# Patient Record
Sex: FEMALE | Race: WHITE | NOT HISPANIC OR LATINO | Employment: OTHER | URBAN - METROPOLITAN AREA
[De-identification: names, ages, dates, MRNs, and addresses within clinical notes are randomized per-mention and may not be internally consistent; named-entity substitution may affect disease eponyms.]

---

## 2017-01-17 ENCOUNTER — OFFICE VISIT (OUTPATIENT)
Dept: ORTHOPEDICS | Facility: CLINIC | Age: 77
End: 2017-01-17

## 2017-01-17 VITALS — BODY MASS INDEX: 23.49 KG/M2 | WEIGHT: 155 LBS | HEIGHT: 68 IN

## 2017-01-17 DIAGNOSIS — M19.041 OSTEOARTHRITIS OF FINGER, RIGHT: ICD-10-CM

## 2017-01-17 DIAGNOSIS — M25.641 JOINT STIFFNESS OF HAND, RIGHT: Primary | ICD-10-CM

## 2017-01-17 NOTE — NURSING NOTE
"Reason For Visit:   Chief Complaint   Patient presents with     Consult     Degenerative changes in Right hand. Referral Dr. Curran.        Primary MD: Jeramie Curran  Ref. MD: same    Age: 76 year old    ?  No      Ht 1.727 m (5' 8\")  Wt 70.308 kg (155 lb)  BMI 23.57 kg/m2      Pain Assessment  Patient Currently in Pain: Yes  0-10 Pain Scale: 2  Primary Pain Location: Hand  Pain Orientation: Right  Pain Descriptors: Aching, Discomfort  Alleviating Factors: Rest (avoiding what makes it worse)  Aggravating Factors: Movement    Hand Dominance Evaluation  Hand Dominance: Right      force  R hand pincher force: 3.629 kg (8 lb)  R hand  level 1 force: 20.412 kg (45 lb)  R hand  level 3 force: 24.948 kg (55 lb)  R hand  level 5 force: 18.144 kg (40 lb)  L hand pincher force: 3.629 kg (8 lb)  L hand   level 1 force: 15.876 kg (35 lb)  L hand  level 3 force: 20.412 kg (45 lb)  L hand  level 5 force: 18.144 kg (40 lb)    QuickDASH Assessment  QuickDASH Main 1/17/2017   1.Open a tight or new jar. Moderate difficulty   2. Do heavy household chores (e.g., wash walls, floors) Mild difficulty   3. Carry a shopping bag or briefcase. Mild difficulty   4. Wash your back. No difficulty   5. Use a knife to cut food. Mild difficulty   6. Recreational activities in which you take some force or impact through your arm, shoulder or hand (e.g., golf, hammering, tennis, etc.). Moderate difficulty   7. During the past week, to what extent has your arm, shoulder or hand problem interfered with your normal social activities with family, friends, neighbours or groups? Moderately   8. During the past week, were you limited in your work or other regular daily activities as a result of your arm, shoulder or hand problem? Slightly limited   9. Arm, shoulder or hand pain. Moderate   10.Tingling (pins and needles) in your arm,shoulder or hand. None   11. During the past week, how much difficulty have you " had sleeping because of the pain in your arm, shoulder or hand? ([place] number) Mild difficulty   Quickdash Ability Score 29.54          Current Outpatient Prescriptions   Medication Sig Dispense Refill     diltiazem (DILACOR XR) 240 MG 24 hr ER capsule Take 1 capsule (240 mg) by mouth daily 90 capsule 3     atorvastatin (LIPITOR) 10 MG tablet Take 1 tablet (10 mg) by mouth daily 90 tablet 3     omeprazole (PRILOSEC) 40 MG capsule Take 1 capsule (40 mg) by mouth daily as needed 90 capsule 3     diclofenac (VOLTAREN) 1 % GEL Apply 4 grams to knees or 2 grams to hands four times daily using enclosed dosing card. 100 g 1     clobetasol (TEMOVATE) 0.05 % ointment Apply sparingly to affected area 2 x daily for14 days. Then, apply small amount to affected area twice weekly for maintenance. 30 g 2     diphenhydrAMINE (BENADRYL) 25 MG tablet Take 1 tablet (25 mg) by mouth nightly as needed for itching or allergies 20 tablet 0     Probiotic Product (PROBIOTIC FORMULA PO) Take by mouth as needed       ascorbic acid (VITAMIN C) 500 MG tablet Take 500 mg by mouth as needed.       Cholecalciferol (VITAMIN D3) 1000 UNIT TABS Take 1 tablet by mouth daily.       ASPIRIN 81 MG OR TABS 1 tab po QD (Once per day) 100 3       Allergies   Allergen Reactions     Dilaudid [Hydromorphone]      dizziness     Senna Hives

## 2017-01-17 NOTE — Clinical Note
1/17/2017       RE: Shahana Donaldson  [address] [facility] [address]     Dear Colleague,    Thank you for referring your patient, Shahana Donaldson, to the [facility] at [facility]. Please see a copy of my visit note below.     Dictation on: 01/17/2017  2:20 PM by: ISHA AMAYA [[email or web address]]         CHIEF COMPLAINT:  Right hand changes by Dr. Curran.      HISTORY OF PRESENT ILLNESS:  Shahana Donaldson is a very pleasant 76-year-old female, right hand dominant who sustained a nonspecific injury to the right ring finger following a fall in mid-10/2016.  She was walking when she landed onto the back of her hand with a closed fist.  She had some swelling; this slowly worsened to the digits.  The swelling worsened ***she finally went to *** where they removed the *** and cut her ring off.  She then followed up with Dr. Curran PCP on 11/11/2016 where they obtained imaging.  Imaging revealed some very mild changes to the PIP joint of the ring finger and she was told to follow up with hand surgery.  Overall, she has been doing well.  Her only concern is she has some mild swelling and stiffness of the finger that is worse in the morning.  She denies any numbness or tingling.  She takes Ibuprofen and Tylenol occasionally  She has not tried any splinting and has done activity modifications as necessary to relieve her pain.       REVIEW OF SYSTEMS:  A 10-point review of systems otherwise negative unless as noted above in HPI.      QuickDASH assessment ore is 29.54.      PRIOR MEDICAL HISTORY:  Hypertension.  She does have diverticular disease ***  cholesterol.      PAST SURGICAL HISTORY:  Bilateral tubal ligation, ***excision in 1998, a Nissen procedure, hiatal hernia on 01/07/2013, hysterectomy *** 01/2014.      SOCIAL HISTORY:  The patient lives nearby.  She denies smoking and drinking.      ALLERGIES:  Dilaudid and Senna.       PHYSICAL  EXAMINATION:  The patient is alert and oriented x3, in no acute distress.  Breathing is regular and nonlabored.  Focused exam of the right hand reveals mild swelling with some rubor which is limited to the PIP joints.  At *** resting flexed position at rest by approximately 10-15 degrees at the PIP joints.  She has full range of motion available with extension at the DIP and MCP.  She is able to make a full composite fist and there are no signs of malrotation.  She has no signs of instability at full extension or 30 degrees of flexion of the PIP joints with both ulnar and radial deviation.  She has negative Claude's test, negative intrinsic and negative *** test.  She has no pain with palpation along the CMC over the volar or dorsal aspects.  She has negative grind test.      IMAGING:  Images obtained on 11/11/2016 revealed a very mild degenerative changes to the PIP joint of the ring finger.  There is minimal significant ***.  She does have moderate arthritis with osteophytes at the base of the CMC joints.      ASSESSMENT AND PLAN:  76-year-old right-hand dominant female with mild degenerative changes to the PIP joint of the ring finger with the joint stiffness.  She also has radiographic evidence of CMC arthritis on the right, but no evidence of clinical correlation.     We discussed at length with the patient regarding treatment recommendations.  As long as the patient continues to do well, we will plan to observe her or referral to occupational/hand therapy for *** program for right finger PIP joint stiffness.  We will plan on *** as the patient endorses *** .  Otherwise, we will have her follow up p.r.n. with Dr. Dotson.      The patient was seen and discussed with Dr. Dotson who agrees with the above assessment and plan.      Dictated by Charan Perdue MD, Resident   I have personally examined this patient and have reviewed the clinical presentation and progress note with the resident.  I agree with the  treatment plan as outlined.  The plan was formulated with the resident on the day of the resident's dictation.      Again, thank you for allowing me to participate in the care of your patient.      Sincerely,    Rosetta Dotson MD

## 2017-01-17 NOTE — PROGRESS NOTES
CHIEF COMPLAINT:  Right hand changes by Dr. Curran.      HISTORY OF PRESENT ILLNESS:  Shahana Donaldson is a very pleasant 76-year-old female, right hand dominant who sustained a nonspecific injury to the right ring finger following a fall in mid-10/2016.  She was walking when she landed onto the back of her hand with a closed fist.  She had some swelling; this slowly worsened to the digits.  The swelling worsened and she finally went to her PCP where they cut her ring off to allow for swelling.  She then followed up with Dr. Curran on 11/11/2016 where they obtained imaging.  Imaging revealed some very mild changes to the PIP joint of the ring finger and she was told to follow up with hand surgery.  Overall, she has been doing well.  Her only concern is she has some mild swelling and stiffness of the finger that is worse in the morning.  She denies any numbness or tingling.  She takes Ibuprofen and Tylenol occasionally  She has not tried any splinting and has done activity modifications as necessary to relieve her pain.       REVIEW OF SYSTEMS:  A 14-point review of systems was obtained per the digital intake process and is included at the completion of this note.     QuickDASH assessment ore is 29.54.      PAST MEDICAL HISTORY:  Hypertension.  She does have diverticular disease, high cholesterol.      PAST SURGICAL HISTORY:  Bilateral tubal ligation, a Nissen procedure, hiatal hernia on 01/07/2013, hysterectomy 01/2014.      SOCIAL HISTORY:  The patient lives nearby.  She denies smoking and drinking.      MEDICATIONS:   Current Outpatient Prescriptions   Medication     diltiazem (DILACOR XR) 240 MG 24 hr ER capsule     atorvastatin (LIPITOR) 10 MG tablet     omeprazole (PRILOSEC) 40 MG capsule     diclofenac (VOLTAREN) 1 % GEL     clobetasol (TEMOVATE) 0.05 % ointment     diphenhydrAMINE (BENADRYL) 25 MG tablet     Probiotic Product (PROBIOTIC FORMULA PO)     ascorbic acid (VITAMIN C) 500 MG tablet      Cholecalciferol (VITAMIN D3) 1000 UNIT TABS     ASPIRIN 81 MG OR TABS     No current facility-administered medications for this visit.     ALLERGIES:  Dilaudid and Senna.      FAMILY HISTORY: Reviewed and non-contributory.     PHYSICAL EXAMINATION:    BMI: 23.6.  Pain is 2 out of 10 on visual analog scale.  QUICKDASH: 29.54.   strengths: 45/55/40lbs right and 35/45/40lbs left.  Pinch strength is 8lbs bilaterally.  GENERAL: The patient is alert and oriented x3, in no acute distress.    HEENT: Head is normocepahlic and atraumatic.  Extraocular movements are intact.   NECK: Full range of motion without pain.  RESPIRATORY:Breathing is regular and nonlabored.    EXTREMITIES: Focused exam of the right hand reveals mild swelling with some rubor which is limited to the PIP joints.  A resting flexed position at approximately 10-15 degrees at the PIP joints.  She has full range of motion available with extension at the DIP and MCP.  She is able to make a full composite fist and there are no signs of malrotation.  She has no signs of instability at full extension or 30 degrees of flexion of the PIP joints with both ulnar and radial deviation.  She has negative Claude's test, negative intrinsic and negative Bunnel test.  She has no pain with palpation along the CMC over the volar or dorsal aspects.  She has negative grind test.      IMAGING:  Images obtained on 11/11/2016 revealed a very mild degenerative changes to the PIP joint of the ring finger.  She does have moderate arthritis with osteophytes at the base of the CMC joints.      ASSESSMENT AND PLAN:  76-year-old right-hand dominant female with mild degenerative changes to the PIP joint of the ring finger with the joint stiffness.  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     We discussed at length with the patient regarding treatment recommendations.  Intermittent anti-inflammatories are appropriate.  As long as  the patient continues to do well, we will plan to observe her or otherwise refer her to occupational/hand therapy for right finger PIP joint stiffness.  she will follow up p.r.n. with Dr. Dotson.      The patient was seen and discussed with Dr. Dotson who agrees with the above assessment and plan.      Dictated by Charan Perdue MD, Resident   I have personally examined this patient and have reviewed the clinical presentation and progress note with the resident.  I agree with the treatment plan as outlined.  The plan was formulated with the resident on the day of the resident's dictation.

## 2017-01-26 ENCOUNTER — MEDICAL CORRESPONDENCE (OUTPATIENT)
Dept: HEALTH INFORMATION MANAGEMENT | Facility: CLINIC | Age: 77
End: 2017-01-26

## 2017-01-26 ENCOUNTER — THERAPY VISIT (OUTPATIENT)
Dept: OCCUPATIONAL THERAPY | Facility: CLINIC | Age: 77
End: 2017-01-26
Payer: MEDICARE

## 2017-01-26 DIAGNOSIS — M25.641 STIFFNESS OF RIGHT HAND JOINT: Primary | ICD-10-CM

## 2017-01-26 PROCEDURE — 97165 OT EVAL LOW COMPLEX 30 MIN: CPT | Mod: GO | Performed by: OCCUPATIONAL THERAPIST

## 2017-01-26 PROCEDURE — G8985 CARRY GOAL STATUS: HCPCS | Mod: GO | Performed by: OCCUPATIONAL THERAPIST

## 2017-01-26 PROCEDURE — G8984 CARRY CURRENT STATUS: HCPCS | Mod: GO | Performed by: OCCUPATIONAL THERAPIST

## 2017-01-26 PROCEDURE — 97110 THERAPEUTIC EXERCISES: CPT | Mod: GO | Performed by: OCCUPATIONAL THERAPIST

## 2017-01-26 PROCEDURE — 29130 APPL FINGER SPLINT STATIC: CPT | Mod: GO | Performed by: OCCUPATIONAL THERAPIST

## 2017-01-26 NOTE — Clinical Note
"DEPARTMENT OF HEALTH AND HUMAN SERVICES  CENTERS FOR MEDICARE & MEDICAID SERVICES    PLAN/UPDATED PLAN OF PROGRESS FOR OUTPATIENT REHABILITATION    PATIENTS NAME:  Shahana Donaldson   : 1940  [facility]:  [id number]  [id number]   PROVIDER NAME: [facility]  MEDICAL RECORD NUMBER: [id number]   START OF CARE DATE:    SOC Date: 17   TYPE:  OT    PRIMARY/TREATMENT DIAGNOSIS: (Pertinent Medical Diagnosis)  Stiffness of right hand joint    VISITS FROM START OF CARE:  Rxs Used: 1     Hand Therapy Initial Evaluation  Current Date:  2017    Subjective:  Shahana (\"Pat\") EMMA Donaldson is a 76 year old right hand dominant female.  Diagnosis:   Right ring finger injury  DOI:  2016  Therapy referral:  17  Patient reports symptoms of pain, stiffness/loss of motion, weakness/loss of strength and edema of the right ring finger which occurred due to fall in 2016. Since onset symptoms are unchanged.  Special tests:  x-ray negative for fracture.  Previous treatment: None.  General health as reported by patient is good.  Pertinent medical history includes:osteoarthritis, high blood pressure, pain at rest/night, changes in skin color  Medical allergies:  See list in EMR.  Surgical history: other: vision, hysterectomy.  Medication history: high blood pressure, Lipitor.  Current occupation is Retired   Home Tasks: driving, lifting/carrying  Barriers include:none  Prior functional level:  no limitations  Additional Occupational Profile Information (patterns of daily living, interests, values and needs): None  Functional Outcome Measure:  Upper Extremity Functional Index  SCORE:   Column Totals: 60/80  (A lower score indicates greater disability.)    ROM:  Ring Finger 17   AROM(PROM) Right   MCP 0/90   PIP -30/110 (-25/)   DIP 0/35     Strength:   (Measured in pounds)    17   Trials Left Right   1  2  3 42  37  32 48+  44+  48+ "   Average: 37 47     Edema:  Circumference:  (Measured in cm)  Edema  1/26/17   Location: Left Right   P1 ring 6.1   PIP 5.5 6.3   P2 4.8 5.4   Sensation:  WNL throughout all nerve distributions; per patient report  Pain Report:  VAS(0-10) 1/26/17   Least: 2/10   Worst: 5/10   Location:  ring finger and PIP joint  Pain Quality:  Aching and Sharp  Frequency: intermittent sharp pain, constant ache  Pain is worst:  Daytime with use  Exacerbated by:  Gripping, lifting and overuse  Relieved by:  rest  Progression:  Unchanged  Assessment:  Patient presents with symptoms consistent with diagnosis of ring finger stiffness/PIP flexion contracture, with conservative intervention.   Patient's limitations or Problem List includes:  Pain, Decreased ROM/motion, Increased edema and Weakness of the right ring finger which interferes with the patient's ability to perform Self Care Tasks (dressing, eating), Sleep Patterns, Recreational Activities and Household Chores as compared to previous level of function.  Rehab Potential:  Excellent - Return to full activity, no limitations  Patient will benefit from skilled Occupational Therapy to increase ROM, flexibility and overall strength and decrease pain and edema to return to previous activity level and resume normal daily tasks and to reach their rehab potential.  Barriers to Learning:  No barrier  Communication Issues:  Patient appears to be able to clearly communicate and understand verbal and written communication and follow directions correctly.  Assessment of Occupational Performance:  1-3 Performance Deficits  Identified Performance Deficits: dressing, feeding, home establishment and management, meal preparation and cleanup, sleep and leisure activities    Clinical Decision Making (Complexity): Low complexity  Treatment Explanation:  The following has been discussed with the patient:  RX ordered/plan of care  Anticipated outcomes  Possible risks and side effects    PATIENTS NAME:  "Shahana Donaldson   : 1940  Plan:  Frequency:  1 X week, once daily  Duration:  for 2 months  Treatment Plan:    Modalities:  US and Paraffin  Therapeutic Exercise:  AROM, AAROM, PROM, Tendon Gliding, Blocking, Reverse Blocking, Extensor Tracking and Isotonics  Manual Techniques:  Joint mobilization  Orthotic Fabrication:  Static progressive and Dynamic Finger based orthoses  Discharge Plan:  Achieve all LTG.  Independent in home treatment program.  Reach maximal therapeutic benefit.    Home Exercise Program:  Warmth for stiffness  Ice as needed for pain after activity   AROM finger E/F  Tendon gliding with 3 fists  PIP and DIP blocking exercises  Reverse blocking for PIP extension  Tracking on table for PIP extension stretch  PROM composite finger flexion  Wear LMB orthosis 10-20 mins 3-4 x.day    Next Visit:  See in 1 week  Assess response to HEP and LMB  Consider US and cylinder cast for night       Caregiver Signature/Credentials ______________________________ Date ________      Treating Provider: Rosetta Henderson, CATHIER/L, CHT    I have reviewed and certified the need for these services and plan of treatment while under my care.        PHYSICIAN'S SIGNATURE:   _________________________________________  Date___________    Rosetta Dotson    Certification period: Beginning of Cert date period: 17 End of Cert period date: 17     Functional Level Progress Report: Please see attached \"Goal Flow sheet for Functional level.\"    ___X_____ Continue Services or       ________ DC Services                Service dates: SOC Date: 17  to present                                                                       "

## 2017-01-26 NOTE — Clinical Note
"DEPARTMENT OF HEALTH AND HUMAN SERVICES  CENTERS FOR MEDICARE & MEDICAID SERVICES    PLAN/UPDATED PLAN OF PROGRESS FOR OUTPATIENT REHABILITATION    PATIENTS NAME:  Shahana Donaldson   : 1940  [facility]:  [id number]  [id number]   PROVIDER NAME: [facility]  MEDICAL RECORD NUMBER: [id number]   START OF CARE DATE:    SOC Date: 17   TYPE:  OT  PRIMARY/TREATMENT DIAGNOSIS: (Pertinent Medical Diagnosis)  Stiffness of right hand joint  VISITS FROM START OF CARE:  Rxs Used: 1     Hand Therapy Initial Evaluation  Current Date:  2017    Subjective:  Shahana (\"Pat\") EMMA Donaldson is a 76 year old right hand dominant female.  Diagnosis:   Right ring finger injury  DOI:  2016  Therapy referral:  17  Patient reports symptoms of pain, stiffness/loss of motion, weakness/loss of strength and edema of the right ring finger which occurred due to fall in 2016. Since onset symptoms are unchanged.  Special tests:  x-ray negative for fracture.  Previous treatment: None.  General health as reported by patient is good.  Pertinent medical history includes:osteoarthritis, high blood pressure, pain at rest/night, changes in skin color  Medical allergies:  See list in EMR.  Surgical history: other: vision, hysterectomy.  Medication history: high blood pressure, Lipitor.  Current occupation is Retired   Home Tasks: driving, lifting/carrying  Barriers include:none  Prior functional level:  no limitations  Additional Occupational Profile Information (patterns of daily living, interests, values and needs): None  Functional Outcome Measure:  Upper Extremity Functional Index  SCORE:   Column Totals: 60/80  (A lower score indicates greater disability.)        ROM:  Ring Finger 17   AROM(PROM) Right   MCP 0/90   PIP -30/110 (-25/)   DIP 0/35     Strength:   (Measured in pounds)    17   Trials Left Right   1  2  3 42  37  32 48+  44+  48+ "   Average: 37 47     Edema:  Circumference:  (Measured in cm)  Edema  1/26/17   Location: Left Right   P1 ring 6.1   PIP 5.5 6.3   P2 4.8 5.4     Sensation:  WNL throughout all nerve distributions; per patient report  Pain Report:  VAS(0-10) 1/26/17   Least: 2/10   Worst: 5/10   Location:  ring finger and PIP joint  Pain Quality:  Aching and Sharp  Frequency: intermittent sharp pain, constant ache  Pain is worst:  Daytime with use  Exacerbated by:  Gripping, lifting and overuse  Relieved by:  rest  Progression:  Unchanged  Assessment:  Patient presents with symptoms consistent with diagnosis of ring finger stiffness/PIP flexion contracture, with conservative intervention.   Patient's limitations or Problem List includes:  Pain, Decreased ROM/motion, Increased edema and Weakness of the right ring finger which interferes with the patient's ability to perform Self Care Tasks (dressing, eating), Sleep Patterns, Recreational Activities and Household Chores as compared to previous level of function.  Rehab Potential:  Excellent - Return to full activity, no limitations  Patient will benefit from skilled Occupational Therapy to increase ROM, flexibility and overall strength and decrease pain and edema to return to previous activity level and resume normal daily tasks and to reach their rehab potential.  Barriers to Learning:  No barrier  Communication Issues:  Patient appears to be able to clearly communicate and understand verbal and written communication and follow directions correctly.  Assessment of Occupational Performance:  1-3 Performance Deficits  Identified Performance Deficits: dressing, feeding, home establishment and management, meal preparation and cleanup, sleep and leisure activities    Clinical Decision Making (Complexity): Low complexity  Treatment Explanation:  The following has been discussed with the patient:  RX ordered/plan of care  Anticipated outcomes  Possible risks and side effects  PATIENTS NAME:  "Shahana Donaldosn   : 1940  Plan:  Frequency:  1 X week, once daily  Duration:  for 2 months  Treatment Plan:    Modalities:  US and Paraffin  Therapeutic Exercise:  AROM, AAROM, PROM, Tendon Gliding, Blocking, Reverse Blocking, Extensor Tracking and Isotonics  Manual Techniques:  Joint mobilization  Orthotic Fabrication:  Static progressive and Dynamic Finger based orthoses  Discharge Plan:  Achieve all LTG.  Independent in home treatment program.  Reach maximal therapeutic benefit.    Home Exercise Program:  Warmth for stiffness  Ice as needed for pain after activity   AROM finger E/F  Tendon gliding with 3 fists  PIP and DIP blocking exercises  Reverse blocking for PIP extension  Tracking on table for PIP extension stretch  PROM composite finger flexion  Wear LMB orthosis 10-20 mins 3-4 x.day    Next Visit:  See in 1 week  Assess response to HEP and LMB  Consider US and cylinder cast for night         Caregiver Signature/Credentials ______________________________ Date ________      Treating Provider: Rosetta Henderson, CATHIER/L, CHT    I have reviewed and certified the need for these services and plan of treatment while under my care.        PHYSICIAN'S SIGNATURE:   _________________________________________  Date___________    Rosetta Dotson    Certification period: Beginning of Cert date period: 17 End of Cert period date: 17     Functional Level Progress Report: Please see attached \"Goal Flow sheet for Functional level.\"    ___X_____ Continue Services or       ________ DC Services                Service dates: SOC Date: 17  to present                                                                       "

## 2017-01-26 NOTE — PROGRESS NOTES
"Hand Therapy Initial Evaluation    Current Date:  1/26/2017    Subjective:  Shahana (\"Pat\") EMMA Donaldson is a 76 year old right hand dominant female.    Diagnosis:   Right ring finger injury  DOI:  October 2016  Therapy referral:  1/17/17    Patient reports symptoms of pain, stiffness/loss of motion, weakness/loss of strength and edema of the right ring finger which occurred due to fall in October 2016. Since onset symptoms are unchanged.  Special tests:  x-ray negative for fracture.  Previous treatment: None.  General health as reported by patient is good.  Pertinent medical history includes:osteoarthritis, high blood pressure, pain at rest/night, changes in skin color  Medical allergies:  See list in EMR.  Surgical history: other: vision, hysterectomy.  Medication history: high blood pressure, Lipitor.    Current occupation is Retired   Home Tasks: driving, lifting/carrying  Barriers include:none  Prior functional level:  no limitations    Additional Occupational Profile Information (patterns of daily living, interests, values and needs): None    Functional Outcome Measure:  Upper Extremity Functional Index  SCORE:   Column Totals: 60/80  (A lower score indicates greater disability.)    ROM:  Ring Finger 1/26/17   AROM(PROM) Right   MCP 0/90   PIP -30/110 (-25/)   DIP 0/35     Strength:   (Measured in pounds)    1/26/17   Trials Left Right   1  2  3 42  37  32 48+  44+  48+   Average: 37 47     Edema:  Circumference:  (Measured in cm)  Edema  1/26/17   Location: Left Right   P1 ring 6.1   PIP 5.5 6.3   P2 4.8 5.4     Sensation:  WNL throughout all nerve distributions; per patient report    Pain Report:  VAS(0-10) 1/26/17   Least: 2/10   Worst: 5/10   Location:  ring finger and PIP joint  Pain Quality:  Aching and Sharp  Frequency: intermittent sharp pain, constant ache  Pain is worst:  Daytime with use  Exacerbated by:  Gripping, lifting and overuse  Relieved by:  rest  Progression:  " Unchanged  Assessment:  Patient presents with symptoms consistent with diagnosis of ring finger stiffness/PIP flexion contracture, with conservative intervention.     Patient's limitations or Problem List includes:  Pain, Decreased ROM/motion, Increased edema and Weakness of the right ring finger which interferes with the patient's ability to perform Self Care Tasks (dressing, eating), Sleep Patterns, Recreational Activities and Household Chores as compared to previous level of function.    Rehab Potential:  Excellent - Return to full activity, no limitations    Patient will benefit from skilled Occupational Therapy to increase ROM, flexibility and overall strength and decrease pain and edema to return to previous activity level and resume normal daily tasks and to reach their rehab potential.    Barriers to Learning:  No barrier    Communication Issues:  Patient appears to be able to clearly communicate and understand verbal and written communication and follow directions correctly.    Assessment of Occupational Performance:  1-3 Performance Deficits  Identified Performance Deficits: dressing, feeding, home establishment and management, meal preparation and cleanup, sleep and leisure activities      Clinical Decision Making (Complexity): Low complexity    Treatment Explanation:  The following has been discussed with the patient:  RX ordered/plan of care  Anticipated outcomes  Possible risks and side effects    Plan:  Frequency:  1 X week, once daily  Duration:  for 2 months  Treatment Plan:    Modalities:  US and Paraffin  Therapeutic Exercise:  AROM, AAROM, PROM, Tendon Gliding, Blocking, Reverse Blocking, Extensor Tracking and Isotonics  Manual Techniques:  Joint mobilization  Orthotic Fabrication:  Static progressive and Dynamic Finger based orthoses    Discharge Plan:  Achieve all LTG.  Independent in home treatment program.  Reach maximal therapeutic benefit.    Home Exercise Program:  Warmth for  stiffness  Ice as needed for pain after activity   AROM finger E/F  Tendon gliding with 3 fists  PIP and DIP blocking exercises  Reverse blocking for PIP extension  Tracking on table for PIP extension stretch  PROM composite finger flexion  Wear LMB orthosis 10-20 mins 3-4 x.day    Next Visit:  See in 1 week  Assess response to HEP and LMB  Consider US and cylinder cast for night

## 2017-01-26 NOTE — Clinical Note
"DEPARTMENT OF HEALTH AND HUMAN SERVICES  CENTERS FOR MEDICARE & MEDICAID SERVICES    PLAN/UPDATED PLAN OF PROGRESS FOR OUTPATIENT REHABILITATION    PATIENTS NAME:  Shahana Donaldson   : 1940  PR[facility] NUMBER:  [id number]  [id number]   PROVIDER NAME: [facility]  MEDICAL RECORD NUMBER: [id number]   START OF CARE DATE:    SOC Date: 17   TYPE:  OT  PRIMARY/TREATMENT DIAGNOSIS: (Pertinent Medical Diagnosis)  Stiffness of right hand joint  VISITS FROM START OF CARE:  Rxs Used: 1     Hand Therapy Initial Evaluation  Current Date:  2017    Subjective:  Shahana (\"Pat\") EMMA Donaldson is a 76 year old right hand dominant female.  Diagnosis:   Right ring finger injury  DOI:  2016  Therapy referral:  17  Patient reports symptoms of pain, stiffness/loss of motion, weakness/loss of strength and edema of the right ring finger which occurred due to fall in 2016. Since onset symptoms are unchanged.  Special tests:  x-ray negative for fracture.  Previous treatment: None.  General health as reported by patient is good.  Pertinent medical history includes:osteoarthritis, high blood pressure, pain at rest/night, changes in skin color  Medical allergies:  See list in EMR.  Surgical history: other: vision, hysterectomy.  Medication history: high blood pressure, Lipitor.  Current occupation is Retired   Home Tasks: driving, lifting/carrying  Barriers include:none  Prior functional level:  no limitations  Additional Occupational Profile Information (patterns of daily living, interests, values and needs): None  Functional Outcome Measure:  Upper Extremity Functional Index  SCORE:   Column Totals: 60/80  (A lower score indicates greater disability.)    ROM:  Ring Finger 17   AROM(PROM) Right   MCP 0/90   PIP -30/110 (-25/)   DIP 0/35     Strength:   (Measured in pounds)    17   Trials Left Right   1  2  3 42  37  32 48+  44+  48+ "   Average: 37 47     Edema:  Circumference:  (Measured in cm)  Edema  1/26/17   Location: Left Right   P1 ring 6.1   PIP 5.5 6.3   P2 4.8 5.4     Sensation:  WNL throughout all nerve distributions; per patient report    Pain Report:  VAS(0-10) 1/26/17   Least: 2/10   Worst: 5/10   Location:  ring finger and PIP joint  Pain Quality:  Aching and Sharp  Frequency: intermittent sharp pain, constant ache  Pain is worst:  Daytime with use  Exacerbated by:  Gripping, lifting and overuse  Relieved by:  rest  Progression:  Unchanged  Assessment:  Patient presents with symptoms consistent with diagnosis of ring finger stiffness/PIP flexion contracture, with conservative intervention.   Patient's limitations or Problem List includes:  Pain, Decreased ROM/motion, Increased edema and Weakness of the right ring finger which interferes with the patient's ability to perform Self Care Tasks (dressing, eating), Sleep Patterns, Recreational Activities and Household Chores as compared to previous level of function.  Rehab Potential:  Excellent - Return to full activity, no limitations  Patient will benefit from skilled Occupational Therapy to increase ROM, flexibility and overall strength and decrease pain and edema to return to previous activity level and resume normal daily tasks and to reach their rehab potential.  Barriers to Learning:  No barrier  Communication Issues:  Patient appears to be able to clearly communicate and understand verbal and written communication and follow directions correctly.  Assessment of Occupational Performance:  1-3 Performance Deficits  Identified Performance Deficits: dressing, feeding, home establishment and management, meal preparation and cleanup, sleep and leisure activities    Clinical Decision Making (Complexity): Low complexity  Treatment Explanation:  The following has been discussed with the patient:  RX ordered/plan of care  Anticipated outcomes  Possible risks and side effects    PATIENTS  "NAME:  Shahana Donaldson   : 1940  Plan:  Frequency:  1 X week, once daily  Duration:  for 2 months  Treatment Plan:    Modalities:  US and Paraffin  Therapeutic Exercise:  AROM, AAROM, PROM, Tendon Gliding, Blocking, Reverse Blocking, Extensor Tracking and Isotonics  Manual Techniques:  Joint mobilization  Orthotic Fabrication:  Static progressive and Dynamic Finger based orthoses  Discharge Plan:  Achieve all LTG.  Independent in home treatment program.  Reach maximal therapeutic benefit.    Home Exercise Program:  Warmth for stiffness  Ice as needed for pain after activity   AROM finger E/F  Tendon gliding with 3 fists  PIP and DIP blocking exercises  Reverse blocking for PIP extension  Tracking on table for PIP extension stretch  PROM composite finger flexion  Wear LMB orthosis 10-20 mins 3-4 x.day    Next Visit:  See in 1 week  Assess response to HEP and LMB  Consider US and cylinder cast for night     Caregiver Signature/Credentials ______________________________ Date ________      Treating Provider: Rosetta Henderson, CATHIER/L, CHT    I have reviewed and certified the need for these services and plan of treatment while under my care.        PHYSICIAN'S SIGNATURE:   _________________________________________  Date___________    Rosetta Dotson    Certification period: Beginning of Cert date period: 17 End of Cert period date: 17     Functional Level Progress Report: Please see attached \"Goal Flow sheet for Functional level.\"    ___X_____ Continue Services or       ________ DC Services                Service dates: SOC Date: 17  to present                                                                       "

## 2017-02-02 ENCOUNTER — THERAPY VISIT (OUTPATIENT)
Dept: OCCUPATIONAL THERAPY | Facility: CLINIC | Age: 77
End: 2017-02-02
Payer: MEDICARE

## 2017-02-02 DIAGNOSIS — M25.641 STIFFNESS OF RIGHT HAND JOINT: Primary | ICD-10-CM

## 2017-02-02 PROCEDURE — 97110 THERAPEUTIC EXERCISES: CPT | Mod: GO | Performed by: OCCUPATIONAL THERAPIST

## 2017-02-02 PROCEDURE — 97140 MANUAL THERAPY 1/> REGIONS: CPT | Mod: GO | Performed by: OCCUPATIONAL THERAPIST

## 2017-02-02 NOTE — PROGRESS NOTES
SOAP note objective information for 2/2/2017:    ROM:  Ring Finger 1/26/17 2/2/17   AROM(PROM) Right Right   MCP 0/90 0/90   PIP  After Tx -30/110 (-25/) -20/110 (-15/)  -15 (-10/)   DIP 0/35 0/50     Home Exercise Program:  Warmth for stiffness  Ice as needed for pain after activity   AROM finger E/F  Tendon gliding with 3 fists  PIP and DIP blocking exercises  Reverse blocking for PIP extension  Tracking on table for PIP extension stretch  PROM composite finger flexion  Wear LMB orthosis 10-20 mins 3-4 x.day    Next Visit:  See in 1-2 weeks  Continue US, jt mob and ROM  Consider cylinder cast or ext gutter for night if extension does not continue to improve    Please refer to the daily flowsheet for treatment today, total treatment time and time spent performing 1:1 timed codes.

## 2017-02-15 ENCOUNTER — THERAPY VISIT (OUTPATIENT)
Dept: OCCUPATIONAL THERAPY | Facility: CLINIC | Age: 77
End: 2017-02-15
Payer: MEDICARE

## 2017-02-15 DIAGNOSIS — M25.641 STIFFNESS OF RIGHT HAND JOINT: ICD-10-CM

## 2017-02-15 PROCEDURE — 97140 MANUAL THERAPY 1/> REGIONS: CPT | Mod: GO | Performed by: OCCUPATIONAL THERAPIST

## 2017-02-15 PROCEDURE — 97110 THERAPEUTIC EXERCISES: CPT | Mod: GO | Performed by: OCCUPATIONAL THERAPIST

## 2017-02-15 PROCEDURE — 29086 APPLICATION CAST FINGER: CPT | Mod: GO | Performed by: OCCUPATIONAL THERAPIST

## 2017-02-15 NOTE — PROGRESS NOTES
SOAP note objective information for 2/15/2017:    ROM:  Ring Finger 1/26/17 2/2/17 2/15/17   AROM(PROM) Right Right Right   MCP 0/90 0/90 /90   PIP  After Tx -30/110 (-25/) -20/110 (-15/)  -15 (-10/) -20/110 (-15/)  -15/ (-10/)   DIP 0/35 0/50 0/60     Home Exercise Program:  Warmth for stiffness  Ice as needed for pain after activity   AROM finger E/F  Tendon gliding with 3 fists  PIP and DIP blocking exercises  Reverse blocking for PIP extension  Tracking on table for PIP extension stretch  PROM composite finger flexion  Wear LMB orthosis 10-20 mins 3-4 x/day  Wear cylinder cast sleeping    Next Visit:  See in 1-2 weeks  Continue US, jt mob and ROM  Assess response to cylinder cast for night, change cast as indciated    Please refer to the daily flowsheet for treatment today, total treatment time and time spent performing 1:1 timed codes.

## 2017-02-15 NOTE — MR AVS SNAPSHOT
After Visit Summary   2/15/2017    Shahana Donaldson    MRN: [id number]           Patient Information     Date Of Birth          1940        Visit Information        Provider Department      2/15/2017 11:00 AM Rosetta Hednerson [facility]        Today's Diagnoses     Stiffness of right hand joint           Follow-ups after your visit        Your next 10 appointments already scheduled     Mar 13, 2017  1:45 PM CDT   Return Visit with Sandra Gee MD   [facility] ([facility])    [facility] [address]  [address]  [facility] [address]   [phone]            Apr 11, 2017  8:00 AM CDT   RETURN GENERAL with Josue Trujillo MD   [facility] ([facility])    [address]  [address]  [facility] [address] [phone]              Who to contact     If you have questions or need follow up information about today's clinic visit or your schedule please contact [facility] directly at [phone].  Normal or non-critical lab and imaging results will be communicated to you by MyChart, letter or phone within 4 business days after the clinic has received the results. If you do not hear from us within 7 days, please contact the clinic through [employer] or phone. If you have a critical or abnormal lab result, we will notify you by phone as soon as possible.  Submit refill requests through [employer] [facility] or call your pharmacy and they will forward the refill request to us. Please allow 3 business days for your refill to be completed.          Additional Information About Your Visit        MyChart Information     [facility] gives you secure access to your electronic health record. If you see a primary care provider, you can also send messages to your care team and make appointments. If you have questions, please  call your primary care clinic.  If you do not have a primary care provider, please call [phone] and they will assist you.        Care EveryWhere ID     This is your Care EveryWhere ID. This could be used by other organizations to access your [place] medical records  [id number]         Blood Pressure from Last 3 Encounters:   11/11/16 (!) 163/93   06/27/16 166/87   06/06/16 (!) 179/91    Weight from Last 3 Encounters:   01/17/17 70.3 kg (155 lb)   11/11/16 69.9 kg (154 lb)   06/27/16 69.1 kg (152 lb 4.8 oz)              We Performed the Following     APPY FINGER CAST     MANUAL THER TECH,1+REGIONS,EA 15 MIN     THERAPEUTIC EXERCISES        Primary Care Provider Office Phone # Fax #    Jeramie Curran [phone]       [address] [address]        Thank you!     Thank you for choosing [facility] [place] [facility]  for your care. Our goal is always to provide you with excellent care. Hearing back from our patients is one way we can continue to improve our services. Please take a few minutes to complete the written survey that you may receive in the mail after your visit with us. Thank you!             Your Updated Medication List - Protect others around you: Learn how to safely use, store and throw away your medicines at www.disposemymeds.org.          This list is accurate as of: 2/15/17 11:30 AM.  Always use your most recent med list.                   Brand Name Dispense Instructions for use    ascorbic acid 500 MG tablet    VITAMIN C     Take 500 mg by mouth as needed.       aspirin 81 MG tablet     100    1 tab po QD (Once per day)       atorvastatin 10 MG tablet    LIPITOR    90 tablet    Take 1 tablet (10 mg) by mouth daily       cholecalciferol 1000 UNIT tablet    vitamin D     Take 1 tablet by mouth daily.       clobetasol 0.05 % ointment    TEMOVATE    30 g    Apply sparingly to affected area 2 x daily for14 days.  Then, apply small amount to affected area twice weekly for maintenance.       diclofenac 1 % Gel topical gel    VOLTAREN    100 g    Apply 4 grams to knees or 2 grams to hands four times daily using enclosed dosing card.       diltiazem 240 MG 24 hr capsule    DILACOR XR    90 capsule    Take 1 capsule (240 mg) by mouth daily       diphenhydrAMINE 25 MG tablet    BENADRYL    20 tablet    Take 1 tablet (25 mg) by mouth nightly as needed for itching or allergies       omeprazole 40 MG capsule    priLOSEC    90 capsule    Take 1 capsule (40 mg) by mouth daily as needed       PROBIOTIC FORMULA PO      Take by mouth as needed

## 2017-03-28 ENCOUNTER — OFFICE VISIT (OUTPATIENT)
Dept: INTERNAL MEDICINE | Facility: CLINIC | Age: 77
End: 2017-03-28

## 2017-03-28 ENCOUNTER — TELEPHONE (OUTPATIENT)
Dept: NURSING | Facility: CLINIC | Age: 77
End: 2017-03-28

## 2017-03-28 VITALS
WEIGHT: 154.8 LBS | RESPIRATION RATE: 16 BRPM | TEMPERATURE: 97.8 F | DIASTOLIC BLOOD PRESSURE: 82 MMHG | SYSTOLIC BLOOD PRESSURE: 134 MMHG | HEART RATE: 85 BPM | BODY MASS INDEX: 23.54 KG/M2

## 2017-03-28 DIAGNOSIS — R07.0 THROAT PAIN: Primary | ICD-10-CM

## 2017-03-28 DIAGNOSIS — R07.0 THROAT PAIN: ICD-10-CM

## 2017-03-28 LAB
DEPRECATED S PYO AG THROAT QL EIA: NORMAL
ERYTHROCYTE [DISTWIDTH] IN BLOOD BY AUTOMATED COUNT: 12.3 % (ref 10–15)
HCT VFR BLD AUTO: 43.1 % (ref 35–47)
HGB BLD-MCNC: 14.1 G/DL (ref 11.7–15.7)
MCH RBC QN AUTO: 31 PG (ref 26.5–33)
MCHC RBC AUTO-ENTMCNC: 32.7 G/DL (ref 31.5–36.5)
MCV RBC AUTO: 95 FL (ref 78–100)
MICRO REPORT STATUS: NORMAL
PLATELET # BLD AUTO: 205 10E9/L (ref 150–450)
RBC # BLD AUTO: 4.55 10E12/L (ref 3.8–5.2)
SPECIMEN SOURCE: NORMAL
WBC # BLD AUTO: 9 10E9/L (ref 4–11)

## 2017-03-28 ASSESSMENT — PAIN SCALES - GENERAL: PAINLEVEL: MILD PAIN (2)

## 2017-03-28 NOTE — MR AVS SNAPSHOT
After Visit Summary   3/28/2017    Shahana Donaldson    MRN: [id number]           Patient Information     Date Of Birth          1940        Visit Information        Provider Department      3/28/2017 9:45 AM Shama Concepcion, APRN CNP [facility]        Today's Diagnoses     Throat pain    -  1      Care Instructions    Primary Care Center Phone Number [phone]  Primary Care Center Medication Refill Request Information:  * Please contact your pharmacy regarding ANY request for medication refills.  ** [facility] Prescription Fax = [phone]  * Please allow 3 business days for routine medication refills.  * Please allow 5 business days for controlled substance medication refills.     Primary Care Center Test Result notification information:  *You will be notified with in 7-10 days of your appointment day regarding the results of your test.  If you are on MyChart you will be notified as soon as the provider has reviewed the results and signed off on them.          Self-Care for Sore Throats  Sore throats occur for many reasons, such as colds, allergies, and infections caused by viruses or bacteria. In any case, your throat becomes red and sore. Your goal for self-care is to reduce your discomfort while giving your throat a chance to heal.    Moisten and Soothe Your Throat    Try a sip of water first thing after waking up.    Keep your throat moist by drinking 6 or more glasses of clear liquids every day.    Run a cool-air humidifier in your room overnight.    Avoid cigarette smoke.     Suck on throat lozenges, cough drops, hard candy, ice chips, or frozen fruit-juice bars. Use the sugar-free versions if your diet or medical condition require them.  Gargle to Ease Irritation  Gargling every hour or 2 can ease irritation. Try gargling with 1 of these solutions:    1/4 teaspoon of salt in 1/2 cup of warm water    An over-the-counter anesthetic gargle  Use Medication for More  Relief  Over-the-counter medication can reduce sore throat symptoms. Ask your pharmacist if you have questions about which medication to use:    Ease pain with anesthetic sprays. Aspirin or an aspirin substitute also helps. Remember, never give aspirin to anyone 18 or younger, or if you are already taking blood thinners.     For sore throats caused by allergies, try antihistamines to block the allergic reaction.    Remember: unless a sore throat is caused by a bacterial infection, antibiotics won t help you.  Prevent Future Sore Throats    Stop smoking or reduce contact with secondhand smoke. Smoke irritates the tender throat lining.    Limit contact with pets and with allergy-causing substances, such as pollen and mold.    When you re around someone with a sore throat or cold, wash your hands frequently to keep viruses or bacteria from spreading.    Don t strain your vocal cords.  Call Your Health Care Provider  Contact your doctor if you have:    A temperature over 101 F (38.3 C)    White spots on the throat    Great difficulty swallowing    Trouble breathing    A skin rash    Recent exposure to someone else with strep bacteria    Severe hoarseness and swollen glands in the neck or jaw     0551-6007 [employer]. [address] [place] [address]. All rights reserved. This information is not intended as a substitute for professional medical care. Always follow your healthcare professional's instructions.              Follow-ups after your visit        Your next 10 appointments already scheduled     Apr 11, 2017  8:00 AM CDT   RETURN GENERAL with Josue Trujillo MD   [facility] Clinic ([facility])    [address]  [address] [facility]  9th [address] Clin [address]a  [facility] [address]   [phone]            Apr 17, 2017  3:45 PM CDT   Return Visit with Sandra Gee MD   [facility] ([facility])    [address] [facility] [address]  [address] [place] [id number] [phone]              Future tests that were ordered for you today     Open Future Orders        Priority Expected Expires Ordered    CBC with platelets Routine 3/28/2017 4/11/2017 3/28/2017            Who to contact     Please call your clinic at [phone] to:    Ask questions about your health    Make or cancel appointments    Discuss your medicines    Learn about your test results    Speak to your doctor   If you have compliments or concerns about an experience at your clinic, or if you wish to file a complaint, please contact [facility] Patient Relations at [phone] or email us at [email or web address]         Additional Information About Your Visit        [employer] Information     [employer] gives you secure access to your electronic health record. If you see a primary care provider, you can also send messages to your care team and make appointments. If you have questions, please call your primary care clinic.  If you do not have a primary care provider, please call [phone] and they will assist you.      [employer] is an electronic gateway that provides easy, online access to your medical records. With [employer], you can request a clinic appointment, read your test results, renew a prescription or communicate with your care team.     To access your existing account, please contact your [facility] or call [phone] for assistance.        Care EveryWhere ID     This is your Care EveryWhere ID. This could be used by other organizations to access your [facility] medical records  [id number]        Your Vitals Were     Pulse Temperature Respirations Breastfeeding? BMI (Body Mass Index)       85 97.8  F (36.6  C) (Oral) 16 No 23.54 kg/m2        Blood Pressure from Last 3 Encounters:   03/28/17 134/82   11/11/16 (!) 163/93   06/27/16 166/87    Weight from Last 3 Encounters:   03/28/17 70.2 kg  (154 lb 12.8 oz)   01/17/17 70.3 kg (155 lb)   11/11/16 69.9 kg (154 lb)              We Performed the Following     Rapid strep screen POCT     Rapid strep screen with reflex to culture        Primary Care Provider Office Phone # Fax #    Jeramie Curran [phone]       [facility] [address] [address]        Thank you!     Thank you for choosing [facility]  for your care. Our goal is always to provide you with excellent care. Hearing back from our patients is one way we can continue to improve our services. Please take a few minutes to complete the written survey that you may receive in the mail after your visit with us. Thank you!             Your Updated Medication List - Protect others around you: Learn how to safely use, store and throw away your medicines at www.disposemymeds.org.          This list is accurate as of: 3/28/17 10:50 AM.  Always use your most recent med list.                   Brand Name Dispense Instructions for use    ascorbic acid 500 MG tablet    VITAMIN C     Take 500 mg by mouth as needed.       aspirin 81 MG tablet     100    1 tab po QD (Once per day)       atorvastatin 10 MG tablet    LIPITOR    90 tablet    Take 1 tablet (10 mg) by mouth daily       cholecalciferol 1000 UNIT tablet    vitamin D     Take 1 tablet by mouth daily.       clobetasol 0.05 % ointment    TEMOVATE    30 g    Apply sparingly to affected area 2 x daily for14 days. Then, apply small amount to affected area twice weekly for maintenance.       diclofenac 1 % Gel topical gel    VOLTAREN    100 g    Apply 4 grams to knees or 2 grams to hands four times daily using enclosed dosing card.       diltiazem 240 MG 24 hr capsule    DILACOR XR    90 capsule    Take 1 capsule (240 mg) by mouth daily       diphenhydrAMINE 25 MG tablet    BENADRYL    20 tablet    Take 1 tablet (25 mg) by mouth nightly as needed for itching or allergies       omeprazole 40 MG  capsule    priLOSEC    90 capsule    Take 1 capsule (40 mg) by mouth daily as needed       PROBIOTIC FORMULA PO      Take by mouth as needed

## 2017-03-28 NOTE — PATIENT INSTRUCTIONS
Primary Care Center Phone Number [phone]  [facility] Medication Refill Request Information:  * Please contact your pharmacy regarding ANY request for medication refills.  ** [facility] Prescription Fax = [phone]  * Please allow 3 business days for routine medication refills.  * Please allow 5 business days for controlled substance medication refills.     [facility] Test Result notification information:  *You will be notified with in 7-10 days of your appointment day regarding the results of your test.  If you are on MyChart you will be notified as soon as the provider has reviewed the results and signed off on them.          Self-Care for Sore Throats  Sore throats occur for many reasons, such as colds, allergies, and infections caused by viruses or bacteria. In any case, your throat becomes red and sore. Your goal for self-care is to reduce your discomfort while giving your throat a chance to heal.    Moisten and Soothe Your Throat    Try a sip of water first thing after waking up.    Keep your throat moist by drinking 6 or more glasses of clear liquids every day.    Run a cool-air humidifier in your room overnight.    Avoid cigarette smoke.     Suck on throat lozenges, cough drops, hard candy, ice chips, or frozen fruit-juice bars. Use the sugar-free versions if your diet or medical condition require them.  Gargle to Ease Irritation  Gargling every hour or 2 can ease irritation. Try gargling with 1 of these solutions:    1/4 teaspoon of salt in 1/2 cup of warm water    An over-the-counter anesthetic gargle  Use Medication for More Relief  Over-the-counter medication can reduce sore throat symptoms. Ask your pharmacist if you have questions about which medication to use:    Ease pain with anesthetic sprays. Aspirin or an aspirin substitute also helps. Remember, never give aspirin to anyone 18 or younger, or if you are already taking blood thinners.     For sore throats caused by allergies, try  antihistamines to block the allergic reaction.    Remember: unless a sore throat is caused by a bacterial infection, antibiotics won t help you.  Prevent Future Sore Throats    Stop smoking or reduce contact with secondhand smoke. Smoke irritates the tender throat lining.    Limit contact with pets and with allergy-causing substances, such as pollen and mold.    When you re around someone with a sore throat or cold, wash your hands frequently to keep viruses or bacteria from spreading.    Don t strain your vocal cords.  Call Your Health Care Provider  Contact your doctor if you have:    A temperature over 101 F (38.3 C)    White spots on the throat    Great difficulty swallowing    Trouble breathing    A skin rash    Recent exposure to someone else with strep bacteria    Severe hoarseness and swollen glands in the neck or jaw     5736-6197 [employer]. [address] [place] [address], [place] [address]. All rights reserved. This information is not intended as a substitute for professional medical care. Always follow your healthcare professional's instructions.

## 2017-03-28 NOTE — NURSING NOTE
Chief Complaint   Patient presents with     Pharyngitis     pt is here to discuss sore throat, chills and dizziness x 1 week       Evelin Rodriguez CMA at 10:13 AM on 3/28/2017

## 2017-03-28 NOTE — TELEPHONE ENCOUNTER
"Call Type: Triage Call    Presenting Problem: Patient has a extreme \"sore throat\" and stuffy  head, has \"white spots\" on tonsils along with swelling. Triaged for  sore throat or hoarness/Disposition to see provider within 24 hours.  Triage Note:  Guideline Title: Sore Throat or Hoarseness  Recommended Disposition: See Provider within 24 hours  Original Inclination: Wanted to speak with a nurse  Override Disposition:  Intended Action: See Dr/Chintan Appt  Physician Contacted: No  Has enlarged tonsils covered by yellow-white patches ?  YES  Choking sensation, cannot swallow own saliva with associated drooling and soft  muffled voice ? NO  Exposure to irritants (smoke, fumes, gases, etc.) ? NO  Cannot open mouth fully due to severe pain ? NO  Marked difficulty swallowing due to sore throat unresponsive to 12 hours of home  care ? NO  Temperature of 101.5 F (38.6 C) or greater that has not responded to 24 hours of  home care measures ? NO  Severe breathing problems not related to nasal congestion ? NO  Coughed out foreign object after choking episode and NO further choking symptoms  (can now talk, no coughing, gasping, or wheezing) ? NO  Swallowing difficulty related to anything other than sore throat such as swallowed  toxic or caustic substance, mechanical abnormalities, or motility problems ? NO  New onset of stiff neck causing pain with forward head movement, severe  generalized headache, fever, or altered mental status ? NO  Non-itchy, red skin rash ? NO  Pregnant and has a temperature up to 100 F (37.7 C) that has not responded to 3  days of home care ? NO  Had negative rapid strep test but no throat culture; having continued symptoms of  sore throat, fever, swollen glands ? NO  Pregnant and has temperature 100F (37.7 C) or greater ? NO  Any temperature elevation in an immunocompromised individual OR frail elderly with  signs of dehydration ? NO  Physician Instructions:  Care Advice: Stay home until your temperature " returns to normal.  Antibiotics may be prescribed by a provider after confirmation of strep  throat or in a high risk situation, until a diagnosis is confirmed. Inform  provider of any previous reaction or sensitivity to penicillin.  Complete  entire course of antibiotics, even when feeling better, if ordered by your  provider.  To help prevent the spread of infection, do not share eating or drinking  utensils, personal care items like a toothbrush, or food.  Wash hands often  with soap and water or alcohol-based hand rub.  Avoid exposure to environmental irritants.  Do not smoke and avoid  second-hand smoke.  Avoid outside activities on high pollution days.  Instead of strong smelling commercial cleaning products, substitute vinegar  and lemon juice.  Drink more fluids -- water, low-sugar juices, tea and warm soup, especially  chicken broth, are options.  Avoid caffeinated or alcoholic beverages  because they can increase the chance of dehydration.  INFECTION CONTROL  CAUTIONS  HEALTH PROMOTION / MAINTENANCE  SYMPTOM / CONDITION MANAGEMENT  Call 911 if voice muffled, is unable to swallow own saliva and is drooling  or choking sensation.  Rest until symptoms improve.  If more than 20 weeks pregnant, lie on left  side when resting.  To help relieve nasal congestion, use nonprescription saline nasal spray  according to label instructions or as directed by a healthcare provider.  If pregnancy is known or suspected, get advice from provider before using  any nonprescription medication other than acetaminophen  Sore Throat Relief:   - Use salt water gargles (1/2 teaspoon salt in 8 oz.  [240mL] warm water) every one to two hours. - Use a vaporizer or cool mist  humidifier in the room when sleeping. - Suck on hard candy, nonprescription  or herbal throat lozenges (sugar-free if diabetic) - Eat soothing, soft  food/fluids (broths, soups, or honey and lemon juice in hot tea, Popsicles,  frozen yogurt or sherbet, scrambled  eggs, cooked cereals, Jell-O or  puddings) whichever is most comforting. - Avoid eating salty, spicy or  acidic foods.  Total water intake includes drinking water, water in beverages, and water  contained in food. Fluids make up about 80% of the body's total hydration  need. Individual fluid requirement to maintain hydration vary based on  physical activity, environmental factors and illness. Limit fluids that  contain sugar, caffeine, or alcohol. Urine will be very light yellow color  when you drink enough fluids.  Analgesic/Antipyretic Advice - NSAIDs: Consider aspirin, ibuprofen,  naproxen or ketoprofen for pain or fever as directed on label or by  pharmacist/provider. PRECAUTIONS: - You should not take this medicine for  more than 10 days unless recommended by your provider. EXCEPTIONS: - Should  not be used if taking blood thinners or have bleeding problems. - Do not  use if have history of sensitivity/allergy to any of these medications  or history of cardiovascular, ulcer, kidney, liver disease or diabetes  unless approved by provider. - Do not exceed recommended dose or frequency.  Analgesic/Antipyretic Advice - Acetaminophen: Consider acetaminophen as  directed on label or by pharmacist/provider for pain or fever. PRECAUTIONS:  - Use if there is no history of liver disease, alcoholism, or intake of  three or more alcohol drinks per day - Only if approved by provider during  pregnancy or when breastfeeding - Do not exceed recommended dose or  frequency. Do not take more than 3000 milligrams (mg) in 24 hours. Do not  take this medicine for more than 10 days unless recommended by your  provider. - During pregnancy, acetaminophen should not be taken more than 3  consecutive days without telling provider - To make sure you don't take too  much, check other medicines you take to see if they also contain  acetaminophen.

## 2017-03-28 NOTE — PROGRESS NOTES
HPI: Shahana Donaldson is a 76 year old female who comes in accompanied by her  for congestion and sore throat.  She noted one week ago onset of feeling fatigued, dizzy,head congestion, nasal congestion.  Now has throat pain which she rates as a 2-3/10 with increasing symptoms of throat scratchiness.  She asked her  to look in her throat and he said it was red with white spots.  Her concern is that she and her immune-compromised sister are suppose to attend a retreat together this weekend and she does not want to expose her sister to an illness.   She admits her appetite is fine.  She has a mild headache.She has been using zinc lozenges, throat lozenges, vitamin C, ginger candy.    Denies fever, ear pain.       Patient Active Problem List   Diagnosis     Essential hypertension, benign     Mixed hyperlipidemia     Symptomatic menopausal or female climacteric states     Diverticulosis of large intestine     Hyperlipidemia     Coronary atherosclerosis     Esophageal reflux     vulvar cyst     Hiatal hernia     S/P Nissen fundoplication (without gastrostomy tube) procedure     Advance care planning     History of tubal ligation     History of dilatation and curettage     History of hysterectomy     Lichen sclerosus     Right knee pain     Stiffness of right hand joint       She has a medical hx of  does not have any pertinent problems on file.    Current Outpatient Prescriptions   Medication Sig Dispense Refill     diltiazem (DILACOR XR) 240 MG 24 hr ER capsule Take 1 capsule (240 mg) by mouth daily 90 capsule 3     atorvastatin (LIPITOR) 10 MG tablet Take 1 tablet (10 mg) by mouth daily 90 tablet 3     omeprazole (PRILOSEC) 40 MG capsule Take 1 capsule (40 mg) by mouth daily as needed 90 capsule 3     diclofenac (VOLTAREN) 1 % GEL Apply 4 grams to knees or 2 grams to hands four times daily using enclosed dosing card. 100 g 1     clobetasol (TEMOVATE) 0.05 % ointment Apply sparingly to affected area 2  x daily for14 days. Then, apply small amount to affected area twice weekly for maintenance. 30 g 2     diphenhydrAMINE (BENADRYL) 25 MG tablet Take 1 tablet (25 mg) by mouth nightly as needed for itching or allergies 20 tablet 0     Probiotic Product (PROBIOTIC FORMULA PO) Take by mouth as needed       ascorbic acid (VITAMIN C) 500 MG tablet Take 500 mg by mouth as needed.       Cholecalciferol (VITAMIN D3) 1000 UNIT TABS Take 1 tablet by mouth daily.       ASPIRIN 81 MG OR TABS 1 tab po QD (Once per day) 100 3         ALLERGIES: Dilaudid [hydromorphone] and Senna    PAST MEDICAL HX:   Past Medical History:   Diagnosis Date     Diverticulosis of colon (without mention of hemorrhage)      Essential hypertension, benign      Gastro-oesophageal reflux disease     nissen     Hemorrhoid      Nonsenile cataract      Osteoporosis      Other and unspecified hyperlipidemia      Rectocele      Symptomatic menopausal or female climacteric states        PAST SURGICAL HX:   Past Surgical History:   Procedure Laterality Date     C NONSPECIFIC PROCEDURE      Bilateral Tubal Ligation     C NONSPECIFIC PROCEDURE      D&C secondary to menorrhagia     C NONSPECIFIC PROCEDURE      child birth x 2     COLONOSCOPY  5/24/2011    Procedure:COLONOSCOPY; Surgeon:HALINA SCOTT; Location:[facility]     COLONOSCOPY Left 11/10/2015    Procedure: COLONOSCOPY;  Surgeon: Raheem Colunga MD;  Location: [facility]     GYN SURGERY      hysterectomy/oopherectomy; done for prolapse     LAPAROSCOPIC NISSEN FUNDOPLICATION  1/7/2013    Procedure: LAPAROSCOPIC NISSEN FUNDOPLICATION;  Laparoscopic Repair of Hiatel Hernia, NISSEN, Esophagoscopy, Gastroscopy And Duodenoscopy ;  Surgeon: Leonidas Valle MD;  Location: [facility]       IMMUNIZATION HX:   Immunization History   Administered Date(s) Administered     Influenza (High Dose) 3 valent vaccine 10/07/2014, 11/11/2016     Influenza (IIV3) 11/16/1998, 12/15/2005, 11/07/2006, 10/14/2008, 10/20/2010,  10/25/2011, 09/19/2012, 10/22/2013, 10/15/2015     Pneumococcal (PCV 13) 05/18/2015     TD (ADULT, 7+) 03/13/1997, 10/10/2006     TDAP Vaccine (Boostrix) 11/11/2016     Zoster vaccine, live 10/20/2010       SOCIAL HX:   Social History     Social History Narrative    The patient has a 0 pk yr tobacco hx.  She has no active use.  Alcohol use is 0 alcoholic drinks per week.  She denies use of recreational drugs.          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         ROS: 8 system ROS reviewed w/o changes except for above      OBJECTIVE:  /82  Pulse 85  Temp 97.8  F (36.6  C) (Oral)  Resp 16  Wt 70.2 kg (154 lb 12.8 oz)  Breastfeeding? No  BMI 23.54 kg/m2   Wt Readings from Last 1 Encounters:   03/28/17 70.2 kg (154 lb 12.8 oz)     Constitutional: no distress, comfortable, pleasant   Eyes: anicteric  Ears, Nose and Throat: tympanic membranes clear, nose clear and free of lesions, pharynx red. I did not see any white spots.    Neck: supple with full range of motion, no lymphadenopathy.   Cardiovascular: regular rate and rhythm, normal S1 and S2, no murmurs, rubs or gallops, peripheral pulses full and symmetric   Respiratory: clear to auscultation, no wheezes or crackles, normal breath sounds   Skin: no concerning lesions, no jaundice, temp normal   Neurological:normal speech, no tremor   Psychological: appropriate mood   LYMPH: cervical,  supraclavicular, infraclavicular  nodes.    ASSESSMENT/PLAN:  Shahana was seen today for pharyngitis.    Diagnoses and all orders for this visit:    Throat pain    -     Rapid strep screen with reflex to culture  -     CBC with platelets; Future  -     Beta strep group A culture    She was phoned with normal CBC and rapid strep test result.   She will be notified of culture result when available.    Advised her to continue current comfort measures and take Benadryl at hs for post nasal drainage.     Total time spent 25 minutes.  More than 50%  of the time spent with Ms. Donaldson on counseling / coordinating her care    Shama TORRES CNP

## 2017-03-30 LAB
BACTERIA SPEC CULT: NORMAL
Lab: NORMAL
MICRO REPORT STATUS: NORMAL
SPECIMEN SOURCE: NORMAL

## 2017-04-11 ENCOUNTER — OFFICE VISIT (OUTPATIENT)
Dept: OPHTHALMOLOGY | Facility: CLINIC | Age: 77
End: 2017-04-11
Attending: OPHTHALMOLOGY
Payer: MEDICARE

## 2017-04-11 DIAGNOSIS — H53.001 AMBLYOPIA OF RIGHT EYE: Primary | ICD-10-CM

## 2017-04-11 DIAGNOSIS — H26.9 CATARACT: ICD-10-CM

## 2017-04-11 DIAGNOSIS — H54.40 BLIND RIGHT EYE: ICD-10-CM

## 2017-04-11 ASSESSMENT — REFRACTION_WEARINGRX
OD_SPHERE: -2.00
OS_AXIS: 158
OS_ADD: +2.75
OS_SPHERE: -1.00
OD_ADD: +2.75
OD_CYLINDER: +1.25
OS_CYLINDER: +1.50
OD_AXIS: 180

## 2017-04-11 ASSESSMENT — VISUAL ACUITY
CORRECTION_TYPE: GLASSES
METHOD: SNELLEN - LINEAR
OS_CC+: +1
OS_CC: J1+
OD_CC: 5'/200E
OS_PH_CC: 20/25-2
OS_CC: 20/30

## 2017-04-11 ASSESSMENT — REFRACTION_MANIFEST
OD_SPHERE: -2.00
OS_CYLINDER: +1.75
OD_CYLINDER: +1.25
OS_SPHERE: -1.75
OD_ADD: +3.00
OS_ADD: +3.00
OD_AXIS: 180
OS_AXIS: 158

## 2017-04-11 ASSESSMENT — CONF VISUAL FIELD
OD_NORMAL: 1
OS_NORMAL: 1

## 2017-04-11 ASSESSMENT — EXTERNAL EXAM - LEFT EYE: OS_EXAM: NORMAL

## 2017-04-11 ASSESSMENT — CUP TO DISC RATIO: OS_RATIO: 0.3

## 2017-04-11 ASSESSMENT — TONOMETRY
OS_IOP_MMHG: 13
IOP_METHOD: TONOPEN
OD_IOP_MMHG: 13

## 2017-04-11 ASSESSMENT — SLIT LAMP EXAM - LIDS
COMMENTS: NORMAL
COMMENTS: NORMAL

## 2017-04-11 ASSESSMENT — EXTERNAL EXAM - RIGHT EYE: OD_EXAM: NORMAL

## 2017-04-11 NOTE — MR AVS SNAPSHOT
After Visit Summary   4/11/2017    Shahana Donaldson    MRN: [id number]           Patient Information     Date Of Birth          1940        Visit Information        Provider Department      4/11/2017 8:00 AM Josue Trujillo [facility]        Today's Diagnoses     Amblyopia of right eye    -  1    Staphyloma of right eye        Blind right eye        Cataract - Both Eyes           Follow-ups after your visit        Follow-up notes from your care team     Return in about 1 year (around 4/11/2018) for Annual Visit.      Your next 10 appointments already scheduled     Apr 17, 2017  3:45 PM CDT   Return Visit with Sandra Gee MD   [facility] ([facility])    [facility]  [address]  [address]  [facility] [address] [phone]              Who to contact     Please call your clinic at [phone] to:    Ask questions about your health    Make or cancel appointments    Discuss your medicines    Learn about your test results    Speak to your doctor   If you have compliments or concerns about an experience at your clinic, or if you wish to file a complaint, please contact [facility] Patient Relations at [phone] or email us at [email or web address]         Additional Information About Your Visit        MyChart Information     [employer] gives you secure access to your electronic health record. If you see a primary care provider, you can also send messages to your care team and make appointments. If you have questions, please call your primary care clinic.  If you do not have a primary care provider, please call [phone] and they will assist you.      [employer] is an electronic gateway that provides easy, online access to your medical records. With [employer], you can request a clinic appointment, read your test results, renew a prescription or communicate with your care team.      To access your existing account, please contact your [facility] or call [phone] for assistance.        Care EveryWhere ID     This is your Care EveryWhere ID. This could be used by other organizations to access your [place] medical records  [id number]         Blood Pressure from Last 3 Encounters:   03/28/17 134/82   11/11/16 (!) 163/93   06/27/16 166/87    Weight from Last 3 Encounters:   03/28/17 70.2 kg (154 lb 12.8 oz)   01/17/17 70.3 kg (155 lb)   11/11/16 69.9 kg (154 lb)              Today, you had the following     No orders found for display       Primary Care Provider Office Phone # Fax #    Jeramie Curran [phone]       [address] [address]        Thank you!     Thank you for choosing [facility]  for your care. Our goal is always to provide you with excellent care. Hearing back from our patients is one way we can continue to improve our services. Please take a few minutes to complete the written survey that you may receive in the mail after your visit with us. Thank you!             Your Updated Medication List - Protect others around you: Learn how to safely use, store and throw away your medicines at www.disposemymeds.org.          This list is accurate as of: 4/11/17 10:25 AM.  Always use your most recent med list.                   Brand Name Dispense Instructions for use    ascorbic acid 500 MG tablet    VITAMIN C     Take 500 mg by mouth as needed.       aspirin 81 MG tablet     100    1 tab po QD (Once per day)       atorvastatin 10 MG tablet    LIPITOR    90 tablet    Take 1 tablet (10 mg) by mouth daily       cholecalciferol 1000 UNIT tablet    vitamin D     Take 1 tablet by mouth daily.       clobetasol 0.05 % ointment    TEMOVATE    30 g    Apply sparingly to affected area 2 x daily for14 days. Then, apply small amount to affected area twice weekly for maintenance.       diclofenac 1 % Gel  topical gel    VOLTAREN    100 g    Apply 4 grams to knees or 2 grams to hands four times daily using enclosed dosing card.       diltiazem 240 MG 24 hr capsule    DILACOR XR    90 capsule    Take 1 capsule (240 mg) by mouth daily       diphenhydrAMINE 25 MG tablet    BENADRYL    20 tablet    Take 1 tablet (25 mg) by mouth nightly as needed for itching or allergies       omeprazole 40 MG capsule    priLOSEC    90 capsule    Take 1 capsule (40 mg) by mouth daily as needed       PROBIOTIC FORMULA PO      Take by mouth as needed

## 2017-04-11 NOTE — PROGRESS NOTES
CC: Annual Exam    HPI:  Shahana Donaldson is a 76 year old female who presents for annual comprehensive exam. 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 She reports some blurriness at distance. Reading has been ok. She reports using tinted glasses at night which improves her glare symptoms at night, especially when raining.    Assessment & Plan      Shahana Donaldson is a 76 year old female with the following diagnoses:   1. Amblyopia of right eye    2. Staphyloma of right eye    3. Blind right eye    4. Cataract - Both Eyes       -Long standing amblyopia right eye.  No previous treatments.  Stable vision per patient  Recommend continued full-time protective eyeware    -Cataract, right eye>left  Early visual significance left eye   Likely no benefit from right eye cataract extraction.    Recommend monitoring left eye for now, recheck yearly.  Glasses prescription updated, monocular precautions reviewed      Patient disposition:   Return in about 1 year (around 4/11/2018) for Annual Visit.      Jose Lombardo MD  PGY2, Dept of Ophthalmology  Pager [phone]

## 2017-04-17 ENCOUNTER — OFFICE VISIT (OUTPATIENT)
Dept: OBGYN | Facility: CLINIC | Age: 77
End: 2017-04-17
Attending: OBSTETRICS & GYNECOLOGY
Payer: MEDICARE

## 2017-04-17 VITALS
DIASTOLIC BLOOD PRESSURE: 91 MMHG | SYSTOLIC BLOOD PRESSURE: 153 MMHG | HEART RATE: 67 BPM | BODY MASS INDEX: 23.7 KG/M2 | WEIGHT: 155.9 LBS

## 2017-04-17 DIAGNOSIS — L90.0 LICHEN SCLEROSUS: ICD-10-CM

## 2017-04-17 PROCEDURE — 99212 OFFICE O/P EST SF 10 MIN: CPT | Mod: ZF

## 2017-04-17 RX ORDER — CLOBETASOL PROPIONATE 0.5 MG/G
OINTMENT TOPICAL
Qty: 30 G | Refills: 2 | Status: SHIPPED | OUTPATIENT
Start: 2017-04-17

## 2017-04-17 NOTE — NURSING NOTE
Chief Complaint   Patient presents with     Follow Up For     Follow up LS and breast exam       See TATO Herndon 4/17/2017

## 2017-04-17 NOTE — PROGRESS NOTES
Shahana is a 76 year old female  postmenopausal female with PMH of BSO with total hysterectomy that presents today for f/u of her Vulvar Lichen Sclerosis.    HPI:  Patient has hx of Vulvar Lichen Sclerosis s/p 2 biopsy procedures last seen by OB-GYN 2015. She takes Clobetasol approximately 3x/week with normally good control. Over past 2 weeks patient has noted increased whiteness in vulvar region. She has been taking clobetasol 2x/day for 1.5 weeks and states this has helped her symptoms. She feels this flare is starting to resolve. Has concerns today for her risk of developing squamous cell carcinoma with lichen sclerosis and wonders what are worrisome signs she should be looking for.    Patient does follow with dermatology who monitors lichen sclerosis annually as well.    ROS:  Negative except as noted above in HPI    PROBLEM LIST:  Patient Active Problem List   Diagnosis     Essential hypertension, benign     Mixed hyperlipidemia     Symptomatic menopausal or female climacteric states     Diverticulosis of large intestine     Hyperlipidemia     Coronary atherosclerosis     Esophageal reflux     vulvar cyst     Hiatal hernia     S/P Nissen fundoplication (without gastrostomy tube) procedure     Advance care planning     History of tubal ligation     History of dilatation and curettage     History of hysterectomy     Lichen sclerosus     Right knee pain     Stiffness of right hand joint       OB/GYN HISTORY:   Obstetric History       T2      TAB0   SAB1   E0   M0   L2       # Outcome Date GA Lbr Erickson/2nd Weight Sex Delivery Anes PTL Lv   3 SAB            2 Term            1 Term                   PAST MEDICAL HISTORY:  Past Medical History:   Diagnosis Date     Diverticulosis of colon (without mention of hemorrhage)      Essential hypertension, benign      Gastro-oesophageal reflux disease     nissen     Hemorrhoid      Nonsenile cataract      Osteoporosis      Other and unspecified  hyperlipidemia      Rectocele      Symptomatic menopausal or female climacteric states        PAST SURGICAL HISTORY:  Past Surgical History:   Procedure Laterality Date     C NONSPECIFIC PROCEDURE      Bilateral Tubal Ligation     C NONSPECIFIC PROCEDURE      D&C secondary to menorrhagia     C NONSPECIFIC PROCEDURE      child birth x 2     COLONOSCOPY  5/24/2011    Procedure:COLONOSCOPY; Surgeon:HALINA SCOTT; Location: GI     COLONOSCOPY Left 11/10/2015    Procedure: COLONOSCOPY;  Surgeon: Raheem Colunga MD;  Location:  GI     GYN SURGERY      hysterectomy/oopherectomy; done for prolapse     LAPAROSCOPIC NISSEN FUNDOPLICATION  1/7/2013    Procedure: LAPAROSCOPIC NISSEN FUNDOPLICATION;  Laparoscopic Repair of Hiatel Hernia, NISSEN, Esophagoscopy, Gastroscopy And Duodenoscopy ;  Surgeon: Leonidas Valle MD;  Location:  OR       FAMILY HISTORY:  Family History   Problem Relation Age of Onset     Hypertension Father      C.A.D. Father      Hypertension Mother      Breast Cancer Sister      EYE* Brother      Keratoconus     Glaucoma No family hx of      Macular Degeneration No family hx of        SOCIAL HISTORY:  Social History     Social History     Marital status:      Spouse name: N/A     Number of children: N/A     Years of education: N/A     Occupational History     Not on file.     Social History Main Topics     Smoking status: Never Smoker     Smokeless tobacco: Never Used     Alcohol use No     Drug use: No     Sexual activity: Yes     Partners: Male     Birth control/ protection: Post-menopausal     Other Topics Concern     Not on file     Social History Narrative    The patient has a 0 pk yr tobacco hx.  She has no active use.  Alcohol use is 0 alcoholic drinks per week.  She denies use of recreational drugs.          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           MEDICATIONS:  Current Outpatient Prescriptions   Medication Sig Dispense Refill      clobetasol (TEMOVATE) 0.05 % ointment Apply sparingly to affected area 2 x daily for14 days. Then, apply small amount to affected area twice weekly for maintenance. 30 g 2     diltiazem (DILACOR XR) 240 MG 24 hr ER capsule Take 1 capsule (240 mg) by mouth daily 90 capsule 3     atorvastatin (LIPITOR) 10 MG tablet Take 1 tablet (10 mg) by mouth daily 90 tablet 3     omeprazole (PRILOSEC) 40 MG capsule Take 1 capsule (40 mg) by mouth daily as needed 90 capsule 3     diclofenac (VOLTAREN) 1 % GEL Apply 4 grams to knees or 2 grams to hands four times daily using enclosed dosing card. 100 g 1     diphenhydrAMINE (BENADRYL) 25 MG tablet Take 1 tablet (25 mg) by mouth nightly as needed for itching or allergies 20 tablet 0     Probiotic Product (PROBIOTIC FORMULA PO) Take by mouth as needed       ascorbic acid (VITAMIN C) 500 MG tablet Take 500 mg by mouth as needed.       Cholecalciferol (VITAMIN D3) 1000 UNIT TABS Take 1 tablet by mouth daily.       ASPIRIN 81 MG OR TABS 1 tab po QD (Once per day) 100 3       ALLERGIES:  Dilaudid [hydromorphone] and Senna    VITALS:  Blood pressure (!) 153/91, pulse 67, weight 70.7 kg (155 lb 14.4 oz), not currently breastfeeding.     Pelvic Exam:   External genitalia: Loss of architecture with fusion of labia minora and majora, R > L. Fusion of clitoral mishra. Biopsy scar present at 9 o'clock position. Unclear if 2nd biopsy scar at 4 o'clock position with some white color changes present.   Urethral meatus: normal   Urethra: no tenderness or scarring  Vagina: atrophic, thin, with physiologic/pink tinged discharge.   Uterus: surgically absent  Rectum: anus normal    A/P  Shahana is a 76 year old female  postmenopausal female with PMH of BSO with total hysterectomy with lichen sclerosis changes of vulvar region.    Lichen Sclerosis:   - Clobetasol ointment 2x daily for 14 days, then apply small amount to affected area twice weekly for maintentance  - F/u in 6 weeks, will consider bx  of lesion at 4 o'clock position if not improved    Patient verbalized understanding of assessment and plan    I, Félix William, MS3, scribed this note in the presence of Dr. Gee based on hx, physical exam, and discussion with the patient.    I agree with the PFSH and ROS as completed by the MS, except for changes made by me. The remainder of the encounter was performed by me and scribed by the MS. The scribed note accurately reflects my personal services and the decisions made by me.    Sandra Gee MD, FACOG  [facility]  OB/GYN    4/18/2017  10:28 AM            Consider bx 4 oclock if burst steroid is not improved.

## 2017-04-17 NOTE — LETTER
2017       RE: Shahana Donaldson  [address] [facility] [address]     Dear Colleague,    Thank you for referring your patient, Shahana Donaldson, to the [facility] at [facility]. Please see a copy of my visit note below.    Shahana is a 76 year old female  postmenopausal female with PMH of BSO with total hysterectomy that presents today for f/u of her Vulvar Lichen Sclerosis.    HPI:  Patient has hx of Vulvar Lichen Sclerosis s/p 2 biopsy procedures last seen by OB-GYN 2015. She takes Clobetasol approximately 3x/week with normally good control. Over past 2 weeks patient has noted increased whiteness in vulvar region. She has been taking clobetasol 2x/day for 1.5 weeks and states this has helped her symptoms. She feels this flare is starting to resolve. Has concerns today for her risk of developing squamous cell carcinoma with lichen sclerosis and wonders what are worrisome signs she should be looking for.    Patient does follow with dermatology who monitors lichen sclerosis annually as well.    ROS:  Negative except as noted above in HPI    PROBLEM LIST:  Patient Active Problem List   Diagnosis     Essential hypertension, benign     Mixed hyperlipidemia     Symptomatic menopausal or female climacteric states     Diverticulosis of large intestine     Hyperlipidemia     Coronary atherosclerosis     Esophageal reflux     vulvar cyst     Hiatal hernia     S/P Nissen fundoplication (without gastrostomy tube) procedure     Advance care planning     History of tubal ligation     History of dilatation and curettage     History of hysterectomy     Lichen sclerosus     Right knee pain     Stiffness of right hand joint       OB/GYN HISTORY:   Obstetric History       T2      TAB0   SAB1   E0   M0   L2       # Outcome Date GA Lbr Erickson/2nd Weight Sex Delivery Anes PTL Lv   3 SAB            2 Term            1 Term                    PAST MEDICAL HISTORY:  Past Medical History:   Diagnosis Date     Diverticulosis of colon (without mention of hemorrhage)      Essential hypertension, benign      Gastro-oesophageal reflux disease     nissen     Hemorrhoid      Nonsenile cataract      Osteoporosis      Other and unspecified hyperlipidemia      Rectocele      Symptomatic menopausal or female climacteric states        PAST SURGICAL HISTORY:  Past Surgical History:   Procedure Laterality Date     C NONSPECIFIC PROCEDURE      Bilateral Tubal Ligation     C NONSPECIFIC PROCEDURE      D&C secondary to menorrhagia     C NONSPECIFIC PROCEDURE      child birth x 2     COLONOSCOPY  5/24/2011    Procedure:COLONOSCOPY; Surgeon:HALINA SCOTT; Location: GI     COLONOSCOPY Left 11/10/2015    Procedure: COLONOSCOPY;  Surgeon: Raheem Colunga MD;  Location:  GI     GYN SURGERY      hysterectomy/oopherectomy; done for prolapse     LAPAROSCOPIC NISSEN FUNDOPLICATION  1/7/2013    Procedure: LAPAROSCOPIC NISSEN FUNDOPLICATION;  Laparoscopic Repair of Hiatel Hernia, NISSEN, Esophagoscopy, Gastroscopy And Duodenoscopy ;  Surgeon: Lenoidas Valle MD;  Location:  OR       FAMILY HISTORY:  Family History   Problem Relation Age of Onset     Hypertension Father      C.A.D. Father      Hypertension Mother      Breast Cancer Sister      EYE* Brother      Keratoconus     Glaucoma No family hx of      Macular Degeneration No family hx of        SOCIAL HISTORY:  Social History     Social History     Marital status:      Spouse name: N/A     Number of children: N/A     Years of education: N/A     Occupational History     Not on file.     Social History Main Topics     Smoking status: Never Smoker     Smokeless tobacco: Never Used     Alcohol use No     Drug use: No     Sexual activity: Yes     Partners: Male     Birth control/ protection: Post-menopausal     Other Topics Concern     Not on file     Social History Narrative    The patient has a 0  pk yr tobacco hx.  She has no active use.  Alcohol use is 0 alcoholic drinks per week.  She denies use of recreational drugs.          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           MEDICATIONS:  Current Outpatient Prescriptions   Medication Sig Dispense Refill     clobetasol (TEMOVATE) 0.05 % ointment Apply sparingly to affected area 2 x daily for14 days. Then, apply small amount to affected area twice weekly for maintenance. 30 g 2     diltiazem (DILACOR XR) 240 MG 24 hr ER capsule Take 1 capsule (240 mg) by mouth daily 90 capsule 3     atorvastatin (LIPITOR) 10 MG tablet Take 1 tablet (10 mg) by mouth daily 90 tablet 3     omeprazole (PRILOSEC) 40 MG capsule Take 1 capsule (40 mg) by mouth daily as needed 90 capsule 3     diclofenac (VOLTAREN) 1 % GEL Apply 4 grams to knees or 2 grams to hands four times daily using enclosed dosing card. 100 g 1     diphenhydrAMINE (BENADRYL) 25 MG tablet Take 1 tablet (25 mg) by mouth nightly as needed for itching or allergies 20 tablet 0     Probiotic Product (PROBIOTIC FORMULA PO) Take by mouth as needed       ascorbic acid (VITAMIN C) 500 MG tablet Take 500 mg by mouth as needed.       Cholecalciferol (VITAMIN D3) 1000 UNIT TABS Take 1 tablet by mouth daily.       ASPIRIN 81 MG OR TABS 1 tab po QD (Once per day) 100 3       ALLERGIES:  Dilaudid [hydromorphone] and Senna    VITALS:  Blood pressure (!) 153/91, pulse 67, weight 70.7 kg (155 lb 14.4 oz), not currently breastfeeding.     Pelvic Exam:   External genitalia: Loss of architecture with fusion of labia minora and majora, R > L. Fusion of clitoral mishra. Biopsy scar present at 9 o'clock position. Unclear if 2nd biopsy scar at 4 o'clock position with some white color changes present.   Urethral meatus: normal   Urethra: no tenderness or scarring  Vagina: atrophic, thin, with physiologic/pink tinged discharge.   Uterus: surgically absent  Rectum: anus normal    A/P  Shahana is a 76 year old  female  postmenopausal female with PMH of BSO with total hysterectomy with lichen sclerosis changes of vulvar region.    Lichen Sclerosis:   - Clobetasol ointment 2x daily for 14 days, then apply small amount to affected area twice weekly for maintentance  - F/u in 6 weeks, will consider bx of lesion at 4 o'clock position if not improved    Patient verbalized understanding of assessment and plan    I, Félix William, MS3, scribed this note in the presence of Dr. Gee based on hx, physical exam, and discussion with the patient.    I agree with the PFSH and ROS as completed by the MS, except for changes made by me. The remainder of the encounter was performed by me and scribed by the MS. The scribed note accurately reflects my personal services and the decisions made by me.    Sandra Gee MD, FACOG  [facility]  OB/GYN    2017  10:28 AM    Consider bx 4 oclock if burst steroid is not improved.

## 2017-04-17 NOTE — MR AVS SNAPSHOT
After Visit Summary   4/17/2017    Shahana Donaldson    MRN: [id number]           Patient Information     Date Of Birth          1940        Visit Information        Provider Department      4/17/2017 3:45 PM Sandra Gee MD [facility]        Today's Diagnoses     Lichen sclerosus           Follow-ups after your visit        Your next 10 appointments already scheduled     May 30, 2017 10:30 AM CDT   Return Visit with Sandra Gee MD   [facility] ([facility])    [address]  [address]  [facility] [id number] [phone]            Apr 12, 2018  8:00 AM CDT   RETURN GENERAL with Josue Trujillo MD   Eye Clinic ([facility])    [address]  [address]  9th [address] Clin [address]a  [facility] [id number] [phone]              Who to contact     Please call your clinic at [phone] to:    Ask questions about your health    Make or cancel appointments    Discuss your medicines    Learn about your test results    Speak to your doctor   If you have compliments or concerns about an experience at your clinic, or if you wish to file a complaint, please contact [facility] Patient Relations at [phone] or email us at [email or web address]         Additional Information About Your Visit        MyChart Information     medidametricst gives you secure access to your electronic health record. If you see a primary care provider, you can also send messages to your care team and make appointments. If you have questions, please call your primary care clinic.  If you do not have a primary care provider, please call [phone] and they will assist you.      Bee Shield is an electronic gateway that provides easy, online access to your medical records. With Bee Shield, you can request a clinic appointment, read your test results, renew a  prescription or communicate with your care team.     To access your existing account, please contact your [facility] or call [phone] for assistance.        Care EveryWhere ID     This is your Care EveryWhere ID. This could be used by other organizations to access your [place] medical records  [id number]        Your Vitals Were     Pulse Breastfeeding? BMI (Body Mass Index)             67 No 23.7 kg/m2          Blood Pressure from Last 3 Encounters:   04/17/17 (!) 153/91   03/28/17 134/82   11/11/16 (!) 163/93    Weight from Last 3 Encounters:   04/17/17 70.7 kg (155 lb 14.4 oz)   03/28/17 70.2 kg (154 lb 12.8 oz)   01/17/17 70.3 kg (155 lb)              Today, you had the following     No orders found for display         Where to get your medicines      These medications were sent to [id number] - [place], [place] - [address] AT [facility] [address]  [address], [place] [address]     Phone:  [phone]     clobetasol 0.05 % ointment          Primary Care Provider Office Phone # Fax #    Jeramie Curran [phone]       [address] [address]        Thank you!     Thank you for choosing [facility]  for your care. Our goal is always to provide you with excellent care. Hearing back from our patients is one way we can continue to improve our services. Please take a few minutes to complete the written survey that you may receive in the mail after your visit with us. Thank you!             Your Updated Medication List - Protect others around you: Learn how to safely use, store and throw away your medicines at www.disposemymeds.org.          This list is accurate as of: 4/17/17 11:59 PM.  Always use your most recent med list.                   Brand Name Dispense Instructions for use    ascorbic acid 500 MG tablet    VITAMIN C     Take 500 mg by  mouth as needed.       aspirin 81 MG tablet     100    1 tab po QD (Once per day)       atorvastatin 10 MG tablet    LIPITOR    90 tablet    Take 1 tablet (10 mg) by mouth daily       cholecalciferol 1000 UNIT tablet    vitamin D     Take 1 tablet by mouth daily.       clobetasol 0.05 % ointment    TEMOVATE    30 g    Apply sparingly to affected area 2 x daily for14 days. Then, apply small amount to affected area twice weekly for maintenance.       diclofenac 1 % Gel topical gel    VOLTAREN    100 g    Apply 4 grams to knees or 2 grams to hands four times daily using enclosed dosing card.       diltiazem 240 MG 24 hr capsule    DILACOR XR    90 capsule    Take 1 capsule (240 mg) by mouth daily       diphenhydrAMINE 25 MG tablet    BENADRYL    20 tablet    Take 1 tablet (25 mg) by mouth nightly as needed for itching or allergies       omeprazole 40 MG capsule    priLOSEC    90 capsule    Take 1 capsule (40 mg) by mouth daily as needed       PROBIOTIC FORMULA PO      Take by mouth as needed

## 2017-04-21 ENCOUNTER — TELEPHONE (OUTPATIENT)
Dept: OBGYN | Facility: CLINIC | Age: 77
End: 2017-04-21

## 2017-04-21 NOTE — TELEPHONE ENCOUNTER
Prior Authorization Retail Medication Request  Medication/Dose: clobetasol prop 0.05 % ointment  Apply sparingly to affected area twice daily for 14 days, then apply small amount to affected area twice weekly for maintenance  Diagnosis and ICD code: lichen sclerosus  L790.0  New/Renewal/Insurance Change PA: new   Previously Tried and Failed Therapies: vagifem 2013  Didn't relieve symtpoms,   Estradiol    Didn't help with skin lesion    Insurance ID (if provided): [facility]  Insurance Phone (if provided):     Any additional info from fax request:     If you received a fax notification from an outside Pharmacy:  Pharmacy Name:[facility]  Pharmacy #:[phone]  Pharmacy Fax:[phone]

## 2017-05-04 NOTE — TELEPHONE ENCOUNTER
[facility] Prior Authorization Team   Phone: [phone]  Fax: [phone]    PA Initiation    Medication: CLOBETASOL 0.05% OINTMENT  Insurance Company: Medicare Blue - Phone [phone] Fax [phone]  Pharmacy Filling the Rx: [id number] - [place], [place] - [address] AT [facility] [address] [facility]  Filling Pharmacy Phone: [phone]  Filling Pharmacy Fax:    Start Date: 5/4/2017    \

## 2017-05-05 NOTE — TELEPHONE ENCOUNTER
Prior Authorization Approval    Authorization Effective Date: 2/3/2017  Authorization Expiration Date: 5/4/2018  Medication: CLOBETASOL 0.05% OINTMENT - Approved  Approved Dose/Quantity:   Reference #:     Insurance Company: Medicare Blue - Phone [phone] Fax [phone]  Expected CoPay: $189.77 @ 30 day suppy (deductible not met)     CoPay Card Available:      Foundation Assistance Needed:    Which Pharmacy is filling the prescription (Not needed for infusion/clinic administered): [id number] - [place], [place] - [address] AT [facility] & [facility]  Pharmacy Notified: Yes  Patient Notified: Yes

## 2017-05-08 PROBLEM — M25.641 STIFFNESS OF RIGHT HAND JOINT: Status: RESOLVED | Noted: 2017-01-26 | Resolved: 2017-05-08

## 2017-05-08 NOTE — PROGRESS NOTES
Discharge Summary - Hand Therapy  Pt cancelled appointment 3/1/17 and has not returned for therapy.  Assume all goals met to pt satisfaction.  D/C Hand Therapy.

## 2017-05-30 ENCOUNTER — OFFICE VISIT (OUTPATIENT)
Dept: OBGYN | Facility: CLINIC | Age: 77
End: 2017-05-30
Attending: OBSTETRICS & GYNECOLOGY
Payer: MEDICARE

## 2017-05-30 VITALS
SYSTOLIC BLOOD PRESSURE: 154 MMHG | DIASTOLIC BLOOD PRESSURE: 90 MMHG | HEART RATE: 75 BPM | WEIGHT: 153 LBS | BODY MASS INDEX: 23.19 KG/M2 | HEIGHT: 68 IN

## 2017-05-30 DIAGNOSIS — L90.0 LICHEN SCLEROSUS: Primary | ICD-10-CM

## 2017-05-30 PROCEDURE — 99212 OFFICE O/P EST SF 10 MIN: CPT | Mod: ZF

## 2017-05-30 NOTE — NURSING NOTE
Chief Complaint   Patient presents with     Follow Up For     6 wks follow up LS. Some overall improvement.       See TATO Herndon 5/30/2017

## 2017-05-30 NOTE — PROGRESS NOTES
"LS f/u visit today. Has used clobetasol over past month.  Denies itching or pain.     Vitals:    05/30/17 1026   BP: 154/90   BP Location: Left arm   Patient Position: Chair   Pulse: 75   Weight: 69.4 kg (153 lb)   Height: 1.727 m (5' 8\")     Vulva inspected. Scar at 9 o clock remains.  Small area at 4 o clock appears c/w likely bx site.  Tissue healthy. No raised lesions.      A/p: routine surveillance for LS.   F/u 3 mo at this point and if neg, will plan q 6 mo f/u.    Sandra Gee MD, FACOG  [facility]  OB/GYN    5/30/2017  2:57 PM      "

## 2017-05-30 NOTE — MR AVS SNAPSHOT
After Visit Summary   5/30/2017    Shahana Donaldson    MRN: [id number]           Patient Information     Date Of Birth          1940        Visit Information        Provider Department      5/30/2017 10:30 AM Sandra Gee MD [facility]        Today's Diagnoses     Lichen sclerosus    -  1       Follow-ups after your visit        Follow-up notes from your care team     Return in about 3 months (around 8/30/2017) for Routine Visit.      Your next 10 appointments already scheduled     May 29, 2018  8:15 AM CDT   RETURN GENERAL with Josue Trujillo MD   Eye Clinic ([facility])    [address]  [address] [facility]  9th [address] Clin 9a  [facility] [address] [phone]              Who to contact     Please call your clinic at [phone] to:    Ask questions about your health    Make or cancel appointments    Discuss your medicines    Learn about your test results    Speak to your doctor   If you have compliments or concerns about an experience at your clinic, or if you wish to file a complaint, please contact [facility] Patient Relations at [phone] or email us at [email or web address]         Additional Information About Your Visit        MyChart Information     [employer] gives you secure access to your electronic health record. If you see a primary care provider, you can also send messages to your care team and make appointments. If you have questions, please call your primary care clinic.  If you do not have a primary care provider, please call [phone] and they will assist you.      E[employer] Assist is an electronic gateway that provides easy, online access to your medical records. With [employer], you can request a clinic appointment, read your test results, renew a prescription or communicate with your care team.     To access your existing account, please contact your [facility]  "Clinic or call [phone] for assistance.        Care EveryWhere ID     This is your Care EveryWhere ID. This could be used by other organizations to access your Telephone medical records  [id number]        Your Vitals Were     Pulse Height Breastfeeding? BMI (Body Mass Index)          75 1.727 m (5' 8\") No 23.26 kg/m2         Blood Pressure from Last 3 Encounters:   05/30/17 154/90   04/17/17 (!) 153/91   03/28/17 134/82    Weight from Last 3 Encounters:   05/30/17 69.4 kg (153 lb)   04/17/17 70.7 kg (155 lb 14.4 oz)   03/28/17 70.2 kg (154 lb 12.8 oz)              Today, you had the following     No orders found for display       Primary Care Provider Office Phone # Fax #    Jeramie Curran [phone]       [address] [address]        Thank you!     Thank you for choosing [facility]  for your care. Our goal is always to provide you with excellent care. Hearing back from our patients is one way we can continue to improve our services. Please take a few minutes to complete the written survey that you may receive in the mail after your visit with us. Thank you!             Your Updated Medication List - Protect others around you: Learn how to safely use, store and throw away your medicines at www.disposemymeds.org.          This list is accurate as of: 5/30/17  2:58 PM.  Always use your most recent med list.                   Brand Name Dispense Instructions for use    ascorbic acid 500 MG tablet    VITAMIN C     Take 500 mg by mouth as needed.       aspirin 81 MG tablet     100    1 tab po QD (Once per day)       atorvastatin 10 MG tablet    LIPITOR    90 tablet    Take 1 tablet (10 mg) by mouth daily       cholecalciferol 1000 UNIT tablet    vitamin D     Take 1 tablet by mouth daily.       clobetasol 0.05 % ointment    TEMOVATE    30 g    Apply sparingly to affected area 2 x daily for14 days. Then, apply small amount to affected " area twice weekly for maintenance.       diclofenac 1 % Gel topical gel    VOLTAREN    100 g    Apply 4 grams to knees or 2 grams to hands four times daily using enclosed dosing card.       diltiazem 240 MG 24 hr capsule    DILACOR XR    90 capsule    Take 1 capsule (240 mg) by mouth daily       diphenhydrAMINE 25 MG tablet    BENADRYL    20 tablet    Take 1 tablet (25 mg) by mouth nightly as needed for itching or allergies       omeprazole 40 MG capsule    priLOSEC    90 capsule    Take 1 capsule (40 mg) by mouth daily as needed       PROBIOTIC FORMULA PO      Take by mouth as needed

## 2017-05-30 NOTE — LETTER
"5/30/2017       RE: Shahana Donaldson  [address] [facility] [address]     Dear Colleague,    Thank you for referring your patient, Shahana Donaldson, to the [facility] at [facility]. Please see a copy of my visit note below.    LS f/u visit today. Has used clobetasol over past month.  Denies itching or pain.     Vitals:    05/30/17 1026   BP: 154/90   BP Location: Left arm   Patient Position: Chair   Pulse: 75   Weight: 69.4 kg (153 lb)   Height: 1.727 m (5' 8\")     Vulva inspected. Scar at 9 o clock remains.  Small area at 4 o clock appears c/w likely bx site.  Tissue healthy. No raised lesions.      A/p: routine surveillance for LS.   F/u 3 mo at this point and if neg, will plan q 6 mo f/u.    Sandra Gee MD, FACOG  [facility]  OB/GYN          "

## 2017-07-31 ENCOUNTER — OFFICE VISIT (OUTPATIENT)
Dept: FAMILY MEDICINE | Facility: CLINIC | Age: 77
End: 2017-07-31

## 2017-07-31 VITALS
RESPIRATION RATE: 16 BRPM | SYSTOLIC BLOOD PRESSURE: 153 MMHG | WEIGHT: 153.3 LBS | BODY MASS INDEX: 23.31 KG/M2 | DIASTOLIC BLOOD PRESSURE: 84 MMHG | HEART RATE: 76 BPM

## 2017-07-31 DIAGNOSIS — M53.3 SACROILIAC JOINT PAIN: Primary | ICD-10-CM

## 2017-07-31 DIAGNOSIS — I10 ESSENTIAL HYPERTENSION, BENIGN: ICD-10-CM

## 2017-07-31 ASSESSMENT — PAIN SCALES - GENERAL: PAINLEVEL: MILD PAIN (3)

## 2017-07-31 NOTE — NURSING NOTE
Post-it placed on computer screen to notify provider of elevated pressure reading. Evelin Rodriguez CMA at 7:56 AM on 7/31/2017

## 2017-07-31 NOTE — PATIENT INSTRUCTIONS
Primary Care Center Phone Number [phone]  Primary Care Center Medication Refill Request Information:  * Please contact your pharmacy regarding ANY request for medication refills.  ** [facility] Prescription Fax = [phone]  * Please allow 3 business days for routine medication refills.  * Please allow 5 business days for controlled substance medication refills.     Primary Care Center Test Result notification information:  *You will be notified with in 7-10 days of your appointment day regarding the results of your test.  If you are on MyChart you will be notified as soon as the provider has reviewed the results and signed off on them.

## 2017-07-31 NOTE — NURSING NOTE
Chief Complaint   Patient presents with     Leg Pain     pt is here to discuss right lower leg pain x 2 weeks       Evelin Rodriguez CMA at 7:53 AM on 7/31/2017

## 2017-07-31 NOTE — PROGRESS NOTES
HPI:  Shahana Donaldson is a 77 year old female who presents today with a c/o pain in the right leg after vacation to [facility]. She did a lot of swimming and walking. She did not feel the pain until after the vacation. She has not been taking pain medications.  Previous injury to right knee as she slammed her knee in the car door and right side of her back after a fall 1.5 years ago at [facility] with physical therapy and previous IT band injury.   This pain is different in the she has pain in the right side into the ankle on the right side. She has some chronic swelling in lower legs and has been using compression stockings. She indicates she was doing side strokeswimming and has pain in the right sacroiliac joint. She describes sharp pain worse with activity 2/10 today present for 2 weeks with gradual improvement in her symptoms.. She is very active. She did not do a regular aerobics class. she has been working with water aerobics which seems to have assisted her pain. She has travel plans once again and wanted to make sure there were no serious problems and that she could continue her physical activity.  Her blood pressure is well controlled at home tends to run a little high in the office.       Patient Active Problem List   Diagnosis     Essential hypertension, benign     Mixed hyperlipidemia     Symptomatic menopausal or female climacteric states     Diverticulosis of large intestine     Hyperlipidemia     Coronary atherosclerosis     Esophageal reflux     vulvar cyst     Hiatal hernia     S/P Nissen fundoplication (without gastrostomy tube) procedure     Advance care planning     History of tubal ligation     History of dilatation and curettage     History of hysterectomy     Lichen sclerosus     Right knee pain         ROS:  CONSTITUTIONAL: no fatigue, no unexpected change in weight, no fever  SKIN: no worrisome rashes  RESP: no significant cough, no shortness of breath  CV: no chest pain, no  palpitations, no new peripheral edema stable  MS: no claudication, no myalgias, no joint aches no joint swelling   NEURO: no weakness, no dizziness, no syncope, no headaches  PSYCHIATRIC: NEGATIVE for changes in mood or affect       Physical Exam:  Vitals: /84  Pulse 76  Resp 16  Wt 69.5 kg (153 lb 4.8 oz)  Breastfeeding? No  BMI 23.31 kg/m2  BMI= Body mass index is 23.31 kg/(m^2).    GENERAL APPEARANCE: healthy, alert and no distress  EYES: Eyes grossly normal to inspection, PERRL and conjunctivae and sclerae normal  RESP: lungs clear to auscultation - no rales, rhonchi or wheezes  CV: regular rates and rhythm, normal S1 S2, no S3 or S4, grade 1/6 murmur,  no peripheral edema and peripheral pulses strong  MS: no musculoskeletal defects are noted and gait is age appropriate without ataxia. She has mild pain with palpation to the SI joint on the right she has full range of motion of the right leg at the hip and knee ankle and foot. There is no swelling noted. Knee ligaments intact.   SKIN: no suspicious lesions or rashes  NEURO: Normal strength and tone,  mentation intact and speech normal  PSYCH: mentation appears normal and affect normal/bright    Shahana was seen today for leg pain. She has right SI joint inflammation related to side stroke swimming now improving. I encouraged water aerobics range of motion and she may use Voltaren gel topically up to 4 times per day to the right SI joint area. Reassurance provided    Diagnoses and all orders for this visit:    Sacroiliac joint pain    Essential hypertension, benign     Her blood pressure reading is slightly above goal of less than 150/90. She will monitor at home and follow-up with her primary if consistently elevated. It is under excellent control at home.   She will f/u in prn. Options for treatment and follow-up care were reviewed with the patient. Shahana Donaldson was engaged and actively involved in the decision making process and verbalized  understanding of the options discussed and was satisfied with the final plan.    Mendoza Dobbs MD

## 2017-07-31 NOTE — MR AVS SNAPSHOT
After Visit Summary   7/31/2017    Shahana Donaldson    MRN: [id number]           Patient Information     Date Of Birth          1940        Visit Information        Provider Department      7/31/2017 7:55 AM Mendoza Dobbs MD [facility]        Today's Diagnoses     Sacroiliac joint pain    -  1    Essential hypertension, benign          Care Instructions    Primary Care Center Phone Number [phone]  Primary Care Center Medication Refill Request Information:  * Please contact your pharmacy regarding ANY request for medication refills.  ** [facility] Prescription Fax = [phone]  * Please allow 3 business days for routine medication refills.  * Please allow 5 business days for controlled substance medication refills.     Primary Care Center Test Result notification information:  *You will be notified with in 7-10 days of your appointment day regarding the results of your test.  If you are on MyChart you will be notified as soon as the provider has reviewed the results and signed off on them.                  Follow-ups after your visit        Follow-up notes from your care team     Discussed this visit      Your next 10 appointments already scheduled     May 29, 2018  8:15 AM CDT   RETURN GENERAL with Josue Trujillo MD   Eye Clinic ([facility])    [facility]  [address]  [address]  [facility] [address] [phone]              Who to contact     Please call your clinic at [phone] to:    Ask questions about your health    Make or cancel appointments    Discuss your medicines    Learn about your test results    Speak to your doctor   If you have compliments or concerns about an experience at your clinic, or if you wish to file a complaint, please contact [facility] Patient Relations at [phone] or email us at [email or web address]         Additional Information About Your Visit         [employer] Information     Se[employer]less gives you secure access to your electronic health record. If you see a primary care provider, you can also send messages to your care team and make appointments. If you have questions, please call your primary care clinic.  If you do not have a primary care provider, please call [phone] and they will assist you.      Seamless is an electronic gateway that provides easy, online access to your medical records. With Se[employer], you can request a clinic appointment, read your test results, renew a prescription or communicate with your care team.     To access your existing account, please contact your [facility] or call [phone] for assistance.        Care EveryWhere ID     This is your Care EveryWhere ID. This could be used by other organizations to access your [place] medical records  [id number]        Your Vitals Were     Pulse Respirations Breastfeeding? BMI (Body Mass Index)          76 16 No 23.31 kg/m2         Blood Pressure from Last 3 Encounters:   07/31/17 153/84   05/30/17 154/90   04/17/17 (!) 153/91    Weight from Last 3 Encounters:   07/31/17 69.5 kg (153 lb 4.8 oz)   05/30/17 69.4 kg (153 lb)   04/17/17 70.7 kg (155 lb 14.4 oz)              Today, you had the following     No orders found for display       Primary Care Provider Office Phone # Fax #    Jeramie Curran [phone]       [address] [address]        Equal Access to Services     ERNESTO NEFF : Hadii aad ku hadasho Soomaali, waaxda luqadaha, qaybta kaalmada adeegyada, celso paulinoin haygerrin juan lemon . So [facility] [phone].    ATENCIÓN: Si habla español, tiene a quiroz disposición servicios gratuitos de asistencia lingüística. Llame al [phone].    We comply with applicable federal civil rights laws and [place] laws. We do not discriminate on the basis of race, color, national origin, age, disability  sex, sexual orientation or gender identity.            Thank you!     Thank you for choosing [facility]IMARY [facility]ARE CLINIC  for your care. Our goal is always to provide you with excellent care. Hearing back from our patients is one way we can continue to improve our services. Please take a few minutes to complete the written survey that you may receive in the mail after your visit with us. Thank you!             Your Updated Medication List - Protect others around you: Learn how to safely use, store and throw away your medicines at www.disposemymeds.org.          This list is accurate as of: 7/31/17  8:17 AM.  Always use your most recent med list.                   Brand Name Dispense Instructions for use Diagnosis    ascorbic acid 500 MG tablet    VITAMIN C     Take 500 mg by mouth as needed.        aspirin 81 MG tablet     100    1 tab po QD (Once per day)    Mixed hyperlipidemia       atorvastatin 10 MG tablet    LIPITOR    90 tablet    Take 1 tablet (10 mg) by mouth daily    Mixed hyperlipidemia       cholecalciferol 1000 UNIT tablet    vitamin D     Take 1 tablet by mouth daily.        clobetasol 0.05 % ointment    TEMOVATE    30 g    Apply sparingly to affected area 2 x daily for14 days. Then, apply small amount to affected area twice weekly for maintenance.    Lichen sclerosus       diclofenac 1 % Gel topical gel    VOLTAREN    100 g    Apply 4 grams to knees or 2 grams to hands four times daily using enclosed dosing card.    Right knee pain       diltiazem 240 MG 24 hr capsule    DILACOR XR    90 capsule    Take 1 capsule (240 mg) by mouth daily    Essential hypertension       diphenhydrAMINE 25 MG tablet    BENADRYL    20 tablet    Take 1 tablet (25 mg) by mouth nightly as needed for itching or allergies    Acute sinusitis       omeprazole 40 MG capsule    priLOSEC    90 capsule    Take 1 capsule (40 mg) by mouth daily as needed    S/P Nissen fundoplication (without gastrostomy tube) procedure        PROBIOTIC FORMULA PO      Take by mouth as needed    Routine gynecological examination, Atypical chest pain

## 2017-10-13 ENCOUNTER — OFFICE VISIT (OUTPATIENT)
Dept: INTERNAL MEDICINE | Facility: CLINIC | Age: 77
End: 2017-10-13

## 2017-10-13 VITALS
WEIGHT: 153.5 LBS | RESPIRATION RATE: 20 BRPM | HEART RATE: 75 BPM | SYSTOLIC BLOOD PRESSURE: 157 MMHG | DIASTOLIC BLOOD PRESSURE: 85 MMHG | BODY MASS INDEX: 23.34 KG/M2 | OXYGEN SATURATION: 96 %

## 2017-10-13 DIAGNOSIS — Z91.81 AT RISK FOR FALLING: ICD-10-CM

## 2017-10-13 DIAGNOSIS — Z12.31 ENCOUNTER FOR SCREENING MAMMOGRAM FOR BREAST CANCER: ICD-10-CM

## 2017-10-13 DIAGNOSIS — Z12.31 VISIT FOR SCREENING MAMMOGRAM: ICD-10-CM

## 2017-10-13 DIAGNOSIS — Z23 NEED FOR PROPHYLACTIC VACCINATION AND INOCULATION AGAINST INFLUENZA: ICD-10-CM

## 2017-10-13 DIAGNOSIS — M79.604 PAIN OF RIGHT LOWER EXTREMITY: ICD-10-CM

## 2017-10-13 DIAGNOSIS — G89.29 CHRONIC PAIN OF LEFT KNEE: ICD-10-CM

## 2017-10-13 DIAGNOSIS — M79.604 PAIN OF RIGHT LOWER EXTREMITY: Primary | ICD-10-CM

## 2017-10-13 DIAGNOSIS — M25.562 CHRONIC PAIN OF LEFT KNEE: ICD-10-CM

## 2017-10-13 DIAGNOSIS — Z23 NEED FOR PROPHYLACTIC VACCINATION AGAINST STREPTOCOCCUS PNEUMONIAE (PNEUMOCOCCUS): ICD-10-CM

## 2017-10-13 LAB
ALBUMIN SERPL-MCNC: 3.9 G/DL (ref 3.4–5)
ALP SERPL-CCNC: 134 U/L (ref 40–150)
ALT SERPL W P-5'-P-CCNC: 17 U/L (ref 0–50)
ANION GAP SERPL CALCULATED.3IONS-SCNC: 5 MMOL/L (ref 3–14)
AST SERPL W P-5'-P-CCNC: 19 U/L (ref 0–45)
BASOPHILS # BLD AUTO: 0 10E9/L (ref 0–0.2)
BASOPHILS NFR BLD AUTO: 0.4 %
BILIRUB SERPL-MCNC: 0.9 MG/DL (ref 0.2–1.3)
BUN SERPL-MCNC: 19 MG/DL (ref 7–30)
CALCIUM SERPL-MCNC: 8.8 MG/DL (ref 8.5–10.1)
CHLORIDE SERPL-SCNC: 109 MMOL/L (ref 94–109)
CO2 SERPL-SCNC: 27 MMOL/L (ref 20–32)
CREAT SERPL-MCNC: 0.68 MG/DL (ref 0.52–1.04)
DIFFERENTIAL METHOD BLD: NORMAL
EOSINOPHIL # BLD AUTO: 0 10E9/L (ref 0–0.7)
EOSINOPHIL NFR BLD AUTO: 0.7 %
ERYTHROCYTE [DISTWIDTH] IN BLOOD BY AUTOMATED COUNT: 12.4 % (ref 10–15)
GFR SERPL CREATININE-BSD FRML MDRD: 83 ML/MIN/1.7M2
GLUCOSE SERPL-MCNC: 101 MG/DL (ref 70–99)
HCT VFR BLD AUTO: 42.2 % (ref 35–47)
HGB BLD-MCNC: 13.7 G/DL (ref 11.7–15.7)
IMM GRANULOCYTES # BLD: 0 10E9/L (ref 0–0.4)
IMM GRANULOCYTES NFR BLD: 0.2 %
LYMPHOCYTES # BLD AUTO: 1.5 10E9/L (ref 0.8–5.3)
LYMPHOCYTES NFR BLD AUTO: 26.2 %
MCH RBC QN AUTO: 31.4 PG (ref 26.5–33)
MCHC RBC AUTO-ENTMCNC: 32.5 G/DL (ref 31.5–36.5)
MCV RBC AUTO: 97 FL (ref 78–100)
MONOCYTES # BLD AUTO: 0.5 10E9/L (ref 0–1.3)
MONOCYTES NFR BLD AUTO: 8.5 %
NEUTROPHILS # BLD AUTO: 3.6 10E9/L (ref 1.6–8.3)
NEUTROPHILS NFR BLD AUTO: 64 %
NRBC # BLD AUTO: 0 10*3/UL
NRBC BLD AUTO-RTO: 0 /100
PLATELET # BLD AUTO: 222 10E9/L (ref 150–450)
POTASSIUM SERPL-SCNC: 3.7 MMOL/L (ref 3.4–5.3)
PROT SERPL-MCNC: 7.7 G/DL (ref 6.8–8.8)
RBC # BLD AUTO: 4.36 10E12/L (ref 3.8–5.2)
SODIUM SERPL-SCNC: 141 MMOL/L (ref 133–144)
WBC # BLD AUTO: 5.6 10E9/L (ref 4–11)

## 2017-10-13 ASSESSMENT — PAIN SCALES - GENERAL: PAINLEVEL: NO PAIN (0)

## 2017-10-13 NOTE — PROGRESS NOTES
HPI   Pt. Comes in for  Follow up today .  She had normal exercise stress echo test 5/23/14.  She had unremarkable Holter Monitor in the past. She was seen by Dr. Jensen Cardiology on 6/25/14 as well as 8/20/14.  She stopped her Altace for possible side effects. She does fixate on increasing her Diltiazem for her sxs. Of microvascular angina.  She o/w denies additional HEENT, cardiopulmonary, abdominal, , GYN, neurological, systemic, psycahictric compliants. No lymphatic, endocrine complaints. She does not smoke nor abuse EtOH. We reviewed fam. Hx. No change. She denies EtOH abuse or smoking.     She denies any rest/exertional CP/SOB today. She states her BP is fairly well controlled at home on 240 mg of Diltiazem.       She was seen by Dr. Valle, CVTS 3/26/15 and again on 4/27/16 for her reflux sxs. And her h/o Yves and a follow up upper GI is planned she remains on her PPI. She was seen by Dr. Yanez, pulmonary 9/10/14 for review of CT scan, breathing issues and PFT's. She was seen by Dr. Gee, [facility] for annual exam 4/13/15. Mammogram 10/15.    She has been seeing Dr. Estelita dhillon 5/20/16 for lumbar radicular pain. She had MRI with some mild disease. Epidural steroid injection is being considered. She is getting PT and saw Dr. Estelita Dhillon 6/28/16.     B knee pain and minor B LE swelling last month or so.     PMH    1. HTN  2. C. Diff.  3. Diverticular disease  4. B tubal ligation  5. Normal exercise stress echo 9/20/12  6. Increased cholesterol  7. Osteoporosis  8. Neuroma excision 1998.  9. Vulvar cyst  10. Normal Coronary angiogram 2000; possible dx. 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   11. Normal Nuc. Med exercise stress test 12/2/13.  12. Laparoscopic Yves procedure for Hiatal Hernia 1/7/13.  13. Laparoscopic hysterectomy oophorectomy and rectocele surgery 1/14.      Physical Exam   Vitals noted gen nad cooperative alert, HEENT ears normal oroparhynx clear no exudate neck supple nl rom no  adenopathy, LCTA, B, normal resp. system exam, RRR, S1, S2, no MRG, abdomen, positive BS's, nt, nd no masses,  Normal neurological exam.  B knees with minimal swelling, no tenderness, no erythema.         A and P       1. Pt. With  5/20/14 cardiac evaluation and normal exercise stress echo 5/23/14.   She has followed up 8/20/14 cardiology with Dr. Jensen. She has no cardiovascular complaints today and is doing well.  2. Mammogram done  11/11/16. Ordered future mammogram today 10/13/17. She will get Tdap and influenza vaccination today 10/13/17  3. She was seen in  [facility] by Dr. Gee 5/2017  4. Colonoscopy done 11/15.  5. She has Followed up with Dr. Valle  with previous h/o Yves surgery.  She was seen 4/27/16.  6. HTN; she remains on Diltiazem and has stopped her Altace. She states side effects from B-blocker.  7. She has followed up in Pulmonary with Dr. Yanez for evaluation of her pulmonary sxs.   8. She was seen by jacquie Contreras for lumbar radicular sxs. And had MRI lumbar spine 5/17/16 showing mild disease. She is following up in  PT. She saw Dr. Capone 6/28/16.  She had epidural injection 6/6/16 that helped out.   9. B knee pain and swelling. B knee X-rays, B LE U/S and ortho referral    Total time spent 25 minutes.  More than 50% of the time spent with Ms. Donaldson on counseling / coordinating her care

## 2017-10-13 NOTE — MR AVS SNAPSHOT
After Visit Summary   10/13/2017    Shahana Donaldson    MRN: [id number]           Patient Information     Date Of Birth          1940        Visit Information        Provider Department      10/13/2017 7:35 AM Jeramie Curran MD [facility]        Today's Diagnoses     Pain of right lower extremity    -  1    Encounter for screening mammogram for breast cancer        Visit for screening mammogram        At risk for falling        Need for prophylactic vaccination and inoculation against influenza        Need for prophylactic vaccination against Streptococcus pneumoniae (pneumococcus)        Chronic pain of left knee          Care Instructions    Primary Care Center: [phone]     Primary Care Center Medication Refill Request Information:  * Please contact your pharmacy regarding ANY request for medication refills.  ** [facility] Prescription Fax = [phone]  * Please allow 3 business days for routine medication refills.  * Please allow 5 business days for controlled substance medication refills.     Primary Care Center Test Result notification information:  *You will be notified with in 7-10 days of your appointment day regarding the results of your test.  If you are on MyChart you will be notified as soon as the provider has reviewed the results and signed off on them.    Orthopaedics & Xray [phone]    Breast Center [phone]               Follow-ups after your visit        Additional Services     ORTHOPEDICS ADULT REFERRAL       Knee pain                  Your next 10 appointments already scheduled     Oct 13, 2017  8:15 AM CDT   LAB with  LAB    Health Lab ([facility])    [address] [place] [address] [phone]           Patient must bring picture ID. Patient should be prepared to give a urine specimen  Please do not eat 10-12 hours before your appointment if you are coming in fasting for labs on  lipids, cholesterol, or glucose (sugar). Pregnant women should follow their Care Team instructions. Water with medications is okay. Do not drink coffee or other fluids. If you have concerns about taking  your medications, please ask at office or if scheduling via [employer], send a message by clicking on Secure Messaging, Message Your Care Team.            Oct 13, 2017  8:35 AM CDT   XR KNEE BILATERAL 3 VIEWS with UCXR1   [facility] Xray ([facility])    [address] [id number] [phone]           Please bring a list of your current medicines to your exam. (Include vitamins, minerals and over-thecounter medicines.) Leave your valuables at home.  Tell your doctor if there is a chance you may be pregnant.  You do not need to do anything special for this exam.            Oct 13, 2017  9:00 AM CDT   US LOWER EXTREMITY VENOUS DUPLEX BILATERAL with UCUS3   [facility] US ([facility])    [address] [id number] [phone]           Please bring a list of your medicines (including vitamins, minerals and over-the-counter drugs). Also, tell your doctor about any allergies you may have. Wear comfortable clothes and leave your valuables at home.  You do not need to do anything special to prepare for your exam.  Please call the Imaging Department at your exam site with any questions.            May 29, 2018  8:15 AM CDT   RETURN GENERAL with Josue Trujillo MD   Eye Clinic ([facility])    [facility]  [address] [facility]  [address]  [facility] [address]   [phone]              Future tests that were ordered for you today     Open Future Orders        Priority Expected Expires Ordered    XR Knee Bilateral 3 Views Routine 10/13/2017 10/13/2018 10/13/2017    MA SCREENING DIGITAL BILAT - Future  (s+30) Routine  10/13/2018 10/13/2017    Mammogram,  routine screening Routine  10/13/2018 10/13/2017    CBC with platelets differential Routine 10/13/2017 10/27/2017 10/13/2017    Comprehensive metabolic panel Routine 10/13/2017 10/13/2018 10/13/2017    US Lower Extremity Venous Duplex Bilateral Routine  10/13/2018 10/13/2017            Who to contact     Please call your clinic at [phone] to:    Ask questions about your health    Make or cancel appointments    Discuss your medicines    Learn about your test results    Speak to your doctor   If you have compliments or concerns about an experience at your clinic, or if you wish to file a complaint, please contact [facility] Patient Relations at [phone] or email us at [email or web address]         Additional Information About Your Visit        [employer] Information     [employer] gives you secure access to your electronic health record. If you see a primary care provider, you can also send messages to your care team and make appointments. If you have questions, please call your primary care clinic.  If you do not have a primary care provider, please call [phone] and they will assist you.      [employer] is an electronic gateway that provides easy, online access to your medical records. With [employer], you can request a clinic appointment, read your test results, renew a prescription or communicate with your care team.     To access your existing account, please contact your [facility] or call [phone] for assistance.        Care EveryWhere ID     This is your Care EveryWhere ID. This could be used by other organizations to access your [place] medical records  [id number]        Your Vitals Were     Pulse Respirations Pulse Oximetry BMI (Body Mass Index)          75 20 96% 23.34 kg/m2         Blood Pressure from Last 3 Encounters:   10/13/17 157/85   07/31/17 153/84   05/30/17 154/90    Weight from Last 3 Encounters:   10/13/17 69.6 kg (153  lb 8 oz)   07/31/17 69.5 kg (153 lb 4.8 oz)   05/30/17 69.4 kg (153 lb)              We Performed the Following     FLU VACCINE, INCREASED ANTIGEN, PRESV FREE, AGE 65+ [92[address]]     ORTHOPEDICS ADULT REFERRAL        Primary Care Provider Office Phone # Fax #    Jeramie SMITH MD [facility] [phone] [address] [address]        Equal Access to Services     ERNESTO NEFF : Hadii aad ku hadasho Soomaali, waaxda luqadaha, qaybta kaalmada adeegyada, waxay idiin hayaan adeeg kharash la'gerrin . So [facility] [phone].    ATENCIÓN: Si habla español, tiene a quiroz disposición servicios gratuitos de asistencia lingüística. [facility] [phone].    We comply with applicable federal civil rights laws and [place] laws. We do not discriminate on the basis of race, color, national origin, age, disability, sex, sexual orientation, or gender identity.            Thank you!     Thank you for choosing [facility]IMARY CARE CLINIC  for your care. Our goal is always to provide you with excellent care. Hearing back from our patients is one way we can continue to improve our services. Please take a few minutes to complete the written survey that you may receive in the mail after your visit with us. Thank you!             Your Updated Medication List - Protect others around you: Learn how to safely use, store and throw away your medicines at www.disposemymeds.org.          This list is accurate as of: 10/13/17  8:02 AM.  Always use your most recent med list.                   Brand Name Dispense Instructions for use Diagnosis    ascorbic acid 500 MG tablet    VITAMIN C     Take 500 mg by mouth as needed.        aspirin 81 MG tablet     100    1 tab po QD (Once per day)    Mixed hyperlipidemia       atorvastatin 10 MG tablet    LIPITOR    90 tablet    Take 1 tablet (10 mg) by mouth daily    Mixed hyperlipidemia       cholecalciferol 1000 UNIT tablet    vitamin D     Take 1 tablet by mouth daily.         clobetasol 0.05 % ointment    TEMOVATE    30 g    Apply sparingly to affected area 2 x daily for14 days. Then, apply small amount to affected area twice weekly for maintenance.    Lichen sclerosus       diclofenac 1 % Gel topical gel    VOLTAREN    100 g    Apply 4 grams to knees or 2 grams to hands four times daily using enclosed dosing card.    Right knee pain       diltiazem 240 MG 24 hr capsule    DILACOR XR    90 capsule    Take 1 capsule (240 mg) by mouth daily    Essential hypertension       diphenhydrAMINE 25 MG tablet    BENADRYL    20 tablet    Take 1 tablet (25 mg) by mouth nightly as needed for itching or allergies    Acute sinusitis       omeprazole 40 MG capsule    priLOSEC    90 capsule    Take 1 capsule (40 mg) by mouth daily as needed    S/P Nissen fundoplication (without gastrostomy tube) procedure       PROBIOTIC FORMULA PO      Take by mouth as needed    Routine gynecological examination, Atypical chest pain

## 2017-10-13 NOTE — NURSING NOTE
Chief Complaint   Patient presents with     Pain     pain in both legs(since July,2017) more in right leg      Edema     swelling in lower legs   Belkis Luke LPN 7:38 AM on 10/13/2017

## 2017-10-13 NOTE — NURSING NOTE
"Injectable Influenza Immunization Documentation    1.  Has the patient received the information for the injectable influenza vaccine? YES     2. Is the patient 6 months of age or older? YES     3. Does the patient have any of the following contraindications?         Severe allergy to eggs? No     Severe allergic reaction to previous influenza vaccines? No   Severe allergy to latex? No       History of Guillain-[place] syndrome? No     Currently have a temperature greater than 100.4F? No        4.  Severely egg allergic patients should have flu vaccine eligibility assessed by an MD, RN, or pharmacist, and those who received flu vaccine should be observed for 15 min by an MD, RN, Pharmacist, Medical Technician, or member of clinic staff.\": YES    5. Latex-allergic patients should be given latex-free influenza vaccine No. Please reference the Vaccine latex table to determine if your clinic s product is latex-containing.       Vaccination given by MARY SZYMANSKI at 1:35 PM on 10/13/2017.          "

## 2017-10-13 NOTE — PATIENT INSTRUCTIONS
[facility]: [phone]     [facility] Medication Refill Request Information:  * Please contact your pharmacy regarding ANY request for medication refills.  ** [facility] Prescription Fax = [phone]  * Please allow 3 business days for routine medication refills.  * Please allow 5 business days for controlled substance medication refills.     [facility] Test Result notification information:  *You will be notified with in 7-10 days of your appointment day regarding the results of your test.  If you are on MyChart you will be notified as soon as the provider has reviewed the results and signed off on them.    Orthopaedics & Xray [phone]    Breast Center [phone]

## 2017-12-12 DIAGNOSIS — E78.2 MIXED HYPERLIPIDEMIA: ICD-10-CM

## 2017-12-12 DIAGNOSIS — I10 ESSENTIAL HYPERTENSION: ICD-10-CM

## 2017-12-14 RX ORDER — DILTIAZEM HYDROCHLORIDE 240 MG/1
CAPSULE, EXTENDED RELEASE ORAL
Qty: 90 CAPSULE | Refills: 3 | Status: SHIPPED | OUTPATIENT
Start: 2017-12-14 | End: 2018-10-22

## 2017-12-14 NOTE — TELEPHONE ENCOUNTER
dilacor  Last Written Prescription Date: 11/13/16  Last Fill Quantity: 90 # refills: 3     lipitor  Last Written Prescription Date: 11/12/16  Last Fill Quantity:90 # refills: 3  Last Office Visit with [facility] prescribing provider: 10/13/17  Future: 11/22/18     Lab Results   Component Value Date    CHOL 172 11/11/2016     Lab Results   Component Value Date     11/11/2016     Lab Results   Component Value Date    LDL 47 11/11/2016     Lab Results   Component Value Date    TRIG 126 11/11/2016     Lab Results   Component Value Date    CHOLHDLRATIO 1.6 10/15/2015       Potassium   Date Value Ref Range Status   10/13/2017 3.7 3.4 - 5.3 mmol/L Final     Creatinine   Date Value Ref Range Status   10/13/2017 0.68 0.52 - 1.04 mg/dL Final     BP Readings from Last 3 Encounters:   10/13/17 157/85   07/31/17 153/84   05/30/17 154/90   lipitor routed to clinic nurse labs overdue per protocol

## 2017-12-16 RX ORDER — ATORVASTATIN CALCIUM 10 MG/1
10 TABLET, FILM COATED ORAL DAILY
Qty: 90 TABLET | Refills: 3 | Status: ON HOLD | OUTPATIENT
Start: 2017-12-16 | End: 2018-10-25

## 2018-03-14 ENCOUNTER — TELEPHONE (OUTPATIENT)
Dept: INTERNAL MEDICINE | Facility: CLINIC | Age: 78
End: 2018-03-14

## 2018-03-14 NOTE — TELEPHONE ENCOUNTER
----- Message from Ana Cristina Michaels sent at 3/14/2018  9:47 AM CDT -----  Regarding: Dr. Curran, Swelling in the legs  Solo Parnell,     Patient would like call due to swelling in her legs. Please follow-up.     Appt on April 2. Pt states she doesn't need to talk to anyone.  Soheila Gomez RN 1:34 PM on 3/14/2018.

## 2018-04-03 ENCOUNTER — OFFICE VISIT (OUTPATIENT)
Dept: INTERNAL MEDICINE | Facility: CLINIC | Age: 78
End: 2018-04-03
Payer: COMMERCIAL

## 2018-04-03 VITALS
OXYGEN SATURATION: 96 % | SYSTOLIC BLOOD PRESSURE: 158 MMHG | WEIGHT: 153 LBS | DIASTOLIC BLOOD PRESSURE: 90 MMHG | HEART RATE: 73 BPM | RESPIRATION RATE: 20 BRPM | BODY MASS INDEX: 23.26 KG/M2
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
## 2018-04-03 DIAGNOSIS — Z23 NEED FOR VACCINATION: ICD-10-CM

## 2018-04-03 LAB — VIT B12 SERPL-MCNC: 244 PG/ML (ref 193–986)

## 2018-04-03 ASSESSMENT — PAIN SCALES - GENERAL: PAINLEVEL: MODERATE PAIN (4)

## 2018-04-03 NOTE — NURSING NOTE
Pneumovax-23 shot given without problems, patient tolerated procedure well.Belkis Luke LPN 3:52 PM on 4/3/2018

## 2018-04-03 NOTE — NURSING NOTE
"Chief Complaint   Patient presents with     Numbness     numbness /tingling in both feet and mostly in toes for about 2 weeks. \" I fell backwards in a gym class about 2-3 weeks ago.\"   Belkis Luke LPN 2:58 PM on 4/3/2018    Rooming Note  Health Maintenance   Health Maintenance Due   Topic Date Due     PNEUMOCOCCAL (2 of 2 - PPSV23) 05/18/2016     ADVANCE DIRECTIVE PLANNING Q5 YRS  02/06/2018    All health maintenance items discussed and pended.  Rooming Note  Blood Pressure   BP Readings from Last 1 Encounters:   04/03/18 158/90       Single BP recheck started, 3:03 PM (4 minutes)  Belkis Luke LPN 3:03 PM on 4/3/2018  "

## 2018-04-03 NOTE — PROGRESS NOTES
[facility]  Primary Care Center   Leslie Mandel MD  04/03/2018      Chief Complaint:   Numbness    History of Present Illness:   Shahana Donaldson is a 77 year old female with a history of hypertension, hyperlipidemia, GERD, and coronary arthrosclerosis who presents for evaluation of balance issues, leg swelling, and feet numbness. She is accompanied by her .  Shahana has established primary care with Dr. Curran, and was last seen by him on 10/13/17, at which time they discussed previous cardiology results and testing including normal stress echo and Holter Monitor.     Today, Shahana reports that about 3 weeks ago she was in her exercise class at the gym when she had a vague description of a mechanical fall to her right buttock. She was able to get up and perform the rest of her exercises following this. She had no pain or other symptoms following the fall.  She was not dizzy, pre-syncopal; she has not experienced increasing falls recently.  About one week ago she began to feel numbness on her toes and feet that she describes as pins and needles with slight weakness. She has had slight tenderness to the right hip/buttock area which has kept her form being able to sleep on that side, though she is already having a hard time sleeping as she is fixated on the numbness while in bed. She denies any numbness/tingling in her fingertips and has not had any balance issues, though  endorses she has been dizzy during their classes at times.     Health Maintenance:  Mammogram (females age 40 - 75): yearly or every 2 years? - Up to Date   Colon Cancer screening (age 50 - 75): yearly FIT or colonoscopy every 5 - 10 years - Up to Date   Dexa (females age ? 65, males age ? 70): every 2 years - Up to Date   Lipid panel: every 5 years, yearly if risk factors - Up to Date   Immunizations (Tetanus every 10 years, Influenza yearly, Pneumonia PPV-23 and PCV-13 age ? 65 or sooner if risk factors) - Recommended  the pneumococcal vaccine   Immunization History   Administered Date(s) Administered     Influenza (High Dose) 3 valent vaccine 10/07/2014, 11/11/2016, 10/13/2017     Influenza (IIV3) PF 11/16/1998, 12/15/2005, 11/07/2006, 10/14/2008, 10/20/2010, 10/25/2011, 09/19/2012, 10/22/2013, 10/15/2015     Pneumo Conj 13-V (2010&after) 05/18/2015     Pneumococcal 23 valent 04/03/2018     TD (ADULT, 7+) 03/13/1997, 10/10/2006     TDAP Vaccine (Boostrix) 11/11/2016     Zoster vaccine, live 10/20/2010        The following health maintenance issues were also reviewed and addressed as needed:  Lifestyle habits (including exercise, diet, weight)  Health risk behaviors (sexual history, alcohol, tobacco, drug use, partner violence)  Routine eye, dental, hearing, and skin exams  Advanced directives     Review of Systems:   Pertinent items are noted in HPI, remainder of complete ROS is negative.      Active Medications:     Current Outpatient Prescriptions:      atorvastatin (LIPITOR) 10 MG tablet, Take 1 tablet (10 mg) by mouth daily, Disp: 90 tablet, Rfl: 3     DILT- MG 24 hr capsule, TAKE 1 CAPSULE BY MOUTH ONCE DAILY, Disp: 90 capsule, Rfl: 3     clobetasol (TEMOVATE) 0.05 % ointment, Apply sparingly to affected area 2 x daily for14 days. Then, apply small amount to affected area twice weekly for maintenance., Disp: 30 g, Rfl: 2     omeprazole (PRILOSEC) 40 MG capsule, Take 1 capsule (40 mg) by mouth daily as needed, Disp: 90 capsule, Rfl: 3     Probiotic Product (PROBIOTIC FORMULA PO), Take by mouth as needed, Disp: , Rfl:      ascorbic acid (VITAMIN C) 500 MG tablet, Take 500 mg by mouth as needed., Disp: , Rfl:      Cholecalciferol (VITAMIN D3) 1000 UNIT TABS, Take 1 tablet by mouth daily., Disp: , Rfl:      ASPIRIN 81 MG OR TABS, 1 tab po QD (Once per day), Disp: 100, Rfl: 3     diclofenac (VOLTAREN) 1 % GEL, Apply 4 grams to knees or 2 grams to hands four times daily using enclosed dosing card. (Patient not taking:  Reported on 4/3/2018), Disp: 100 g, Rfl: 1     diphenhydrAMINE (BENADRYL) 25 MG tablet, Take 1 tablet (25 mg) by mouth nightly as needed for itching or allergies (Patient not taking: Reported on 4/3/2018), Disp: 20 tablet, Rfl: 0      Allergies:   Dilaudid [hydromorphone] and Senna      Past Medical History:  Gastro-oesophageal reflux disease   Hemorrhoid   Nonsenile cataract   Osteoporosis   Rectocele   Essential hypertension, benign  Mixed hyperlipidemia  Symptomatic menopausal or female climacteric states  Diverticulosis of large intestine  Coronary atherosclerosis  Esophageal reflux  vulvar cyst  Hiatal hernia  Advance care planning   Lichen sclerosus  Past Surgical History:  History of tubal ligation bilateral   History of dilatation and curettage  History of hysterectomy  2 child births   Colonoscopy  X 2   Hysterectomy/oophorectomy  Laparoscopic nissen fundoplication   Family History:   Father - HTN, CAD  Mother - HTN   Sister - breast cancer   Bother - keratoconus       Social History:   Presents with    Tobacco use: Never smoker   Alcohol consumption: No   Marital status:    Exercises three times a week      Physical Exam:   /90 (BP Location: Right arm, Patient Position: Sitting, Cuff Size: Adult Regular)  Pulse 73  Resp 20  Wt 69.4 kg (153 lb)  SpO2 96%  BMI 23.26 kg/m2     Constitutional: Healthy, alert, no distress and cooperative  Extremities/back: this tenderness over the sacrum and sacroiliac region on the right.  Skin: Ecchymosis on crease of buttock on right with slight hematoma.    Neurologic: Gait normal. Tandem gait is a little wobbly, and the Romberg test is slightly wobbly without falling.  Diminished sensation to monofilament testing and light touch on the right toes. Diminished sensation on the distal dorsum of the left foot and 205 toes on the dorsum.  Deep tendon reflexes in the knees and ankles are intact. Negative kyle test. Absent vibration sense on lower  extremities.     Assessment and Plan:  Fall, sequela  There's a small hematoma over the tailbone it is only slightly tender. The neurological exam demonstrates slight decreased sensation in the toes on the right and variably on the left toes. This is not consistent with a radiculopathy or spinal injury. The squeeze test for Hilton's neuroma was negative.  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  There's no motor neuropathy.    I was struck by the indeterminate Romberg test and absence of vibratory sense. This may well contributed to her fall, and possibly to the neuropathy. I discuss these results with her and her  and reassured her that there was no serious spine injury from the fall.    Both she and her  go to the gym three times a week and I asked them to continue that into let me know if she developed a tendency to fall. At that point we could refer her to the balance PT group.    Plan:- Vitamin B12;     Need for vaccination  - Pneumococcal vaccine 23 valent PPSV23  (Pneumovax) [46654]     Follow-up: Data Unavailable      Scribe Disclosure:   I, Ralph Herrera, am serving as a scribe to document services personally performed by Leslie Mandel MD at this visit, based upon the provider's statements to me. All documentation has been reviewed by the aforementioned provider prior to being entered into the official medical record.     Portions of this medical record were completed by a scribe. UPON MY REVIEW AND AUTHENTICATION BY ELECTRONIC SIGNATURE, this confirms (a) I performed the applicable clinical services, and (b) the record is accurate.      Leslie Mandel

## 2018-04-03 NOTE — MR AVS SNAPSHOT
After Visit Summary   4/3/2018    Shahana Donaldson    MRN: [id number]           Patient Information     Date Of Birth          1940        Visit Information        Provider Department      4/3/2018 2:30 PM Leslie Mandel MD [facility]        Today's Diagnoses     Fall, sequela    -  1    Need for vaccination          Care Instructions    [facility]: [phone]     Primary Care Center Medication Refill Request Information:  * Please contact your pharmacy regarding ANY request for medication refills.  ** [facility] Prescription Fax = [phone]  * Please allow 3 business days for routine medication refills.  * Please allow 5 business days for controlled substance medication refills.     Primary Care Center Test Result notification information:  *You will be notified with in 7-10 days of your appointment day regarding the results of your test.  If you are on MyChart you will be notified as soon as the provider has reviewed the results and signed off on them.          Follow-ups after your visit        Your next 10 appointments already scheduled     Apr 03, 2018  4:00 PM CDT   LAB with  LAB   [facility] ([facility])    [address] [place] [address] [phone]           Please do not eat 10-12 hours before your appointment if you are coming in fasting for labs on lipids, cholesterol, or glucose (sugar). This does not apply to pregnant women. Water, hot tea and black coffee (with nothing added) are okay. Do not drink other fluids, diet soda or chew gum.            Apr 17, 2018  8:35 AM CDT   (Arrive by 8:20 AM)   Return Visit with Jeramie Curran MD   [facility] ([facility])    [address] [place] [address] [phone]            May 29, 2018  8:15 AM CDT   RETURN GENERAL with Josue Trujillo MD   [facility] ([facility])     "[address]  [address]  [facility] [address]   [phone]            Nov 21, 2018  7:05 AM CST   (Arrive by 6:50 AM)   Return Visit with Jeramie Curran MD   [facility] ([facility])    [address]  4th Floor  [facility] [address] [phone]            Nov 21, 2018 10:00 AM CST   (Arrive by 9:45 AM)   MA SCREENING DIGITAL BILATERAL with [id number]   [facility] ([facility])    [address]  2nd Floor  [facility] [address] [phone]           Do not use any powder, lotion or deodorant under your arms or on your breast. If you do, we will ask you to remove it before your exam.  Wear comfortable, two-piece clothing.  If you have any allergies, tell your care team.  Bring any previous mammograms from other facilities or have them mailed to the breast center. Three-dimensional (3D) mammograms are available at [facility] locations in [facility], [facility], [place], [place], and [place]. [facility] locations include [place] and [facility] in [place]. Benefits of 3D mammograms include: - Improved rate of cancer detection - Decreases your chance of having to go back for more tests, which means fewer: - \"False-positive\" results (This means that there is an abnormal area but it isn't cancer.) - Invasive testing procedures, such as a biopsy or surgery - Can provide clearer images of the breast if you have dense breast tissue. 3D mammography is an optional exam that anyone can have with a 2D mammogram. It doesn't replace or take the place of a 2D mammogram. 2D mammograms remain an effective screening test for all women.  Not all insurance companies cover the cost of a 3D mammogram. Check with your insurance.              Future tests that were ordered for you today     Open Future Orders        Priority " Expected Expires Ordered    Vitamin B12 Routine 4/3/2018 4/3/2019 4/3/2018            Who to contact     Please call your clinic at [phone] to:    Ask questions about your health    Make or cancel appointments    Discuss your medicines    Learn about your test results    Speak to your doctor            Additional Information About Your Visit        MyChart Information     [employer] gives you secure access to your electronic health record. If you see a primary care provider, you can also send messages to your care team and make appointments. If you have questions, please call your primary care clinic.  If you do not have a primary care provider, please call [phone] and they will assist you.      [employer] is an electronic gateway that provides easy, online access to your medical records. With [employer], you can request a clinic appointment, read your test results, renew a prescription or communicate with your care team.     To access your existing account, please contact your [facility] or call [phone] for assistance.        Care EveryWhere ID     This is your Care EveryWhere ID. This could be used by other organizations to access your [facility] medical records  [id number]        Your Vitals Were     Pulse Respirations Pulse Oximetry BMI (Body Mass Index)          73 20 96% 23.26 kg/m2         Blood Pressure from Last 3 Encounters:   04/03/18 158/90   10/13/17 157/85   07/31/17 153/84    Weight from Last 3 Encounters:   04/03/18 69.4 kg (153 lb)   10/13/17 69.6 kg (153 lb 8 oz)   07/31/17 69.5 kg (153 lb 4.8 oz)              We Performed the Following     Pneumococcal vaccine 23 valent PPSV23  (Pneumovax) [65108]        Primary Care Provider Office Phone # Fax #    Jeramie Curran [phone] [address] [address]        Equal Access to Services     ERNESTO NEFF : Cristhian Valdovinos, pooja graf, marlon jacobo  celso kamarayareli frausto'aan ah. So [facility] [phone].    ATENCIÓN: Si stefano hobson, tiene a quiroz disposición servicios gratuitos de asistencia lingüística. Chris al [phone].    We comply with applicable federal civil rights laws and [place] laws. We do not discriminate on the basis of race, color, national origin, age, disability, sex, sexual orientation, or gender identity.            Thank you!     Thank you for choosing [facility]  for your care. Our goal is always to provide you with excellent care. Hearing back from our patients is one way we can continue to improve our services. Please take a few minutes to complete the written survey that you may receive in the mail after your visit with us. Thank you!             Your Updated Medication List - Protect others around you: Learn how to safely use, store and throw away your medicines at www.disposemymeds.org.          This list is accurate as of 4/3/18  3:49 PM.  Always use your most recent med list.                   Brand Name Dispense Instructions for use Diagnosis    ascorbic acid 500 MG tablet    VITAMIN C     Take 500 mg by mouth as needed.        aspirin 81 MG tablet     100    1 tab po QD (Once per day)    Mixed hyperlipidemia       atorvastatin 10 MG tablet    LIPITOR    90 tablet    Take 1 tablet (10 mg) by mouth daily    Mixed hyperlipidemia       cholecalciferol 1000 UNIT tablet    vitamin D3     Take 1 tablet by mouth daily.        clobetasol 0.05 % ointment    TEMOVATE    30 g    Apply sparingly to affected area 2 x daily for14 days. Then, apply small amount to affected area twice weekly for maintenance.    Lichen sclerosus       diclofenac 1 % Gel topical gel    VOLTAREN    100 g    Apply 4 grams to knees or 2 grams to hands four times daily using enclosed dosing card.    Right knee pain       DILT- MG 24 hr capsule   Generic drug:  diltiazem     90 capsule    TAKE 1 CAPSULE BY MOUTH ONCE DAILY     Essential hypertension       diphenhydrAMINE 25 MG tablet    BENADRYL    20 tablet    Take 1 tablet (25 mg) by mouth nightly as needed for itching or allergies    Acute sinusitis       omeprazole 40 MG capsule    priLOSEC    90 capsule    Take 1 capsule (40 mg) by mouth daily as needed    S/P Nissen fundoplication (without gastrostomy tube) procedure       PROBIOTIC FORMULA PO      Take by mouth as needed    Routine gynecological examination, Atypical chest pain

## 2018-04-17 ENCOUNTER — RADIANT APPOINTMENT (OUTPATIENT)
Dept: GENERAL RADIOLOGY | Facility: CLINIC | Age: 78
End: 2018-04-17
Attending: INTERNAL MEDICINE
Payer: COMMERCIAL

## 2018-04-17 ENCOUNTER — OFFICE VISIT (OUTPATIENT)
Dept: INTERNAL MEDICINE | Facility: CLINIC | Age: 78
End: 2018-04-17
Payer: COMMERCIAL

## 2018-04-17 VITALS
WEIGHT: 153.8 LBS | RESPIRATION RATE: 20 BRPM | DIASTOLIC BLOOD PRESSURE: 74 MMHG | BODY MASS INDEX: 23.39 KG/M2 | OXYGEN SATURATION: 97 % | HEART RATE: 63 BPM | SYSTOLIC BLOOD PRESSURE: 158 MMHG

## 2018-04-17 DIAGNOSIS — Z98.890 S/P NISSEN FUNDOPLICATION (WITHOUT GASTROSTOMY TUBE) PROCEDURE: Primary | ICD-10-CM

## 2018-04-17 DIAGNOSIS — K44.9 HIATAL HERNIA: ICD-10-CM

## 2018-04-17 DIAGNOSIS — R20.9 DISTURBANCE OF SKIN SENSATION: Primary | ICD-10-CM

## 2018-04-17 DIAGNOSIS — R20.9 DISTURBANCE OF SKIN SENSATION: ICD-10-CM

## 2018-04-17 LAB
ALBUMIN SERPL-MCNC: 4.2 G/DL (ref 3.4–5)
ALP SERPL-CCNC: 124 U/L (ref 40–150)
ALT SERPL W P-5'-P-CCNC: 18 U/L (ref 0–50)
ANION GAP SERPL CALCULATED.3IONS-SCNC: 6 MMOL/L (ref 3–14)
AST SERPL W P-5'-P-CCNC: 21 U/L (ref 0–45)
BASOPHILS # BLD AUTO: 0 10E9/L (ref 0–0.2)
BASOPHILS NFR BLD AUTO: 0.4 %
BILIRUB SERPL-MCNC: 0.8 MG/DL (ref 0.2–1.3)
BUN SERPL-MCNC: 19 MG/DL (ref 7–30)
CALCIUM SERPL-MCNC: 8.8 MG/DL (ref 8.5–10.1)
CHLORIDE SERPL-SCNC: 108 MMOL/L (ref 94–109)
CO2 SERPL-SCNC: 25 MMOL/L (ref 20–32)
CREAT SERPL-MCNC: 0.73 MG/DL (ref 0.52–1.04)
CRP SERPL-MCNC: <2.9 MG/L (ref 0–8)
DIFFERENTIAL METHOD BLD: NORMAL
EOSINOPHIL # BLD AUTO: 0 10E9/L (ref 0–0.7)
EOSINOPHIL NFR BLD AUTO: 0.6 %
ERYTHROCYTE [DISTWIDTH] IN BLOOD BY AUTOMATED COUNT: 12.5 % (ref 10–15)
ERYTHROCYTE [SEDIMENTATION RATE] IN BLOOD BY WESTERGREN METHOD: 14 MM/H (ref 0–30)
GFR SERPL CREATININE-BSD FRML MDRD: 77 ML/MIN/1.7M2
GLUCOSE SERPL-MCNC: 94 MG/DL (ref 70–99)
HCT VFR BLD AUTO: 42.7 % (ref 35–47)
HGB BLD-MCNC: 14.1 G/DL (ref 11.7–15.7)
IMM GRANULOCYTES # BLD: 0 10E9/L (ref 0–0.4)
IMM GRANULOCYTES NFR BLD: 0.3 %
LYMPHOCYTES # BLD AUTO: 1.5 10E9/L (ref 0.8–5.3)
LYMPHOCYTES NFR BLD AUTO: 21.2 %
MCH RBC QN AUTO: 31.5 PG (ref 26.5–33)
MCHC RBC AUTO-ENTMCNC: 33 G/DL (ref 31.5–36.5)
MCV RBC AUTO: 95 FL (ref 78–100)
MONOCYTES # BLD AUTO: 0.5 10E9/L (ref 0–1.3)
MONOCYTES NFR BLD AUTO: 6.9 %
NEUTROPHILS # BLD AUTO: 4.9 10E9/L (ref 1.6–8.3)
NEUTROPHILS NFR BLD AUTO: 70.6 %
NRBC # BLD AUTO: 0 10*3/UL
NRBC BLD AUTO-RTO: 0 /100
PLATELET # BLD AUTO: 218 10E9/L (ref 150–450)
POTASSIUM SERPL-SCNC: 3.8 MMOL/L (ref 3.4–5.3)
PROT SERPL-MCNC: 7.9 G/DL (ref 6.8–8.8)
RBC # BLD AUTO: 4.48 10E12/L (ref 3.8–5.2)
SODIUM SERPL-SCNC: 140 MMOL/L (ref 133–144)
TSH SERPL DL<=0.005 MIU/L-ACNC: 2.51 MU/L (ref 0.4–4)
WBC # BLD AUTO: 6.9 10E9/L (ref 4–11)

## 2018-04-17 ASSESSMENT — PAIN SCALES - GENERAL: PAINLEVEL: MILD PAIN (2)

## 2018-04-17 NOTE — NURSING NOTE
Chief Complaint   Patient presents with     Fall     follow up fall ( about 4 weeks ago) at the gym on right side, since then has developed numbness/tingling in front part of feet into toes.   Belkis Luke LPN 8:41 AM on 4/17/2018  Rooming Note  Blood Pressure   BP Readings from Last 1 Encounters:   04/17/18 168/89    Single BP recheck started, 8:42 AM (4 minutes)  Belkis Luke LPN 8:43 AM on 4/17/2018    .

## 2018-04-17 NOTE — PROGRESS NOTES
HPI   Pt. Comes in for  Follow up today. She was at an exercise class about 2 weeks ago and she was doing an exercise and fell to the R on her R hip. She has B toe numbness since then. She had normal exercise stress echo test 5/23/14.  She had unremarkable Holter Monitor in the past. She was seen by Dr. Jensen [facility] on 6/25/14 as well as 8/20/14.  She stopped her Altace for possible side effects. She does fixate on increasing her Diltiazem for her sxs. Of microvascular angina.  She o/w denies additional HEENT, cardiopulmonary, abdominal, , GYN, neurological, systemic, psycahictric compliants. No lymphatic, endocrine complaints. She does not smoke nor abuse EtOH. We reviewed fam. Hx. No change. She denies EtOH abuse or smoking.     She denies any rest/exertional CP/SOB today. She states her BP is fairly well controlled at home on 240 mg of Diltiazem.       She was seen by Dr. Valle, CVTS 3/26/15 and again on 4/27/16 for her reflux sxs. And her h/o Yves and a follow up upper GI is planned she remains on her PPI. She was seen by Dr. Yanez, pulmonary 9/10/14 for review of CT scan, breathing issues and PFT's. She was seen by Dr. Gee, [facility] for annual exam 4/13/15. Mammogram 10/15.    She has been seeing Dr. Estelita dhillon 5/20/16 for lumbar radicular pain. She had MRI with some mild disease. Epidural steroid injection is being considered. She is getting PT and saw Dr. Estelita Dhillon 6/28/16.         PMH    1. HTN  2. C. Diff.  3. Diverticular disease  4. B tubal ligation  5. Normal exercise stress echo 9/20/12  6. Increased cholesterol  7. Osteoporosis  8. Neuroma excision 1998.  9. Vulvar cyst  10. Normal Coronary angiogram 2000; possible dx. 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   11. Normal Nuc. Med exercise stress test 12/2/13.  12. Laparoscopic Yves procedure for Hiatal Hernia 1/7/13.  13. Laparoscopic hysterectomy oophorectomy and rectocele surgery 1/14.      Physical Exam   Vitals noted gen nad  cooperative alert, HEENT ears normal oroparhynx clear no exudate neck supple nl rom no adenopathy, LCTA, B, normal resp. system exam, RRR, S1, S2, no MRG, abdomen, positive BS's, nt, nd no masses,  She has sensation B LE' and toes. She has normal gait. B LE strength is normal and symmetric proximal/distal. She has normal and symmetric reflexes. No tenderness to lower lumbar spine or B hips.         A and P       1. Pt. With  5/20/14 cardiac evaluation and normal exercise stress echo 5/23/14.   She has followed up 8/20/14 cardiology with Dr. Jensen. She has no cardiovascular complaints today and is doing well.  2. Mammogram done  11/20/17. Tdap and influenza vaccination  10/13/17  3. She was seen in  [facility] by Dr. Gee 5/2017  4. Colonoscopy done 11/15.  5. She has Followed up with Dr. Valle  with previous h/o Yves surgery.  She was seen 4/27/16.  6. HTN; she remains on Diltiazem and has stopped her Altace. She states side effects from B-blocker.  7. She has followed up in Pulmonary with Dr. Yanez for evaluation of her pulmonary sxs.   8. She was seen by Dr. Capone, jacquie for lumbar radicular sxs. And had MRI lumbar spine 5/17/16 showing mild disease. She is following up in  PT. She saw Dr. Capone 6/28/16.  She had epidural injection 6/6/16 that helped out.   9. Seen 4/3/2018 by Dr. Mandel [facility] with B12 low normal 244 pg/mL B foot numbness will check additional labs, methylmalonic acid. Will get plain films lumbar spine and B hips. I will see her back in about 2 weeks. If worse/persistent: EMG B LE and Lumbar MRI    Total time spent 25 minutes.  More than 50% of the time spent with Ms. Donaldson on counseling / coordinating her care

## 2018-04-17 NOTE — MR AVS SNAPSHOT
After Visit Summary   4/17/2018    Shahana Donaldson    MRN: [id number]           Patient Information     Date Of Birth          1940        Visit Information        Provider Department      4/17/2018 8:35 AM Jeramie Curran MD [facility]        Today's Diagnoses     Disturbance of skin sensation    -  1      Care Instructions    .Primary Care Center: [phone]     Primary Care Center Medication Refill Request Information:  * Please contact your pharmacy regarding ANY request for medication refills.  ** [facility] Prescription Fax = [phone]  * Please allow 3 business days for routine medication refills.  * Please allow 5 business days for controlled substance medication refills.     Primary Care Center Test Result notification information:  *You will be notified with in 7-10 days of your appointment day regarding the results of your test.  If you are on MyChart you will be notified as soon as the provider has reviewed the results and signed off on them.          Follow-ups after your visit        Your next 10 appointments already scheduled     Apr 17, 2018  9:25 AM CDT   (Arrive by 9:10 AM)   XR LUMBAR SPINE 2/3 VIEWS with XR1   [facility] ([facility])    [address] [place] [address] [phone]           Please bring a list of your current medicines to your exam. (Include vitamins, minerals and over-thecounter medicines.) Leave your valuables at home.  Tell your doctor if there is a chance you may be pregnant.  You do not need to do anything special for this exam.            Apr 17, 2018  9:45 AM CDT   LAB with  LAB   [facility])    [address] [place] [address] [phone]           Please do not eat 10-12 hours before your appointment if you are coming in fasting for labs on lipids, cholesterol, or glucose (sugar). This  "does not apply to pregnant women. Water, hot tea and black coffee (with nothing added) are okay. Do not drink other fluids, diet soda or chew gum.            May 04, 2018  7:05 AM CDT   (Arrive by 6:50 AM)   Return Visit with Jeramie Curran MD   [facility] ([facility] [place])    [address] [facility]  [address] [facility] [address]   [phone]            May 29, 2018  8:15 AM CDT   RETURN GENERAL with Josue Trujillo MD   [facility] ([facility])    [address]  [address]  [facility] [address]   [phone]            Nov 21, 2018  7:05 AM CST   (Arrive by 6:50 AM)   Return Visit with Jeramie Curran MD   [facility] ([facility] [place])    [address] [place] [address]  [address] [facility] [address]   [phone]            Nov 21, 2018 10:00 AM CST   (Arrive by 9:45 AM)   MA SCREENING DIGITAL BILATERAL with [id number]   [facility] ([facility])    [address] [place] [address]  [address]nd [facility] [address]   [phone]           Do not use any powder, lotion or deodorant under your arms or on your breast. If you do, we will ask you to remove it before your exam.  Wear comfortable, two-piece clothing.  If you have any allergies, tell your care team.  Bring any previous mammograms from other facilities or have them mailed to the breast center. Three-dimensional (3D) mammograms are available at [place] locations in [facility], [facility], [facility], [place], [place], and [place]. [facility] locations include [place] [facility] in [place]. Benefits of 3D mammograms include: - Improved rate of cancer detection - Decreases your chance of having to go back for more tests, which means fewer: - \"False-positive\" results (This means that there is an abnormal area but it isn't cancer.) - Invasive " testing procedures, such as a biopsy or surgery - Can provide clearer images of the breast if you have dense breast tissue. 3D mammography is an optional exam that anyone can have with a 2D mammogram. It doesn't replace or take the place of a 2D mammogram. 2D mammograms remain an effective screening test for all women.  Not all insurance companies cover the cost of a 3D mammogram. Check with your insurance.              Future tests that were ordered for you today     Open Future Orders        Priority Expected Expires Ordered    TSH with free T4 reflex Routine 4/17/2018 4/17/2019 4/17/2018    Methylmalonic acid Routine 4/17/2018 4/17/2019 4/17/2018    Erythrocyte sedimentation rate Routine 4/17/2018 4/17/2019 4/17/2018    CRP inflammation Routine 4/17/2018 4/17/2019 4/17/2018    Comprehensive metabolic panel Routine 4/17/2018 5/1/2018 4/17/2018    CBC with platelets differential Routine 4/17/2018 5/1/2018 4/17/2018            Who to contact     Please call your clinic at [phone] to:    Ask questions about your health    Make or cancel appointments    Discuss your medicines    Learn about your test results    Speak to your doctor            Additional Information About Your Visit        [email or web address] Information     [email or web address] gives you secure access to your electronic health record. If you see a primary care provider, you can also send messages to your care team and make appointments. If you have questions, please call your primary care clinic.  If you do not have a primary care provider, please call [phone] and they will assist you.      [email or web address] is an electronic gateway that provides easy, online access to your medical records. With [email or web address], you can request a clinic appointment, read your test results, renew a prescription or communicate with your care team.     To access your existing account, please contact your [facility] or call [phone] for assistance.        Care  EveryWhere ID     This is your Care EveryWhere ID. This could be used by other organizations to access your [facility] medical records  [id number]        Your Vitals Were     Pulse Respirations Pulse Oximetry BMI (Body Mass Index)          63 20 97% 23.39 kg/m2         Blood Pressure from Last 3 Encounters:   04/17/18 158/74   04/03/18 158/90   10/13/17 157/85    Weight from Last 3 Encounters:   04/17/18 69.8 kg (153 lb 12.8 oz)   04/03/18 69.4 kg (153 lb)   10/13/17 69.6 kg (153 lb 8 oz)              We Performed the Following     XR Lumbar Spine 2-3 Views     XR Pelvis and Hip Bilateral 2 Views        Primary Care Provider Office Phone # Fax #    Jeramie Curran [phone] [address] [address]        Equal Access to Services     [facility]: Hadii chantelle ku hadasho Sodimple, waaxda luqadaha, qaybta kaalmada adeegyada, celso amador hayjenise lemon . So [facility] [phone].    ATENCIÓN: Si habla español, tiene a quiroz disposición servicios gratuitos de asistencia lingüística. Fredericrayshawn al [phone].    We comply with applicable federal civil rights laws and [place] laws. We do not discriminate on the basis of race, color, national origin, age, disability, sex, sexual orientation, or gender identity.            Thank you!     Thank you for choosing [facility]  for your care. Our goal is always to provide you with excellent care. Hearing back from our patients is one way we can continue to improve our services. Please take a few minutes to complete the written survey that you may receive in the mail after your visit with us. Thank you!             Your Updated Medication List - Protect others around you: Learn how to safely use, store and throw away your medicines at www.disposemymeds.org.          This list is accurate as of 4/17/18  9:05 AM.  Always use your most recent med list.                   Brand Name Dispense Instructions for use  Diagnosis    ascorbic acid 500 MG tablet    VITAMIN C     Take 500 mg by mouth as needed.        aspirin 81 MG tablet     100    1 tab po QD (Once per day)    Mixed hyperlipidemia       atorvastatin 10 MG tablet    LIPITOR    90 tablet    Take 1 tablet (10 mg) by mouth daily    Mixed hyperlipidemia       cholecalciferol 1000 UNIT tablet    vitamin D3     Take 1 tablet by mouth daily.        clobetasol 0.05 % ointment    TEMOVATE    30 g    Apply sparingly to affected area 2 x daily for14 days. Then, apply small amount to affected area twice weekly for maintenance.    Lichen sclerosus       diclofenac 1 % Gel topical gel    VOLTAREN    100 g    Apply 4 grams to knees or 2 grams to hands four times daily using enclosed dosing card.    Right knee pain       DILT- MG 24 hr capsule   Generic drug:  diltiazem     90 capsule    TAKE 1 CAPSULE BY MOUTH ONCE DAILY    Essential hypertension       diphenhydrAMINE 25 MG tablet    BENADRYL    20 tablet    Take 1 tablet (25 mg) by mouth nightly as needed for itching or allergies    Acute sinusitis       omeprazole 40 MG capsule    priLOSEC    90 capsule    Take 1 capsule (40 mg) by mouth daily as needed    S/P Nissen fundoplication (without gastrostomy tube) procedure       PROBIOTIC FORMULA PO      Take by mouth as needed    Routine gynecological examination, Atypical chest pain

## 2018-04-17 NOTE — PATIENT INSTRUCTIONS
.Primary Care Center: [phone]     [facility] Medication Refill Request Information:  * Please contact your pharmacy regarding ANY request for medication refills.  ** [facility] Prescription Fax = [phone]  * Please allow 3 business days for routine medication refills.  * Please allow 5 business days for controlled substance medication refills.     [facility] Test Result notification information:  *You will be notified with in 7-10 days of your appointment day regarding the results of your test.  If you are on MyChart you will be notified as soon as the provider has reviewed the results and signed off on them.

## 2018-04-19 LAB — METHYLMALONATE SERPL-SCNC: 0.15 UMOL/L (ref 0–0.4)

## 2018-05-04 ENCOUNTER — OFFICE VISIT (OUTPATIENT)
Dept: INTERNAL MEDICINE | Facility: CLINIC | Age: 78
End: 2018-05-04
Payer: COMMERCIAL

## 2018-05-04 VITALS
OXYGEN SATURATION: 96 % | RESPIRATION RATE: 20 BRPM | HEART RATE: 79 BPM | WEIGHT: 153.9 LBS | DIASTOLIC BLOOD PRESSURE: 80 MMHG | BODY MASS INDEX: 23.4 KG/M2 | SYSTOLIC BLOOD PRESSURE: 155 MMHG

## 2018-05-04 DIAGNOSIS — M25.561 CHRONIC PAIN OF RIGHT KNEE: Primary | ICD-10-CM

## 2018-05-04 DIAGNOSIS — G89.29 CHRONIC PAIN OF RIGHT KNEE: Primary | ICD-10-CM

## 2018-05-04 ASSESSMENT — PAIN SCALES - GENERAL: PAINLEVEL: NO PAIN (0)

## 2018-05-04 NOTE — PROGRESS NOTES
HPI   Pt. Comes in for  Follow up today. She was at an exercise class about 2 weeks ago and she was doing an exercise and fell to the R on her R hip. She has B toe numbness since then. She had normal exercise stress echo test 5/23/14.  She had unremarkable Holter Monitor in the past. She was seen by Dr. Jensen Cardiology on 6/25/14 as well as 8/20/14.  She stopped her Altace for possible side effects. She does fixate on increasing her Diltiazem for her sxs. Of microvascular angina.  She o/w denies additional HEENT, cardiopulmonary, abdominal, , GYN, neurological, systemic, psycahictric compliants. No lymphatic, endocrine complaints. She does not smoke nor abuse EtOH. We reviewed fam. Hx. No change. She denies EtOH abuse or smoking.     She denies any rest/exertional CP/SOB today. She states her BP is fairly well controlled at home on 240 mg of Diltiazem.       She was seen by Dr. Valle, CVTS 3/26/15 and again on 4/27/16 for her reflux sxs. And her h/o Yves and a follow up upper GI is planned she remains on her PPI. She was seen by Dr. Yanez, pulmonary 9/10/14 for review of CT scan, breathing issues and PFT's. She was seen by Dr. Gee, [facility] for annual exam 4/13/15. Mammogram 11/2017    She has been seeing Dr. Estelita dhillon 5/20/16 for lumbar radicular pain. She had MRI with some mild disease. Epidural steroid injection is being considered. She saw Dr. Estelita Dhillon 6/28/16.         PMH    1. HTN  2. C. Diff.  3. Diverticular disease  4. B tubal ligation  5. Normal exercise stress echo 9/20/12  6. Increased cholesterol  7. Osteoporosis  8. Neuroma excision 1998.  9. Vulvar cyst  10. Normal Coronary angiogram 2000; possible dx. 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   11. Normal Nuc. Med exercise stress test 12/2/13.  12. Laparoscopic Yves procedure for Hiatal Hernia 1/7/13.  13. Laparoscopic hysterectomy oophorectomy and rectocele surgery 1/14.      Physical Exam   Vitals noted gen nad cooperative  alert, HEENT ears normal oroparhynx clear no exudate neck supple nl rom no adenopathy, LCTA, B, normal resp. system exam, RRR, S1, S2, no MRG, abdomen, positive BS's, nt, nd no masses,  She has sensation B LE' and toes. She has normal gait. B LE strength is normal and symmetric proximal/distal. She has normal and symmetric reflexes. No tenderness to lower lumbar spine or B hips.  R knee w/o any tenderness, full ROM, no swelling, no erythema.         A and P       1. Pt. With  5/20/14 cardiac evaluation and normal exercise stress echo 5/23/14.   She has followed up 8/20/14 cardiology with Dr. Jensen. She has no cardiovascular complaints today and is doing well.  2. Mammogram done  11/20/17. Tdap and influenza vaccination  10/13/17  3. She was seen in  [facility] by Dr. Gee 5/2017  4. Colonoscopy done 11/15.  5. She has Followed up with Dr. Valle  with previous h/o Yves surgery.  She was seen 4/27/16 and has follow up 5/9/2018 with esophagram.   6. HTN; she remains on Diltiazem and has stopped her Altace. She states side effects from B-blocker. She states better at home. Does not want 24 hr BP monitor.   7. She has followed up in Pulmonary with Dr. Yanez for evaluation of her pulmonary sxs.   8. She was seen by Dr. Capone, ortho for lumbar radicular sxs. And had MRI lumbar spine 5/17/16 showing mild disease. She is following up in  PT. She saw Dr. Capone 6/28/16.  She had epidural injection 6/6/16 that helped out.   9. Seen 4/3/2018 by Dr. Mandel, [place] with B12 low normal 244 pg/mL B foot numbness will check additional labs, methylmalonic acid checked as normal.  Plain films lumbar spine and B hips w/o acute findings.  If worse/persistent: EMG B LE and Lumbar MRI. She states her sxs. Are less and declines any further evaluation  10. She had minor injury R knee and placed order for PT today 5/4/2018  11. Recommended she check with her insurance regarding new Shingles vaccine.     Total time spent 15 minutes.  More  than 50% of the time spent with Ms. Donaldson on counseling / coordinating her care

## 2018-05-04 NOTE — NURSING NOTE
Chief Complaint   Patient presents with     Fall     follow up and right hip and knee injury   Belkis Luke LPN 7:05 AM on 5/4/2018    Rooming Note  Blood Pressure   BP Readings from Last 1 Encounters:   05/04/18 162/84    Single BP recheck started, 7:06 AM (4 minutes)    Belkis Luke LPN 7:06 AM on 5/4/2018

## 2018-05-04 NOTE — PATIENT INSTRUCTIONS
[facility]: [phone]     [facility] Medication Refill Request Information:  * Please contact your pharmacy regarding ANY request for medication refills.  ** [facility] Prescription Fax = [phone]  * Please allow 3 business days for routine medication refills.  * Please allow 5 business days for controlled substance medication refills.     [facility] Test Result notification information:  *You will be notified with in 7-10 days of your appointment day regarding the results of your test.  If you are on MyChart you will be notified as soon as the provider has reviewed the results and signed off on them.      Schedule Physical Therapy

## 2018-05-08 NOTE — PROGRESS NOTES
THORACIC SURGERY FOLLOW UP VISIT    I saw Mrs. Shahana Donaldson in follow-up today. The clinical summary follows:  PREOP DIAGNOSIS   Giant hiatal hernia (>50%)    PROCEDURE   Laparoscopic reduction of hiatal hernia and Nissen (no Chaya, no PEG)  DATE OF PROCEDURE  1/7/2013     COMPLICATIONS  None     INTERVAL STUDIES  Esophagogram (2/2014): normal post Nissen appearance, mild distal esophageal dilation, no hernia.  CT PE protocol (9/2014): no PE, intact Nissen below diaphragm  Esophagogram (3/27/2015): intact Nissen, no recurrence of hernia, no reflux.  Esophagogram (4/27/2016): intact Nissen, no recurrence of hernia, mild reflux.      Esophagram 2018: Not formally read at time of note. No evidence of hiatal hernia reoccurrence.       ETOH: No  TOB: never smoker  BMI: Body mass index is 23.26 kg/(m^2).    SUBJECTIVE  She denies any dysphagia, regurgitation or heartburn--stating she has not had to use her PRN PPI at all in the recent past.  She is able to eat a wide variety of foods without difficulty.      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    IMPRESSION: (Z98.890) S/P Nissen fundoplication (without gastrostomy tube) procedure (K44.9) Hiatal hernia    75 year-old female 5 years S/P Nissen fundoplication for giant hiatal hernia.      PLAN  I spent a total of 15 minutes with Ms. Shahana Donaldson and her  Jake, more than 50% of which were spent in counseling, coordination of care, and face-to-face time. I reviewed the plan as follows:  1. Hiatal Hernia. S/P repair as above.  Her esophagram today was not formally read at my visit, however no evidence or re-herniation.  Will await final report and contact patient with any concerning findings.   Follow up with an esophagram in two years.   All questions were answered and the patient and present family were in agreement with the plan.  I appreciate the opportunity to participate in the care of  your patient and will keep you updated.  Sincerely,  BRIAN Yoder, CNP  Thoracic and Forgut Surgery  [facility]

## 2018-05-09 ENCOUNTER — RADIANT APPOINTMENT (OUTPATIENT)
Dept: GENERAL RADIOLOGY | Facility: CLINIC | Age: 78
End: 2018-05-09
Attending: NURSE PRACTITIONER
Payer: COMMERCIAL

## 2018-05-09 ENCOUNTER — OFFICE VISIT (OUTPATIENT)
Dept: SURGERY | Facility: CLINIC | Age: 78
End: 2018-05-09
Attending: THORACIC SURGERY (CARDIOTHORACIC VASCULAR SURGERY)
Payer: MEDICARE

## 2018-05-09 VITALS
OXYGEN SATURATION: 95 % | SYSTOLIC BLOOD PRESSURE: 142 MMHG | BODY MASS INDEX: 23.26 KG/M2 | DIASTOLIC BLOOD PRESSURE: 87 MMHG | RESPIRATION RATE: 16 BRPM | WEIGHT: 153 LBS | HEART RATE: 73 BPM

## 2018-05-09 DIAGNOSIS — K44.9 HIATAL HERNIA: ICD-10-CM

## 2018-05-09 DIAGNOSIS — Z98.890 S/P NISSEN FUNDOPLICATION (WITHOUT GASTROSTOMY TUBE) PROCEDURE: Primary | ICD-10-CM

## 2018-05-09 DIAGNOSIS — Z98.890 S/P NISSEN FUNDOPLICATION (WITHOUT GASTROSTOMY TUBE) PROCEDURE: ICD-10-CM

## 2018-05-09 PROCEDURE — G0463 HOSPITAL OUTPT CLINIC VISIT: HCPCS | Mod: ZF

## 2018-05-09 NOTE — LETTER
5/9/2018       RE: Shahana Donaldson  [address] [facility] [address]     Dear Colleague,    Thank you for referring your patient, Shahana Donaldson, to the [facility]. Please see a copy of my visit note below.    THORACIC SURGERY FOLLOW UP VISIT    I saw Mrs. Shahana Donaldson in follow-up today. The clinical summary follows:  PREOP DIAGNOSIS   Giant hiatal hernia (>50%)    PROCEDURE   Laparoscopic reduction of hiatal hernia and Nissen (no Chaya, no PEG)  DATE OF PROCEDURE  1/7/2013     COMPLICATIONS  None     INTERVAL STUDIES  Esophagogram (2/2014): normal post Nissen appearance, mild distal esophageal dilation, no hernia.  CT PE protocol (9/2014): no PE, intact Nissen below diaphragm  Esophagogram (3/27/2015): intact Nissen, no recurrence of hernia, no reflux.  Esophagogram (4/27/2016): intact Nissen, no recurrence of hernia, mild reflux.      Esophagram 2018: Not formally read at time of note. No evidence of hiatal hernia reoccurrence.       ETOH: No  TOB: never smoker  BMI: Body mass index is 23.26 kg/(m^2).    SUBJECTIVE  She denies any dysphagia, regurgitation or heartburn--stating she has not had to use her PRN PPI at all in the recent past.  She is able to eat a wide variety of foods without difficulty.      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    IMPRESSION: (Z98.890) S/P Nissen fundoplication (without gastrostomy tube) procedure (K44.9) Hiatal hernia    75 year-old female 5 years S/P Nissen fundoplication for giant hiatal hernia.      PLAN  I spent a total of 15 minutes with Ms. Shahana Donaldson and her  Jake, more than 50% of which were spent in counseling, coordination of care, and face-to-face time. I reviewed the plan as follows:  1. Hiatal Hernia. S/P repair as above.  Her esophagram today was not formally read at my visit, however no evidence or re-herniation.  Will await final report  and contact patient with any concerning findings.   Follow up with an esophagram in two years.   All questions were answered and the patient and present family were in agreement with the plan.  I appreciate the opportunity to participate in the care of your patient and will keep you updated.  Sincerely,  BRIAN Yoder, CNP  Thoracic and Forgut Surgery  [facility]

## 2018-05-09 NOTE — MR AVS SNAPSHOT
After Visit Summary   5/9/2018    Shahana Donaldson    MRN: [id number]           Patient Information     Date Of Birth          1940        Visit Information        Provider Department      5/9/2018 12:00 PM Marva Luna APRN CNP [facility]        Today's Diagnoses     S/P Nissen fundoplication (without gastrostomy tube) procedure    -  1    Hiatal hernia           Follow-ups after your visit        Follow-up notes from your care team     Return in about 2 years (around 5/9/2020).      Your next 10 appointments already scheduled     May 10, 2018 11:30 AM CDT   (Arrive by 11:15 AM)   TAMARA Extremity with Bonny Nolen PT   [facility] [place] [facility] ([facility] [place])    [address]  [place] [address]   [phone]            May 21, 2018  9:30 AM CDT   TAMARA Extremity with Letty Murphy PTA   [facility] [place] [facility] ([facility] [place])    [address]  [place] [address]   [phone]            May 29, 2018  8:15 AM CDT   RETURN GENERAL with Josue Trujillo MD   [facility] ([facility])    [address] [address] [phone]            Nov 21, 2018  7:05 AM CST   (Arrive by 6:50 AM)   Return Visit with Jeramie Curran MD   [facility] ([facility])    [address] [place] [address]  4th [facility] [id number] [phone]            Nov 21, 2018 10:00 AM CST   MA SCREENING DIGITAL BILATERAL with [id number]   [facility] ([facility])    [address] [place] [address]  2nd [facility] [id number] [phone]           Do not use any powder, lotion or deodorant under your arms or on your breast. If you do, we will ask you to remove it before your exam.  Wear comfortable, two-piece clothing.  If  "you have any allergies, tell your care team.  Bring any previous mammograms from other facilities or have them mailed to the breast center. Three-dimensional (3D) mammograms are available at [place] locations in [facility], [place], [place], [facility], [place], [place], and [place]. [facility] locations include [place] and [facility] in [place]. Benefits of 3D mammograms include: - Improved rate of cancer detection - Decreases your chance of having to go back for more tests, which means fewer: - \"False-positive\" results (This means that there is an abnormal area but it isn't cancer.) - Invasive testing procedures, such as a biopsy or surgery - Can provide clearer images of the breast if you have dense breast tissue. 3D mammography is an optional exam that anyone can have with a 2D mammogram. It doesn't replace or take the place of a 2D mammogram. 2D mammograms remain an effective screening test for all women.  Not all insurance companies cover the cost of a 3D mammogram. Check with your insurance.              Who to contact     If you have questions or need follow up information about today's clinic visit or your schedule please contact [facility] directly at [phone].  Normal or non-critical lab and imaging results will be communicated to you by ResoServhart, letter or phone within 4 business days after the clinic has received the results. If you do not hear from us within 7 days, please contact the clinic through [email or web address] or phone. If you have a critical or abnormal lab result, we will notify you by phone as soon as possible.  Submit refill requests through [employer] or call your pharmacy and they will forward the refill request to us. Please allow 3 business days for your refill to be completed.          Additional Information About Your Visit        Ze[employer]rix Information     Ze[employer] gives you secure access to your electronic health record. If you see a primary " care provider, you can also send messages to your care team and make appointments. If you have questions, please call your primary care clinic.  If you do not have a primary care provider, please call [phone] and they will assist you.        Care EveryWhere ID     This is your Care EveryWhere ID. This could be used by other organizations to access your [facility] medical records  [id number]        Your Vitals Were     Pulse Respirations Pulse Oximetry BMI (Body Mass Index)          73 16 95% 23.26 kg/m2         Blood Pressure from Last 3 Encounters:   05/09/18 142/87   05/04/18 155/80   04/17/18 158/74    Weight from Last 3 Encounters:   05/09/18 69.4 kg (153 lb)   05/04/18 69.8 kg (153 lb 14.4 oz)   04/17/18 69.8 kg (153 lb 12.8 oz)              Today, you had the following     No orders found for display       Primary Care Provider Office Phone # Fax #    Jeramie Curran [phone]       [address] [address]        Equal Access to Services     [facility] Hospital[facility] : Hadii chantelle ku hadasho Sodimple, waaxda luqadaha, qaybta kaalmakirby kamara, celso crane. So [facility] [phone].    ATENCIÓN: Si habla español, tiene a quiroz disposición servicios gratuitos de asistencia lingüística. [facility] [phone].    We comply with applicable federal civil rights laws and [place] laws. We do not discriminate on the basis of race, color, national origin, age, disability, sex, sexual orientation, or gender identity.            Thank you!     Thank you for choosing [facility]  for your care. Our goal is always to provide you with excellent care. Hearing back from our patients is one way we can continue to improve our services. Please take a few minutes to complete the written survey that you may receive in the mail after your visit with us. Thank you!             Your Updated Medication List - Protect others around you: Learn how to safely  use, store and throw away your medicines at www.disposemymeds.org.          This list is accurate as of 5/9/18 12:09 PM.  Always use your most recent med list.                   Brand Name Dispense Instructions for use Diagnosis    ascorbic acid 500 MG tablet    VITAMIN C     Take 500 mg by mouth as needed.        aspirin 81 MG tablet     100    1 tab po QD (Once per day)    Mixed hyperlipidemia       atorvastatin 10 MG tablet    LIPITOR    90 tablet    Take 1 tablet (10 mg) by mouth daily    Mixed hyperlipidemia       cholecalciferol 1000 UNIT tablet    vitamin D3     Take 1 tablet by mouth daily.        clobetasol 0.05 % ointment    TEMOVATE    30 g    Apply sparingly to affected area 2 x daily for14 days. Then, apply small amount to affected area twice weekly for maintenance.    Lichen sclerosus       diclofenac 1 % Gel topical gel    VOLTAREN    100 g    Apply 4 grams to knees or 2 grams to hands four times daily using enclosed dosing card.    Right knee pain       DILT- MG 24 hr capsule   Generic drug:  diltiazem     90 capsule    TAKE 1 CAPSULE BY MOUTH ONCE DAILY    Essential hypertension       diphenhydrAMINE 25 MG tablet    BENADRYL    20 tablet    Take 1 tablet (25 mg) by mouth nightly as needed for itching or allergies    Acute sinusitis       omeprazole 40 MG capsule    priLOSEC    90 capsule    Take 1 capsule (40 mg) by mouth daily as needed    S/P Nissen fundoplication (without gastrostomy tube) procedure       PROBIOTIC FORMULA PO      Take by mouth as needed    Routine gynecological examination, Atypical chest pain

## 2018-05-09 NOTE — NURSING NOTE
"Oncology Rooming Note    May 9, 2018 11:41 AM   Shahana Donaldson is a 77 year old female who presents for:    No chief complaint on file.    Initial Vitals: /87  Pulse 73  Resp 16  Wt 69.4 kg (153 lb)  SpO2 95%  BMI 23.26 kg/m2 Estimated body mass index is 23.26 kg/(m^2) as calculated from the following:    Height as of 5/30/17: 1.727 m (5' 8\").    Weight as of this encounter: 69.4 kg (153 lb). Body surface area is 1.82 meters squared.  Data Unavailable Comment: Data Unavailable   No LMP recorded. Patient is postmenopausal.  Allergies reviewed: Yes  Medications reviewed: Yes    Medications: Medication refills not needed today.  Pharmacy name entered into EPIC:    [facility] [address] [place] [address]    Clinical concerns: Patient states there are no new concerns to discuss with provider.  Mariana Luna was not notified.       8 minutes for nursing intake (face to face time)     Claire Marie CMA                "

## 2018-05-10 ENCOUNTER — THERAPY VISIT (OUTPATIENT)
Dept: PHYSICAL THERAPY | Facility: CLINIC | Age: 78
End: 2018-05-10
Payer: MEDICARE

## 2018-05-10 DIAGNOSIS — M25.561 CHRONIC PAIN OF RIGHT KNEE: Primary | ICD-10-CM

## 2018-05-10 DIAGNOSIS — G89.29 CHRONIC PAIN OF RIGHT KNEE: Primary | ICD-10-CM

## 2018-05-10 PROCEDURE — 97110 THERAPEUTIC EXERCISES: CPT | Mod: GP | Performed by: PHYSICAL THERAPIST

## 2018-05-10 PROCEDURE — 97161 PT EVAL LOW COMPLEX 20 MIN: CPT | Mod: GP | Performed by: PHYSICAL THERAPIST

## 2018-05-10 PROCEDURE — G8978 MOBILITY CURRENT STATUS: HCPCS | Mod: GP | Performed by: PHYSICAL THERAPIST

## 2018-05-10 PROCEDURE — G8979 MOBILITY GOAL STATUS: HCPCS | Mod: GP | Performed by: PHYSICAL THERAPIST

## 2018-05-10 PROCEDURE — 97112 NEUROMUSCULAR REEDUCATION: CPT | Mod: GP | Performed by: PHYSICAL THERAPIST

## 2018-05-10 ASSESSMENT — ACTIVITIES OF DAILY LIVING (ADL)
HOW_WOULD_YOU_RATE_THE_OVERALL_FUNCTION_OF_YOUR_KNEE_DURING_YOUR_USUAL_DAILY_ACTIVITIES?: ABNORMAL
STIFFNESS: THE SYMPTOM AFFECTS MY ACTIVITY MODERATELY
GO UP STAIRS: ACTIVITY IS FAIRLY DIFFICULT
KNEEL ON THE FRONT OF YOUR KNEE: ACTIVITY IS SOMEWHAT DIFFICULT
SWELLING: NOT ANSWERED
GIVING WAY, BUCKLING OR SHIFTING OF KNEE: THE SYMPTOM AFFECTS MY ACTIVITY MODERATELY
RISE FROM A CHAIR: ACTIVITY IS MINIMALLY DIFFICULT
STAND: ACTIVITY IS SOMEWHAT DIFFICULT
PAIN: THE SYMPTOM AFFECTS MY ACTIVITY MODERATELY
LIMPING: NOT ANSWERED
GO DOWN STAIRS: ACTIVITY IS FAIRLY DIFFICULT
WALK: ACTIVITY IS SOMEWHAT DIFFICULT
SIT WITH YOUR KNEE BENT: ACTIVITY IS MINIMALLY DIFFICULT
WEAKNESS: THE SYMPTOM AFFECTS MY ACTIVITY MODERATELY
KNEE_ACTIVITY_OF_DAILY_LIVING_SUM: 32
SQUAT: ACTIVITY IS SOMEWHAT DIFFICULT
HOW_WOULD_YOU_RATE_THE_CURRENT_FUNCTION_OF_YOUR_KNEE_DURING_YOUR_USUAL_DAILY_ACTIVITIES_ON_A_SCALE_FROM_0_TO_100_WITH_100_BEING_YOUR_LEVEL_OF_KNEE_FUNCTION_PRIOR_TO_YOUR_INJURY_AND_0_BEING_THE_INABILITY_TO_PERFORM_ANY_OF_YOUR_USUAL_DAILY_ACTIVITIES?: 85
AS_A_RESULT_OF_YOUR_KNEE_INJURY,_HOW_WOULD_YOU_RATE_YOUR_CURRENT_LEVEL_OF_DAILY_ACTIVITY?: ABNORMAL

## 2018-05-10 NOTE — PROGRESS NOTES
[facility] Initial Evaluation -- Lower Extremity    Evaluation Date: May 10, 2018  Shahana Donaldson is a 77 year old female with a Rt knee, tailbone condition.   Referral: GP  Work mechanical stresses: Retired   Employment status:   Leisure mechanical stresses: exercise class 1 x/wk, 2-3 x/week goes to [facility] - is doing more upper body exer because walking bothers  Functional disability score: See flowsheet  VAS score (0-10): 0/10 currently, ranges 0-8/10  Patient goals/expectations:  decr tailbone and knee pain to be able to walk and do stairs without pain    HISTORY:    Present symptoms: Lateral/ant Rt knee, Rt tailbone/buttock  Pain quality (sharp/shooting/stabbing/aching/burning/cramping):  Rt knee sharp, tailbone aching/sharp    Present since (onset date): 5 weeks ago.  MD referral 5-4-2018  Symptoms (improving/unchaning/worsening):  unchanging.      Symptoms commenced as a result of: Fell at [facility] landig on Rt side and tailbone   Condition occurred in the following environment: at gym     Symptoms at onset: Rt tailbone and knee  Paresthesia (yes/no):  Numbness/tingle bilat toes  Spinal history: yes - radiculopathy 2016 - injection w/ relief   Cough/Sneeze (pos/neg):  neg    Constant symptoms: N&T in feet  Intermittent symptoms: Rt knee and tailbone,     Symptoms are worse with the following: Sometimes Rising, Sometimes Walking, Always Stairs (ascending+descending), Always Sleeping (prone/sup/side R/L) - Rt side and Other - lying down - always   Symptoms are better with the following: none    Continued use makes the pain (better/worse/no effect): worse    Disturbed night (yes/no): difficulty getting to sleep, wakes 3-6 x/night      Pain at rest (yes/no):  yes  Site (back/hip/knee/ankle/foot):  Tailbone, knee and feet    Other questions (swelling/clicking/locking/giving way/falling):  Some giving way     Previous episodes: yes - 2016 see above  Previous treatments: PT    Specific  "Questions:  General health (excellent/good/fair/poor):  good  Pertinent medical history includes: Osteoarthritis, High blood pressure and Menopausal  Medications (nil/NSAIDS/analg/steroids/anticoag/other):  OTC analgesic and Other - High blood pressure  Medical allergies:  senacot  Imaging (none/Xray/MRI/other):  None recently  Recent or major surgery (yes/no):  Nisson surgery  Night pain (yes/no):  occas  Accidents (yes/no):  None other than at gym 5 weeks ago  Unexplained weight loss (yes/no):  no  Barriers at home: none  Other red flags: none    Sites for physical examination (back/hip/knee/ankle/foot/other): Rt tailbone and Knee    EXAMINATION    Posture:  Sitting (good/fair/poor): fair    Correction of Posture (better/worse/no effect/NA): NE  Standing (good/fair/poor): fair - mod incr thoracic kyphosis, Lt shld elevated, scoliosis type curve  Other observations:      Neurological: (NA/motor/sensory/reflexes/dural):    Myotomes:  Decr Toe Raise, single leg squat, and ant tib Rt   Dural:  Slump (+) Rt    Baselines (pain or functional activity): Decr strength and pain Rt knee with stairs/6\" step Up    Extremities (Hip / Knee / Ankle / Foot): Rt knee    Movement Loss Anshul Mod Min Nil Pain   Flexion    X    Extension    X      Passive Movement (+/- over pressure)/(PDM/ERP):     Rt knee symmetrical to Lt - no pain  Resisted Test Response (pain):    Rt HS 5/5 and Quad 4-/4  Other Tests:     Spine:  Movement loss:    Trunk Flexion to toes    Extension 25% loss - sharp pain Rt lateral ankle    SG Rt and Lt WNL  Effect of repeated movements: Slight Tailbone pain Rt   SPENSER (DKC) 2 x10 - NE, NE, NE   EIStdg (hips against table) - 2 x10 - NE, NE, Incr ROM, decr Rt knee pain w/ 6\" step up  Effect of static positioning:   Spine testing (not relevant/relevant/secondary problem): relevant    Baseline Symptoms: NA  Repeated Tests Symptom Response Mechanical Response   Active/Passive movement, resisted test, functional test During " -  Produce, Abolish, Increase, Decrease, NE After -  Better, Worse, NB, NW, NE Effect -   ? or ? ROM, strength or key functional test No   Effect          Effect of static positioning                Provisional Classification (Extremity/Spine):  Spine - Derangement - Asymmetrical, unilateral, symptoms below knee      Princicple of Management:   Education:  Posture - Neutral spine, use of L-roll, affect of posture on spine and pain producing structures, centralization vs peripheralization, avoiding couch and recliner.    Equipment provided:  none  Exercise and dosage:  EIStdg (hips against counter) 10x 6 x/day.    ASSESSMENT/PLAN:    Patient is a 77 year old female with lumbar and right side knee complaints.    Patient has the following significant findings with corresponding treatment plan.                Diagnosis 1:  Chronic Rt knee pain  Pain -  hot/cold therapy, manual therapy, education, directional preference exercise and home program  Decreased ROM/flexibility - manual therapy, therapeutic exercise and home program  Decreased strength - therapeutic exercise, therapeutic activities and home program  Decreased function - therapeutic activities and home program  Impaired posture - neuro re-education and home program    Therapy Evaluation Codes:   1) History comprised of:   Personal factors that impact the plan of care:      Age.    Comorbidity factors that impact the plan of care are:      High blood pressure, Menopausal and Osteoarthritis.     Medications impacting care: High blood pressure and Pain.  2) Examination of Body Systems comprised of:   Body structures and functions that impact the plan of care:      Knee and Lumbar spine.   Activity limitations that impact the plan of care are:      Sitting, Walking, Sleeping and Laying down.  3) Clinical presentation characteristics are:   Stable/Uncomplicated.  4) Decision-Making    Low complexity using standardized patient assessment instrument and/or measureable  assessment of functional outcome.  Cumulative Therapy Evaluation is: Low complexity.    Previous and current functional limitations:  (See Goal Flow Sheet for this information)    Short term and Long term goals: (See Goal Flow Sheet for this information)     Communication ability:  Patient appears to be able to clearly communicate and understand verbal and written communication and follow directions correctly.  Treatment Explanation - The following has been discussed with the patient:   RX ordered/plan of care  Anticipated outcomes  Possible risks and side effects  This patient would benefit from PT intervention to resume normal activities.   Rehab potential is good.    Frequency:  1 X week, once daily  Duration:  for 6 weeks  Discharge Plan:  Achieve all LTG.  Independent in home treatment program.  Reach maximal therapeutic benefit.    Please refer to the daily flowsheet for treatment today, total treatment time and time spent performing 1:1 timed codes.

## 2018-05-10 NOTE — LETTER
DEPARTMENT OF HEALTH AND HUMAN SERVICES  CENTERS FOR MEDICARE & MEDICAID SERVICES    PLAN/UPDATED PLAN OF PROGRESS FOR OUTPATIENT REHABILITATION    PATIENTS NAME:  Shahana Donaldson   : 1940    PROVIDER NUMBER:    [id number]  [id number]     PROVIDER NAME: [facility]  MEDICAL RECORD NUMBER: [id number]     START OF CARE DATE:  SOC Date: 05/10/18   TYPE:  PT    PRIMARY/TREATMENT DIAGNOSIS: (Pertinent Medical Diagnosis)  Chronic pain of right knee    VISITS FROM START OF CARE:    1     [facility] Initial Evaluation -- Lower Extremity  Evaluation Date: May 10, 2018  Shahana Donaldson is a 77 year old female with a Rt knee, tailbone condition.   Referral: GP  Work mechanical stresses: Retired   Employment status:   Leisure mechanical stresses: exercise class 1 x/wk, 2-3 x/week goes to [facility] - is doing more upper body exer because walking bothers  Functional disability score: See flowsheet  VAS score (0-10): 0/10 currently, ranges 0-8/10  Patient goals/expectations:  decr tailbone and knee pain to be able to walk and do stairs without pain    HISTORY:  Present symptoms: Lateral/ant Rt knee, Rt tailbone/buttock  Pain quality (sharp/shooting/stabbing/aching/burning/cramping):  Rt knee sharp, tailbone aching/sharp  Present since (onset date): 5 weeks ago.  MD referral 2018  Symptoms (improving/unchaning/worsening):  unchanging.    Symptoms commenced as a result of: Fell at [facility] landig on Rt side and tailbone   Condition occurred in the following environment: at gym   Symptoms at onset: Rt tailbone and knee    Paresthesia (yes/no):  Numbness/tingle bilat toes  Spinal history: yes - radiculopathy 2016 - injection w/ relief   Cough/Sneeze (pos/neg):  neg  Constant symptoms: N&T in feet  Intermittent symptoms: Rt knee and tailbone,   Symptoms are worse with the following: Sometimes Rising, Sometimes Walking, Always Stairs  "(ascending+descending), Always Sleeping (prone/sup/side R/L) - Rt side and Other - lying down - always   Symptoms are better with the following: none  Continued use makes the pain (better/worse/no effect): worse    PATIENTS NAME:  Shahana Donaldson   : 1940  PRIMARY/TREATMENT DIAGNOSIS: (Pertinent Medical Diagnosis)  Chronic pain of right knee    HISTORY (continued)  Disturbed night (yes/no): difficulty getting to sleep, wakes 3-6 x/night    Pain at rest (yes/no):  yes    Site (back/hip/knee/ankle/foot):  Tailbone, knee and feet  Other questions (swelling/clicking/locking/giving way/falling):  Some giving way  Previous episodes: yes - 2016 see above  Previous treatments: PT    Specific Questions:  General health (excellent/good/fair/poor):  good  Pertinent medical history includes: Osteoarthritis, High blood pressure and Menopausal  Medications (nil/NSAIDS/analg/steroids/anticoag/other):  OTC analgesic and Other - High blood pressure  Medical allergies:  senacot  Imaging (none/Xray/MRI/other):  None recently  Recent or major surgery (yes/no):  Nisson surgery  Night pain (yes/no):  occas  Accidents (yes/no):  None other than at gym 5 weeks ago  Unexplained weight loss (yes/no):  no  Barriers at home: none  Other red flags: none    Sites for physical examination (back/hip/knee/ankle/foot/other): Rt tailbone and Knee    EXAMINATION    Posture:  Sitting (good/fair/poor): fair    Correction of Posture (better/worse/no effect/NA): NE  Standing (good/fair/poor): fair - mod incr thoracic kyphosis, Lt shld elevated, scoliosis type curve  Other observations:      Neurological: (NA/motor/sensory/reflexes/dural):    Myotomes:  Decr Toe Raise, single leg squat, and ant tib Rt   Dural:  Slump (+) Rt    Baselines (pain or functional activity): Decr strength and pain Rt knee with stairs/6\" step Up    Extremities (Hip / Knee / Ankle / Foot): Rt knee    Movement Loss Anshul Mod Min Nil Pain   Flexion    X    Extension    X  " "    PATIENTS NAME:  Shahana Donaldson   : 1940  PRIMARY/TREATMENT DIAGNOSIS: (Pertinent Medical Diagnosis)  Chronic pain of right knee    Passive Movement (+/- over pressure)/(PDM/ERP):     Rt knee symmetrical to Lt - no pain  Resisted Test Response (pain):    Rt HS 5/5 and Quad 4-/4  Other Tests:     Spine:  Movement loss:    Trunk Flexion to toes    Extension 25% loss - sharp pain Rt lateral ankle    SG Rt and Lt WNL  Effect of repeated movements: Slight Tailbone pain Rt   SPENSER (DKC) 2 x10 - NE, NE, NE   EIStdg (hips against table) - 2 x10 - NE, NE, Incr ROM, decr Rt knee pain w/ 6\"  step up  Effect of static positioning:   Spine testing (not relevant/relevant/secondary problem): relevant    Baseline Symptoms: NA  Repeated Tests Symptom Response Mechanical Response   Active/Passive movement, resisted test, functional test During -  Produce, Abolish, Increase, Decrease, NE After -  Better, Worse, NB, NW, NE Effect -   ? or ? ROM, strength or key functional test No   Effect   Effect of static positioning       Provisional Classification (Extremity/Spine):  Spine - Derangement - Asymmetrical, unilateral, symptoms below knee      Princicple of Management:   Education:  Posture - Neutral spine, use of L-roll, affect of posture on spine and pain producing structures, centralization vs peripheralization, avoiding couch and recliner   Equipment provided:  none  Exercise and dosage:  EIStdg (hips against counter) 10x 6 x/day.    ASSESSMENT/PLAN:  Patient is a 77 year old female with lumbar and right side knee complaints.    Patient has the following significant findings with corresponding treatment plan.                Diagnosis 1:  Chronic Rt knee pain  Pain -  hot/cold therapy, manual therapy, education, directional preference exercise and home program  Decreased ROM/flexibility - manual therapy, therapeutic exercise and home program  Decreased strength - therapeutic exercise, therapeutic activities and home " program  Decreased function - therapeutic activities and home program  Impaired posture - neuro re-education and home program  PATIENTS NAME:  Shahana Donaldson   : 1940  PRIMARY/TREATMENT DIAGNOSIS: (Pertinent Medical Diagnosis)  Chronic pain of right knee    Therapy Evaluation Codes:   1) History comprised of:   Personal factors that impact the plan of care:      Age.    Comorbidity factors that impact the plan of care are:      High blood pressure, Menopausal and Osteoarthritis.     Medications impacting care: High blood pressure and Pain.  2) Examination of Body Systems comprised of:   Body structures and functions that impact the plan of care:      Knee and Lumbar spine.   Activity limitations that impact the plan of care are:      Sitting, Walking, Sleeping and Laying down.  3) Clinical presentation characteristics are:   Stable/Uncomplicated.  4) Decision-Making    Low complexity using standardized patient assessment instrument and/or   measureable assessment of functional outcome.  Cumulative Therapy Evaluation is: Low complexity.    Previous and current functional limitations:  (See Goal Flow Sheet for this information)    Short term and Long term goals: (See Goal Flow Sheet for this information)   Communication ability:  Patient appears to be able to clearly communicate and understand verbal and written communication and follow directions correctly.  Treatment Explanation - The following has been discussed with the patient:   RX ordered/plan of care, Anticipated outcomes, Possible risks and side effects                                                PATIENTS NAME:  Shahana Donaldson   : 1940  PRIMARY/TREATMENT DIAGNOSIS: (Pertinent Medical Diagnosis)  Chronic pain of right knee    This patient would benefit from PT intervention to resume normal activities.   Rehab potential is good.  Frequency:  1 X week, once daily  Duration:  for 6 weeks  Discharge Plan:  Achieve all  "LTG.  Independent in home treatment program.  Reach maximal therapeutic benefit.          Caregiver Signature/Credentials _____________________________ Date ________          Bonny Nolen  PT,  Cert MDT     I have reviewed and certified the need for these services and plan of treatment while under my care.        PHYSICIAN'S SIGNATURE:   _________________________________________    Date___________   Jeramie Curran MD    Certification period:  Beginning of Cert date period: 05/10/18 to 08/07/18     Functional Level Progress Report: Please see attached \"Goal Flow sheet for Functional level.\"    ____X____ Continue Services or       ________ DC Services                Service dates: From  SOC Date: 05/10/18  to present                         "

## 2018-05-11 PROBLEM — M25.561 CHRONIC PAIN OF RIGHT KNEE: Status: ACTIVE | Noted: 2018-05-11

## 2018-05-11 PROBLEM — G89.29 CHRONIC PAIN OF RIGHT KNEE: Status: ACTIVE | Noted: 2018-05-11

## 2018-05-21 ENCOUNTER — THERAPY VISIT (OUTPATIENT)
Dept: PHYSICAL THERAPY | Facility: CLINIC | Age: 78
End: 2018-05-21
Payer: MEDICARE

## 2018-05-21 DIAGNOSIS — G89.29 CHRONIC PAIN OF RIGHT KNEE: ICD-10-CM

## 2018-05-21 DIAGNOSIS — M25.561 CHRONIC PAIN OF RIGHT KNEE: ICD-10-CM

## 2018-05-21 PROCEDURE — 97112 NEUROMUSCULAR REEDUCATION: CPT | Mod: GP | Performed by: PHYSICAL THERAPY ASSISTANT

## 2018-05-21 PROCEDURE — 97110 THERAPEUTIC EXERCISES: CPT | Mod: GP | Performed by: PHYSICAL THERAPY ASSISTANT

## 2018-05-21 ASSESSMENT — ACTIVITIES OF DAILY LIVING (ADL)
KNEE_ACTIVITY_OF_DAILY_LIVING_SUM: 20
HOW_WOULD_YOU_RATE_THE_OVERALL_FUNCTION_OF_YOUR_KNEE_DURING_YOUR_USUAL_DAILY_ACTIVITIES?: ABNORMAL
STAND: ACTIVITY IS SOMEWHAT DIFFICULT
PAIN: THE SYMPTOM AFFECTS MY ACTIVITY SEVERELY
WEAKNESS: NOT ANSWERED
SIT WITH YOUR KNEE BENT: ACTIVITY IS MINIMALLY DIFFICULT
RISE FROM A CHAIR: ACTIVITY IS FAIRLY DIFFICULT
STIFFNESS: NOT ANSWERED
KNEEL ON THE FRONT OF YOUR KNEE: ACTIVITY IS FAIRLY DIFFICULT
WALK: ACTIVITY IS FAIRLY DIFFICULT
AS_A_RESULT_OF_YOUR_KNEE_INJURY,_HOW_WOULD_YOU_RATE_YOUR_CURRENT_LEVEL_OF_DAILY_ACTIVITY?: ABNORMAL
LIMPING: NOT ANSWERED
GO UP STAIRS: ACTIVITY IS FAIRLY DIFFICULT
SWELLING: NOT ANSWERED
GIVING WAY, BUCKLING OR SHIFTING OF KNEE: NOT ANSWERED
GO DOWN STAIRS: ACTIVITY IS FAIRLY DIFFICULT
SQUAT: ACTIVITY IS FAIRLY DIFFICULT

## 2018-05-29 ENCOUNTER — OFFICE VISIT (OUTPATIENT)
Dept: OPHTHALMOLOGY | Facility: CLINIC | Age: 78
End: 2018-05-29
Attending: OPHTHALMOLOGY
Payer: MEDICARE

## 2018-05-29 DIAGNOSIS — H54.40 BLIND RIGHT EYE: ICD-10-CM

## 2018-05-29 DIAGNOSIS — H25.13 AGE-RELATED NUCLEAR CATARACT OF BOTH EYES: Primary | ICD-10-CM

## 2018-05-29 PROCEDURE — G0463 HOSPITAL OUTPT CLINIC VISIT: HCPCS | Mod: ZF

## 2018-05-29 PROCEDURE — 92015 DETERMINE REFRACTIVE STATE: CPT | Mod: GY,ZF

## 2018-05-29 ASSESSMENT — VISUAL ACUITY
OS_CC: 20/30
CORRECTION_TYPE: GLASSES
METHOD: SNELLEN - LINEAR
OS_PH_CC: 20/30+2
OS_CC+: -2
OD_CC: 3/200 E

## 2018-05-29 ASSESSMENT — REFRACTION_MANIFEST
OS_AXIS: 160
OS_ADD: +2.75
OD_ADD: +2.75
OS_SPHERE: -2.00
OS_CYLINDER: +1.75
OD_SPHERE: BALANCE

## 2018-05-29 ASSESSMENT — REFRACTION_WEARINGRX
OS_CYLINDER: +1.50
OD_CYLINDER: +1.25
OD_ADD: +2.75
OS_ADD: +2.75
OS_SPHERE: -0.75
OD_SPHERE: -1.75
OS_AXIS: 164
OD_AXIS: 173
SPECS_TYPE: PAL

## 2018-05-29 ASSESSMENT — CONF VISUAL FIELD
OD_NORMAL: 1
METHOD: COUNTING FINGERS
OS_NORMAL: 1

## 2018-05-29 ASSESSMENT — TONOMETRY
OS_IOP_MMHG: 12
IOP_METHOD: TONOPEN
OD_IOP_MMHG: 10

## 2018-05-29 ASSESSMENT — EXTERNAL EXAM - RIGHT EYE: OD_EXAM: NORMAL

## 2018-05-29 ASSESSMENT — SLIT LAMP EXAM - LIDS
COMMENTS: NORMAL
COMMENTS: NORMAL

## 2018-05-29 ASSESSMENT — CUP TO DISC RATIO: OS_RATIO: 0.3

## 2018-05-29 ASSESSMENT — EXTERNAL EXAM - LEFT EYE: OS_EXAM: NORMAL

## 2018-05-29 NOTE — PATIENT INSTRUCTIONS
Future Appointments  Date Time Provider Department Center   5/31/2018 9:30 AM Letty Murphy PTA [facility]   11/21/2018 7:05 AM Jeramie Curran MD [facility]   11/21/2018 10:00 AM [address]   5/30/2019 8:00 AM Josue Trujillo MD [facility] CLIN

## 2018-05-29 NOTE — MR AVS SNAPSHOT
After Visit Summary   5/29/2018    Shahana Donaldson    MRN: [id number]           Patient Information     Date Of Birth          1940        Visit Information        Provider Department      5/29/2018 8:15 AM [facility]ue [facility]        Today's Diagnoses     Age-related nuclear cataract of both eyes    -  1    Blind right eye        Staphyloma of right eye          Care Instructions    Future Appointments  Date Time Provider Department Center   5/31/2018 9:30 AM Letty Murphy PTA [facility]   11/21/2018 7:05 AM Jeramie Curran MD [facility]   11/21/2018 10:00 AM [id number]   5/30/2019 8:00 AM Josue Trujillo MD [facility] CLIN               Follow-ups after your visit        Follow-up notes from your care team     Return in about 1 year (around 5/29/2019) for DFE.      Your next 10 appointments already scheduled     May 31, 2018  9:30 AM CDT   TAMARA Extremity with Letty Murphy PTA   [facility] ([facility])    [address]  [facility] [address]   [phone]            Nov 21, 2018  7:05 AM CST   (Arrive by 6:50 AM)   Return Visit with Jeramie Curran MD   [facility] ([facility])    [address] [place] [address]  4th [facility] [id number] [phone]            Nov 21, 2018 10:00 AM CST   MA SCREENING DIGITAL BILATERAL with [facility] [facility] ([facility])    [address] [place] [id number] [phone]           Do not use any powder, lotion or deodorant under your arms or on your breast. If you do, we will ask you to remove it before your exam.  Wear comfortable, two-piece clothing.  If you have any allergies, tell your care team.  Bring any previous mammograms from other facilities or have them mailed to the breast center. Three-dimensional (3D)  "mammograms are available at [facility] locations in [place], [place], [place], [place], [facility], [place], [place], and [place]. -Health locations include [place] and [facility] in [place]. Benefits of 3D mammograms include: - Improved rate of cancer detection - Decreases your chance of having to go back for more tests, which means fewer: - \"False-positive\" results (This means that there is an abnormal area but it isn't cancer.) - Invasive testing procedures, such as a biopsy or surgery - Can provide clearer images of the breast if you have dense breast tissue. 3D mammography is an optional exam that anyone can have with a 2D mammogram. It doesn't replace or take the place of a 2D mammogram. 2D mammograms remain an effective screening test for all women.  Not all insurance companies cover the cost of a 3D mammogram. Check with your insurance.              Who to contact     Please call your clinic at [phone] to:    Ask questions about your health    Make or cancel appointments    Discuss your medicines    Learn about your test results    Speak to your doctor            Additional Information About Your Visit        [email or web address] Information     [email or web address] gives you secure access to your electronic health record. If you see a primary care provider, you can also send messages to your care team and make appointments. If you have questions, please call your primary care clinic.  If you do not have a primary care provider, please call [phone] and they will assist you.      [email or web address] is an electronic gateway that provides easy, online access to your medical records. With [email or web address], you can request a clinic appointment, read your test results, renew a prescription or communicate with your care team.     To access your existing account, please contact your [facility] or call [phone] for assistance.        Care EveryWhere ID     This is your Care EveryWhere ID. " This could be used by other organizations to access your [facility] medical records  [id number]         Blood Pressure from Last 3 Encounters:   05/09/18 142/87   05/04/18 155/80   04/17/18 158/74    Weight from Last 3 Encounters:   05/09/18 69.4 kg (153 lb)   05/04/18 69.8 kg (153 lb 14.4 oz)   04/17/18 69.8 kg (153 lb 12.8 oz)              Today, you had the following     No orders found for display       Primary Care Provider Office Phone # Fax #    Jeramie Curran [phone] [address] [address]        Equal Access to Services     ERNESTO NEFF : Cristhian Valdovinos, wavanda graf, qaybta kaalmada adewilbur, celso crane. So [facility] [phone].    ATENCIÓN: Si habla español, tiene a quiroz disposición servicios gratuitos de asistencia lingüística. Llame al [phone].    We comply with applicable federal civil rights laws and [place] laws. We do not discriminate on the basis of race, color, national origin, age, disability, sex, sexual orientation, or gender identity.            Thank you!     Thank you for choosing [facility]  for your care. Our goal is always to provide you with excellent care. Hearing back from our patients is one way we can continue to improve our services. Please take a few minutes to complete the written survey that you may receive in the mail after your visit with us. Thank you!             Your Updated Medication List - Protect others around you: Learn how to safely use, store and throw away your medicines at www.disposemymeds.org.          This list is accurate as of 5/29/18  8:50 AM.  Always use your most recent med list.                   Brand Name Dispense Instructions for use Diagnosis    ascorbic acid 500 MG tablet    VITAMIN C     Take 500 mg by mouth as needed.        aspirin 81 MG tablet     100    1 tab po QD (Once per day)    Mixed hyperlipidemia       atorvastatin 10 MG tablet     LIPITOR    90 tablet    Take 1 tablet (10 mg) by mouth daily    Mixed hyperlipidemia       cholecalciferol 1000 UNIT tablet    vitamin D3     Take 1 tablet by mouth daily.        clobetasol 0.05 % ointment    TEMOVATE    30 g    Apply sparingly to affected area 2 x daily for14 days. Then, apply small amount to affected area twice weekly for maintenance.    Lichen sclerosus       diclofenac 1 % Gel topical gel    VOLTAREN    100 g    Apply 4 grams to knees or 2 grams to hands four times daily using enclosed dosing card.    Right knee pain       DILT- MG 24 hr capsule   Generic drug:  diltiazem     90 capsule    TAKE 1 CAPSULE BY MOUTH ONCE DAILY    Essential hypertension       diphenhydrAMINE 25 MG tablet    BENADRYL    20 tablet    Take 1 tablet (25 mg) by mouth nightly as needed for itching or allergies    Acute sinusitis       omeprazole 40 MG capsule    priLOSEC    90 capsule    Take 1 capsule (40 mg) by mouth daily as needed    S/P Nissen fundoplication (without gastrostomy tube) procedure       PROBIOTIC FORMULA PO      Take by mouth as needed    Routine gynecological examination, Atypical chest pain

## 2018-05-29 NOTE — NURSING NOTE
Chief Complaints and History of Present Illnesses   Patient presents with     Follow Up For     Yearly follow up Cataract - Both Eyes      HPI    Affected eye(s):  Both   Symptoms:     No floaters   No flashes   No redness   No tearing   No Dryness   No itching         Do you have eye pain now?:  No      Comments:  Pt states vision appearing more blurry in both eyes both distance and near.    Beata MOORE May 29, 2018 7:57 AM

## 2018-05-29 NOTE — PROGRESS NOTES
CC: Annual Exam    HPI:  Shahana Donaldson is a 77 year old female who presents for annual comprehensive exam. 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 She reports worsening blurriness at distance. Reading has been ok.     Assessment & Plan      Shahana Donaldson is a 77 year old female with the following diagnoses:     1. Age-related nuclear cataract of both eyes    2. Blind right eye    3. Staphyloma of right eye       -Long standing amblyopia right eye.  No previous treatments.  Stable vision per patient  -Cataracts both eyes   Visual significant both eyes.  Functioning well, would like to defer surgery   Recommend monitoring left eye for now, recheck yearly.    Glasses prescription updated, monocular precautions reviewed      Patient disposition:   Return in about 1 year (around 5/29/2019) for DFE.       Attending Physician Attestation:  Complete documentation of historical and exam elements from today's encounter can be found in the full encounter summary report (not reduplicated in this progress note).  I personally obtained the chief complaint(s) and history of present illness.  I confirmed and edited as necessary the review of systems, past medical/surgical history, family history, social history, and examination findings as documented by others; and I examined the patient myself.  I personally reviewed the relevant tests, images, and reports as documented above.  I formulated and edited as necessary the assessment and plan and discussed the findings and management plan with the patient and family. . - Josue Trujillo MD

## 2018-05-31 ENCOUNTER — THERAPY VISIT (OUTPATIENT)
Dept: PHYSICAL THERAPY | Facility: CLINIC | Age: 78
End: 2018-05-31
Payer: MEDICARE

## 2018-05-31 DIAGNOSIS — G89.29 CHRONIC PAIN OF RIGHT KNEE: ICD-10-CM

## 2018-05-31 DIAGNOSIS — M25.561 CHRONIC PAIN OF RIGHT KNEE: ICD-10-CM

## 2018-05-31 PROCEDURE — 97530 THERAPEUTIC ACTIVITIES: CPT | Mod: GP | Performed by: PHYSICAL THERAPY ASSISTANT

## 2018-05-31 PROCEDURE — 97110 THERAPEUTIC EXERCISES: CPT | Mod: GP | Performed by: PHYSICAL THERAPY ASSISTANT

## 2018-06-11 ENCOUNTER — THERAPY VISIT (OUTPATIENT)
Dept: PHYSICAL THERAPY | Facility: CLINIC | Age: 78
End: 2018-06-11
Payer: MEDICARE

## 2018-06-11 DIAGNOSIS — G89.29 CHRONIC PAIN OF RIGHT KNEE: ICD-10-CM

## 2018-06-11 DIAGNOSIS — M25.561 CHRONIC PAIN OF RIGHT KNEE: ICD-10-CM

## 2018-06-11 PROCEDURE — 97530 THERAPEUTIC ACTIVITIES: CPT | Mod: GP | Performed by: PHYSICAL THERAPIST

## 2018-06-11 PROCEDURE — 97110 THERAPEUTIC EXERCISES: CPT | Mod: GP | Performed by: PHYSICAL THERAPIST

## 2018-06-12 DIAGNOSIS — Z98.890 S/P NISSEN FUNDOPLICATION (WITHOUT GASTROSTOMY TUBE) PROCEDURE: ICD-10-CM

## 2018-06-12 RX ORDER — OMEPRAZOLE 40 MG/1
40 CAPSULE, DELAYED RELEASE ORAL DAILY PRN
Qty: 90 CAPSULE | Refills: 3 | Status: SHIPPED | OUTPATIENT
Start: 2018-06-12 | End: 2018-10-22

## 2018-07-20 ENCOUNTER — MEDICAL CORRESPONDENCE (OUTPATIENT)
Dept: HEALTH INFORMATION MANAGEMENT | Facility: CLINIC | Age: 78
End: 2018-07-20

## 2018-07-20 ENCOUNTER — THERAPY VISIT (OUTPATIENT)
Dept: PHYSICAL THERAPY | Facility: CLINIC | Age: 78
End: 2018-07-20
Payer: MEDICARE

## 2018-07-20 DIAGNOSIS — G89.29 CHRONIC PAIN OF RIGHT KNEE: ICD-10-CM

## 2018-07-20 DIAGNOSIS — M25.561 RIGHT KNEE PAIN, UNSPECIFIED CHRONICITY: ICD-10-CM

## 2018-07-20 DIAGNOSIS — M25.561 CHRONIC PAIN OF RIGHT KNEE: ICD-10-CM

## 2018-07-20 PROCEDURE — 97530 THERAPEUTIC ACTIVITIES: CPT | Mod: GP | Performed by: PHYSICAL THERAPIST

## 2018-07-20 PROCEDURE — G8978 MOBILITY CURRENT STATUS: HCPCS | Mod: GP | Performed by: PHYSICAL THERAPIST

## 2018-07-20 PROCEDURE — G8979 MOBILITY GOAL STATUS: HCPCS | Mod: GP | Performed by: PHYSICAL THERAPIST

## 2018-07-20 PROCEDURE — 97110 THERAPEUTIC EXERCISES: CPT | Mod: GP | Performed by: PHYSICAL THERAPIST

## 2018-07-20 NOTE — LETTER
DEPARTMENT OF HEALTH AND HUMAN SERVICES  CENTERS FOR MEDICARE & MEDICAID SERVICES    UPDATED PLAN OF PROGRESS FOR OUTPATIENT REHABILITATION    PATIENTS NAME:  Shahana Donaldson   : 1940    PROVIDER NUMBER:    [id number]  HICN:       [facility]  MEDICAL RECORD NUMBER: [id number]     START OF CARE DATE:  SOC Date: 05/10/18   TYPE:  PT    PRIMARY/TREATMENT DIAGNOSIS: (Pertinent Medical Diagnosis)  Chronic pain of right knee    VISITS FROM START OF CARE:    5     PROGRESS  REPORT: Progress reporting period is from 2018 to 2018. Interruption in attendance due to [employer] Vacation.      SUBJECTIVE  Subjective changes noted by patient: Pt. Notes that her knee is really stiff in am. But then better with movement/ exercise.. both knees hurt R>L. RIGHT is her dominant leg.. she is asking for strengthening ex.  Improved overall. She was hiking in [place] without trouble last week.   Current Pain level: 3/10 (stairs pre Rx).   Previous pain level was 5/10.  Initial Pain level: 5/10 (stairs ).   Changes in function:  Yes (See Goal flowsheet attached for changes in current functional level). Adverse reaction to treatment or activity: None    OBJECTIVE  Changes noted in objective findings:  Yes,   Objective: MMT: GM 5 HAB 4+ g. max 4 pre Rx: step down pain free 3 flight of stairs + both ascend and descending R knee  squatting + for both R>L  ROM: extension 2 fingers tight to 154 +extension with OP tight 1 finger to table  L is same as R. no edema.      ASSESSMENT/PLAN  Updated problem list and treatment plan: Diagnosis 1:  Chronic knee pain  Pain -  self management, education, directional preference exercise and home program  Decreased strength - therapeutic exercise, therapeutic activities and home program  Decreased function - therapeutic activities and home program  STG/LTGs have been met or progress has been made towards goals:  Yes  "(See Goal flow sheet completed today.)  Assessment of Progress: The patient's condition is improving.  Self Management Plans:  Patient is independent in a home treatment program.  Patient is independent in self management of symptoms.  Shahana continues to require the following intervention to meet STG and LTG's:  PT        PATIENTS NAME:  Shahana Donaldson   : 1940  PRIMARY/TREATMENT DIAGNOSIS: (Pertinent Medical Diagnosis)  Chronic pain of right knee    Recommendations:  This patient would benefit from continued therapy.     Frequency:  1 X week, once daily  Duration:  for 2 weeks.              Caregiver Signature/Credentials _____________________________ Date ________         Brenda Sharif, PT, Cert MDT     I have reviewed and certified the need for these services and plan of treatment while under my care.        PHYSICIAN'S SIGNATURE:   _________________________________________      Date___________   Jeramie Curran MD    Certification period:  Beginning of Cert date period: 18 to 10/18/2018    Functional Level Progress Report: Please see attached \"Goal Flow sheet for Functional level.\"    ____X____ Continue Services or       ________ DC Services                Service dates: From  SOC Date: 05/10/18 to present                         "

## 2018-07-22 NOTE — PROGRESS NOTES
Subjective:  HPI                    Objective:  System    Physical Exam    General     ROS    Assessment/Plan:    PROGRESS  REPORT    Progress reporting period is from 6.11.2018 to 7.20.2018. Interruption in attendance due to [employer]ts[employer] Vacation.      SUBJECTIVE  Subjective changes noted by patient: Pt. Notes that her knee is really stiff in am. But then better with movement/ exercise.. both knees hurt R>L. RIGHT is her dominant leg.. she is asking for strengthening ex.  Improved overall. She was hiking in [place] without trouble last week.   Current pain level is 3/10 Current Pain level: 3/10 (stairs pre Rx).     Previous pain level was  5/10 Initial Pain level: 5/10 (stairs ).   Changes in function:  Yes (See Goal flowsheet attached for changes in current functional level)  Adverse reaction to treatment or activity: None    OBJECTIVE  Changes noted in objective findings:  Yes,   Objective: MMT: GM 5 HAB 4+ g. max 4 pre Rx: step down pain free 3 flight of stairs + both ascend and descending R knee  squatting + for both R>L  ROM: extension 2 fingers tight to 154 +extension with OP tight 1 finger to table  L is same as R. no edema.      ASSESSMENT/PLAN  Updated problem list and treatment plan: Diagnosis 1:  Chronic knee pain  Pain -  self management, education, directional preference exercise and home program  Decreased strength - therapeutic exercise, therapeutic activities and home program  Decreased function - therapeutic activities and home program  STG/LTGs have been met or progress has been made towards goals:  Yes (See Goal flow sheet completed today.)  Assessment of Progress: The patient's condition is improving.  Self Management Plans:  Patient is independent in a home treatment program.  Patient is independent in self management of symptoms.    Shahana continues to require the following intervention to meet STG and LTG's:  PT    Recommendations:  This patient would benefit from continued therapy.      Frequency:  1 X week, once daily  Duration:  for 2 weeks.        Please refer to the daily flowsheet for treatment today, total treatment time and time spent performing 1:1 timed codes.

## 2018-07-23 ENCOUNTER — TELEPHONE (OUTPATIENT)
Dept: INTERNAL MEDICINE | Facility: CLINIC | Age: 78
End: 2018-07-23

## 2018-07-23 NOTE — TELEPHONE ENCOUNTER
[facility] Call [facility]    Phone Message    May a detailed message be left on voicemail: yes    Reason for Call: Other: Pt is requesting appt for her blood pressure with Bina before 8/13 as she is leaving for trip. His soonest right now that we can schedule is 8/14. Please call pt back at home phone.      Action Taken: Message routed to:  [facility] ([facility]): RENEE

## 2018-07-23 NOTE — TELEPHONE ENCOUNTER
Patient called clinic complaining of ' chest tightness' re-occurring and wanting to discuss her blood pressure medication.  Advised patient to go to the ED and she declined this.  She said she would like to discuss her diltiazem with Dr. Curran.  She is going out of town on 8/13/18 and would  like appointment sooner to see Dr. Curran to discuss her blood pressure.  She states she has seen him for years and he has altered her medication. The patient would like to go on a previous dosage she was on so that she may 'feel better'.   Advised patient that Dr. Gonzales nurse may authorize a sooner appointment after discussing with provider. Ladarius Betancur LPN at 2:17 PM on 7/23/2018          Pt called and has questions on her blood pressure medications. Pt states her blood pressure is 146/91 on 07/24,  140/86 HR 60 on 07/24/2018. Pt states she has chest discomfort and SOB at times. Going OOT 08/13/2018.  Appt 12:05pm with DR Curran.  Soheila Gomez RN 10:58 AM on 7/24/2018.

## 2018-07-30 ENCOUNTER — THERAPY VISIT (OUTPATIENT)
Dept: PHYSICAL THERAPY | Facility: CLINIC | Age: 78
End: 2018-07-30
Payer: MEDICARE

## 2018-07-30 DIAGNOSIS — M25.561 CHRONIC PAIN OF RIGHT KNEE: ICD-10-CM

## 2018-07-30 DIAGNOSIS — M25.561 RIGHT KNEE PAIN, UNSPECIFIED CHRONICITY: ICD-10-CM

## 2018-07-30 DIAGNOSIS — G89.29 CHRONIC PAIN OF RIGHT KNEE: ICD-10-CM

## 2018-07-30 PROCEDURE — 97530 THERAPEUTIC ACTIVITIES: CPT | Mod: GP | Performed by: PHYSICAL THERAPIST

## 2018-07-30 PROCEDURE — 97110 THERAPEUTIC EXERCISES: CPT | Mod: GP | Performed by: PHYSICAL THERAPIST

## 2018-07-31 ENCOUNTER — OFFICE VISIT (OUTPATIENT)
Dept: INTERNAL MEDICINE | Facility: CLINIC | Age: 78
End: 2018-07-31
Payer: COMMERCIAL

## 2018-07-31 ENCOUNTER — RADIANT APPOINTMENT (OUTPATIENT)
Dept: CARDIOLOGY | Facility: CLINIC | Age: 78
End: 2018-07-31
Attending: INTERNAL MEDICINE
Payer: COMMERCIAL

## 2018-07-31 VITALS
SYSTOLIC BLOOD PRESSURE: 153 MMHG | DIASTOLIC BLOOD PRESSURE: 86 MMHG | WEIGHT: 153.1 LBS | HEART RATE: 76 BPM | BODY MASS INDEX: 23.28 KG/M2

## 2018-07-31 DIAGNOSIS — R01.1 HEART MURMUR: Primary | ICD-10-CM

## 2018-07-31 DIAGNOSIS — I10 BENIGN ESSENTIAL HYPERTENSION: ICD-10-CM

## 2018-07-31 DIAGNOSIS — R01.1 HEART MURMUR: ICD-10-CM

## 2018-07-31 RX ORDER — DILTIAZEM HYDROCHLORIDE 300 MG/1
300 CAPSULE, COATED, EXTENDED RELEASE ORAL DAILY
Qty: 30 CAPSULE | Refills: 1 | Status: SHIPPED | OUTPATIENT
Start: 2018-07-31 | End: 2018-09-21

## 2018-07-31 ASSESSMENT — PAIN SCALES - GENERAL: PAINLEVEL: NO PAIN (0)

## 2018-07-31 NOTE — NURSING NOTE
Chief Complaint   Patient presents with     Hypertension     pt here to discuss high blood pressure     Recheck Medication     pt would like to discuss medications       Cherelle Cheng CMA at 11:57 AM on 7/31/2018.

## 2018-07-31 NOTE — MR AVS SNAPSHOT
After Visit Summary   7/31/2018    Shahana Donaldson    MRN: [id number]           Patient Information     Date Of Birth          1940        Visit Information        Provider Department      7/31/2018 12:05 PM Jeramie Curran MD [facility]        Today's Diagnoses     Heart murmur    -  1    Benign essential hypertension          Care Instructions    Primary Care Center Medication Refill Request Information:  * Please contact your pharmacy regarding ANY request for medication refills.  ** [facility] Prescription Fax = [phone]  * Please allow 3 business days for routine medication refills.  * Please allow 5 business days for controlled substance medication refills.     Primary Care Center Test Result notification information:  *You will be notified with in 7-10 days of your appointment day regarding the results of your test.  If you are on MyChart you will be notified as soon as the provider has reviewed the results and signed off on them.    [facility]: [phone]             Follow-ups after your visit        Your next 10 appointments already scheduled     Jul 31, 2018  1:00 PM CDT   Ech Complete with [id number]   [facility] ([facility])    [address] [place] [id number] [phone]           1.  Please bring or wear a comfortable two-piece outfit. 2.  You may eat, drink and take your normal medicines. 3.  For any questions that cannot be answered, please contact the ordering physician 4.  Please do not wear perfumes or scented lotions on the day of your exam.            Aug 07, 2018 12:30 PM CDT   (Arrive by 12:15 PM)   Return Visit with Jeramie Curran MD   [facility] ([facility])    [address] [place] [address] [id number] [phone]            Nov 21, 2018  7:05 AM CST   (Arrive by 6:50 AM)   Return Visit with Jeramie Curran MD     [facility] ([facility] [place])    [address]  4th Floor  [facility] [address] [phone]            Nov 21, 2018 10:00 AM CST   MA SCREENING DIGITAL BILATERAL with [id number]   [facility] ([facility])    [address], 2nd Floor  [facility] [id number] [phone]           Do not use any powder, lotion or deodorant under your arms or on your breast. If you do, we will ask you to remove it before your exam.  Wear comfortable, two-piece clothing.  If you have any allergies, tell your care team.  Bring any previous mammograms from other facilities or have them mailed to the breast center. Three-dimensional (3D) mammograms are available at [place] locations in [facility], [facility], [facility], and [place]. [facility] locations include [place] [facility] [place] in [place]. Benefits of 3D mammograms include: - Improved rate of cancer detection - Decreases your chance of having to go back for more tests, which means fewer: - \"False-positive\" results (This means that there is an abnormal area but it isn't cancer.) - Invasive testing procedures, such as a biopsy or surgery - Can provide clearer images of the breast if you have dense breast tissue. 3D mammography is an optional exam that anyone can have with a 2D mammogram. It doesn't replace or take the place of a 2D mammogram. 2D mammograms remain an effective screening test for all women.  Not all insurance companies cover the cost of a 3D mammogram. Check with your insurance.            May 30, 2019  8:00 AM CDT   RETURN GENERAL with Josue Trujillo MD   [facility] Clinic ([facility])    [address]  [address]  [facility] [address]   [phone]              Future tests that were ordered for you today     Open Future Orders        Priority Expected Expires " Ordered    Echocardiogram Routine  7/31/2019 7/31/2018            Who to contact     Please call your clinic at [phone] to:    Ask questions about your health    Make or cancel appointments    Discuss your medicines    Learn about your test results    Speak to your doctor            Additional Information About Your Visit        [employer] Information     "[employer] gives you secure access to your electronic health record. If you see a primary care provider, you can also send messages to your care team and make appointments. If you have questions, please call your primary care clinic.  If you do not have a primary care provider, please call [phone] and they will assist you.      "[employer] is an electronic gateway that provides easy, online access to your medical records. With "[employer] you can request a clinic appointment, read your test results, renew a prescription or communicate with your care team.     To access your existing account, please contact your [facility] or call [phone] for assistance.        Care EveryWhere ID     This is your Care EveryWhere ID. This could be used by other organizations to access your [place] medical records  [id number]        Your Vitals Were     Pulse BMI (Body Mass Index)                76 23.28 kg/m2           Blood Pressure from Last 3 Encounters:   07/31/18 153/86   05/09/18 142/87   05/04/18 155/80    Weight from Last 3 Encounters:   07/31/18 69.4 kg (153 lb 1.6 oz)   05/09/18 69.4 kg (153 lb)   05/04/18 69.8 kg (153 lb 14.4 oz)              We Performed the Following     EKG Performed in Clinic w/ Provider Reading Fee          Today's Medication Changes          These changes are accurate as of 7/31/18 12:51 PM.  If you have any questions, ask your nurse or doctor.               Start taking these medicines.        Dose/Directions    diltiazem 300 MG 24 hr capsule   Commonly known as:  CARDIZEM CD   Used for:  Benign essential  hypertension   Started by:  Jeramie Curran MD        Dose:  300 mg   Take 1 capsule (300 mg) by mouth daily   Quantity:  30 capsule   Refills:  1            Where to get your medicines      These medications were sent to [facility] [address]  [address] [facility] [address]     Phone:  [phone]     diltiazem 300 MG 24 hr capsule                Primary Care Provider Office Phone # Fax #    Jeramie Curran [phone]       [address] [address]        Equal Access to Services     [facility]: Hadii aad ku hadasho Soomaali, waaxda luqadaha, qaybta kaalmada adeegyada, waxselene paulinoin hayaan adeyareli lemon . So [facility] [phone].    ATENCIÓN: Si habla español, tiene a quiroz disposición servicios gratuitos de asistencia lingüística. Llame al [phone].    We comply with applicable federal civil rights laws and [place] laws. We do not discriminate on the basis of race, color, national origin, age, disability, sex, sexual orientation, or gender identity.            Thank you!     Thank you for choosing [facility]  for your care. Our goal is always to provide you with excellent care. Hearing back from our patients is one way we can continue to improve our services. Please take a few minutes to complete the written survey that you may receive in the mail after your visit with us. Thank you!             Your Updated Medication List - Protect others around you: Learn how to safely use, store and throw away your medicines at www.disposemymeds.org.          This list is accurate as of 7/31/18 12:51 PM.  Always use your most recent med list.                   Brand Name Dispense Instructions for use Diagnosis    ascorbic acid 500 MG tablet    VITAMIN C     Take 500 mg by mouth as needed.        aspirin 81 MG tablet     100    1 tab po QD (Once per day)    Mixed hyperlipidemia       atorvastatin 10 MG  tablet    LIPITOR    90 tablet    Take 1 tablet (10 mg) by mouth daily    Mixed hyperlipidemia       cholecalciferol 1000 UNIT tablet    vitamin D3     Take 1 tablet by mouth daily.        clobetasol 0.05 % ointment    TEMOVATE    30 g    Apply sparingly to affected area 2 x daily for14 days. Then, apply small amount to affected area twice weekly for maintenance.    Lichen sclerosus       diclofenac 1 % Gel topical gel    VOLTAREN    100 g    Apply 4 grams to knees or 2 grams to hands four times daily using enclosed dosing card.    Right knee pain       DILT- MG 24 hr capsule   Generic drug:  diltiazem     90 capsule    TAKE 1 CAPSULE BY MOUTH ONCE DAILY    Essential hypertension       diltiazem 300 MG 24 hr capsule    CARDIZEM CD    30 capsule    Take 1 capsule (300 mg) by mouth daily    Benign essential hypertension       diphenhydrAMINE 25 MG tablet    BENADRYL    20 tablet    Take 1 tablet (25 mg) by mouth nightly as needed for itching or allergies    Acute sinusitis       omeprazole 40 MG capsule    priLOSEC    90 capsule    Take 1 capsule (40 mg) by mouth daily as needed    S/P Nissen fundoplication (without gastrostomy tube) procedure       PROBIOTIC FORMULA PO      Take by mouth as needed    Routine gynecological examination, Atypical chest pain

## 2018-07-31 NOTE — PROGRESS NOTES
HPI:    Pat comes in specifically to discuss her BP. She has been taking readings at home. She is only on Diltiazem  mg. She denies any new HEENT, cardiopulmonary, abdominal, , GYN, neurological complaints    PE:    Vitals noted gen nad cooperative alert, LCTA, B good air movement, RRR, S1, S2, murmur present, grossly normal neurological exam    A/P:    She wants to increase her Diltiazem to 300 mg; HR today EKG 72. Resting echo for dorothy    I will see her back in about one week to follow up on echo and BP    Total time spent 15 minutes.  More than 50% of the time spent with Ms. Donaldson on counseling / coordinating her care

## 2018-07-31 NOTE — PATIENT INSTRUCTIONS
[facility] Medication Refill Request Information:  * Please contact your pharmacy regarding ANY request for medication refills.  ** [facility] Prescription Fax = [phone]  * Please allow 3 business days for routine medication refills.  * Please allow 5 business days for controlled substance medication refills.     [facility] Test Result notification information:  *You will be notified with in 7-10 days of your appointment day regarding the results of your test.  If you are on MyChart you will be notified as soon as the provider has reviewed the results and signed off on them.    [facility]: [phone]

## 2018-08-07 ENCOUNTER — OFFICE VISIT (OUTPATIENT)
Dept: INTERNAL MEDICINE | Facility: CLINIC | Age: 78
End: 2018-08-07
Payer: COMMERCIAL

## 2018-08-07 VITALS
BODY MASS INDEX: 23.26 KG/M2 | RESPIRATION RATE: 18 BRPM | SYSTOLIC BLOOD PRESSURE: 165 MMHG | HEART RATE: 73 BPM | WEIGHT: 153 LBS | DIASTOLIC BLOOD PRESSURE: 91 MMHG

## 2018-08-07 DIAGNOSIS — I10 BENIGN ESSENTIAL HYPERTENSION: Primary | ICD-10-CM

## 2018-08-07 ASSESSMENT — PAIN SCALES - GENERAL: PAINLEVEL: NO PAIN (0)

## 2018-08-07 NOTE — PROGRESS NOTES
HPI:    Kamini comes in specifically to discuss her BP. She has been taking readings at home. She increased her Diltiazem  mg to 300 mg at our last visit 7/31/2018. Overall she feels better. She states her BP is in the  range. She denies any new HEENT, cardiopulmonary, abdominal, , GYN, neurological complaints    PE:    Vitals noted. My check large cuff R arm 125/80,  gen nad cooperative alert, LCTA, B good air movement, RRR, S1, S2, murmur present, grossly normal neurological exam    A/P:    She  increaseed her Diltiazem to 300 mg PO every day and her BP is better    Resting echo done on 7/31/2018; nl LVF EF 60-65%. Trace to mild mitral insufficiency.    Total time spent 15 minutes.  More than 50% of the time spent with Ms. Donaldson on counseling / coordinating her care

## 2018-08-07 NOTE — NURSING NOTE
Chief Complaint   Patient presents with     Hypertension     Patient is here to follow up on blood pressure     Results     Also here to follow up on test results       Ritesh Prieto CMA (AAMA) at 12:07 PM on 8/7/2018

## 2018-08-07 NOTE — MR AVS SNAPSHOT
"              After Visit Summary   8/7/2018    Shahana Donaldson    MRN: [id number]           Patient Information     Date Of Birth          1940        Visit Information        Provider Department      8/7/2018 12:30 PM Jeramie Curran MD [facility]        Today's Diagnoses     Benign essential hypertension    -  1       Follow-ups after your visit        Your next 10 appointments already scheduled     Aug 29, 2018  9:30 AM CDT   TAMARA Extremity with Brenda Sharif, PT   [facility] [place] [facility] ([facility] [place])    [address]  [place] [address]   [phone]            Nov 21, 2018  7:05 AM CST   (Arrive by 6:50 AM)   Return Visit with Jeramie Curran MD   [facility] ([facility] [place])    [address] [place] [address]  4th Floor  [facility] [id number] [phone]            Nov 21, 2018 10:00 AM CST   MA SCREENING DIGITAL BILATERAL with [email or web address]   [facility] ([facility] [facility])    [address] [place] [address], 2nd Floor  [facility] [id number] [phone]           Do not use any powder, lotion or deodorant under your arms or on your breast. If you do, we will ask you to remove it before your exam.  Wear comfortable, two-piece clothing.  If you have any allergies, tell your care team.  Bring any previous mammograms from other facilities or have them mailed to the breast center. Three-dimensional (3D) mammograms are available at [place] locations in [facility], [facility], [place], [place], and [place]. [facility] locations include [place] and [facility] in [place]. Benefits of 3D mammograms include: - Improved rate of cancer detection - Decreases your chance of having to go back for more tests, which means fewer: - \"False-positive\" results (This means that there is an abnormal area but it isn't cancer.) - " Invasive testing procedures, such as a biopsy or surgery - Can provide clearer images of the breast if you have dense breast tissue. 3D mammography is an optional exam that anyone can have with a 2D mammogram. It doesn't replace or take the place of a 2D mammogram. 2D mammograms remain an effective screening test for all women.  Not all insurance companies cover the cost of a 3D mammogram. Check with your insurance.            May 30, 2019  8:00 AM CDT   RETURN GENERAL with Josue Trujillo MD   Eye Clinic ([facility])    [place] [address]  [facility] [id number] [phone]              Who to contact     Please call your clinic at [phone] to:    Ask questions about your health    Make or cancel appointments    Discuss your medicines    Learn about your test results    Speak to your doctor            Additional Information About Your Visit        [employer] Information     [employer] gives you secure access to your electronic health record. If you see a primary care provider, you can also send messages to your care team and make appointments. If you have questions, please call your primary care clinic.  If you do not have a primary care provider, please call [phone] and they will assist you.      [employer] is an electronic gateway that provides easy, online access to your medical records. With [employer], you can request a clinic appointment, read your test results, renew a prescription or communicate with your care team.     To access your existing account, please contact your [facility] or call [phone] for assistance.        Care EveryWhere ID     This is your Care EveryWhere ID. This could be used by other organizations to access your [place] medical records  [id number]        Your Vitals Were     Pulse Respirations Breastfeeding? BMI (Body Mass Index)          73 18 No 23.26 kg/m2         Blood Pressure from  Last 3 Encounters:   08/07/18 (!) 165/91   07/31/18 153/86   05/09/18 142/87    Weight from Last 3 Encounters:   08/07/18 69.4 kg (153 lb)   07/31/18 69.4 kg (153 lb 1.6 oz)   05/09/18 69.4 kg (153 lb)              Today, you had the following     No orders found for display       Primary Care Provider Office Phone # Fax #    Jeramie Curran [phone] [address] [address]        Equal Access to Services     [facility]: Hadii chantelle jo hadjulio Sodimple, waaxda inesadaha, qaybta kaalmada asad, celso lemon . So [facility] [phone].    ATENCIÓN: Si habla español, tiene a quiroz disposición servicios gratuitos de asistencia lingüística. [facility] [phone].    We comply with applicable federal civil rights laws and [place] laws. We do not discriminate on the basis of race, color, national origin, age, disability, sex, sexual orientation, or gender identity.            Thank you!     Thank you for choosing [facility]  for your care. Our goal is always to provide you with excellent care. Hearing back from our patients is one way we can continue to improve our services. Please take a few minutes to complete the written survey that you may receive in the mail after your visit with us. Thank you!             Your Updated Medication List - Protect others around you: Learn how to safely use, store and throw away your medicines at www.disposemymeds.org.          This list is accurate as of 8/7/18  1:03 PM.  Always use your most recent med list.                   Brand Name Dispense Instructions for use Diagnosis    ascorbic acid 500 MG tablet    VITAMIN C     Take 500 mg by mouth as needed.        aspirin 81 MG tablet     100    1 tab po QD (Once per day)    Mixed hyperlipidemia       atorvastatin 10 MG tablet    LIPITOR    90 tablet    Take 1 tablet (10 mg) by mouth daily    Mixed hyperlipidemia       cholecalciferol 1000 UNIT  tablet    vitamin D3     Take 1 tablet by mouth daily.        clobetasol 0.05 % ointment    TEMOVATE    30 g    Apply sparingly to affected area 2 x daily for14 days. Then, apply small amount to affected area twice weekly for maintenance.    Lichen sclerosus       diclofenac 1 % Gel topical gel    VOLTAREN    100 g    Apply 4 grams to knees or 2 grams to hands four times daily using enclosed dosing card.    Right knee pain       DILT- MG 24 hr capsule   Generic drug:  diltiazem     90 capsule    TAKE 1 CAPSULE BY MOUTH ONCE DAILY    Essential hypertension       diltiazem 300 MG 24 hr capsule    CARDIZEM CD    30 capsule    Take 1 capsule (300 mg) by mouth daily    Benign essential hypertension       diphenhydrAMINE 25 MG tablet    BENADRYL    20 tablet    Take 1 tablet (25 mg) by mouth nightly as needed for itching or allergies    Acute sinusitis       omeprazole 40 MG capsule    priLOSEC    90 capsule    Take 1 capsule (40 mg) by mouth daily as needed    S/P Nissen fundoplication (without gastrostomy tube) procedure       PROBIOTIC FORMULA PO      Take by mouth as needed    Routine gynecological examination, Atypical chest pain

## 2018-08-29 ENCOUNTER — THERAPY VISIT (OUTPATIENT)
Dept: PHYSICAL THERAPY | Facility: CLINIC | Age: 78
End: 2018-08-29
Payer: MEDICARE

## 2018-08-29 DIAGNOSIS — G89.29 CHRONIC PAIN OF RIGHT KNEE: ICD-10-CM

## 2018-08-29 DIAGNOSIS — M25.561 RIGHT KNEE PAIN, UNSPECIFIED CHRONICITY: ICD-10-CM

## 2018-08-29 DIAGNOSIS — M25.561 CHRONIC PAIN OF RIGHT KNEE: ICD-10-CM

## 2018-08-29 PROCEDURE — G8980 MOBILITY D/C STATUS: HCPCS | Mod: GP | Performed by: PHYSICAL THERAPIST

## 2018-08-29 PROCEDURE — 97110 THERAPEUTIC EXERCISES: CPT | Mod: GP | Performed by: PHYSICAL THERAPIST

## 2018-08-29 PROCEDURE — G8979 MOBILITY GOAL STATUS: HCPCS | Mod: GP | Performed by: PHYSICAL THERAPIST

## 2018-08-29 PROCEDURE — 97530 THERAPEUTIC ACTIVITIES: CPT | Mod: GP | Performed by: PHYSICAL THERAPIST

## 2018-08-29 ASSESSMENT — ACTIVITIES OF DAILY LIVING (ADL)
RISE FROM A CHAIR: NOT ANSWERED
STAND: NOT ANSWERED
LIMPING: I DO NOT HAVE THE SYMPTOM
KNEEL ON THE FRONT OF YOUR KNEE: NOT ANSWERED
KNEE_ACTIVITY_OF_DAILY_LIVING_SUM: 28
SIT WITH YOUR KNEE BENT: NOT ANSWERED
SQUAT: NOT ANSWERED
SWELLING: I DO NOT HAVE THE SYMPTOM
GIVING WAY, BUCKLING OR SHIFTING OF KNEE: I DO NOT HAVE THE SYMPTOM
STIFFNESS: I HAVE THE SYMPTOM BUT IT DOES NOT AFFECT MY ACTIVITY
PAIN: I DO NOT HAVE THE SYMPTOM
WALK: NOT ANSWERED
GO UP STAIRS: NOT ANSWERED
GO DOWN STAIRS: NOT ANSWERED
WEAKNESS: I DO NOT HAVE THE SYMPTOM

## 2018-08-29 NOTE — PROGRESS NOTES
Subjective:  HPI                    Objective:  System    Physical Exam    General     ROS    Assessment/Plan:    DISCHARGE REPORT    Progress reporting period is from 5.21.2018 to 8.29.2018.       SUBJECTIVE  Subjective changes noted by patient:   Pt states her knee was pain free while on vacation and since returning.  she has done a few sessions of the exercises since she's been home.     Current pain level is 0/10  .     Previous pain level was  4/10  .   Changes in function:  Yes (See Goal flowsheet attached for changes in current functional level)  Adverse reaction to treatment or activity: None    OBJECTIVE  Changes noted in objective findings:  Yes,   Objective: PROM: wnl pain free MMT: hip/ knee wnl except glut max and GM 4/5  she is independent with the home exercise program.     ASSESSMENT/PLAN  Updated problem list and treatment plan: Diagnosis 1:  Knee pain  Decreased strength - home program  STG/LTGs have been met or progress has been made towards goals:  Yes (See Goal flow sheet completed today.)  Assessment of Progress: The patient has met all of their long term goals.  Self Management Plans:  Patient is independent in a home treatment program.  Patient is independent in self management of symptoms.    Shahana continues to require the following intervention to meet STG and LTG's:  PT intervention is no longer required to meet STG/LTG.    Recommendations:  This patient is ready to be discharged from therapy and continue their home treatment program.    Please refer to the daily flowsheet for treatment today, total treatment time and time spent performing 1:1 timed codes.

## 2018-09-21 DIAGNOSIS — I10 BENIGN ESSENTIAL HYPERTENSION: ICD-10-CM

## 2018-09-24 RX ORDER — DILTIAZEM HYDROCHLORIDE 300 MG/1
CAPSULE, EXTENDED RELEASE ORAL
Qty: 90 CAPSULE | Refills: 3 | Status: ON HOLD | OUTPATIENT
Start: 2018-09-24 | End: 2018-10-25

## 2018-09-24 NOTE — TELEPHONE ENCOUNTER
CARTIA  MG 24 hr capsule  TAKE ONE CAPSULE BY MOUTH ONE TIME DAILY         Last Written Prescription Date:  7-31-18  Last Fill Quantity: 30,   # refills: 1  Last Office Visit : 8-7-18  Future Office visit:  11-21-18    Routing refill request to RN for review/approval because:  Drug failed the [facility], [facility] or [facility] refill protocol.  Recent dosage change- BP still elevated/lab not rechecked.

## 2018-10-22 ENCOUNTER — APPOINTMENT (OUTPATIENT)
Dept: GENERAL RADIOLOGY | Facility: CLINIC | Age: 78
DRG: 469 | End: 2018-10-22
Payer: MEDICARE

## 2018-10-22 ENCOUNTER — HOSPITAL ENCOUNTER (INPATIENT)
Facility: CLINIC | Age: 78
LOS: 4 days | Discharge: NURSING, REHAB OR LONG-TERM CARE | DRG: 469 | End: 2018-10-26
Attending: EMERGENCY MEDICINE | Admitting: SURGERY
Payer: MEDICARE

## 2018-10-22 ENCOUNTER — APPOINTMENT (OUTPATIENT)
Dept: GENERAL RADIOLOGY | Facility: CLINIC | Age: 78
DRG: 469 | End: 2018-10-22
Attending: EMERGENCY MEDICINE
Payer: MEDICARE

## 2018-10-22 ENCOUNTER — APPOINTMENT (OUTPATIENT)
Dept: GENERAL RADIOLOGY | Facility: CLINIC | Age: 78
DRG: 469 | End: 2018-10-22
Attending: NURSE PRACTITIONER
Payer: MEDICARE

## 2018-10-22 DIAGNOSIS — S72.002A CLOSED FRACTURE OF LEFT HIP, INITIAL ENCOUNTER (H): ICD-10-CM

## 2018-10-22 DIAGNOSIS — I10 BENIGN ESSENTIAL HYPERTENSION: ICD-10-CM

## 2018-10-22 DIAGNOSIS — K59.03 DRUG-INDUCED CONSTIPATION: Primary | ICD-10-CM

## 2018-10-22 DIAGNOSIS — S72.032A: ICD-10-CM
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
## 2018-10-22 DIAGNOSIS — E78.2 MIXED HYPERLIPIDEMIA: ICD-10-CM

## 2018-10-22 LAB
ABO + RH BLD: NORMAL
ABO + RH BLD: NORMAL
ALBUMIN UR-MCNC: NEGATIVE MG/DL
ALBUMIN UR-MCNC: NEGATIVE MG/DL
ANION GAP SERPL CALCULATED.3IONS-SCNC: 9 MMOL/L (ref 3–14)
APPEARANCE UR: CLEAR
APPEARANCE UR: CLEAR
BILIRUB UR QL STRIP: NEGATIVE
BILIRUB UR QL STRIP: NEGATIVE
BLD GP AB SCN SERPL QL: NORMAL
BLOOD BANK CMNT PATIENT-IMP: NORMAL
BUN SERPL-MCNC: 19 MG/DL (ref 7–30)
CALCIUM SERPL-MCNC: 8.5 MG/DL (ref 8.5–10.1)
CHLORIDE SERPL-SCNC: 109 MMOL/L (ref 94–109)
CO2 SERPL-SCNC: 25 MMOL/L (ref 20–32)
COLOR UR AUTO: ABNORMAL
COLOR UR AUTO: YELLOW
CREAT SERPL-MCNC: 0.71 MG/DL (ref 0.52–1.04)
ERYTHROCYTE [DISTWIDTH] IN BLOOD BY AUTOMATED COUNT: 12.9 % (ref 10–15)
GFR SERPL CREATININE-BSD FRML MDRD: 79 ML/MIN/1.7M2
GLUCOSE SERPL-MCNC: 95 MG/DL (ref 70–99)
GLUCOSE UR STRIP-MCNC: 30 MG/DL
GLUCOSE UR STRIP-MCNC: NEGATIVE MG/DL
HCT VFR BLD AUTO: 41.9 % (ref 35–47)
HGB BLD-MCNC: 13.3 G/DL (ref 11.7–15.7)
HGB UR QL STRIP: NEGATIVE
HGB UR QL STRIP: NEGATIVE
INR PPP: 0.98 (ref 0.86–1.14)
INTERPRETATION ECG - MUSE: NORMAL
KETONES UR STRIP-MCNC: 10 MG/DL
KETONES UR STRIP-MCNC: NEGATIVE MG/DL
LEUKOCYTE ESTERASE UR QL STRIP: ABNORMAL
LEUKOCYTE ESTERASE UR QL STRIP: NEGATIVE
MCH RBC QN AUTO: 31.1 PG (ref 26.5–33)
MCHC RBC AUTO-ENTMCNC: 31.7 G/DL (ref 31.5–36.5)
MCV RBC AUTO: 98 FL (ref 78–100)
MUCOUS THREADS #/AREA URNS LPF: PRESENT /LPF
MUCOUS THREADS #/AREA URNS LPF: PRESENT /LPF
NITRATE UR QL: NEGATIVE
NITRATE UR QL: NEGATIVE
PH UR STRIP: 7 PH (ref 5–7)
PH UR STRIP: 7 PH (ref 5–7)
PLATELET # BLD AUTO: 211 10E9/L (ref 150–450)
POTASSIUM SERPL-SCNC: 3.8 MMOL/L (ref 3.4–5.3)
RADIOLOGIST FLAGS: ABNORMAL
RBC # BLD AUTO: 4.27 10E12/L (ref 3.8–5.2)
RBC #/AREA URNS AUTO: 1 /HPF (ref 0–2)
RBC #/AREA URNS AUTO: 2 /HPF (ref 0–2)
SODIUM SERPL-SCNC: 143 MMOL/L (ref 133–144)
SOURCE: ABNORMAL
SOURCE: ABNORMAL
SP GR UR STRIP: 1.01 (ref 1–1.03)
SP GR UR STRIP: 1.02 (ref 1–1.03)
SPECIMEN EXP DATE BLD: NORMAL
SQUAMOUS #/AREA URNS AUTO: 3 /HPF (ref 0–1)
TRANS CELLS #/AREA URNS HPF: <1 /HPF (ref 0–1)
TRANS CELLS #/AREA URNS HPF: <1 /HPF (ref 0–1)
UROBILINOGEN UR STRIP-MCNC: NORMAL MG/DL (ref 0–2)
UROBILINOGEN UR STRIP-MCNC: NORMAL MG/DL (ref 0–2)
WBC # BLD AUTO: 11.3 10E9/L (ref 4–11)
WBC #/AREA URNS AUTO: 1 /HPF (ref 0–5)
WBC #/AREA URNS AUTO: 3 /HPF (ref 0–5)

## 2018-10-22 PROCEDURE — 12000008 ZZH R&B INTERMEDIATE UMMC

## 2018-10-22 PROCEDURE — 86901 BLOOD TYPING SEROLOGIC RH(D): CPT | Performed by: NURSE PRACTITIONER

## 2018-10-22 PROCEDURE — 73502 X-RAY EXAM HIP UNI 2-3 VIEWS: CPT

## 2018-10-22 PROCEDURE — 99285 EMERGENCY DEPT VISIT HI MDM: CPT | Mod: 25 | Performed by: EMERGENCY MEDICINE

## 2018-10-22 PROCEDURE — 73552 X-RAY EXAM OF FEMUR 2/>: CPT | Mod: LT

## 2018-10-22 PROCEDURE — 96374 THER/PROPH/DIAG INJ IV PUSH: CPT | Performed by: EMERGENCY MEDICINE

## 2018-10-22 PROCEDURE — 25000128 H RX IP 250 OP 636: Performed by: NURSE PRACTITIONER

## 2018-10-22 PROCEDURE — 99284 EMERGENCY DEPT VISIT MOD MDM: CPT | Mod: Z6 | Performed by: EMERGENCY MEDICINE

## 2018-10-22 PROCEDURE — A9270 NON-COVERED ITEM OR SERVICE: HCPCS | Mod: GY | Performed by: NURSE PRACTITIONER

## 2018-10-22 PROCEDURE — 80048 BASIC METABOLIC PNL TOTAL CA: CPT | Performed by: NURSE PRACTITIONER

## 2018-10-22 PROCEDURE — 25000132 ZZH RX MED GY IP 250 OP 250 PS 637: Mod: GY | Performed by: NURSE PRACTITIONER

## 2018-10-22 PROCEDURE — 72100 X-RAY EXAM L-S SPINE 2/3 VWS: CPT

## 2018-10-22 PROCEDURE — 85027 COMPLETE CBC AUTOMATED: CPT | Performed by: NURSE PRACTITIONER

## 2018-10-22 PROCEDURE — 99222 1ST HOSP IP/OBS MODERATE 55: CPT | Performed by: NURSE PRACTITIONER

## 2018-10-22 PROCEDURE — 71045 X-RAY EXAM CHEST 1 VIEW: CPT

## 2018-10-22 PROCEDURE — 93005 ELECTROCARDIOGRAM TRACING: CPT | Performed by: EMERGENCY MEDICINE

## 2018-10-22 PROCEDURE — 68200002 ZZH TRAUMA EVALUATION W/O CC LEVEL II: Performed by: EMERGENCY MEDICINE

## 2018-10-22 PROCEDURE — 96375 TX/PRO/DX INJ NEW DRUG ADDON: CPT | Performed by: EMERGENCY MEDICINE

## 2018-10-22 PROCEDURE — 86850 RBC ANTIBODY SCREEN: CPT | Performed by: NURSE PRACTITIONER

## 2018-10-22 PROCEDURE — 25000128 H RX IP 250 OP 636: Performed by: EMERGENCY MEDICINE

## 2018-10-22 PROCEDURE — 25000128 H RX IP 250 OP 636: Performed by: SURGERY

## 2018-10-22 PROCEDURE — 81001 URINALYSIS AUTO W/SCOPE: CPT | Performed by: NURSE PRACTITIONER

## 2018-10-22 PROCEDURE — 96361 HYDRATE IV INFUSION ADD-ON: CPT | Performed by: EMERGENCY MEDICINE

## 2018-10-22 PROCEDURE — 85610 PROTHROMBIN TIME: CPT | Performed by: NURSE PRACTITIONER

## 2018-10-22 PROCEDURE — 86900 BLOOD TYPING SEROLOGIC ABO: CPT | Performed by: NURSE PRACTITIONER

## 2018-10-22 RX ORDER — POTASSIUM CHLORIDE 1.5 G/1.58G
20-40 POWDER, FOR SOLUTION ORAL
Status: DISCONTINUED | OUTPATIENT
Start: 2018-10-22 | End: 2018-10-26 | Stop reason: HOSPADM

## 2018-10-22 RX ORDER — POTASSIUM CHLORIDE 29.8 MG/ML
20 INJECTION INTRAVENOUS
Status: DISCONTINUED | OUTPATIENT
Start: 2018-10-22 | End: 2018-10-26 | Stop reason: HOSPADM

## 2018-10-22 RX ORDER — ONDANSETRON 2 MG/ML
4 INJECTION INTRAMUSCULAR; INTRAVENOUS ONCE
Status: COMPLETED | OUTPATIENT
Start: 2018-10-22 | End: 2018-10-22

## 2018-10-22 RX ORDER — FENTANYL CITRATE 50 UG/ML
25-50 INJECTION, SOLUTION INTRAMUSCULAR; INTRAVENOUS
Status: DISCONTINUED | OUTPATIENT
Start: 2018-10-22 | End: 2018-10-24

## 2018-10-22 RX ORDER — MORPHINE SULFATE 4 MG/ML
4 INJECTION, SOLUTION INTRAMUSCULAR; INTRAVENOUS
Status: DISCONTINUED | OUTPATIENT
Start: 2018-10-22 | End: 2018-10-22

## 2018-10-22 RX ORDER — METHOCARBAMOL 750 MG/1
750 TABLET, FILM COATED ORAL EVERY 6 HOURS PRN
Status: DISCONTINUED | OUTPATIENT
Start: 2018-10-22 | End: 2018-10-26 | Stop reason: HOSPADM

## 2018-10-22 RX ORDER — POTASSIUM CL/LIDO/0.9 % NACL 10MEQ/0.1L
10 INTRAVENOUS SOLUTION, PIGGYBACK (ML) INTRAVENOUS
Status: DISCONTINUED | OUTPATIENT
Start: 2018-10-22 | End: 2018-10-26 | Stop reason: HOSPADM

## 2018-10-22 RX ORDER — ATORVASTATIN CALCIUM 10 MG/1
10 TABLET, FILM COATED ORAL EVERY EVENING
Status: DISCONTINUED | OUTPATIENT
Start: 2018-10-22 | End: 2018-10-26 | Stop reason: HOSPADM

## 2018-10-22 RX ORDER — ONDANSETRON 2 MG/ML
4 INJECTION INTRAMUSCULAR; INTRAVENOUS EVERY 6 HOURS PRN
Status: DISCONTINUED | OUTPATIENT
Start: 2018-10-22 | End: 2018-10-23

## 2018-10-22 RX ORDER — MORPHINE SULFATE 2 MG/ML
2 INJECTION, SOLUTION INTRAMUSCULAR; INTRAVENOUS
Status: DISCONTINUED | OUTPATIENT
Start: 2018-10-22 | End: 2018-10-25

## 2018-10-22 RX ORDER — CALCIUM CARBONATE 500(1250)
1500 TABLET ORAL 2 TIMES DAILY WITH MEALS
Status: DISCONTINUED | OUTPATIENT
Start: 2018-10-22 | End: 2018-10-26 | Stop reason: HOSPADM

## 2018-10-22 RX ORDER — POTASSIUM CHLORIDE 7.45 MG/ML
10 INJECTION INTRAVENOUS
Status: DISCONTINUED | OUTPATIENT
Start: 2018-10-22 | End: 2018-10-26 | Stop reason: HOSPADM

## 2018-10-22 RX ORDER — HEPARIN SODIUM 5000 [USP'U]/.5ML
5000 INJECTION, SOLUTION INTRAVENOUS; SUBCUTANEOUS ONCE
Status: COMPLETED | OUTPATIENT
Start: 2018-10-22 | End: 2018-10-22

## 2018-10-22 RX ORDER — ONDANSETRON 4 MG/1
4 TABLET, ORALLY DISINTEGRATING ORAL EVERY 6 HOURS PRN
Status: DISCONTINUED | OUTPATIENT
Start: 2018-10-22 | End: 2018-10-23

## 2018-10-22 RX ORDER — OXYCODONE HYDROCHLORIDE 5 MG/1
5 TABLET ORAL
Status: DISCONTINUED | OUTPATIENT
Start: 2018-10-22 | End: 2018-10-25

## 2018-10-22 RX ORDER — DOCUSATE SODIUM 100 MG/1
100 CAPSULE, LIQUID FILLED ORAL 2 TIMES DAILY
Status: DISCONTINUED | OUTPATIENT
Start: 2018-10-22 | End: 2018-10-26 | Stop reason: HOSPADM

## 2018-10-22 RX ORDER — ACETAMINOPHEN 325 MG/1
975 TABLET ORAL EVERY 6 HOURS
Status: DISCONTINUED | OUTPATIENT
Start: 2018-10-22 | End: 2018-10-26 | Stop reason: HOSPADM

## 2018-10-22 RX ORDER — AMOXICILLIN 250 MG
1-2 CAPSULE ORAL 2 TIMES DAILY
Status: DISCONTINUED | OUTPATIENT
Start: 2018-10-22 | End: 2018-10-22

## 2018-10-22 RX ORDER — NALOXONE HYDROCHLORIDE 0.4 MG/ML
.1-.4 INJECTION, SOLUTION INTRAMUSCULAR; INTRAVENOUS; SUBCUTANEOUS
Status: DISCONTINUED | OUTPATIENT
Start: 2018-10-22 | End: 2018-10-26 | Stop reason: HOSPADM

## 2018-10-22 RX ORDER — POTASSIUM CHLORIDE 750 MG/1
20-40 TABLET, EXTENDED RELEASE ORAL
Status: DISCONTINUED | OUTPATIENT
Start: 2018-10-22 | End: 2018-10-26 | Stop reason: HOSPADM

## 2018-10-22 RX ORDER — ACETAMINOPHEN 650 MG/1
650 SUPPOSITORY RECTAL EVERY 4 HOURS PRN
Status: DISCONTINUED | OUTPATIENT
Start: 2018-10-22 | End: 2018-10-26 | Stop reason: HOSPADM

## 2018-10-22 RX ORDER — SODIUM CHLORIDE 9 MG/ML
1000 INJECTION, SOLUTION INTRAVENOUS CONTINUOUS
Status: DISCONTINUED | OUTPATIENT
Start: 2018-10-22 | End: 2018-10-24

## 2018-10-22 RX ORDER — ONDANSETRON 2 MG/ML
4 INJECTION INTRAMUSCULAR; INTRAVENOUS EVERY 30 MIN PRN
Status: DISCONTINUED | OUTPATIENT
Start: 2018-10-22 | End: 2018-10-22

## 2018-10-22 RX ORDER — LIDOCAINE 4 G/G
2 PATCH TOPICAL
Status: DISCONTINUED | OUTPATIENT
Start: 2018-10-22 | End: 2018-10-23 | Stop reason: ALTCHOICE

## 2018-10-22 RX ORDER — POLYETHYLENE GLYCOL 3350 17 G/17G
17 POWDER, FOR SOLUTION ORAL DAILY
Status: DISCONTINUED | OUTPATIENT
Start: 2018-10-22 | End: 2018-10-24

## 2018-10-22 RX ORDER — SODIUM CHLORIDE 9 MG/ML
INJECTION, SOLUTION INTRAVENOUS CONTINUOUS
Status: DISCONTINUED | OUTPATIENT
Start: 2018-10-22 | End: 2018-10-22

## 2018-10-22 RX ORDER — MAGNESIUM SULFATE HEPTAHYDRATE 40 MG/ML
4 INJECTION, SOLUTION INTRAVENOUS EVERY 4 HOURS PRN
Status: DISCONTINUED | OUTPATIENT
Start: 2018-10-22 | End: 2018-10-26 | Stop reason: HOSPADM

## 2018-10-22 RX ADMIN — ONDANSETRON 4 MG: 2 INJECTION INTRAMUSCULAR; INTRAVENOUS at 22:11

## 2018-10-22 RX ADMIN — SODIUM CHLORIDE 1000 ML: 9 INJECTION, SOLUTION INTRAVENOUS at 14:08

## 2018-10-22 RX ADMIN — OXYCODONE HYDROCHLORIDE 5 MG: 5 TABLET ORAL at 18:10

## 2018-10-22 RX ADMIN — ONDANSETRON 4 MG: 2 INJECTION INTRAMUSCULAR; INTRAVENOUS at 18:58

## 2018-10-22 RX ADMIN — OXYCODONE HYDROCHLORIDE 5 MG: 5 TABLET ORAL at 14:25

## 2018-10-22 RX ADMIN — SODIUM CHLORIDE: 9 INJECTION, SOLUTION INTRAVENOUS at 09:18

## 2018-10-22 RX ADMIN — LIDOCAINE 2 PATCH: 560 PATCH PERCUTANEOUS; TOPICAL; TRANSDERMAL at 16:55

## 2018-10-22 RX ADMIN — ONDANSETRON 4 MG: 2 INJECTION INTRAMUSCULAR; INTRAVENOUS at 09:18

## 2018-10-22 RX ADMIN — METHOCARBAMOL 750 MG: 750 TABLET, FILM COATED ORAL at 14:24

## 2018-10-22 RX ADMIN — ACETAMINOPHEN 975 MG: 325 TABLET, FILM COATED ORAL at 21:03

## 2018-10-22 RX ADMIN — SODIUM CHLORIDE 1000 ML: 9 INJECTION, SOLUTION INTRAVENOUS at 20:12

## 2018-10-22 RX ADMIN — ATORVASTATIN CALCIUM 10 MG: 10 TABLET, FILM COATED ORAL at 21:03

## 2018-10-22 RX ADMIN — CALCIUM 1500 MG: 500 TABLET ORAL at 18:10

## 2018-10-22 RX ADMIN — Medication 5000 UNITS: at 21:02

## 2018-10-22 RX ADMIN — MORPHINE SULFATE 4 MG: 4 INJECTION INTRAVENOUS at 09:20

## 2018-10-22 ASSESSMENT — ENCOUNTER SYMPTOMS
ARTHRALGIAS: 1
GASTROINTESTINAL NEGATIVE: 1
MYALGIAS: 1
NECK PAIN: 0
CARDIOVASCULAR NEGATIVE: 1
CONSTITUTIONAL NEGATIVE: 1
RESPIRATORY NEGATIVE: 1

## 2018-10-22 ASSESSMENT — ACTIVITIES OF DAILY LIVING (ADL)
ADLS_ACUITY_SCORE: 14
ADLS_ACUITY_SCORE: 14

## 2018-10-22 NOTE — H&P
[facility], [place]    History and Physical / Consult note: Trauma Service    Date of Admission:  10/22/2018    Time of Admission/Consult Request (page/call): 10/22/2018 @ 1030am    Time of my evaluation: 10/22/2018 @ 11am  Consulting services:  Orthopedics - Non-emergent consult: Time called: 1130   Regional Anesthesia Pain - Time called: 1150     Assessment & Plan   Trauma mechanism: Fall down 2-3 stairs  Time/date of injury:10/22/2018 am  Known Injuries:  1. Left displaced femoral neck fracture  Other diagnoses:   1. Acute pain  2. Hypertension  3. Hyperlipidemia  4. Osteoporosis  5. GERD  6. Hiatal hernia S/P lap nissen 2013  7. leukocytosis      Procedure: Pending per Orthopedic surgery  Plan:  1. Admit to trauma, Dr. Viramontes. Tertiary exam in am. Isolated left femur fracture. Denies other pain or discomfort. Reports no LOC or head strike with fall. CXR, EKG obtained for pre- op evaluation  2. Left displaced femur fracture: Orthopedic Surgery consulted. NPO for possible surgery. Keep on bedrest with NWB to LLE. Per-op labs obtained, EKG, CXR without trauma. Benefits of surgical repair of her femur fracture outweighs the risk of  pain, immobility, resp complications, VTE with  non surgical repair at this time, OK to proceed from trauma perspective.  3. Acute pain: Regional Anesthesia consulted for peripheral block. PRN Fentanyl for severe pain, intolerance to IV Dilaudid (severe nausea, vomiting), scheduled Tylenol, Oxycodone. Ice to left hip.  4. Home medication: Took PO Diltiazem earlier today. Holding PTA Aspirin in anticipation of surgery. Other meds resumed  5. Leukocytosis: Probably trauma related. Will check a UA per protocol. Afebrile, no recent URI's, chills or malaise  6. Labs pending  7. PT/OT eval   8. Social work discharge planning    Code status: Full confirmed with patient.     General Cares:  GI Prophylaxis: Resuming PTA PPI  DVT Prophylaxis: Mechanical for now/  chemical prophylaxis post op  Date of last stool/Bowel Regimen:PTA/ ordered  Pulmonary toilet:Incentive spirometer, cough and deep breathe    ETOH: This patient was asked if in the last 3-6 months there has been a time when she had 4 or more drinks in a single day/outing.. Patient answer to the screening question was in the negative. No intervention needed.  Primary Care Physician   Jeramie Curran    Chief Complaint   Left hip pain    History is obtained from the patient, electronic health record and emergency department physician    History of Present Illness   Shahana Donaldson is a 78 year old female who presents with evaluation following a fall. She states that she was in the process of moving things to her cabin and she fell down 2-3 carpeted stairs, landing on her left hip and now complains of left hip and groin pain. She reports worse left hip and groin pain with cough or movement and tolerable at rest. She denies any LOC, head strike, lightheadedness or dizziness. She states she has been in her usual states of health, no cough, cold or flu symptoms, no diarrhea or recent changes in her medications. She took her home Diltiazem earlier today and her other medications last evening. She is not anticoagulated. Her only pain is in her left hip, no chest pain or shortness of breath.     Past Medical History    I have reviewed this patient's medical history and updated it with pertinent information if needed.   Past Medical History:   Diagnosis Date     Diverticulosis of colon (without mention of hemorrhage)      Essential hypertension, benign      Gastro-oesophageal reflux disease     nissen     Hemorrhoid      Nonsenile cataract      Osteoporosis      Other and unspecified hyperlipidemia      Rectocele      Symptomatic menopausal or female climacteric states        Past Surgical History   I have reviewed this patient's surgical history and updated it with pertinent information if needed.  Past Surgical  History:   Procedure Laterality Date     C NONSPECIFIC PROCEDURE      Bilateral Tubal Ligation     C NONSPECIFIC PROCEDURE      D&C secondary to menorrhagia     C NONSPECIFIC PROCEDURE      child birth x 2     COLONOSCOPY  2011    Procedure:COLONOSCOPY; Surgeon:HALINA SCOTT; Location:[facility]     COLONOSCOPY Left 11/10/2015    Procedure: COLONOSCOPY;  Surgeon: Raheem Colunga MD;  Location: [facility]     GYN SURGERY      hysterectomy/oopherectomy; done for prolapse     LAPAROSCOPIC NISSEN FUNDOPLICATION  2013    Procedure: LAPAROSCOPIC NISSEN FUNDOPLICATION;  Laparoscopic Repair of Hiatel Hernia, NISSEN, Esophagoscopy, Gastroscopy And Duodenoscopy ;  Surgeon: Leonidas Valle MD;  Location: [facility]     Prior to Admission Medications   Prior to Admission Medications   Prescriptions Last Dose Informant Patient Reported? Taking?   ASPIRIN 81 MG OR TABS 10/21/2018 at Unknown time  No Yes   Si tab po QD (Once per day)   CARTIA  MG 24 hr capsule 10/22/2018 at Unknown time  No Yes   Sig: TAKE ONE CAPSULE BY MOUTH ONE TIME DAILY    Cholecalciferol (VITAMIN D3) 1000 UNIT TABS   Yes No   Sig: Take 1 tablet by mouth daily.   DILT- MG 24 hr capsule   No No   Sig: TAKE 1 CAPSULE BY MOUTH ONCE DAILY   Probiotic Product (PROBIOTIC FORMULA PO)   Yes No   Sig: Take by mouth as needed   ascorbic acid (VITAMIN C) 500 MG tablet   Yes No   Sig: Take 500 mg by mouth as needed.   atorvastatin (LIPITOR) 10 MG tablet 10/21/2018 at Unknown time  No Yes   Sig: Take 1 tablet (10 mg) by mouth daily   clobetasol (TEMOVATE) 0.05 % ointment   No No   Sig: Apply sparingly to affected area 2 x daily for14 days. Then, apply small amount to affected area twice weekly for maintenance.   diclofenac (VOLTAREN) 1 % GEL   No No   Sig: Apply 4 grams to knees or 2 grams to hands four times daily using enclosed dosing card.   diphenhydrAMINE (BENADRYL) 25 MG tablet   No No   Sig: Take 1 tablet (25 mg) by mouth nightly as needed  for itching or allergies   omeprazole (PRILOSEC) 40 MG capsule   No No   Sig: Take 1 capsule (40 mg) by mouth daily as needed      Facility-Administered Medications: None     Allergies   Allergies   Allergen Reactions     Dilaudid [Hydromorphone]      dizziness     Senna Hives       Social History   Social History     Social History     Marital status:      Spouse name: N/A     Number of children: N/A     Years of education: N/A     Occupational History     Not on file.     Social History Main Topics     Smoking status: Never Smoker     Smokeless tobacco: Never Used     Alcohol use No     Drug use: No     Sexual activity: Yes     Partners: Male     Birth control/ protection: Post-menopausal     Other Topics Concern     Not on file     Social History Narrative    The patient has a 0 pk yr tobacco hx.  She has no active use.  Alcohol use is 0 alcoholic drinks per week.  She denies use of recreational drugs.          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           Family History   Family history reviewed with patient and is noncontributory.    Review of Systems   CONSTITUTIONAL: No fever, chills, sweats, fatigue   EYES: no visual blurring, no double vision or visual loss  ENT: no decrease in hearing, no tinnitus, no vertigo, no hoarseness  RESPIRATORY: no shortness of breath, no cough, no sputum   CARDIOVASCULAR: no palpitations, no chest  pain, no exertional chest pain or pressure  GASTROINTESTINAL: no nausea or vomiting, or abd pain  GENITOURINARY: no dysuria, no frequency or hesitancy, no hematuria  MUSCULOSKELETAL: left hip and groin pain.  SKIN: no rashes, ecchymoses, abrasions or lacerations  NEUROLOGIC: no numbness or tingling of hands, no numbness or tingling  of feet, no syncope, no tremors or weakness  PSYCHIATRIC: no sleep disturbances, no anxiety or depression    Physical Exam   Temp: 97.2  F (36.2  C) Temp src: Oral BP: 103/61   Heart Rate: 77 Resp: 16 SpO2: 94 % O2  Device: Nasal cannula Oxygen Delivery: 2 LPM  Vital Signs with Ranges  Temp:  [97.2  F (36.2  C)] 97.2  F (36.2  C)  Heart Rate:  [68-87] 77  Resp:  [16] 16  BP: (103-172)/() 103/61  SpO2:  [88 %-98 %] 94 % 150 lbs 0 oz    Primary Survey:  Airway: patient talking  Breathing: symmetric respiratory effort bilaterally  Circulation: central pulses present and peripheral pulses present  Disability: Pupils - left 4 mm and brisk, right 4 mm and brisk     Sharmaine Coma Scale - Total 15/15  Eye Response (E): 4  4= spontaneous,  3= to verbal/voice, 2=  to pain, 1= No response   Verbal Response (V): 5   5= Orientated, converses,  4= Confused, converses, 3= Inappropriate words,  2= Incomprehensible sounds,  1=No response   Motor Response (M): 6   6= Obeys commands, 5= Localizes to pain, 4= Withdrawal to pain, 3=Fexion to pain, 2= Extension to pain, 1= No response    Secondary Survey:  General: alert, oriented to person, place, time  Head: atraumatic, normocephalic, trachea midline  Eyes: PERRLA, pupils 3mm, EOMI, corneas and conjunctivae clear  Ears: pearly grey bilateral TMs and non-inflamed external ear canals  Nose: nares patent, no drainage, nasal septum non-tender  Mouth/Throat: no exudates or erythema,  no dental tenderness or malocclusions, no tongue lacerations  Neck:  no cervical collar present. No midline posterior tenderness, full AROM without pain or tenderness   Chest/Pulmonary: normal respiratory rate and rhythm,  bilateral clear breath sounds, no wheezes, rales or rhonchi, no chest wall tenderness or deformities,   Cardiovascular: S1, S2,  normal and regular rate and rhythm, no murmurs  Abdomen: soft, non-tender, no guarding, no rebound tenderness and no tenderness to palpation  : normal external genitalia, pelvis stable to lateral compression, no urine to assess  Back/Spine: no deformity, no midline tenderness, no sacral tenderness,  no step-offs and no abrasions or contusions  Musculoskel/Extremities:  normal extremities, full AROM of major joints without tenderness, edema, erythema, ecchymosis, or abrasions. +2 PP. no edema, no numbness or tingling  Hand: no gross deformities of hands or fingers. Full AROM of hand and fingers in flexion and extension.  strength equal and symmetric.   Skin: no rashes, laceration, ecchymosis, skin warm and dry.   Neuro: PERRLA, alert, oriented x 3. CN II-XII grossly intact. No focal deficits. Strength 5/5 x 4 extremities.  Sensation intact.  Psychiatric: affect/mood normal, cooperative, normal judgement/insight and memory intact  # Pain Assessment:  Current Pain Score 1/17/2017   Pain score (0-10) 2   Pain location -   Pain descriptors Aching;Discomfort   - Shahana is experiencing pain due to left femur fracture. Pain management was discussed and the plan was created in a collaborative fashion.  Shahana's response to the current recommendations: engaged  - Please see the plan for pain management as documented above  Data     Results for orders placed or performed during the hospital encounter of 10/22/18 (from the past 24 hour(s))   XR Pelvis and Hip Left 2 Views   Result Value Ref Range    Radiologist flags (Urgent)      Displaced transcervical fracture of the left femoral    Narrative    Exam: XR PELVIS AND HIP LEFT 2 VIEWS, 10/22/2018 10:11 AM    Indication: pain after fall;     Comparison: X-ray 4/17/2018    Findings:   1 view pelvis and 2 lateral views left hip.    Displaced transcervical fracture of the left femoral neck. Distal  aspect is slightly superiorly and laterally displaced.    Minimal degenerative changes. Partially visualized degenerative  changes of lumbar spine.    Osteopenic appearance of bone.      Impression    Impression:   Displaced transcervical fracture of the left femoral neck.       [Access Center: Displaced transcervical fracture of the left femoral  neck. ]    This report will be copied to the [facility] to ensure a  provider  acknowledges the finding. Access Center is available Monday  through Friday 8am-3:30 pm.     I have personally reviewed the examination and initial interpretation  and I agree with the findings.    JERRELL RODRIGUEZ MD   XR Lumbar Spine 2/3 Views    Narrative    2 views lumbar spine radiographs 10/22/2018 10:19 AM    History: pain after fall;     Comparison: X-ray 4/17/2018    Findings:   AP and lateral  views of the lumbar spine were obtained.    5  lumbar type vertebral bodies are assumed for the purpose of this  dictation.    There is no acute osseous abnormality.  Osteopenic appearance of bone.    There is multilevel degenerative changes of the lumbar spine and also  lower lumbar predominant facet arthropathy . Disc spaces relatively  preserved. Unchanged trace anterolisthesis of L4 over L5.    The visualized bowel gas pattern is non-obstructive.        Impression    Impression:  1.  No acute osseous abnormality.  2.  Multilevel degenerative changes.   3. Bones are osteopenic appearing.    I have personally reviewed the examination and initial interpretation  and I agree with the findings.    JERRELL RODRIGUEZ MD   ABO/Rh type and screen   Result Value Ref Range    ABO O     RH(D) Pos     Antibody Screen Neg     Test Valid Only At          [facility]    Specimen Expires 10/25/2018    INR   Result Value Ref Range    INR 0.98 0.86 - 1.14   Basic metabolic panel   Result Value Ref Range    Sodium 143 133 - 144 mmol/L    Potassium 3.8 3.4 - 5.3 mmol/L    Chloride 109 94 - 109 mmol/L    Carbon Dioxide 25 20 - 32 mmol/L    Anion Gap 9 3 - 14 mmol/L    Glucose 95 70 - 99 mg/dL    Urea Nitrogen 19 7 - 30 mg/dL    Creatinine 0.71 0.52 - 1.04 mg/dL    GFR Estimate 79 >60 mL/min/1.7m2    GFR Estimate If Black >90 >60 mL/min/1.7m2    Calcium 8.5 8.5 - 10.1 mg/dL   CBC with platelets   Result Value Ref Range    WBC 11.3 (H) 4.0 - 11.0 10e9/L    RBC Count 4.27 3.8 - 5.2 10e12/L     Hemoglobin 13.3 11.7 - 15.7 g/dL    Hematocrit 41.9 35.0 - 47.0 %    MCV 98 78 - 100 fl    MCH 31.1 26.5 - 33.0 pg    MCHC 31.7 31.5 - 36.5 g/dL    RDW 12.9 10.0 - 15.0 %    Platelet Count 211 150 - 450 10e9/L   XR Chest Port 1 View    Narrative    XR CHEST PORT 1 VW 10/22/2018 12:31 PM    Comparison: Esophagram 5/9/2018, CT chest 9/2/2014, chest radiograph  6/24/2014    History: traumatic fall;     Findings: Single AP view of the chest. K silhouette is within normal  limits. The pulmonary vasculature is distinct. Blunting of the right  costophrenic angle. No pneumothorax. No left pleural effusion. No  acute airspace disease. Biapical scarring. The upper abdomen is  unremarkable.      Impression    Impression: No pneumothorax. No displaced rib fracture. Small right  pleural effusion.       Studies:  XR Pelvis and Hip Left 2 Views   Final Result   Abnormal   Impression:    Displaced transcervical fracture of the left femoral neck.          [Access Center: Displaced transcervical fracture of the left femoral   neck. ]      This report will be copied to the [facility] to ensure a   provider acknowledges the finding. Access Center is available Monday   through Friday 8am-3:30 pm.       I have personally reviewed the examination and initial interpretation   and I agree with the findings.      JERRELL RODRIGUEZ MD      XR Lumbar Spine 2/3 Views   Final Result   Impression:   1.  No acute osseous abnormality.   2.  Multilevel degenerative changes.    3. Bones are osteopenic appearing.      I have personally reviewed the examination and initial interpretation   and I agree with the findings.      JERRELL RODRIGUEZ MD      XR Chest Port 1 View    (Results Pending)       Nae Felipe CNP

## 2018-10-22 NOTE — IP AVS SNAPSHOT
MRN:[id number]                      After Visit Summary   10/22/2018    Shahana Donaldson    MRN: [id number]           Thank you!     Thank you for choosing [facility] for your care. Our goal is always to provide you with excellent care. Hearing back from our patients is one way we can continue to improve our services. Please take a few minutes to complete the written survey that you may receive in the mail after you visit with us. Thank you!        Patient Information     Date Of Birth          1940        Designated Caregiver       Most Recent Value    Caregiver    Will someone help with your care after discharge? yes    Name of designated caregiver Jake    Phone number of caregiver [phone]    Caregiver address [address] [place] [address]      About your hospital stay     You were admitted on:  October 22, 2018 You last received care in the:  [address] [facility]    You were discharged on:  October 26, 2018        Reason for your hospital stay       You were hospitalized and treated for the following conditions:   Fall with left hip fracture s/p repair on 10/23/18    You were seen by the following services:  Trauma Service  Palliative Care service for advance care planning   Orthopedics  Physical and Occupational therapies                  Who to Call     For medical emergencies, please call 911.  For non-urgent questions about your medical care, please call your primary care provider or clinic, [phone]  For questions related to your surgery, please call your surgery clinic        Attending Provider     Provider Ritesh Noble MD Emergency Medicine    Sera Viramontes MD General Surgery       Primary Care Provider Office Phone # Fax #    Jeramie Curran [phone]      After Care Instructions     Activity - Up with nursing assistance       Up with nursing and assistive devices            Additional Discharge  Instructions       Lovenox for 2 weeks and then full strength Aspirin (325mg) for 6 weeks per orthopedics for DVT prophylaxis            Advance Diet as Tolerated       Follow this diet upon discharge: Regular diet.            Encourage PO fluids           Fall precautions           General info for SNF       Length of Stay Estimate: Short Term Care: Estimated # of Days <30  Condition at Discharge: Stable  Level of care:skilled   Rehabilitation Potential: Good  Admission H&P remains valid and up-to-date: Yes  Recent Chemotherapy: N/A  Use Nursing Home Standing Orders: Yes            Hip precautions       Avoid flexion greater than 90 degrees (avoid bending past 90 degrees)   Avoid internal rotation (twisting your legs in or out)  Avoid adduction past midline (avoid crossing your legs)  Avoid pivoting.  Keep a pillow (or abductor pillow--pink foam) between your thighs to keep your knees from touching.            Mantoux instructions       Give two-step Mantoux (PPD) Per Facility Policy Yes            Weight bearing status       Weight bearing as tolerated            Wound care (specify)       Site:   Left thigh Aquacell dressing   Instructions:   Okay to remove dressing on POD #7  (October 30)                  Follow-up Appointments     Follow Up and recommended labs and tests       Follow up with your primary care provider for continued medical care and hospital follow up in 5-10 days.     Medication Therapy Management Services  If you have any questions regarding your medications after discharge, this service is available to you.  Please call:  [phone] or [phone] (toll-free)  [facility]  [address] [place] [id number]  [email or web address]    Trauma Clinic- as needed.   [facility]  Floor 4  [address] [place] [address]   Appointments: [phone]    Orthopaedic Clinic-- Follow up with Dr Gutierrez in 2 weeks   [facility]  [facility]  Floor 4   [address] [place] [id number]   Appointments: [phone]                  Your next 10 appointments already scheduled     Nov 07, 2018 12:45 PM CST   (Arrive by 12:30 PM)   Return Visit with Araceli Gutierrez MD   [facility] ([facility])    [address] [place] [address]  4th [facility] [address]   [phone]            Nov 21, 2018  7:05 AM CST   (Arrive by 6:50 AM)   Return Visit with Jeramie Curran MD   [facility] ([facility])    [address] [place] [address]  4th [facility] [address]   [phone]            Nov 21, 2018 10:00 AM CST   MA SCREENING DIGITAL BILATERAL with [email or web address]   [facility] ([facility])    [address] [place] [address], 2nd Floor  [facility] [address]   [phone]           How do I prepare for my exam? (Food and drink instructions) No Food and Drink Restrictions.  How do I prepare for my exam? (Other instructions) Do not use any powder, lotion or deodorant under your arms or on your breast. If you do, we will ask you to remove it before your exam.  What should I wear: Wear comfortable, two-piece clothing.  How long does the exam take: Most scans will take 15 minutes.  What should I bring: Bring any previous mammograms from other facilities or have them mailed to the breast center.  Do I need a :  No  is needed.  What do I need to tell my doctor: If you have any allergies, tell your care team.  What should I do after the exam: No restrictions, You may resume normal activities.  What is this test: This test is an x-ray of the breast to look for breast disease. The breast is pressed between two plates to flatten and spread the tissue. An X-ray is taken of the breast from different angles.  Who should I call with questions: If you have any questions, please call the Imaging Department where you  "will have your exam. Directions, parking instructions, and other information is available on our website, [email or web address].  Other information about my exam Three-dimensional (3D) mammograms are available at [facility] locations in [facility], [facility], [place], [facility] and [place].  Health locations include [facility] and [facility] in [place].  Benefits of 3D mammograms include * Improved rate of cancer detection * Decreases your chance of having to go back for more tests, which means fewer: * \"False-positive\" results (This means that there is an abnormal area but it isn't cancer.) * Invasive testing procedures, such as a biopsy or surgery * Can provide clearer images of the breast if you have dense breast tissue.  *3D mammography is an optional exam that anyone can have with a 2D mammogram. It doesn't replace or take the place of a 2D mammogram. 2D mammograms remain an effective screening test for all women.  Not all insurance companies cover the cost of a 3D mammogram. Check with your insurance. Three-dimensional (3D) mammograms are available at [facility] locations in [facility], [facility], [facility], and [place]. Health locations include [facility] and [facility] in [place]. Benefits of 3D mammograms include: - Improved rate of cancer detection - Decreases your chance of having to go back for more tests, which means fewer: - \"False-positive\" results (This means that there is an abnormal area but it isn't cancer.) - Invasive testing procedures, such as a biopsy or surgery - Can provide clearer images of the breast if you have dense breast tissue. 3D mammography is an optional exam that anyone can have with a 2D mammogram. It doesn't replace or take the place of a 2D mammogram. 2D mammograms remain an effective screening test for all women.  Not all insurance companies cover the cost of " "a 3D mammogram. Check with your insurance.            May 30, 2019  8:00 AM CDT   RETURN GENERAL with Josue Trujillo MD   Eye Clinic ([facility])    [facility] [address] Clin [address]  [facility] [address]   [phone]              Additional Services     HONORING CHOICES REFERRAL       Your provider has referred you to Outpatient Honoring Choices Advance Care Planning Facilitator or Serious illness clinic support staff. The facilitator or support staff will contact you to schedule the appointment or for the follow up call    Reason for Referral: Basic Advance Care Planning - 1:1 need            Occupational Therapy Adult Consult       Evaluate and treat as clinically indicated.    Reason:  Left femoral neck fracture s/p Hemiarthroplasty            Physical Therapy Adult Consult       Evaluate and treat as clinically indicated.    Reason:  Left femoral neck fracture s/p Hemiarthroplasty                  Further instructions from your care team       To complete a Health Care Directive after your discharge         Pending Results     No orders found from 10/20/2018 to 10/23/2018.            Statement of Approval     Ordered          10/26/18 1157  I have reviewed and agree with all the recommendations and orders detailed in this document.  EFFECTIVE NOW     Approved and electronically signed by:  Monroe Dodd PA-C             Admission Information     Date & Time Provider Department Dept. Phone    10/22/2018 Sera Viramontes MD [address] [facility] [phone]      Your Vitals Were     Blood Pressure Pulse Temperature Respirations Height Weight    132/77 (BP Location: Right arm) 80 98.3  F (36.8  C) (Oral) 18 1.753 m (5' 9\") 69.1 kg (152 lb 6.4 oz)    Pulse Oximetry BMI (Body Mass Index)                94% 22.51 kg/m2          MyChart Information     [facility] gives you secure access to your electronic health record. If you see a primary care provider, " you can also send messages to your care team and make appointments. If you have questions, please call your primary care clinic.  If you do not have a primary care provider, please call [phone] and they will assist you.        Care EveryWhere ID     This is your Care EveryWhere ID. This could be used by other organizations to access your [facility] medical records  [id number]        Equal Access to Services     ERNESTO NEFF AH: Hadii chantelle jo hadvanessao Soarnavali, waaxda luqadaha, qaybta kaalmada daynada, celso orellanapricilaantonio crane. So [facility] [phone].    ATENCIÓN: Si habla espjayashree, tiene a quiroz disposición servicios gratuitos de asistencia lingüística. Chris al [phone].    We comply with applicable federal civil rights laws and [place] laws. We do not discriminate on the basis of race, color, national origin, age, disability, sex, sexual orientation, or gender identity.               Review of your medicines      START taking        Dose / Directions    acetaminophen 325 MG tablet   Commonly known as:  TYLENOL   Used for:  Closed fracture of left hip, initial encounter (H)        Dose:  975 mg   Take 3 tablets (975 mg) by mouth every 6 hours for 7 days   Quantity:  100 tablet   Refills:  0       aspirin 325 MG EC tablet   Used for:  Closed fracture of left hip, initial encounter (H)   Replaces:  aspirin 81 MG tablet        Dose:  325 mg   Start taking on:  11/8/2018   Take 1 tablet (325 mg) by mouth daily   Quantity:  42 tablet   Refills:  0       calcium carbonate 500 mg (elemental) 500 MG tablet   Commonly known as:  OSCAL;OYSTER SHELL CALCIUM   Used for:  Closed fracture of left hip, initial encounter (H)        Dose:  1500 mg   Take 3 tablets (1,500 mg) by mouth 2 times daily (with meals)   Quantity:  60 tablet   Refills:  0       docusate sodium 100 MG capsule   Commonly known as:  COLACE   Used for:  Drug-induced constipation        Dose:  100 mg   Take 1 capsule (100 mg) by mouth 2 times  daily   Quantity:  60 capsule   Refills:  0       enoxaparin 40 MG/0.4ML injection   Commonly known as:  LOVENOX   Used for:  Closed fracture of left hip, initial encounter (H)        Dose:  40 mg   Inject 0.4 mLs (40 mg) Subcutaneous every 24 hours   Quantity:  5.6 mL   Refills:  0       methocarbamol 750 MG tablet   Commonly known as:  ROBAXIN   Used for:  Closed fracture of left hip, initial encounter (H), Closed displaced midcervical fracture of left femur, initial encounter (H)        Dose:  750 mg   Take 1 tablet (750 mg) by mouth every 6 hours as needed for muscle spasms   Quantity:  45 tablet   Refills:  0         CONTINUE these medicines which may have CHANGED, or have new prescriptions. If we are uncertain of the size of tablets/capsules you have at home, strength may be listed as something that might have changed.        Dose / Directions    cholecalciferol 1000 UNIT tablet   Commonly known as:  vitamin D3   This may have changed:  how much to take   Used for:  Closed fracture of left hip, initial encounter (H)        Dose:  2000 Units   Take 2 tablets (2,000 Units) by mouth daily   Quantity:  30 tablet   Refills:  0       clobetasol 0.05 % ointment   Commonly known as:  TEMOVATE   This may have changed:  additional instructions   Used for:  Lichen sclerosus        Apply sparingly to affected area 2 x daily for14 days. Then, apply small amount to affected area twice weekly for maintenance.   Quantity:  30 g   Refills:  2       diltiazem 300 MG 24 hr capsule   Commonly known as:  CARTIA XT   This may have changed:  See the new instructions.   Used for:  Benign essential hypertension        Dose:  300 mg   Take 1 capsule (300 mg) by mouth daily   Quantity:  90 capsule   Refills:  0         CONTINUE these medicines which have NOT CHANGED        Dose / Directions    ascorbic acid 500 MG tablet   Commonly known as:  VITAMIN C        Dose:  500 mg   Take 500 mg by mouth as needed (when thinking of it)  "  Refills:  0       atorvastatin 10 MG tablet   Commonly known as:  LIPITOR   Used for:  Mixed hyperlipidemia        Dose:  10 mg   Take 1 tablet (10 mg) by mouth daily   Quantity:  90 tablet   Refills:  3         STOP taking     aspirin 81 MG tablet   Replaced by:  aspirin 325 MG EC tablet                Where to get your medicines      Some of these will need a paper prescription and others can be bought over the counter. Ask your nurse if you have questions.     You don't need a prescription for these medications     acetaminophen 325 MG tablet    aspirin 325 MG EC tablet    atorvastatin 10 MG tablet    calcium carbonate 500 mg (elemental) 500 MG tablet    cholecalciferol 1000 UNIT tablet    diltiazem 300 MG 24 hr capsule    docusate sodium 100 MG capsule    enoxaparin 40 MG/0.4ML injection    methocarbamol 750 MG tablet                Protect others around you: Learn how to safely use, store and throw away your medicines at www.disposemymeds.org.        Information about your nerve block     Today you received a block to numb the nerves near your surgery site.    This is a block using local anesthetic or \"numbing\" medication injected around the nerves to anesthetize or \"numb\" the area supplied by those nerves. This block is injected into the muscle layer near your surgical site. The type of anesthesia (Exparel) your anesthesia team used to numb your abdomen may give you relief for up to 72 hours.     Diet: There are no diet restrictions, but you should drink plenty of fluids, unless you are on a fluid-restricted diet.     Activity: If your surgical site is an arm or leg you should be careful with your affected limb, since it is possible to injure your limb without being aware of it due to the numbing. Until full feeling returns, you should guard against bumping or hitting your limb, and avoid extreme hot or cold temperatures on the skin.    Pain Medication: As the block wears off, the feeling will return as a " tingling or prickly sensation near your surgical site. You will experience more discomfort from your incisions as the feeling returns. You may want to take a pain pill (a narcotic or Tylenol if this was prescribed by your surgeon) when you start to experience mild pain, before the pain becomes more severe. If your pain medications do not control your pain, you should notify your surgeon. If you are taking narcotics for pain management, do not drink alcohol, drive a car, or perform hazardous activities.  If you have questions or concerns you may call your surgeon at the number provided with your discharge instructions.     Call your surgeon if you experience blurry vision, ringing in the ears or metallic taste in your mouth.              Medication List: This is a list of all your medications and when to take them. Check marks below indicate your daily home schedule. Keep this list as a reference.      Medications           Morning Afternoon Evening Bedtime As Needed    acetaminophen 325 MG tablet   Commonly known as:  TYLENOL   Take 3 tablets (975 mg) by mouth every 6 hours for 7 days   Last time this was given:  975 mg on 10/26/2018  1:37 PM                                ascorbic acid 500 MG tablet   Commonly known as:  VITAMIN C   Take 500 mg by mouth as needed (when thinking of it)                                aspirin 325 MG EC tablet   Take 1 tablet (325 mg) by mouth daily   Start taking on:  11/8/2018                                atorvastatin 10 MG tablet   Commonly known as:  LIPITOR   Take 1 tablet (10 mg) by mouth daily   Last time this was given:  10 mg on 10/25/2018  8:27 PM                                calcium carbonate 500 mg (elemental) 500 MG tablet   Commonly known as:  OSCAL;OYSTER SHELL CALCIUM   Take 3 tablets (1,500 mg) by mouth 2 times daily (with meals)   Last time this was given:  1,500 mg on 10/26/2018  8:46 AM                                cholecalciferol 1000 UNIT tablet   Commonly  known as:  vitamin D3   Take 2 tablets (2,000 Units) by mouth daily   Last time this was given:  2,000 Units on 10/26/2018  8:46 AM                                clobetasol 0.05 % ointment   Commonly known as:  TEMOVATE   Apply sparingly to affected area 2 x daily for14 days. Then, apply small amount to affected area twice weekly for maintenance.                                diltiazem 300 MG 24 hr capsule   Commonly known as:  CARTIA XT   Take 1 capsule (300 mg) by mouth daily   Last time this was given:  300 mg on 10/26/2018  8:46 AM                                docusate sodium 100 MG capsule   Commonly known as:  COLACE   Take 1 capsule (100 mg) by mouth 2 times daily   Last time this was given:  100 mg on 10/26/2018  8:47 AM                                enoxaparin 40 MG/0.4ML injection   Commonly known as:  LOVENOX   Inject 0.4 mLs (40 mg) Subcutaneous every 24 hours   Last time this was given:  40 mg on 10/26/2018  1:37 PM                                methocarbamol 750 MG tablet   Commonly known as:  ROBAXIN   Take 1 tablet (750 mg) by mouth every 6 hours as needed for muscle spasms   Last time this was given:  750 mg on 10/22/2018  2:24 PM

## 2018-10-22 NOTE — ED NOTES
General acute hospital   ED Nurse to Floor Handoff     Shahana Donaldson is a 78 year old female who speaks English and lives with a spouse,  in a home  They arrived in the ED by ambulance from home    ED Chief Complaint: Fall and Hip Pain (left hip)    ED Dx;   Final diagnoses:   Closed fracture of left hip, initial encounter (H)         Needed?: No    Allergies:   Allergies   Allergen Reactions     Dilaudid [Hydromorphone]      dizziness     Senna Hives   .  Past Medical Hx:   Past Medical History:   Diagnosis Date     Diverticulosis of colon (without mention of hemorrhage)      Essential hypertension, benign      Gastro-oesophageal reflux disease     nissen     Hemorrhoid      Nonsenile cataract      Osteoporosis      Other and unspecified hyperlipidemia      Rectocele      Symptomatic menopausal or female climacteric states       Baseline Mental status: WDL  Current Mental Status changes: at basesline    Infection present or suspected this encounter: no  Sepsis suspected: No  Isolation type: No active isolations     Activity level - Baseline/Home:  Independent  Activity Level - Current:   Bedrest d/t fractured femur    Bariatric equipment needed?: No    In the ED these meds were given:   Medications   sodium chloride 0.9% infusion ( Intravenous New Bag 10/22/18 0918)   morphine (PF) injection 4 mg (4 mg Intravenous Given 10/22/18 0920)   ondansetron (ZOFRAN) injection 4 mg (4 mg Intravenous Given 10/22/18 0918)       Drips running?  Yes NS @ 75 mL/hr    Home pump  No    Current LDAs  Peripheral IV 10/22/18 Right Upper forearm (Active)   Site Assessment WDL 10/22/2018  9:13 AM   Line Status Saline locked 10/22/2018  9:13 AM   Phlebitis Scale 0-->no symptoms 10/22/2018  9:13 AM   Number of days:0       Incision/Surgical Site 01/07/13 Anterior;Bilateral Abdomen (Active)   Number of days:2114       Labs results: Labs Ordered and Resulted from Time of ED Arrival Up to the Time  "of Departure from the ED - No data to display    Imaging Studies:   Recent Results (from the past 24 hour(s))   XR Pelvis and Hip Left 2 Views   Result Value    Radiologist flags (Urgent)     Displaced transcervical fracture of the left femoral    Narrative    Exam: XR PELVIS AND HIP LEFT 2 VIEWS, 10/22/2018 10:11 AM    Indication: pain after fall;     Comparison: X-ray 4/17/2018    Findings:   1 view pelvis and 2 lateral views left hip.    Displaced transcervical fracture of the left femoral neck. Distal  aspect is slightly superiorly and laterally displaced.    Minimal degenerative changes. Partially visualized degenerative  changes of lumbar spine.    Osteopenic appearance of bone.      Impression    Impression:   Displaced transcervical fracture of the left femoral neck.       [Access Center: Displaced transcervical fracture of the left femoral  neck. ]    This report will be copied to the [facility] to ensure a  provider acknowledges the finding. Access Center is available Monday  through Friday 8am-3:30 pm.    XR Lumbar Spine 2/3 Views    Narrative    2 views lumbar spine radiographs 10/22/2018 10:19 AM    History: pain after fall;     Comparison: X-ray 4/17/2018    Findings:   AP and lateral  views of the lumbar spine were obtained.    5  lumbar type vertebral bodies are assumed for the purpose of this  dictation.    There is no acute osseous abnormality.  Osteopenic appearance of bone.    There is multilevel degenerative changes of the lumbar spine and also  lower lumbar predominant facet arthropathy . Disc spaces relatively  preserved. Unchanged trace anterolisthesis of L4 over L5.    The visualized bowel gas pattern is non-obstructive.        Impression    Impression:  1.  No acute osseous abnormality.  2.  Multilevel degenerative changes.       Recent vital signs:   /68  Temp 97.2  F (36.2  C) (Oral)  Resp 16  Ht 1.753 m (5' 9\")  Wt 68 kg (150 lb)  SpO2 96%  BMI 22.15 " kg/m2    Cardiac Rhythm: Normal Sinus  Pt needs tele? No  Skin/wound Issues: None    Code Status: Full Code    Pain control: fair    Nausea control: pt had none    Abnormal labs/tests/findings requiring intervention: Displaced transcervical fracture of the left femoral    Family present during ED course? Yes   Family Comments/Social Situation comments:  @ bedside    Tasks needing completion: None    Johana Oneal RN  [facility]-- [phone] [facility]  [phone] [facility]

## 2018-10-22 NOTE — IP AVS SNAPSHOT
"    [address] [facility]: [phone]                                              INTERAGENCY TRANSFER FORM - PHYSICIAN ORDERS   10/22/2018                    Hospital Admission Date: 10/22/2018  IWONA PHIPPS   : 1940  Sex: Female        Attending Provider: Sera Viramontes MD     Allergies:  Dilaudid [Hydromorphone], Senna    Infection:  None   Service:  TRAUMA    Ht:  1.753 m (5' 9\")   Wt:  69.1 kg (152 lb 6.4 oz)   Admission Wt:  68 kg (150 lb)    BMI:  22.51 kg/m 2   BSA:  1.83 m 2            Patient PCP Information     Provider PCP Type    Jeramie Curran MD General      ED Clinical Impression     Diagnosis Description Comment Added By Time Added    Closed fracture of left hip, initial encounter (H) [S72.002A] Closed fracture of left hip, initial encounter (H) [S72.002A]  Ritesh Fontaine MD 10/22/2018 10:17 AM      Hospital Problems as of 10/26/2018              Priority Class Noted POA    Left displaced femoral neck fracture (H) Medium  10/22/2018 Yes      Non-Hospital Problems as of 10/26/2018              Priority Class Noted    Essential hypertension, benign Medium  2004    Mixed hyperlipidemia Medium  2004    Symptomatic menopausal or female climacteric states Medium  2004    Diverticulosis of large intestine Medium  2004    Hyperlipidemia Medium  2004    Coronary atherosclerosis Medium  10/25/2011    Esophageal reflux Medium  10/25/2011    vulvar cyst Medium  10/25/2011    Hiatal hernia Medium  2012    S/P Nissen fundoplication (without gastrostomy tube) procedure Medium  2013    History of tubal ligation Medium  9/10/2014    History of dilatation and curettage Medium  9/10/2014    History of hysterectomy Medium  9/10/2014    Lichen sclerosus Medium  10/7/2014    Right knee pain Medium  2015      Code Status History     Date Active Date Inactive Code Status Order ID Comments User Context    10/25/2018  5:13 PM  Full Code " [id number]  Monroe Dodd PA-C Outpatient    10/22/2018  2:06 PM 10/25/2018  5:13 PM Full Code [id number]  Nae Felipe Lauraselena, APRN CNP Inpatient    1/7/2013  3:03 PM 1/10/2013  8:25 PM Full Code [id number]  Leslie Cotton, RN Inpatient    5/25/2011  1:17 PM 1/7/2013  3:03 PM Full Code [id number]  Mera Abbott Outpatient    5/23/2011  1:20 PM 5/25/2011  1:17 PM Full Code [id number]  Elicia Avila Inpatient    4/12/2004 10:30 AM 4/12/2004 10:30 AM  None  Mary Gil Demographics         Medication Review      START taking        Dose / Directions Comments    acetaminophen 325 MG tablet   Commonly known as:  TYLENOL   Used for:  Closed fracture of left hip, initial encounter (H)        Dose:  975 mg   Take 3 tablets (975 mg) by mouth every 6 hours for 7 days   Quantity:  100 tablet   Refills:  0        aspirin 325 MG EC tablet   Used for:  Closed fracture of left hip, initial encounter (H)   Replaces:  aspirin 81 MG tablet        Dose:  325 mg   Start taking on:  11/8/2018   Take 1 tablet (325 mg) by mouth daily   Quantity:  42 tablet   Refills:  0    For 6 weeks ( to start After Lovenox course)       calcium carbonate 500 mg (elemental) 500 MG tablet   Commonly known as:  OSCAL;OYSTER SHELL CALCIUM   Used for:  Closed fracture of left hip, initial encounter (H)        Dose:  1500 mg   Take 3 tablets (1,500 mg) by mouth 2 times daily (with meals)   Quantity:  60 tablet   Refills:  0        docusate sodium 100 MG capsule   Commonly known as:  COLACE   Used for:  Drug-induced constipation        Dose:  100 mg   Take 1 capsule (100 mg) by mouth 2 times daily   Quantity:  60 capsule   Refills:  0        enoxaparin 40 MG/0.4ML injection   Commonly known as:  LOVENOX   Used for:  Closed fracture of left hip, initial encounter (H)        Dose:  40 mg   Inject 0.4 mLs (40 mg) Subcutaneous every 24 hours   Quantity:  5.6 mL   Refills:  0        methocarbamol 750 MG tablet   Commonly known as:  ROBAXIN   Used  for:  Closed fracture of left hip, initial encounter (H), Closed displaced midcervical fracture of left femur, initial encounter (H)        Dose:  750 mg   Take 1 tablet (750 mg) by mouth every 6 hours as needed for muscle spasms   Quantity:  45 tablet   Refills:  0          CONTINUE these medications which may have CHANGED, or have new prescriptions. If we are uncertain of the size of tablets/capsules you have at home, strength may be listed as something that might have changed.        Dose / Directions Comments    cholecalciferol 1000 UNIT tablet   Commonly known as:  vitamin D3   This may have changed:  how much to take   Used for:  Closed fracture of left hip, initial encounter (H)        Dose:  2000 Units   Take 2 tablets (2,000 Units) by mouth daily   Quantity:  30 tablet   Refills:  0        clobetasol 0.05 % ointment   Commonly known as:  TEMOVATE   This may have changed:  additional instructions   Used for:  Lichen sclerosus        Apply sparingly to affected area 2 x daily for14 days. Then, apply small amount to affected area twice weekly for maintenance.   Quantity:  30 g   Refills:  2        diltiazem 300 MG 24 hr capsule   Commonly known as:  CARTIA XT   This may have changed:  See the new instructions.   Used for:  Benign essential hypertension        Dose:  300 mg   Take 1 capsule (300 mg) by mouth daily   Quantity:  90 capsule   Refills:  0          CONTINUE these medications which have NOT CHANGED        Dose / Directions Comments    ascorbic acid 500 MG tablet   Commonly known as:  VITAMIN C        Dose:  500 mg   Take 500 mg by mouth as needed (when thinking of it)   Refills:  0        atorvastatin 10 MG tablet   Commonly known as:  LIPITOR   Used for:  Mixed hyperlipidemia        Dose:  10 mg   Take 1 tablet (10 mg) by mouth daily   Quantity:  90 tablet   Refills:  3          STOP taking     aspirin 81 MG tablet   Replaced by:  aspirin 325 MG EC tablet                     Further instructions  from your care team       To complete a Health Care Directive after your discharge         Summary of Visit     Reason for your hospital stay       You were hospitalized and treated for the following conditions:   Fall with left hip fracture s/p repair on 10/23/18    You were seen by the following services:  Trauma Service  Palliative Care service for advance care planning   Orthopedics  Physical and Occupational therapies             After Care     Activity - Up with nursing assistance       Up with nursing and assistive devices       Additional Discharge Instructions       Lovenox for 2 weeks and then full strength Aspirin (325mg) for 6 weeks per orthopedics for DVT prophylaxis       Advance Diet as Tolerated       Follow this diet upon discharge: Regular diet.       Encourage PO fluids           Fall precautions           General info for SNF       Length of Stay Estimate: Short Term Care: Estimated # of Days <30  Condition at Discharge: Stable  Level of care:skilled   Rehabilitation Potential: Good  Admission H&P remains valid and up-to-date: Yes  Recent Chemotherapy: N/A  Use Nursing Home Standing Orders: Yes       Hip precautions       Avoid flexion greater than 90 degrees (avoid bending past 90 degrees)   Avoid internal rotation (twisting your legs in or out)  Avoid adduction past midline (avoid crossing your legs)  Avoid pivoting.  Keep a pillow (or abductor pillow--pink foam) between your thighs to keep your knees from touching.       Mantoux instructions       Give two-step Mantoux (PPD) Per Facility Policy Yes       Weight bearing status       Weight bearing as tolerated       Wound care (specify)       Site:   Left thigh Aquacell dressing   Instructions:   Okay to remove dressing on POD #7  (October 30)             Referrals     [facility] REFERRAL       Your provider has referred you to Outpatient [facility] Advance Care Planning Facilitator or Serious illness clinic support staff. The  facilitator or support staff will contact you to schedule the appointment or for the follow up call    Reason for Referral: Basic Advance Care Planning - 1:1 need       Occupational Therapy Adult Consult       Evaluate and treat as clinically indicated.    Reason:  Left femoral neck fracture s/p Hemiarthroplasty       Physical Therapy Adult Consult       Evaluate and treat as clinically indicated.    Reason:  Left femoral neck fracture s/p Hemiarthroplasty             Your next 10 appointments already scheduled     Nov 07, 2018 12:45 PM CST   (Arrive by 12:30 PM)   Return Visit with Araceli Gutierrez MD   [facility] ([facility])    [address] [place] [address]  [address] [facility] [address]   [phone]            Nov 21, 2018  7:05 AM CST   (Arrive by 6:50 AM)   Return Visit with Jeramie Curran MD   [facility] ([facility])    [address] [place] [address]  [address] [facility] [id number]   [phone]            Nov 21, 2018 10:00 AM CST   MA SCREENING DIGITAL BILATERAL with [email or web address]   [facility] ([facility])    [address] [place] [address], [address] [place] [id number]   [phone]           How do I prepare for my exam? (Food and drink instructions) No Food and Drink Restrictions.  How do I prepare for my exam? (Other instructions) Do not use any powder, lotion or deodorant under your arms or on your breast. If you do, we will ask you to remove it before your exam.  What should I wear: Wear comfortable, two-piece clothing.  How long does the exam take: Most scans will take 15 minutes.  What should I bring: Bring any previous mammograms from other facilities or have them mailed to the breast center.  Do I need a :  No  is needed.  What do I need to tell my doctor: If you have any allergies, tell your care team.  What should I do after the exam: No restrictions, You may  "resume normal activities.  What is this test: This test is an x-ray of the breast to look for breast disease. The breast is pressed between two plates to flatten and spread the tissue. An X-ray is taken of the breast from different angles.  Who should I call with questions: If you have any questions, please call the Imaging Department where you will have your exam. Directions, parking instructions, and other information is available on our website, [email or web address].  Other information about my exam Three-dimensional (3D) mammograms are available at [facility] locations in [facility], [facility], [place], [facility] and [place]. [facility] locations include [place] and the [facility] in [place].  Benefits of 3D mammograms include * Improved rate of cancer detection * Decreases your chance of having to go back for more tests, which means fewer: * \"False-positive\" results (This means that there is an abnormal area but it isn't cancer.) * Invasive testing procedures, such as a biopsy or surgery * Can provide clearer images of the breast if you have dense breast tissue.  *3D mammography is an optional exam that anyone can have with a 2D mammogram. It doesn't replace or take the place of a 2D mammogram. 2D mammograms remain an effective screening test for all women.  Not all insurance companies cover the cost of a 3D mammogram. Check with your insurance. Three-dimensional (3D) mammograms are available at [facility] locations in [facility], [facility], [facility], and [place]. Health locations include [place] and [facility] in [place]. Benefits of 3D mammograms include: - Improved rate of cancer detection - Decreases your chance of having to go back for more tests, which means fewer: - \"False-positive\" results (This means that there is an abnormal area but it isn't cancer.) - Invasive testing procedures, " such as a biopsy or surgery - Can provide clearer images of the breast if you have dense breast tissue. 3D mammography is an optional exam that anyone can have with a 2D mammogram. It doesn't replace or take the place of a 2D mammogram. 2D mammograms remain an effective screening test for all women.  Not all insurance companies cover the cost of a 3D mammogram. Check with your insurance.            May 30, 2019  8:00 AM CDT   RETURN GENERAL with Josue Trujillo MD   [facility] ([facility])    [address] Clin [address]  [facility] [address] [phone]              Follow-Up Appointment Instructions     Future Labs/Procedures    Follow Up and recommended labs and tests     Comments:    Follow up with your primary care provider for continued medical care and hospital follow up in 5-10 days.     Medication Therapy Management Services  If you have any questions regarding your medications after discharge, this service is available to you.  Please call:  [phone] or [phone] (toll-free)  [facility]  [address] [place] [address]  [email or web address]    Trauma Clinic- as needed.   [facility]  Floor 4  [address] [place] [address]   Appointments: [phone]    Orthopaedic Clinic-- Follow up with Dr Gutierrez in 2 weeks   [facility]  Floor 4   [address] [place] [address]   Appointments: [phone]      Follow-Up Appointment Instructions     Follow Up and recommended labs and tests       Follow up with your primary care provider for continued medical care and hospital follow up in 5-10 days.     Medication Therapy Management Services  If you have any questions regarding your medications after discharge, this service is available to you.  Please call:  [phone] or [phone] (toll-free)  [facility]  [place] [address]  [email or web address]    Trauma Clinic- as needed.   [facility]  Floor 4  [address] [place] [address]   Appointments: [phone]    Orthopaedic Clinic-- Follow up with Dr Gutierrez in 2 weeks   [facility]  Floor 4   [address] [place] [address]   Appointments: [phone]             Statement of Approval     Ordered          10/26/18 1157  I have reviewed and agree with all the recommendations and orders detailed in this document.  EFFECTIVE NOW     Approved and electronically signed by:  Monroe Dodd PA-C

## 2018-10-22 NOTE — IP AVS SNAPSHOT
[address] [address]    Phone:  [phone]                                       After Visit Summary   10/22/2018    Shahana Donaldson    MRN: [id number]           After Visit Summary Signature Page     I have received my discharge instructions, and my questions have been answered. I have discussed any challenges I see with this plan with the nurse or doctor.    ..........................................................................................................................................  Patient/Patient Representative Signature      ..........................................................................................................................................  Patient Representative Print Name and Relationship to Patient    ..................................................               ................................................  Date                                   Time    ..........................................................................................................................................  Reviewed by Signature/Title    ...................................................              ..............................................  Date                                               Time          22EPIC Rev 08/18

## 2018-10-22 NOTE — LETTER
Transition Communication Hand-off for Care Transitions to Next Level of Care Provider    Name: Shahana Donaldson  : 1940  MRN #: [id number]  Primary Care Provider: Jeramie Curran     Primary Clinic: [address] [place] [address]     Reason for Hospitalization:  Closed fracture of left hip, initial encounter (H) [S72.002A]  Admit Date/Time: 10/22/2018  8:41 AM  Discharge Date: 10/26/2018  Payor Source: Payor: MEDICARE / Plan: MEDICARE / Product Type: Medicare /     Readmission Assessment Measure (JUSTIN) Risk Score/category: Average     Plan of Care Goals/Milestone Events:  Pt is s/p left hemiarthroplasty on 10/23/18, after pt sustained fall.  Pt is medically ready for discharge on POD # 3. Pt evaluated by therapies whom recommended TCU placement, to which pt was agreeable to. Pt will be going to [facility] TCU.         Reason for Communication Hand-off Referral: Other Pt will be going to a TCU.    Discharge Plan:       Concern for non-adherence with plan of care: No   Discharge Needs Assessment:  Needs       Most Recent Value    Transportation Available car, family or friend will provide          Follow-up specialty is recommended: Yes    Follow-up plan:  Future Appointments  Date Time Provider Department Center   2018 12:45 PM Araceli Gutierrez MD [facility]   2018 7:05 AM Jeramie Curran MD [facility]   2018 10:00 AM [email or web address]   2019 8:00 AM Josue Trujillo MD [facility]       Any outstanding tests or procedures:        Referrals     Future Labs/Procedures    [facility] REFERRAL     Comments:    Your provider has referred you to Outpatient [facility] Advance Care Planning Facilitator or Serious illness clinic support staff. The facilitator or support staff will contact you to schedule the appointment or for the follow up call    Reason for Referral: Basic Advance Care Planning - 1:1 need    Occupational Therapy  Adult Consult     Comments:    Evaluate and treat as clinically indicated.    Reason:  Left femoral neck fracture s/p Hemiarthroplasty    Physical Therapy Adult Consult     Comments:    Evaluate and treat as clinically indicated.    Reason:  Left femoral neck fracture s/p Hemiarthroplasty            Key Recommendations:    Anticipate pt will be able to return home after TCU stay.     Lissy Abbott    AVS/Discharge Summary is the source of truth; this is a helpful guide for improved communication of patient story

## 2018-10-22 NOTE — ED TRIAGE NOTES
BIBA with c/o left hip pain after falling. Patient slipped on tile floor, denies head injury or LOC. Not on blood thinners.

## 2018-10-22 NOTE — IP AVS SNAPSHOT
"    [address] [facility]: [phone]                                              INTERAGENCY TRANSFER FORM - LAB / IMAGING / EKG / EMG RESULTS   10/22/2018                    Hospital Admission Date: 10/22/2018  IWONA PHIPPS   : 1940  Sex: Female        Attending Provider: Sera Viramontes MD     Allergies:  Dilaudid [Hydromorphone], Senna    Infection:  None   Service:  TRAUMA    Ht:  1.753 m (5' 9\")   Wt:  69.1 kg (152 lb 6.4 oz)   Admission Wt:  68 kg (150 lb)    BMI:  22.51 kg/m 2   BSA:  1.83 m 2            Patient PCP Information     Provider PCP Type    Jeramie Curran MD General         Lab Results - 3 Days      CBC with platelets [[id number]] (Abnormal)  Resulted: 10/25/18 07, Result status: Final result    Ordering provider: Demi Abebe APRN CNP  10/25/18 0103 Resulting lab: [facility]    Specimen Information    Type Source Collected On   Blood  10/25/18 0711          Components       Value Reference Range Flag Lab   WBC 10.1 4.0 - 11.0 10e9/L  51   RBC Count 3.41 3.8 - 5.2 10e12/L L 51   Hemoglobin 10.4 11.7 - 15.7 g/dL L 51   Hematocrit 32.6 35.0 - 47.0 % L 51   MCV 96 78 - 100 fl  51   MCH 30.5 26.5 - 33.0 pg  51   MCHC 31.9 31.5 - 36.5 g/dL  51   RDW 12.9 10.0 - 15.0 %  51   Platelet Count 162 150 - 450 10e9/L  51            Basic metabolic panel [[id number]] (Abnormal)  Resulted: 10/24/18 0722, Result status: Final result    Ordering provider: Nae Felipe APRN CNP  10/24/18 0001 Resulting lab: [facility]    Specimen Information    Type Source Collected On   Blood  10/24/18 0645          Components       Value Reference Range Flag Lab   Sodium 141 133 - 144 mmol/L  51   Potassium 4.1 3.4 - 5.3 mmol/L  51   Chloride 109 94 - 109 mmol/L  51   Carbon Dioxide 21 20 - 32 mmol/L  51   Anion Gap 10 3 - 14 mmol/L  51   Glucose 129 70 - 99 mg/dL H 51   Urea Nitrogen 14 7 - 30 mg/dL  51 "   Creatinine 0.62 0.52 - 1.04 mg/dL  51   GFR Estimate >90 >60 mL/min/1.7m2  51   Comment:  Non  GFR Calc   GFR Estimate If Black >90 >60 mL/min/1.7m2  51   Comment:  African American GFR Calc   Calcium 8.3 8.5 - 10.1 mg/dL L 51            CBC with platelets [[id number]] (Abnormal)  Resulted: 10/24/18 0700, Result status: Final result    Ordering provider: Nae Felipe APRN CNP  10/24/18 0001 Resulting lab: [facility]    Specimen Information    Type Source Collected On   Blood  10/24/18 0645          Components       Value Reference Range Flag Lab   WBC 8.0 4.0 - 11.0 10e9/L  51   RBC Count 3.41 3.8 - 5.2 10e12/L L 51   Hemoglobin 10.7 11.7 - 15.7 g/dL L 51   Hematocrit 33.3 35.0 - 47.0 % L 51   MCV 98 78 - 100 fl  51   MCH 31.4 26.5 - 33.0 pg  51   MCHC 32.1 31.5 - 36.5 g/dL  51   RDW 12.7 10.0 - 15.0 %  51   Platelet Count 160 150 - 450 10e9/L  51            Vitamin D [[id number]]  Resulted: 10/23/18 1459, Result status: Final result    Ordering provider: Nafisa Gaines MD  10/23/18 0100 Resulting lab: [facility]    Specimen Information    Type Source Collected On   Blood  10/23/18 0658          Components       Value Reference Range Flag Lab   Vitamin D Deficiency screening 25 20 - 75 ug/L  51   Comment:         Season, race, dietary intake, and treatment affect the concentration of   25-hydroxy-Vitamin D. Values may decrease during winter months and increase   during summer months. Values 20-29 ug/L may indicate Vitamin D insufficiency   and values <20 ug/L may indicate Vitamin D deficiency.  Vitamin D determination is routinely performed by an immunoassay specific for   25 hydroxyvitamin D3.  If an individual is on vitamin D2 (ergocalciferol)   supplementation, please specify 25 OH vitamin D2 and D3 level determination by   LCMSMS test VITD23.              Glucose by meter [[id number]]  Resulted: 10/23/18 1429, Result  status: Final result    Ordering provider: Sera Viramontes MD  10/23/18 1413 Resulting lab: POINT OF CARE TEST, GLUCOSE    Specimen Information    Type Source Collected On     10/23/18 1413          Components       Value Reference Range Flag Lab   Glucose 95 70 - 99 mg/dL  170            Parathyroid Hormone Intact [[id number]]  Resulted: 10/23/18 0753, Result status: Final result    Ordering provider: Nae Felipe APRN CNP  10/23/18 0100 Resulting lab: [facility]    Specimen Information    Type Source Collected On   Blood  10/23/18 0658          Components       Value Reference Range Flag Lab   Parathyroid Hormone Intact 34 18 - 80 pg/mL  51            Magnesium [[id number]]  Resulted: 10/23/18 0738, Result status: Final result    Ordering provider: Nae Felipe APRN CNP  10/23/18 0100 Resulting lab: [facility]    Specimen Information    Type Source Collected On   Blood  10/23/18 0658          Components       Value Reference Range Flag Lab   Magnesium 2.0 1.6 - 2.3 mg/dL  51            Phosphorus [[id number]]  Resulted: 10/23/18 0738, Result status: Final result    Ordering provider: Nae Felipe APRN CNP  10/23/18 0100 Resulting lab: [facility]    Specimen Information    Type Source Collected On   Blood  10/23/18 0658          Components       Value Reference Range Flag Lab   Phosphorus 3.1 2.5 - 4.5 mg/dL  51            CBC with platelets [[id number]]  Resulted: 10/23/18 0723, Result status: Final result    Ordering provider: Nae Felipe APRN CNP  10/23/18 0100 Resulting lab: [facility]    Specimen Information    Type Source Collected On   Blood  10/23/18 0658          Components       Value Reference Range Flag Lab   WBC 6.4 4.0 - 11.0 10e9/L  51   RBC Count 3.86 3.8 - 5.2 10e12/L  51   Hemoglobin 12.0 11.7 - 15.7 g/dL  51   Hematocrit  "37.6 35.0 - 47.0 %  51   MCV 97 78 - 100 fl  51   MCH 31.1 26.5 - 33.0 pg  51   MCHC 31.9 31.5 - 36.5 g/dL  51   RDW 13.0 10.0 - 15.0 %  51   Platelet Count 163 150 - 450 10e9/L  51            Testing Performed By     Lab - Abbreviation Name Director Address Valid Date Range    51 - Unknown [facility] Unknown [address] [facility] [address] 12/31/14 1010 - Present    170 - Unknown POINT OF CARE TEST, GLUCOSE Unknown Unknown 10/31/11 1114 - Present            Unresulted Labs (24h ago through future)    Start       Ordered    Unscheduled  Potassium  (Potassium Replacement - \"Standard\" - For K levels less than 3.4 mmol/L - UU,UR,UA,RH,SH,PH,WY )  CONDITIONAL (SPECIFY),   Routine     Comments:  Obtain Potassium Level for these conditions:  *IF no potassium result within 24 hours before initiation of order set, draw potassium level with next lab collect.    *2 HOURS AFTER last IV potassium replacement dose and 4 hours after an oral replacement dose.  *Next morning after potassium dose.     Repeat Potassium Replacement if necessary.    10/22/18 1406    Unscheduled  Magnesium  (Magnesium Replacement -  Adult - \"Standard\" - Replacement for all levels less than 1.6 mg/dL )  CONDITIONAL (SPECIFY),   Routine     Comments:  Obtain Magnesium Level for these conditions:  *IF no magnesium result within 24 hrs before initiation of order set, draw magnesium level with next lab collect.    *2 HOURS AFTER last magnesium replacement dose when magnesium replacement given for level less than 1.6   *Next morning after magnesium dose.     Repeat Magnesium Replacement if necessary.    10/22/18 1406         Imaging Results - 3 Days      XR Pelvis Port 1/2 Views [[id number]]  Resulted: 10/23/18 2052, Result status: Final result    Ordering provider: Jakob Anthony MD  10/23/18 1943 Resulted by: Yael Jung MD    Performed: 10/23/18 1944 - 10/23/18 2012 Resulting lab: RADIOLOGY RESULTS "    Narrative:       Exam: 2 views of the left hip dated 10/23/2018.    COMPARISON: Same day.    CLINICAL HISTORY: Postop hemiarthroplasty.    FINDINGS: 2 views of the pelvis were obtained. Postsurgical changes of  a left hip hemiarthroplasty. The hardware appears intact.  Postoperative air is noted.      Impression:       IMPRESSION: Postsurgical changes of a left hip hemiarthroplasty,  without complication.    JERRELL JUNG MD      XR Pelvis Port 1/2 Views [[id number]]  Resulted: 10/23/18 2000, Result status: Final result    Ordering provider: Araceli Gutierrez MD  10/23/18 1734 Resulted by: Jerrell Jung MD    Performed: 10/23/18 1734 - 10/23/18 1809 Resulting lab: RADIOLOGY RESULTS    Narrative:       Exam: Intraoperative views of the left hip dated 10/23/2018.    COMPARISON: 10/22/2018.    CLINICAL HISTORY: Intra-Op evaluation.    FINDINGS: Intraoperative views of the left hip were obtained. Surgical  air is noted. New post surgical changes of a left hip  hemiarthroplasty. The hardware appears intact. Postoperative air is  noted.      Impression:       IMPRESSION: Intraoperative film of a left hip hemiarthroplasty.    JERRELL JUNG MD      POC US Guidance Needle Placement [[id number]]  Resulted: 10/23/18 1425, Result status: Final result    Ordering provider: Félix Drake DO  10/23/18 1414 Performed: 10/23/18 1414 - 10/23/18 1414    Resulting lab: [facility]      Impression:       Left fascia iliaca       Testing Performed By     Lab - Abbreviation Name Director Address Valid Date Range    104 - Rad Rslts RADIOLOGY RESULTS Unknown Unknown 02/16/05 1553 - Present    1735 - [facility] Unknown Unknown 05/12/15 0832 - Present            Encounter-Level Documents:     There are no encounter-level documents.      Order-Level Documents:     There are no order-level documents.

## 2018-10-22 NOTE — CONSULTS
[facility] Orthopaedic Surgery Consultation    Shahana Donaldson MRN# [id number]   Age: 78 year old YOB: 1940   Date of Admission: 10/22/2018    Reason for consult: Left hip pain   Requesting physician: Ritesh Fontaine*   Level of consult: Consult, follow and place orders            Impression and Recommendation (Resident / Clinician):   Impression:  Shahana Donaldson is a 78 year old female with a PMHx significant for HTN, HLD, CAD, hiatal hernia s/p Nissen fundoplication who presents following a mechanical fall with a left femoral neck fracture.     Operative Plan: Plan for left hip hemiarthroplasty with Dr. Bowen 10/22.   - NPO    - Labs: CBC, BMP, INR/PTT, Type and Cross   - Hold anticoagulation   - Consent: In chart   - Case request placed   - Medically cleared by trauma    Activity: NWB LLE.  Wound Cares/Dressing/Splint: None currently.  DVT PPx: Hold for OR  Antibiotic: Lilli-op  Labs:   CBC, BMP, INR/PTT, Type and Cross. Recommend osteoporosis labs to be done during this hospitalization including Ca, Phos, PTH, Vitamin D.  Imaging: Left femur XRs  PT/OT: Work on ROM, strengthening, gait training, mobility, and ADLs  Dispo: Per Primary team      Thank you for allowing me to participate in this patient's care. Please do not hesitate to contact me with any questions or concerns.    Apurva Dennis MD   10/22/2018 12:55 PM  Orthopaedic Surgery Resident, PGY-1    Please page me at [phone] with any questions/concerns. If there is no response, if it is a weekend, or if it is during evening hours, please page the orthopaedic surgery resident on call.    The aforementioned patient was discussed with Dr. Dennis. I performed an independent history and physical exam, in addition to reviewing any relevant imaging. I agree with the above note with edits made where appropriate.     Nafisa Gaines MD  Orthopaedic Surgery Resident, PGY-4  Pager: [phone]         Chief Complaint:   Left hip pain           History of Present Illness (Resident / Clinician):   Shahana Donaldson is a 78 year old RHD female with PMHx significant for HTN, HLD, CAD, hiatal hernia s/p Nissen who presents following a mechanical fall with left hip pain. The patient reports that [place] she slipped on tiled floor in socks and fell on her left hip. She had immediate pain to the left hip and was unable to ambulate. No LOC, no head trauma. Denies pain elsewhere. No numbness or tingling to her lower extremities. She has not injured her left hip in the past. Of note, she did not have left hip pain prior to this event. She does have bilateral knee pain. She ambulates without assistive devices at baseline and is fairly active, working out at the [facility] 3x/week with her . Not on anticoagulants. Non-smoker and denies issues with bleeding, clotting, anesthesia. Last PO intake was dinner yesterday evening.     History obtained from patient interview and chart review. All pertinent ROS information is included above.           Review of Systems (not addressed in HPI):   10 point ROS negative unless otherwise noted above.         Past Medical History:     Past Medical History:   Diagnosis Date     Diverticulosis of colon (without mention of hemorrhage)      Essential hypertension, benign      Gastro-oesophageal reflux disease     nissen     Hemorrhoid      Nonsenile cataract      Osteoporosis      Other and unspecified hyperlipidemia      Rectocele      Symptomatic menopausal or female climacteric states      Patient denies any personal history of bleeding disorders, clotting disorders, or adverse reactions to anesthesia.         Past Surgical History:     Past Surgical History:   Procedure Laterality Date     C NONSPECIFIC PROCEDURE      Bilateral Tubal Ligation     C NONSPECIFIC PROCEDURE      D&C secondary to menorrhagia     C NONSPECIFIC PROCEDURE      child birth x 2     COLONOSCOPY  5/24/2011     Procedure:COLONOSCOPY; Surgeon:HALINA SCOTT; Location: GI     COLONOSCOPY Left 11/10/2015    Procedure: COLONOSCOPY;  Surgeon: Raheem Colunga MD;  Location:  GI     GYN SURGERY      hysterectomy/oopherectomy; done for prolapse     LAPAROSCOPIC NISSEN FUNDOPLICATION  1/7/2013    Procedure: LAPAROSCOPIC NISSEN FUNDOPLICATION;  Laparoscopic Repair of Hiatel Hernia, NISSEN, Esophagoscopy, Gastroscopy And Duodenoscopy ;  Surgeon: Leonidas Valle MD;  Location:  OR            Social History:     Social History     Social History     Marital status:      Spouse name: N/A     Number of children: N/A     Years of education: N/A     Occupational History     Not on file.     Social History Main Topics     Smoking status: Never Smoker     Smokeless tobacco: Never Used     Alcohol use No     Drug use: No     Sexual activity: Yes     Partners: Male     Birth control/ protection: Post-menopausal     Other Topics Concern     Not on file     Social History Narrative    The patient has a 0 pk yr tobacco hx.  She has no active use.  Alcohol use is 0 alcoholic drinks per week.  She denies use of recreational drugs.          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         Living Situation: lives in [place] with  in house, exercises 3x/week at [employer]  Tobacco: None  Alcohol: None  Illicit Drugs: None          Family History:     Family History   Problem Relation Age of Onset     Hypertension Father      C.A.D. Father      Hypertension Mother      Breast Cancer Sister      EYE* Brother      Keratoconus     Glaucoma No family hx of      Macular Degeneration No family hx of        Patient denies known family history of bleeding, clotting, or anesthesia related complications.            Allergies:     Allergies   Allergen Reactions     Dilaudid [Hydromorphone]      dizziness     Senna Hives             Medications:     Prior to Admission medications    Medication Sig Last  Dose Taking? Auth Provider   ASPIRIN 81 MG OR TABS 1 tab po QD (Once per day) 10/21/2018 at Unknown time Yes Kelton To MD   atorvastatin (LIPITOR) 10 MG tablet Take 1 tablet (10 mg) by mouth daily 10/21/2018 at Unknown time Yes Jeramie Curran MD   CARTIA  MG 24 hr capsule TAKE ONE CAPSULE BY MOUTH ONE TIME DAILY  10/22/2018 at Unknown time Yes Jeramie Curran MD   ascorbic acid (VITAMIN C) 500 MG tablet Take 500 mg by mouth as needed.   Reported, Patient   Cholecalciferol (VITAMIN D3) 1000 UNIT TABS Take 1 tablet by mouth daily.   Reported, Patient   clobetasol (TEMOVATE) 0.05 % ointment Apply sparingly to affected area 2 x daily for14 days. Then, apply small amount to affected area twice weekly for maintenance.   Sandra Gee MD   diclofenac (VOLTAREN) 1 % GEL Apply 4 grams to knees or 2 grams to hands four times daily using enclosed dosing card.   Nat Cui MD   DILT- MG 24 hr capsule TAKE 1 CAPSULE BY MOUTH ONCE DAILY   Jeramie Curran MD   diphenhydrAMINE (BENADRYL) 25 MG tablet Take 1 tablet (25 mg) by mouth nightly as needed for itching or allergies   Stefani Stevenson MD   omeprazole (PRILOSEC) 40 MG capsule Take 1 capsule (40 mg) by mouth daily as needed   Jeramie Curran MD   Probiotic Product (PROBIOTIC FORMULA PO) Take by mouth as needed   Reported, Patient     Medication reviewed with patient and in chart.           Physical Exam:     Vitals:    10/22/18 1245 10/22/18 1300 10/22/18 1408 10/22/18 1516   BP: (!) 118/96 101/84 141/71 127/73   BP Location:    Left arm   Resp:   16 18   Temp:   97.8  F (36.6  C) 97.1  F (36.2  C)   TempSrc:   Oral Oral   SpO2:   95% 97%   Weight:       Height:         General: Patient is awake, alert, and appropriate; following commands and is in NAD  Neuro: CN II-XII grossly intact  Skin: No rashes, lumps, or bumps; skin color normal  HEENT: Normocephalic, atraumatic; EOMs grossly intact; external ear without  erythema or edema bilaterally  Lungs: Breaths nonlabored, without wheezes or stridor  Heart/Cardiovascular: Regular pulse, no peripheral cyanosis  Abdomen: Soft, non-tender, non-distended   Back: Nontender to palpation throughout, no step-offs or deformities appreciated. Bilateral SI joints non-tender.  Pelvis: Stable to AP and lateral compression, non-tender  Upper Extremity: No deformity, skin intact. No significant tenderness to palpation over clavicle, AC joint, shoulder, arm, elbow, forearm, wrist. Normal ROM shoulder, elbow, wrist without pain. Motor intact distally with finger flexion/extension/intrinsics/EPL, OK sign 5/5 strength. SILT ax/m/r/u nerve distributions. Radial pulse palpable, 2+  Right lower Extremity: No deformity, skin intact. No significant tenderness to palpation over thigh, knee, leg, ankle/foot. No pain with ROM hip/knee/ankle. Motor intact distally TA/GSC/EHL/FHL with 5/5 strength. SILT sp/dp/tibial/saph/sural nerves. DP/PT pulses palpable, 2+, toes warm and well perfused   Left lower Extremity: RLE shortened. Skin intact. TTP over GT. Pain with log roll. No significant tenderness to palpation over distal thigh, knee, leg, ankle/foot. No pain with ROM ankle. Motor intact distally TA/GSC/EHL/FHL with 5/5 strength. SILT sp/dp/tibial/saph/sural nerves. DP/PT pulses palpable, 2+, toes warm and well perfused           Imaging:   Review of imaging below demonstrates:    XR AP pelvis and left hip 10/22: displaced femoral neck fracture, shaft displaced superiorly and laterally into varus. No suspicious lucencies. No soft tissue abnormalities.           Labs:   CBC:  Lab Results   Component Value Date    WBC 11.3 (H) 10/22/2018    HGB 13.3 10/22/2018     10/22/2018       BMP:  Lab Results   Component Value Date     10/22/2018    POTASSIUM 3.8 10/22/2018    CHLORIDE 109 10/22/2018    CO2 25 10/22/2018    BUN 19 10/22/2018    CR 0.71 10/22/2018    ANIONGAP 9 10/22/2018    MARCK 8.5  10/22/2018    GLC 95 10/22/2018       Inflammatory Markers:  Lab Results   Component Value Date    WBC 11.3 (H) 10/22/2018    CRP <2.9 04/17/2018    SED 14 04/17/2018

## 2018-10-22 NOTE — IP AVS SNAPSHOT
` `     [address] [facility] BANK: [phone]            Medication Administration Report for Shahana Donaldson as of 10/26/18 1507   Legend:    Given Hold Not Given Due Canceled Entry Other Actions    Time Time (Time) Time  Time-Action       Inactive    Active    Linked        Medications 10/20/18 10/21/18 10/22/18 10/23/18 10/24/18 10/25/18 10/26/18    acetaminophen (TYLENOL) Suppository 650 mg  Dose: 650 mg  Freq: EVERY 4 HOURS PRN Route: RE  PRN Reason: mild pain  Start: 10/22/18 1406   Admin Instructions: Alternate ibuprofen (if ordered) with acetaminophen.  Maximum acetaminophen dose from all sources = 75 mg/kg/day not to exceed 4 grams/day.    Admin. Amount: 1 suppository (1 × 650 mg suppository)  Dispense Loc: [facility] ADS 7B        1403-Auto Hold       2034-Unhold              acetaminophen (TYLENOL) tablet 975 mg  Dose: 975 mg  Freq: EVERY 6 HOURS Route: PO  Start: 10/22/18 1415   Admin Instructions: Alternate ibuprofen (if ordered) with acetaminophen.  Maximum acetaminophen dose from all sources = 75 mg/kg/day not to exceed 4 grams/day.    Admin. Amount: 3 tablet (3 × 325 mg tablet)  Last Admin: 10/26/18 1337  Dispense Loc: [facility] ADS 7B       (1654)-Not Given [C]       2103 (975 mg)-Given        0154 (975 mg)-Given       (0848)-Not Given       1403-Auto Hold       1415 Auto Hold-Automatically Held       2015 Auto Hold-Automatically Held       2034-Unhold        0114 (975 mg)-Given              0923 (975 mg)-Given       1509 (975 mg)-Given       2110 (975 mg)-Given        0148 (975 mg)-Given       0933 (975 mg)-Given       1410 (975 mg)-Given [C]       2112 (975 mg)-Given        0336 (975 mg)-Given       1337 (975 mg)-Given       [ ] 1730       [ ] 2330           atorvastatin (LIPITOR) tablet 10 mg  Dose: 10 mg  Freq: EVERY EVENING Route: PO  Start: 10/22/18 2000   Admin. Amount: 1 tablet (1 × 10 mg tablet)  Last Admin: 10/25/18 2027  Dispense Loc: [facility] ADS 7B       2103 (10 mg)-Given         "1403-Auto Hold       2000 Auto Hold-Automatically Held       2034-Unhold        2111 (10 mg)-Given        2027 (10 mg)-Given        [ ] 2000           bupivacaine liposome (EXPAREL) LONG ACTING injection was administered into the infiltration site to produce postsurgical analgesia. Duration of action is up to 72 hours, and other \"samuel\" medications should not be given for 96 hours with the exception of the lidocaine 5% patch (LIDODERM) and the lidocaine 10mg in potassium infusions. This entry is for INFORMATION ONLY.  Freq: CONTINUOUS PRN Route: XX  Start: 10/23/18 2040   End: 10/27/18 2039   Admin Instructions: NURSE to notify physician if patient has ringing in the ears, metallic taste in the mouth, or circumoral numbness    Dispense Loc: [facility]  POC: Post-procedure               calcium carbonate 500 mg (elemental) (OSCAL;OYSTER SHELL CALCIUM) tablet 1,500 mg  Dose: 1,500 mg  Freq: 2 TIMES DAILY WITH MEALS Route: PO  Start: 10/22/18 1800   Admin Instructions: Each tablet = 500 mg elemental calcium = 1250 mg calcium carbonate.  Each tablet = 500 mg elemental calcium = 1250 mg calcium carbonate.    Admin. Amount: 3 tablet (3 × 500 mg tablet)  Last Admin: 10/26/18 0846  Dispense Loc: [facility] ADS 7B       1810 (1,500 mg)-Given        (0848)-Not Given       1403-Auto Hold       1800 Auto Hold-Automatically Held       2034-Unhold        0921 (1,500 mg)-Given       (2110)-Not Given        0933 (1,500 mg)-Given       1751 (1,500 mg)-Given        0846 (1,500 mg)-Given       [ ] 1800           cholecalciferol (vitamin D3) tablet 2,000 Units  Dose: 2,000 Units  Freq: DAILY Route: PO  Start: 10/22/18 1415   Admin. Amount: 2 tablet (2 × 1,000 Units tablet)  Last Admin: 10/26/18 0846  Dispense Loc: [facility] ADS 7B       (1654)-Not Given [C]        (0848)-Not Given       1403-Auto Hold       2034-Unhold        0920 (2,000 Units)-Given        0932 (2,000 Units)-Given        0846 (2,000 Units)-Given           " diltiazem (CARDIZEM CD/CARTIA XT) 24 hr capsule 300 mg  Dose: 300 mg  Freq: DAILY Route: PO  Start: 10/23/18 0800   Admin Instructions: DO NOT CRUSH.    Admin. Amount: 1 capsule (1 × 120 mg capsule) + 1 capsule (1 × 180 mg capsule)  Last Admin: 10/26/18 0846  Dispense Loc: [facility] ADS 7B   Mixture Administration Information:   Medication Type Amount   diltiazem 120 MG CP24 Medications 120 mg   diltiazem 180 MG CP24 Medications 180 mg                0749 (300 mg)-Given       1403-Auto Hold       2034-Unhold        0921 (300 mg)-Given        0932 (300 mg)-Given        0846 (300 mg)-Given           docusate sodium (COLACE) capsule 100 mg  Dose: 100 mg  Freq: 2 TIMES DAILY Route: PO  Start: 10/22/18 2000   Admin. Amount: 1 capsule (1 × 100 mg capsule)  Last Admin: 10/26/18 0847  Dispense Loc: [facility] ADS 7B       (2111)-Not Given        (0848)-Not Given       1403-Auto Hold       2000 Auto Hold-Automatically Held       2034-Unhold        0922 (100 mg)-Given       2111 (100 mg)-Given        0933 (100 mg)-Given       (2052)-Not Given        0847 (100 mg)-Given       [ ] 2000           enoxaparin (LOVENOX) injection 40 mg  Dose: 40 mg  Freq: EVERY 24 HOURS Route: SC  Start: 10/24/18 1200   Admin. Amount: 40 mg = 0.4 mL Conc: 40 mg/0.4 mL  Last Admin: 10/26/18 1337  Dispense Loc: [facility] ADS 7B  Volume: 0.4 mL  POC: Post-procedure         1244 (40 mg)-Given        1230 (40 mg)-Given        1337 (40 mg)-Given           magnesium sulfate 4 g in 100 mL sterile water (premade)  Dose: 4 g  Freq: EVERY 4 HOURS PRN Route: IV  PRN Reason: magnesium supplementation  Start: 10/22/18 1406   Admin Instructions: For serum Mg++ less than 1.6 mg/dL  Give 4 g and recheck magnesium level 2 hours after dose, and next AM.    Admin. Amount: 4 g = 100 mL Conc: 4 g/100 mL  Dispense Loc: [facility] ADS 7B  Infused Over: 120 Minutes  Volume: 100 mL        1403-Auto Hold       2034-Unhold              methocarbamol (ROBAXIN) tablet 750 mg  Dose: 750  mg  Freq: EVERY 6 HOURS PRN Route: PO  PRN Reason: muscle spasms  Start: 10/22/18 1406   Admin. Amount: 1 tablet (1 × 750 mg tablet)  Last Admin: 10/22/18 1424  Dispense Loc: [facility] ADS 7B       1424 (750 mg)-Given        1403-Auto Hold       2034-Unhold              naloxone (NARCAN) injection 0.1-0.4 mg  Dose: 0.1-0.4 mg  Freq: EVERY 2 MIN PRN Route: IV  PRN Reason: opioid reversal  Start: 10/22/18 1406   Admin Instructions: For respiratory rate LESS than or EQUAL to 8.  Partial reversal dose:  0.1 mg titrated q 2 minutes for Analgesia Side Effects Monitoring Sedation Level of 3 (frequently drowsy, arousable, drifts to sleep during conversation).Full reversal dose:  0.4 mg bolus for Analgesia Side Effects Monitoring Sedation Level of 4 (somnolent, minimal or no response to stimulation).  For ordered IV doses 0.1-2mg give IVP. Give each 0.4mg over 15 seconds in emergency situations. For non-emergent situations further dilute in 9mL of NS to facilitate titration of response.    Admin. Amount: 0.1-0.4 mg = 0.25-1 mL Conc: 0.4 mg/mL  Dispense Loc: [facility] ADS 7B  Volume: 1 mL        1403-Auto Hold       2034-Unhold              ondansetron (ZOFRAN-ODT) ODT tab 4 mg  Dose: 4 mg  Freq: EVERY 6 HOURS PRN Route: PO  PRN Reasons: nausea,vomiting  Start: 10/23/18 1023   Admin Instructions: This is Step 1 of nausea and vomiting management.  If nausea not resolved in 15 minutes, go to Step 2 prochlorperazine (COMPAZINE). Do not push through foil backing. Peel back foil and gently remove. Place on tongue immediately. Administration with liquid unnecessary  With dry hands, peel back foil backing and gently remove tablet; do not push oral disintegrating tablet through foil backing; administer immediately on tongue and oral disintegrating tablet dissolves in seconds; then swallow with saliva; liquid not required.    Admin. Amount: 1 tablet (1 × 4 mg tablet)  Dispense Loc: [facility] ADS 7B               1403-Auto Hold        2034-Unhold             Or  ondansetron (ZOFRAN) injection 4-8 mg  Dose: 4-8 mg  Freq: EVERY 6 HOURS PRN Route: IV  PRN Reasons: nausea,vomiting  Start: 10/23/18 1023   Admin Instructions: This is Step 1 of nausea and vomiting management.  If nausea not resolved in 15 minutes, go to Step 2 prochlorperazine (COMPAZINE).  Irritant. For ordered IV doses 0.1-4 mg, give IV Push undiluted over 2-5 minutes.    Admin. Amount: 4-8 mg = 2-4 mL Conc: 4 mg/2 mL  Last Admin: 10/23/18 1126  Dispense Loc: [facility] ADS 7B  Infused Over: 2-5 Minutes  Volume: 4 mL        1126 (4 mg)-Given       1403-Auto Hold       2034-Unhold              polyethylene glycol (MIRALAX/GLYCOLAX) Packet 17 g  Dose: 17 g  Freq: 2 TIMES DAILY Route: PO  Indications of Use: CONSTIPATION  Start: 10/24/18 2000   Admin Instructions: Give in 8 ounces of water, juice, or soda.  Hold for loose stools.  1 Packet = 17 grams. Mixed prescribed dose in 8 ounces of water. Follow with 8 oz. of water.    Admin. Amount: 17 g  Last Admin: 10/25/18 0932  Dispense Loc: [facility] ADS 7B         2111 (17 g)-Given        0932 (17 g)-Given       (2052)-Not Given        (0846)-Not Given       [ ] 2000           potassium chloride (KLOR-CON) Packet 20-40 mEq  Dose: 20-40 mEq  Freq: EVERY 2 HOURS PRN Route: ORAL OR FEED  PRN Reason: potassium supplementation  Start: 10/22/18 1406   Admin Instructions: Use if unable to tolerate tablets.  If Serum K+ 3.0-3.3, dose = 60 mEq po total dose (40 mEq x1 followed in 2 hours by 20 mEq x1). Recheck K+ level 4 hours after dose and the next AM.  If Serum K+ 2.5-2.9, dose = 80 mEq po total dose (40 mEq Q2H x2). Recheck K+ level 4 hours after dose and the next AM.  If Serum K+ less than 2.5, See IV order.  Dissolve packet contents in 4-8 ounces of cold water or juice.    Admin. Amount: 20-40 mEq  Dispense Loc: [facility] ADS 7B        1403-Auto Hold       2034-Unhold              potassium chloride 10 mEq in 100 mL intermittent infusion with 10 mg  lidocaine  Dose: 10 mEq  Freq: EVERY 1 HOUR PRN Route: IV  PRN Reason: potassium supplementation  Start: 10/22/18 1406   Admin Instructions: Infuse via PERIPHERAL LINE. Use potassium with lidocaine for pain with peripheral administration.  If Serum K+ 3.0-3.3, dose = 10 mEq/hr x4 doses (40 mEq IV total dose). Recheck K+ level 2 hours after dose and the next AM.  If Serum K+ less than 3.0, dose = 10 mEq/hr x6 doses (60 mEq IV total dose). Recheck K+ level 2 hours after dose and the next AM.    Admin. Amount: 10 mEq = 100 mL Conc: 10 mEq/100 mL  Dispense Loc: [facility]  Infused Over: 1 Hours  Volume: 100 mL        1403-Auto Hold       2034-Unhold              potassium chloride 10 mEq in 100 mL sterile water intermittent infusion (premix)  Dose: 10 mEq  Freq: EVERY 1 HOUR PRN Route: IV  PRN Reason: potassium supplementation  Start: 10/22/18 1406   Admin Instructions: Infuse via PERIPHERAL LINE or CENTRAL LINE. Use for central line replacement if patient weight less than 65 kg, if patient is on TPN with high potassium content or if unit does not stock 20 mEq bags.   If Serum K+ 3.0-3.3, dose = 10 mEq/hr x4 doses (40 mEq IV total dose). Recheck K+ level 2 hours after dose and the next AM.   If Serum K+ less than 3.0, dose = 10 mEq/hr x6 doses (60 mEq IV total dose). Recheck K+ level 2 hours after dose and the next AM.    Admin. Amount: 10 mEq = 100 mL Conc: 10 mEq/100 mL  Dispense Loc: [facility]  Infused Over: 60 Minutes  Volume: 100 mL        1403-Auto Hold       2034-Unhold              potassium chloride 20 mEq in 50 mL intermittent infusion  Dose: 20 mEq  Freq: EVERY 1 HOUR PRN Route: IV  PRN Reason: potassium supplementation  Start: 10/22/18 1406   Admin Instructions: Infuse via CENTRAL LINE Only. May need EKG if less than 65 kg or on TPN - Max rate is 0.3 mEq/kg/hr for patients not on EKG monitoring.   If Serum K+ 3.0-3.3, dose = 20 mEq/hr x2 doses (40 mEq IV total dose). Recheck K+ level 2  hours after dose and the next AM.  If Serum K+ less than 3.0, dose = 20 mEq/hr x3 doses (60 mEq IV total dose). Recheck K+ level 2 hours after dose and the next AM.    Admin. Amount: 20 mEq = 50 mL Conc: 20 mEq/50 mL  Dispense Loc: [facility]  Volume: 50 mL        1403-Auto Hold       -Un              potassium chloride SA (K-DUR/KLOR-CON M) CR tablet 20-40 mEq  Dose: 20-40 mEq  Freq: EVERY 2 HOURS PRN Route: PO  PRN Reason: potassium supplementation  Start: 10/22/18 1406   Admin Instructions: Use if able to take PO.   If Serum K+ 3.0-3.3, dose = 60 mEq po total dose (40 mEq x1 followed in 2 hours by 20 mEq x1). Recheck K+ level 4 hours after dose and the next AM.  If Serum K+ 2.5-2.9, dose = 80 mEq po total dose (40 mEq Q2H x2). Recheck K+ level 4 hours after dose and the next AM.  If Serum K+ less than 2.5, See IV order.  DO NOT CRUSH.    Admin. Amount: 2-4 tablet (2-4 × 10 mEq tablet)  Dispense Loc: [facility] ADS 7B        1403-Auto Hold       -Un              prochlorperazine (COMPAZINE) tablet 5 mg  Dose: 5 mg  Freq: EVERY 6 HOURS PRN Route: PO  PRN Reasons: nausea,vomiting  Start: 10/23/18 1024   Admin. Amount: 1 tablet (1 × 5 mg tablet)  Dispense Loc: [facility] ADS 7B        1403-Auto Hold       -Un             Completed Medications  Medications 10/20/18 10/21/18 10/22/18 10/23/18 10/24/18 10/25/18 10/26/18         Dose: 2 g  Freq: EVERY 8 HOURS Route: IV  Indications of Use: PERIOPERATIVE PHARMACOPROPHYLAXIS  Start: 10/24/18 0100   End: 10/24/18 0953   Admin. Amount: 2 g = 100 mL Conc: 2 g/100 mL  Last Admin: 10/24/18 0923  Dispense Loc: [facility]  Infused Over: 30 Minutes  Administrations Remainin  Volume: 100 mL  POC: Post-procedure   Current Line: Peripheral IV 10/23/18 Left Lower forearm        0106 (2 g)-Given       0923 (2 g)-Given               Dose: 2 g  Freq: PRE-OP/PRE-PROCEDURE Route: IV  Indications of Use: PERIOPERATIVE PHARMACOPROPHYLAXIS  Start:  10/23/18 0154   End: 10/23/18 1741   Admin Instructions: Give first dose within 1 hour PRIOR to incision. If patient weight is greater than or equal to 120 kg increase dose to 3 g.    Admin. Amount: 2 g = 100 mL Conc: 2 g/100 mL  Last Admin: 10/23/18 1741  Dispense Loc: [facility]  Infused Over: 30 Minutes  Administrations Remainin  Volume: 100 mL  POC: Pre-procedure        1545 (2 g)-Given       1741 (1 g)-Given                Freq: ONCE Route: IR  Start: 10/23/18 1515   End: 10/23/18 1613   Admin Instructions: In bag x1    Last Admin: 10/23/18 1613  Dispense Loc: [facility]  Administrations Remainin  POC: Intra-procedure   Mixture Administration Information:   Medication Type Amount   gentamicin 40 MG/ML SOLN Medications 120 mg   polymyxin B 239929 units SOLR Medications 500,000 Units   bacitracin 02870 units SOLR Medications 50,000 Units   sodium chloride 0.9 % SOLN Medications 1,000 mL                1613 (1,000 mL)-Given                    Discontinued Medications  Medications 10/20/18 10/21/18 10/22/18 10/23/18 10/24/18 10/25/18 10/26/18         Dose: 975 mg  Freq: ONCE Route: PO  Start: 10/23/18 1515   End: 10/23/18 1853   Admin Instructions: Maximum acetaminophen dose from all sources = 75 mg/kg/day not to exceed 4 grams/day.    Admin. Amount: 3 tablet (3 × 325 mg tablet)  Dispense Loc: [facility] ADS 3C  Administrations Remainin  POC: Pre-procedure               1853-Med Discontinued            Dose: 1 g  Freq: SEE ADMIN INSTRUCTIONS Route: IV  Indications of Use: PERIOPERATIVE PHARMACOPROPHYLAXIS  Start: 10/23/18 0154   End: 10/23/18 1853   Admin Instructions: Intra-Op Dose.  Give every 2 hours while patient in surgery, starting 2 hours after pre-op dose.  DO NOT GIVE intra-op dose if CrCl less than 10 mL/min (on dialysis).  If CrCL less than 50 mL/min, double the time interval between doses.    Admin. Amount: 1 g  Dispense Loc: [facility] ADS 7B  Infused Over: 30 Minutes  POC:  Pre-procedure        1853-Med Discontinued            Dose: 40 mg  Freq: EVERY 24 HOURS Route: SC  Start: 10/23/18 2045   End: 10/23/18 2046   Admin. Amount: 40 mg = 0.4 mL Conc: 40 mg/0.4 mL  Dispense Loc: [facility] 7B  Volume: 0.4 mL  POC: Post-procedure        2046-Med Discontinued                  Dose: 25-50 mcg  Freq: EVERY 2 MIN PRN Route: IV  PRN Reason: other  PRN Comment: acute pain  Start: 10/23/18 1913   End: 10/23/18 2034   Admin Instructions: MAX cumulative dose = 250 mcg.   Use fentaNYL (SUBLIMAZE) initially, as a short acting agent for acute pain control. If insufficient, or a longer acting agent is needed, begin morphine or HYDROmorphone (DILAUDID) if ordered.  For ordered IV doses 1-100 mcg give IV Push undiluted over a minimum of 3-5 minutes.    Admin. Amount: 25-50 mcg = 0.5-1 mL Conc: 50 mcg/mL  Dispense Loc: [facility] PACU  Volume: 2 mL  POC: PACU        2034-Med Discontinued            Dose: 25-50 mcg  Freq: EVERY 2 MIN PRN Route: IV  PRN Reason: other  PRN Comment: acute pain. Max cumulative dose 250 mcg.   Start: 10/23/18 1426   End: 10/23/18 1853   Admin Instructions: Caution: may have synergistic effect when used with midazolam (VERSED). If inadequate response may repeat 25-50 mcg IV slowly Q 5 minutes PRN pain (Maximum of 200 mcg total dose in 60 minutes.) Doses can be exceeded under direct oversight of patient by provider.Only given for procedural sedation while provider present. Nurse to discontinue this medication when procedure complete.  For ordered IV doses 1-100 mcg give IV Push undiluted over a minimum of 3-5 minutes.    Admin. Amount: 25-50 mcg = 0.5-1 mL Conc: 50 mcg/mL  Last Admin: 10/23/18 1446  Dispense Loc: [facility] 3C  Volume: 2 mL  POC: Pre-procedure        1446 (50 mcg)-Given       1853-Med Discontinued            Dose: 25-50 mcg  Freq: EVERY 2 MIN PRN Route: IV  PRN Reason: other  PRN Comment: acute pain. Max cumulative dose 250 mcg.   Start: 10/23/18 1414   End:  10/23/18 1853   Admin Instructions: Caution: may have synergistic effect when used with midazolam (VERSED). If inadequate response may repeat 25-50 mcg IV slowly Q 5 minutes PRN pain (Maximum of 200 mcg total dose in 60 minutes.) Doses can be exceeded under direct oversight of patient by provider.Only given for procedural sedation while provider present. Nurse to discontinue this medication when procedure complete.  For ordered IV doses 1-100 mcg give IV Push undiluted over a minimum of 3-5 minutes.    Admin. Amount: 25-50 mcg = 0.5-1 mL Conc: 50 mcg/mL  Dispense Loc: [facility] ADS 3C  Volume: 2 mL  POC: Pre-procedure        1853-Med Discontinued            Dose: 25-50 mcg  Freq: EVERY 1 HOUR PRN Route: IV  PRN Reason: moderate to severe pain  Start: 10/22/18 1155   End: 10/24/18 1331   Admin Instructions: For ordered IV doses 1-100 mcg give IV Push undiluted over a minimum of 3-5 minutes.    Admin. Amount: 25-50 mcg = 0.5-1 mL Conc: 50 mcg/mL  Dispense Loc: [facility] ADS 7B  Volume: 2 mL        1403-Auto Hold       2034-Unhold        1331-Med Discontinued           Dose: 0.2 mg  Freq: EVERY 1 MIN PRN Route: IV  PRN Reason: benzodiazepine reversal  Start: 10/23/18 1426   End: 10/23/18 1853   Admin Instructions: Give over 15 seconds. If inadequate response after 45 seconds, may repeat up to a MAX total dose of 1 mg. Continue monitoring until discharge criteria met of minimum of 2 hours.  Irritant. For ordered IV doses 0.1-1 mg, give IV Push undiluted. Administer each 0.2mg over 15 seconds.    Admin. Amount: 0.2 mg = 2 mL Conc: 0.1 mg/mL  Dispense Loc: [facility] ADS 3C  Volume: 5 mL  POC: Pre-procedure        1853-Med Discontinued            Dose: 0.2 mg  Freq: EVERY 1 MIN PRN Route: IV  PRN Reason: benzodiazepine reversal  Start: 10/23/18 1414   End: 10/23/18 1853   Admin Instructions: Give over 15 seconds. If inadequate response after 45 seconds, may repeat up to a MAX total dose of 1 mg. Continue monitoring until  discharge criteria met of minimum of 2 hours.  Irritant. For ordered IV doses 0.1-1 mg, give IV Push undiluted. Administer each 0.2mg over 15 seconds.    Admin. Amount: 0.2 mg = 2 mL Conc: 0.1 mg/mL  Dispense Loc: [facility] ADS 3C  Infused Over: 1 Minutes  Volume: 5 mL  POC: Pre-procedure        -Med Discontinued            Dose: 300 mg  Freq: ONCE Route: PO  Start: 10/23/18 1515   End: 10/23/18 1853   Admin Instructions: Give ONLY ONCE in pre-op.  Confirm that patient has not already received a PRE OP dose of gabapentin (NEURONTIN).  This dose is in addition to patient's home medication dose, if any.    Admin. Amount: 1 capsule (1 × 300 mg capsule)  Dispense Loc: [facility] ADS 3C  Administrations Remainin  POC: Pre-procedure               -Med Discontinued            Rate: 100 mL/hr   Freq: CONTINUOUS Route: IV  Start: 10/23/18 1915   End: 10/23/18 2034   Admin Instructions: Continue until IV catheter is weaned    POC: PACU               Med Discontinued            Rate: 25 mL/hr   Freq: CONTINUOUS Route: IV  Start: 10/23/18 1430   End: 10/23/18 1853   Admin Instructions: IF patient NOT on dialysis.    Dispense Loc: [facility] Floor Stock  Volume: 1,000 mL  POC: Pre-procedure               -Med Discontinued            Freq: EVERY 24 HOURS  Route: TD  Start: 10/22/18 2000   End: 10/24/18 1023   Admin Instructions: Patient should have a 12 hour patch free interval    Dispense Loc: [facility]               1403-Auto Hold        Auto Hold-Automatically Held       -Unhold        1023-Med Discontinued           Dose: 1-2 mg  Freq: EVERY 4 MIN PRN Route: IV  PRN Reason: sedation  Start: 10/23/18 1426   End: 10/23/18 1853   Admin Instructions: Caution: when used with opioids, may need lower doses. If inadequate response may repeat 1 mg IV slowly Q 4 minutes PRN sedation until desired response (Maximum of 5 mg total dose.) Doses can be exceeded under direct oversight of patient by  provider. Nurse to discontinue this medication when procedure complete.  For ordered IV doses 0.1-2.5 mg give IV Push slowly titrated over a minimum of 2 minutes. Dilute each 1mg in 4mL of NS.    Admin. Amount: 1-2 mg = 1-2 mL Conc: 1 mg/mL  Dispense Loc: [facility] ADS 3C  Volume: 2 mL  POC: Pre-procedure        1853-Med Discontinued            Dose: 1-2 mg  Freq: EVERY 4 MIN PRN Route: IV  PRN Reason: sedation  Start: 10/23/18 1414   End: 10/23/18 1853   Admin Instructions: Caution: when used with opioids, may need lower doses. If inadequate response may repeat 1 mg IV slowly Q 4 minutes PRN sedation until desired response (Maximum of 5 mg total dose.) Doses can be exceeded under direct oversight of patient by provider. Nurse to discontinue this medication when procedure complete.  For ordered IV doses 0.1-2.5 mg give IV Push slowly titrated over a minimum of 2 minutes. Dilute each 1mg in 4mL of NS.    Admin. Amount: 1-2 mg = 1-2 mL Conc: 1 mg/mL  Dispense Loc: [facility] ADS 3C  Volume: 2 mL  POC: Pre-procedure        1853-Med Discontinued            Dose: 2 mg  Freq: EVERY 2 HOURS PRN Route: IV  PRN Reason: moderate to severe pain  PRN Comment: pain control or improvement in physical function. Hold dose for analgesic side effects.  Start: 10/22/18 1406   End: 10/25/18 1218   Admin Instructions: Start at the lowest dose. May adjust dose by 1 mg every 2 hours as needed. Notify provider to assess for uncontrolled pain or analgesic side effects. Hold while on PCA or with regular IV opioid dosing.  For ordered IV doses 0.1-15 mg give IV Push undiluted over 4-5 minutes.    Admin. Amount: 2 mg  Dispense Loc: [facility] ADS 7B        1403-Auto Hold       2034-Unhold         1218-Med Discontinued          Dose: 2-4 mg  Freq: EVERY 5 MIN PRN Route: IV  PRN Reason: other  PRN Comment: pain control or improvement in physical function.  Start: 10/23/18 1913   End: 10/23/18 2034   Admin Instructions: Max cumulative dose = 10 mg  If  fentaNYL (SUBLIMAZE) is also ordered, use morphine if pain control insufficient with fentaNYL (SUBLIMAZE) or a longer acting agent is needed.  For ordered IV doses 0.1-15 mg give IV Push undiluted over 4-5 minutes.    Admin. Amount: 2-4 mg  Dispense Loc: [facility] ADS PACU  POC: PACU        2034-Med Discontinued            Dose: 0.1-0.4 mg  Freq: EVERY 2 MIN PRN Route: IV  PRN Reason: opioid reversal  Start: 10/23/18 1913   End: 10/24/18 1912   Admin Instructions: For apnea or imminent respiratory arrest: give 0.4 mg IV undiluted Q 2 minutes PRN until desired degree of reversal is obtained, stop opioid and notify provider. Continue monitoring until discharge criteria are met for a minimum of 2 hours.  For severe sedation, decrease in respiratory depth, quality or respiratory rate less than 8: give 0.1 mg IV Q 2 minutes x 3 doses, stop opioid and notify provider.  Try to minimize reversal of analgesia especially in end-of-life patients  For ordered IV doses 0.1-2mg give IVP. Give each 0.4mg over 15 seconds in emergency situations. For non-emergent situations further dilute in 9mL of NS to facilitate titration of response.    Admin. Amount: 0.1-0.4 mg = 0.25-1 mL Conc: 0.4 mg/mL  Dispense Loc: [facility] ADS 7B  Volume: 1 mL  POC: Post-procedure         1912-Med Discontinued           Dose: 0.1-0.4 mg  Freq: EVERY 2 MIN PRN Route: IV  PRN Reason: opioid reversal  Start: 10/23/18 1426   End: 10/23/18 1853   Admin Instructions: For apnea or imminent respiratory arrest: give 0.4 mg IV undiluted Q 2 minutes PRN until desired degree of reversal is obtained, stop opioid and notify provider. Continue monitoring until discharge are criteria met for a minimum of 2 hours. For severe sedation, decrease in respiratory depth, quality or Respiratory Rate less than 8: give 0.1 mg IV Q 2 minutes x 3 doses, stop opioid and notify provider.  Try to minimize reversal of analgesia especially in end-of-life patients.  Continue monitoring until  discharge criteria are met for a minimum of 2 hours.  For ordered IV doses 0.1-2mg give IVP. Give each 0.4mg over 15 seconds in emergency situations. For non-emergent situations further dilute in 9mL of NS to facilitate titration of response.    Admin. Amount: 0.1-0.4 mg = 0.25-1 mL Conc: 0.4 mg/mL  Dispense Loc: [facility] ADS 3C  Volume: 1 mL  POC: Pre-procedure        1853-Med Discontinued            Dose: 0.1-0.4 mg  Freq: EVERY 2 MIN PRN Route: IV  PRN Reason: opioid reversal  Start: 10/23/18 1414   End: 10/23/18 1853   Admin Instructions: For apnea or imminent respiratory arrest: give 0.4 mg IV undiluted Q 2 minutes PRN until desired degree of reversal is obtained, stop opioid and notify provider. Continue monitoring until discharge are criteria met for a minimum of 2 hours. For severe sedation, decrease in respiratory depth, quality or Respiratory Rate less than 8: give 0.1 mg IV Q 2 minutes x 3 doses, stop opioid and notify provider.  Try to minimize reversal of analgesia especially in end-of-life patients.  Continue monitoring until discharge criteria are met for a minimum of 2 hours.  For ordered IV doses 0.1-2mg give IVP. Give each 0.4mg over 15 seconds in emergency situations. For non-emergent situations further dilute in 9mL of NS to facilitate titration of response.    Admin. Amount: 0.1-0.4 mg = 0.25-1 mL Conc: 0.4 mg/mL  Dispense Loc: [facility] ADS 3C  Volume: 1 mL  POC: Pre-procedure        1853-Med Discontinued            Dose: 4 mg  Freq: EVERY 30 MIN PRN Route: PO  PRN Reason: nausea  Start: 10/23/18 1913   End: 10/23/18 2034   Admin Instructions: MAX total dose = 8 mg, including OR dosing. If not resolved in 15 minutes, then go to step 2 [prochlorperazine (COMPAZINE), if ordered].  With dry hands, peel back foil backing and gently remove tablet; do not push oral disintegrating tablet through foil backing; administer immediately on tongue and oral disintegrating tablet dissolves in seconds; then  swallow with saliva; liquid not required.    Admin. Amount: 1 tablet (1 × 4 mg tablet)  Dispense Loc: [facility] ADS 3C  Administrations Remainin  POC: PACU               -Med Discontinued         Or    Dose: 4 mg  Freq: EVERY 30 MIN PRN Route: IV  PRN Reason: nausea  Start: 10/23/18 1913   End: 10/23/18 2034   Admin Instructions: MAX total dose = 8 mg, including OR dosing. If not resolved in 15 minutes, then go to step 2 [prochlorperazine (COMPAZINE), if ordered].  Irritant. For ordered IV doses 0.1-4 mg, give IV Push undiluted over 2-5 minutes.    Admin. Amount: 4 mg = 2 mL Conc: 4 mg/2 mL  Last Admin: 10/23/18 1927  Dispense Loc: [facility] PACU  Infused Over: 2-5 Minutes  Administrations Remainin  Volume: 2 mL  POC: PACU         (4 mg)-Given       -Med Discontinued            Dose: 5 mg  Freq: EVERY 3 HOURS PRN Route: PO  PRN Reason: moderate to severe pain  Start: 10/22/18 1406   End: 10/25/18 1218   Admin Instructions: Hold while on PCA or with regular IV opioid dosing.    Admin. Amount: 1 tablet (1 × 5 mg tablet)  Last Admin: 10/23/18 075  Dispense Loc: [facility] ADS 7B       1425 (5 mg)-Given       1810 (5 mg)-Given        0408 (5 mg)-Given       0750 (5 mg)-Given       1403-Auto Hold       -Unhold         1218-Med Discontinued          Dose: 17 g  Freq: DAILY Route: PO  Indications of Use: CONSTIPATION  Start: 10/22/18 141   End: 10/24/18 1335   Admin Instructions: Give in 8 ounces of water, juice, or soda.  Hold for loose stools.  1 Packet = 17 grams. Mixed prescribed dose in 8 ounces of water. Follow with 8 oz. of water.    Admin. Amount: 17 g  Last Admin: 10/24/18 0923  Dispense Loc: [facility] ADS 7B       (0003)-Not Given [C]        (0849)-Not Given       1403-Auto Hold       -Unhold        0923 (17 g)-Given       1335-Med Discontinued           Dose: 5 mg  Freq: EVERY 6 HOURS PRN Route: IV  PRN Reasons: nausea,vomiting  Start: 10/23/18 1913   End: 10/23/18 2034   Admin  Instructions: This is Step 2 of the nausea and vomiting protocol.   If nausea not resolved in 15 minutes, give metoclopramide (REGLAN) if ordered (step 3 of nausea and vomiting protocol)  For ordered IV doses 0.1-10 mg, give IV Push undiluted. Each 5mg over 1 minute.    Admin. Amount: 5 mg = 1 mL Conc: 5 mg/mL  Dispense Loc: [facility] ADS PACU  Infused Over: 1-2 Minutes  Volume: 1 mL  POC: PACU        4-Med Discontinued            Rate: 75 mL/hr Dose: 1000 mL  Freq: CONTINUOUS Route: IV  Last Dose: 1,000 mL (10/23/18 2129)  Start: 10/22/18 1415   End: 10/24/18 1023   Admin. Amount: 1,000 mL  Last Admin: 10/23/18 2129  Dispense Loc: [facility] Floor Stock  Volume: 1,000 mL       1408 (1,000 mL)-New Bag       2012 (1,000 mL)-New Bag        0750 (1,000 mL)-New Bag       1400 (1,000 mL)-Rate/Dose Verify       1711-Stopped        (1,000 mL)-New Bag        1023-Med Discontinued           Dose: 1 g  Freq: ONCE Route: IV  Start: 10/23/18 1500   End: 10/23/18 1853   Admin Instructions: Each 1 gram to be infused over 10 minutes.    Admin. Amount: 1 g  Dispense Loc: [facility] Satellite OR  Administrations Remainin  Volume: 50 mL  POC: Pre-procedure   Mixture Administration Information:   Medication Type Amount   tranexamic acid 1000 MG/10ML SOLN Medications 1 g   sodium chloride 0.9 % SOLN Base 50 mL                       1853-Med Discontinued       Medications 10/20/18 10/21/18 10/22/18 10/23/18 10/24/18 10/25/18 10/26/18

## 2018-10-22 NOTE — PLAN OF CARE
Problem: Patient Care Overview  Goal: Plan of Care/Patient Progress Review  Outcome: No Change  VSS.   GI/: Pt voided in bedpan  Diet: NPO for OR  Incisions/Drains: NOne, pt has bruising on L hip. Pulses +2; no numbness or tingling  IV: R PIV infusing NS at 75  Activity: rolled with A2  PRN meds: Robaxin and oxy given

## 2018-10-22 NOTE — ED PROVIDER NOTES
History     Chief Complaint   Patient presents with     Fall     Hip Pain     left hip     HPI  Shahana Donaldson is a 78 year old female with a history of HTN, osteoporosis who presents to the Emergency Department for the evaluation of left hip pain after a fall. Patient states that prior to arrival she fell down 2-3 stairs, landing on her left hip and now complains of left hip and general left side pain. Patient reports pain is tolerable if she is still and at rest, however, when she moves pain worsens significantly. She states she was able to get up two stairs after the fall but otherwise has not been able to walk since. Arrived by ambulance. Patient is allergic to Dilaudid.    I have reviewed the Medications, Allergies, Past Medical and Surgical History, and Social History in the [employer] system.  Past Medical History:   Diagnosis Date     Diverticulosis of colon (without mention of hemorrhage)      Essential hypertension, benign      Gastro-oesophageal reflux disease     nissen     Hemorrhoid      Nonsenile cataract      Osteoporosis      Other and unspecified hyperlipidemia      Rectocele      Symptomatic menopausal or female climacteric states        Past Surgical History:   Procedure Laterality Date     C NONSPECIFIC PROCEDURE      Bilateral Tubal Ligation     C NONSPECIFIC PROCEDURE      D&C secondary to menorrhagia     C NONSPECIFIC PROCEDURE      child birth x 2     COLONOSCOPY  5/24/2011    Procedure:COLONOSCOPY; Surgeon:HALINA SCOTT; Location: GI     COLONOSCOPY Left 11/10/2015    Procedure: COLONOSCOPY;  Surgeon: Raheem Colunga MD;  Location:  GI     GYN SURGERY      hysterectomy/oopherectomy; done for prolapse     LAPAROSCOPIC NISSEN FUNDOPLICATION  1/7/2013    Procedure: LAPAROSCOPIC NISSEN FUNDOPLICATION;  Laparoscopic Repair of Hiatel Hernia, NISSEN, Esophagoscopy, Gastroscopy And Duodenoscopy ;  Surgeon: Leonidas Valle MD;  Location:  OR       Family History   Problem  "Relation Age of Onset     Hypertension Father      C.A.D. Father      Hypertension Mother      Breast Cancer Sister      EYE* Brother      Keratoconus     Glaucoma No family hx of      Macular Degeneration No family hx of        Social History   Substance Use Topics     Smoking status: Never Smoker     Smokeless tobacco: Never Used     Alcohol use No       Current Facility-Administered Medications   Medication     fentaNYL (PF) (SUBLIMAZE) injection 25-50 mcg     ondansetron (ZOFRAN) injection 4 mg     sodium chloride 0.9% infusion     Current Outpatient Prescriptions   Medication     ASPIRIN 81 MG OR TABS     atorvastatin (LIPITOR) 10 MG tablet     CARTIA  MG 24 hr capsule     ascorbic acid (VITAMIN C) 500 MG tablet     Cholecalciferol (VITAMIN D3) 1000 UNIT TABS     clobetasol (TEMOVATE) 0.05 % ointment     diclofenac (VOLTAREN) 1 % GEL     DILT- MG 24 hr capsule     diphenhydrAMINE (BENADRYL) 25 MG tablet     omeprazole (PRILOSEC) 40 MG capsule     Probiotic Product (PROBIOTIC FORMULA PO)        Allergies   Allergen Reactions     Dilaudid [Hydromorphone]      dizziness     Senna Hives       Review of Systems   Constitutional: Negative.    Respiratory: Negative.    Cardiovascular: Negative.    Gastrointestinal: Negative.    Genitourinary: Negative.    Musculoskeletal: Positive for arthralgias, gait problem and myalgias. Negative for neck pain.        Positive - left hip pain   Skin: Negative.    All other systems reviewed and are negative.      Physical Exam   BP: (!) 167/107  Heart Rate: 84  Temp: 97.2  F (36.2  C)  Resp: 16  Height: 175.3 cm (5' 9\")  Weight: 68 kg (150 lb)  SpO2: 94 %      Physical Exam   Constitutional: She appears well-developed and well-nourished. No distress.   HENT:   Head: Atraumatic.   Neck: Neck supple.   Cardiovascular: Normal rate, regular rhythm and normal heart sounds.    Pulmonary/Chest: Breath sounds normal.   Abdominal: Soft.   Musculoskeletal:        Left hip: She " exhibits decreased range of motion, tenderness and bony tenderness. She exhibits no deformity.        Legs:  Skin: She is not diaphoretic.   Nursing note and vitals reviewed.      ED Course   8:58 AM  The patient was seen and examined by Blair Fontaine MD in Room 12.     ED Course     Procedures        Results for orders placed or performed during the hospital encounter of 10/22/18   XR Pelvis and Hip Left 2 Views     Value    Radiologist flags (Urgent)     Displaced transcervical fracture of the left femoral    Narrative    Exam: XR PELVIS AND HIP LEFT 2 VIEWS, 10/22/2018 10:11 AM    Indication: pain after fall;     Comparison: X-ray 4/17/2018    Findings:   1 view pelvis and 2 lateral views left hip.    Displaced transcervical fracture of the left femoral neck. Distal  aspect is slightly superiorly and laterally displaced.    Minimal degenerative changes. Partially visualized degenerative  changes of lumbar spine.    Osteopenic appearance of bone.      Impression    Impression:   Displaced transcervical fracture of the left femoral neck.       [Access Center: Displaced transcervical fracture of the left femoral  neck. ]    This report will be copied to the [facility] to ensure a  provider acknowledges the finding. Access Center is available Monday  through Friday 8am-3:30 pm.    XR Lumbar Spine 2/3 Views    Narrative    2 views lumbar spine radiographs 10/22/2018 10:19 AM    History: pain after fall;     Comparison: X-ray 4/17/2018    Findings:   AP and lateral  views of the lumbar spine were obtained.    5  lumbar type vertebral bodies are assumed for the purpose of this  dictation.    There is no acute osseous abnormality.  Osteopenic appearance of bone.    There is multilevel degenerative changes of the lumbar spine and also  lower lumbar predominant facet arthropathy . Disc spaces relatively  preserved. Unchanged trace anterolisthesis of L4 over L5.    The visualized bowel gas pattern is  non-obstructive.        Impression    Impression:  1.  No acute osseous abnormality.  2.  Multilevel degenerative changes.       Medications   sodium chloride 0.9% infusion ( Intravenous New Bag 10/22/18 0918)   ondansetron (ZOFRAN) injection 4 mg (4 mg Intravenous Given 10/22/18 0918)   fentaNYL (PF) (SUBLIMAZE) injection 25-50 mcg (not administered)       Discussed with Trauma       Labs Ordered and Resulted from Time of ED Arrival Up to the Time of Departure from the ED   CBC WITH PLATELETS - Abnormal; Notable for the following:        Result Value    WBC 11.3 (*)     All other components within normal limits   INR   BASIC METABOLIC PANEL   ROUTINE UA WITH MICROSCOPIC REFLEX TO CULTURE   ABO/RH TYPE AND SCREEN       Consults  Trauma: At Bedside (10/22/18 1128)    Assessments & Plan (with Medical Decision Making)   Shahana Donaldson is a 78 year old female who fell down 2 or 3 stairs at home landing on her left hip and sustaining a left femoral neck fracture.  She has no other injuries, she does have low back pain L-spine. Film shows degenerative change without acute abnormality.  Pre-op labs and pain medication were given trauma was consulted she will be admitted to their service she is in stable condition.    I have reviewed the nursing notes.    I have reviewed the findings, diagnosis, plan and need for follow up with the patient.    New Prescriptions    No medications on file       Final diagnoses:   Closed fracture of left hip, initial encounter (H)     I, Pipo Barkley, am serving as a trained medical scribe to document services personally performed by Blair Fontaine MD, based on the provider's statements to me.   IBlair MD, was physically present and have reviewed and verified the accuracy of this note documented by Pipo Barkley.    10/22/2018   [facility], [place], EMERGENCY DEPARTMENT     Ritesh Fontaine MD  10/22/18 7621

## 2018-10-22 NOTE — ED NOTES
Bed: ED12  Expected date: 10/22/18  Expected time: 8:34 AM  Means of arrival: Ambulance  Comments:  Fadia [age] female  Fall at home  Pain left hip

## 2018-10-22 NOTE — IP AVS SNAPSHOT
"` `     [facility]: [phone]                                              INTERAGENCY TRANSFER FORM - NURSING   10/22/2018                    Hospital Admission Date: 10/22/2018  IWONA PHIPPS   : 1940  Sex: Female        Attending Provider: Sera Viramontes MD     Allergies:  Dilaudid [Hydromorphone], Senna    Infection:  None   Service:  TRAUMA    Ht:  1.753 m (5' 9\")   Wt:  69.1 kg (152 lb 6.4 oz)   Admission Wt:  68 kg (150 lb)    BMI:  22.51 kg/m 2   BSA:  1.83 m 2            Patient PCP Information     Provider PCP Type    Jeramie Curran MD General      Current Code Status     Date Active Code Status Order ID Comments User Context       Prior      Code Status History     Date Active Date Inactive Code Status Order ID Comments User Context    10/25/2018  5:13 PM  Full Code [id number]  Monroe Dodd PA-C Outpatient    10/22/2018  2:06 PM 10/25/2018  5:13 PM Full Code [id number]  Nae Felipe APRN CNP Inpatient    2013  3:03 PM 1/10/2013  8:25 PM Full Code [id number]  Leslie Cotton, RN Inpatient    2011  1:17 PM 2013  3:03 PM Full Code [id number]  Mera Abbott Outpatient    2011  1:20 PM 2011  1:17 PM Full Code [id number]  Elicia Avila Inpatient    2004 10:30 AM 2004 10:30 AM  None  Mary Gil Demographics      Advance Directives        Scanned docmt in ACP Activity?           No scanned doc        Hospital Problems as of 10/26/2018              Priority Class Noted POA    Left displaced femoral neck fracture (H) Medium  10/22/2018 Yes      Non-Hospital Problems as of 10/26/2018              Priority Class Noted    Essential hypertension, benign Medium  2004    Mixed hyperlipidemia Medium  2004    Symptomatic menopausal or female climacteric states Medium  2004    Diverticulosis of large intestine Medium  2004    Hyperlipidemia Medium  2004    Coronary atherosclerosis Medium  " 10/25/2011    Esophageal reflux Medium  10/25/2011    vulvar cyst Medium  10/25/2011    Hiatal hernia Medium  9/18/2012    S/P Nissen fundoplication (without gastrostomy tube) procedure Medium  1/7/2013    History of tubal ligation Medium  9/10/2014    History of dilatation and curettage Medium  9/10/2014    History of hysterectomy Medium  9/10/2014    Lichen sclerosus Medium  10/7/2014    Right knee pain Medium  11/19/2015      Immunizations     Name Date      Influenza (High Dose) 3 valent vaccine 10/13/17     Influenza (High Dose) 3 valent vaccine 11/11/16     Influenza (High Dose) 3 valent vaccine 10/07/14     Influenza (IIV3) PF 10/15/15     Influenza (IIV3) PF 10/22/13     Influenza (IIV3) PF 09/19/12     Influenza (IIV3) PF 10/25/11     Influenza (IIV3) PF 10/20/10     Influenza (IIV3) PF 10/14/08     Influenza (IIV3) PF 11/07/06     Influenza (IIV3) PF 12/15/05     Influenza (IIV3) PF 11/16/98     Pneumo Conj 13-V (2010&after) 05/18/15     Pneumococcal 23 valent 04/03/18     TD (ADULT, 7+) 10/10/06     TD (ADULT, 7+) 03/13/97     TDAP Vaccine (Boostrix) 11/11/16     Zoster vaccine recombinant adjuvanted (SHINGRIX) 05/11/18     Zoster vaccine, live 10/20/10          END      ASSESSMENT     Discharge Profile Flowsheet     DISCHARGE NEEDS ASSESSMENT     Inspection under devices  Full 10/26/18 0856    Transportation Available  car;family or friend will provide 10/24/18 1439   Not Inspected under devices  -- (left hip dressing) 10/26/18 0341    Equipment Used at Home  -- (none) 01/10/13 0850   Skin WDL  ex 10/26/18 0856    GASTROINTESTINAL (ADULT,PEDIATRIC,OB)     Skin Color/Characteristics  bruised (ecchymotic) 10/26/18 0856    GI WDL  WDL 10/26/18 0856   Skin Temperature  warm 10/26/18 0856    Last Bowel Movement  10/25/18 10/26/18 0856   Skin Moisture  dry 10/26/18 0856    Passing flatus  yes 10/26/18 0856   Skin Elasticity  quick return to original state 10/26/18 0856    COMMUNICATION ASSESSMENT     Skin  "Integrity  drain/device(s);incision(s) 10/26/18 0856    Patient's communication style  spoken language (English or Bilingual) 10/22/18 0837   SAFETY      SKIN     Safety WDL  WDL 10/26/18 0844    Inspection of bony prominences  Full 10/26/18 0856   Safety Factors  bed in low position 10/26/18 0844    Full except areas not inspected   -- (under dressing) 10/23/18 2030   All Alarms  none present 10/26/18 0341                 Assessment WDL (Within Defined Limits) Definitions           Safety WDL     Effective: 09/28/15    Row Information: <b>WDL Definition:</b> Bed in low position, wheels locked; call light in reach; upper side rails up x 2; ID band on<br> <font color=\"gray\"><i>Item=AS safety wdl>>List=AS safety wdl>>Version=F14</i></font>      Skin WDL     Effective: 09/28/15    Row Information: <b>WDL Definition:</b> Warm; dry; intact; elastic; without discoloration; pressure points without redness<br> <font color=\"gray\"><i>Item=AS skin wdl>>List=AS skin wdl>>Version=F14</i></font>      Vitals     Vital Signs Flowsheet     VITAL SIGNS     ANALGESIA SIDE EFFECTS MONITORING      Temp  98.3  F (36.8  C) 10/26/18 1134   Side Effects Monitoring: Respiratory Quality  R 10/25/18 0217    Temp src  Oral 10/26/18 1134   Side Effects Monitoring: Respiratory Depth  N 10/25/18 0217    Resp  18 10/26/18 1134   Side Effects Monitoring: Sedation Level  1 10/25/18 0217    Pulse  80 10/25/18 2247   RANDALL COMA SCALE      Heart Rate  97 10/26/18 1134   Best Eye Response  4-->(E4) spontaneous 10/26/18 0856    Pulse/Heart Rate Source  Monitor 10/26/18 1134   Best Motor Response  6-->(M6) obeys commands 10/26/18 0856    BP  132/77 10/26/18 1134   Best Verbal Response  5-->(V5) oriented 10/26/18 0856    BP Location  Right arm 10/26/18 1134   Valmeyer Coma Scale Score  15 10/26/18 0856    Patient Position  Lying 10/23/18 2001   HEIGHT AND WEIGHT      OXYGEN THERAPY     Height  1.753 m (5' 9\") 10/22/18 0842    SpO2  94 % 10/26/18 1134   " Weight  69.1 kg (152 lb 6.4 oz) 10/25/18 1154    O2 Device  None (Room air) 10/26/18 1134   BSA (Calculated - sq m)  1.82 10/22/18 0842    Oxygen Delivery  2 LPM 10/26/18 0809   BMI (Calculated)  22.2 10/22/18 0842    PAIN/COMFORT     POSITIONING      Patient Currently in Pain  denies 10/26/18 0831   Body Position  independently positioning 10/26/18 0856    Preferred Pain Scale  CAPA (Clinically Aligned Pain Assessment) ([facility] Adults Only) 10/26/18 0831   Head of Bed (HOB)  HOB at 20-30 degrees 10/26/18 0341    Pain Location  Hip 10/26/18 0831   Positioning/Transfer Devices  aBduction splint/pillow 10/26/18 0856    Pain Orientation  Left 10/26/18 0831   DAILY CARE      Pain Descriptors  Headache 10/24/18 1225   Activity Management  ambulated to bathroom 10/26/18 0856    Pain Management Interventions  analgesia administered 10/23/18 0756   Activity Assistance Provided  assistance, 2 people;assistance, stand-by 10/26/18 0856    Pain Intervention(s)  Medication (See eMAR) 10/23/18 0756   ECG      Response to Interventions  Decrease in pain 10/23/18 0951   ECG Rhythm  Sinus rhythm 10/23/18 2002    CLINICALLY ALIGNED PAIN ASSESSMENT (CAPA) ([facility] ADULTS ONLY)     Ectopy  None 10/23/18 2002    Comfort  comfortably manageable 10/25/18 1734   Lead Monitored  Lead II;V 5 10/23/18 2002    Change in Pain  getting better 10/24/18 2144   RESPIRATORY MONITORING      Pain Control  partially effective 10/24/18 2144   Respiratory Monitoring (EtCO2)  35 mmHg 10/24/18 1601    Functioning  can do most things, but pain gets in the way of some 10/24/18 2144   Integrated Pulmonary Index (IPI)  8-9 10/24/18 1601    Sleep  awake with occasional pain 10/24/18 2144                 Patient Lines/Drains/Airways Status    Active LINES/DRAINS/AIRWAYS     Name: Placement date: Placement time: Site: Days: Last dressing change:    Peripheral IV 10/23/18 Left Lower forearm 10/23/18   1539   Lower forearm   2      Incision/Surgical Site 01/07/13 Anterior;Bilateral Abdomen 01/07/13 2118     Incision/Surgical Site 10/23/18 Left Hip 10/23/18   1740    2             Patient Lines/Drains/Airways Status    Active PICC/CVC     None            Intake/Output Detail Report     Date Intake       Output     Net    Shift P.O. I.V. IV Piggyback Colloid Total Urine Emesis/NG output Blood Total       Jeanne 10/25/18 0700 - 10/25/18 1459 240 -- -- -- 240 400 -- -- 400 -160    Noc 10/25/18 1500 - 10/25/18 2359 240 -- -- -- 240 200 -- -- 200 40    Day 10/26/18 0000 - 10/26/18 0659 360 -- -- -- 360 175 -- -- 175 185    Jeanne 10/26/18 0700 - 10/26/18 1459 -- -- -- -- -- 550 -- -- 550 -550    Noc 10/26/18 1500 - 10/26/18 2359 -- -- -- -- -- -- -- -- -- 0      Last Void/BM       Most Recent Value    Urine Occurrence 1 at 10/26/2018 1100    Stool Occurrence 1 at 10/26/2018 1100      Case Management/Discharge Planning     Case Management/Discharge Planning Flowsheet     REFERRAL INFORMATION     Equipment Used at Home  -- (none) 01/10/13 0850    Arrived From  clinic 05/23/11 1242   / CAREGIVER      LIVING ENVIRONMENT     Filed Complexity Screen Score  4 10/23/18 0905    Lives With  spouse 10/24/18 1439   ABUSE RISK SCREEN      Living Arrangements  house 10/24/18 1439   QUESTION TO PATIENT:  Has a member of your family or a partner(now or in the past) intimidated, hurt, manipulated, or controlled you in any way?  no 10/22/18 0845    COPING/STRESS     QUESTION TO PATIENT: Do you feel safe going back to the place where you are living?  yes 10/22/18 0845    Major Change/Loss/Stressor  denies 10/23/18 1301   OBSERVATION: Is there reason to believe there has been maltreatment of a vulnerable adult (ie. Physical/Sexual/Emotional abuse, self neglect, lack of adequate food, shelter, medical care, or financial exploitation)?  no 10/22/18 0845    DISCHARGE PLANNING     OTHER      Transportation Available  car;family or friend will provide 10/24/18 8165    Are you depressed or being treated for depression?  No 10/22/18 0824

## 2018-10-22 NOTE — PHARMACY-ADMISSION MEDICATION HISTORY
Admission medication history interview status for the 10/22/2018 admission is complete. See Epic admission navigator for allergy information, pharmacy, prior to admission medications and immunization status.     Medication history interview sources:  patient (knew all meds, schedule, and doses), [facility] pharmacy (confirmed doses)    Changes made to PTA medication list (reason)  Added: n/a  Deleted: voltaren gel, dilt 240 (using 300), benadryl, omeprazole, probiotic (all stopped per patient)  Changed: n/a    Additional medication history information (including reliability of information, actions taken by pharmacist):  -Patient using cartia , not dilt . Confirmed with [facility]      Prior to Admission medications    Medication Sig Last Dose Taking? Auth Provider   ASPIRIN 81 MG OR TABS 1 tab po QD (Once per day) 10/21/2018 at PM Yes Kelton To MD   atorvastatin (LIPITOR) 10 MG tablet Take 1 tablet (10 mg) by mouth daily 10/21/2018 at PM Yes Jeramie Curran MD   CARTIA  MG 24 hr capsule TAKE ONE CAPSULE BY MOUTH ONE TIME DAILY  10/22/2018 at AM Yes Jeramie Curran MD   Cholecalciferol (VITAMIN D3) 1000 UNIT TABS Take 1 tablet by mouth daily. 10/21/2018 at PM Yes Reported, Patient   ascorbic acid (VITAMIN C) 500 MG tablet Take 500 mg by mouth as needed (when thinking of it)  More than a month at Unknown time  Reported, Patient   clobetasol (TEMOVATE) 0.05 % ointment Apply sparingly to affected area 2 x daily for14 days. Then, apply small amount to affected area twice weekly for maintenance.  Patient taking differently: Apply sparingly to affected area 2 x daily for14 days as needed for rash. Then, apply small amount to affected area twice weekly for maintenance. More than a month at familia Gee, Sandra Francois MD         Medication history completed by:  Sahil Marshall RP on 10/22/2018 at 1:45 PM

## 2018-10-23 ENCOUNTER — ANESTHESIA (OUTPATIENT)
Dept: SURGERY | Facility: CLINIC | Age: 78
DRG: 469 | End: 2018-10-23
Payer: MEDICARE

## 2018-10-23 ENCOUNTER — APPOINTMENT (OUTPATIENT)
Dept: GENERAL RADIOLOGY | Facility: CLINIC | Age: 78
DRG: 469 | End: 2018-10-23
Payer: MEDICARE

## 2018-10-23 ENCOUNTER — APPOINTMENT (OUTPATIENT)
Dept: GENERAL RADIOLOGY | Facility: CLINIC | Age: 78
DRG: 469 | End: 2018-10-23
Attending: ORTHOPAEDIC SURGERY
Payer: MEDICARE

## 2018-10-23 ENCOUNTER — ANESTHESIA EVENT (OUTPATIENT)
Dept: SURGERY | Facility: CLINIC | Age: 78
DRG: 469 | End: 2018-10-23
Payer: MEDICARE

## 2018-10-23 LAB
DEPRECATED CALCIDIOL+CALCIFEROL SERPL-MC: 25 UG/L (ref 20–75)
ERYTHROCYTE [DISTWIDTH] IN BLOOD BY AUTOMATED COUNT: 13 % (ref 10–15)
GLUCOSE BLDC GLUCOMTR-MCNC: 95 MG/DL (ref 70–99)
HCT VFR BLD AUTO: 37.6 % (ref 35–47)
HGB BLD-MCNC: 12 G/DL (ref 11.7–15.7)
MAGNESIUM SERPL-MCNC: 2 MG/DL (ref 1.6–2.3)
MCH RBC QN AUTO: 31.1 PG (ref 26.5–33)
MCHC RBC AUTO-ENTMCNC: 31.9 G/DL (ref 31.5–36.5)
MCV RBC AUTO: 97 FL (ref 78–100)
PHOSPHATE SERPL-MCNC: 3.1 MG/DL (ref 2.5–4.5)
PLATELET # BLD AUTO: 163 10E9/L (ref 150–450)
PTH-INTACT SERPL-MCNC: 34 PG/ML (ref 18–80)
RBC # BLD AUTO: 3.86 10E12/L (ref 3.8–5.2)
WBC # BLD AUTO: 6.4 10E9/L (ref 4–11)

## 2018-10-23 PROCEDURE — 71000014 ZZH RECOVERY PHASE 1 LEVEL 2 FIRST HR: Performed by: ORTHOPAEDIC SURGERY

## 2018-10-23 PROCEDURE — 27210794 ZZH OR GENERAL SUPPLY STERILE: Performed by: ORTHOPAEDIC SURGERY

## 2018-10-23 PROCEDURE — 25000128 H RX IP 250 OP 636: Performed by: NURSE ANESTHETIST, CERTIFIED REGISTERED

## 2018-10-23 PROCEDURE — 25000125 ZZHC RX 250: Performed by: STUDENT IN AN ORGANIZED HEALTH CARE EDUCATION/TRAINING PROGRAM

## 2018-10-23 PROCEDURE — 25000128 H RX IP 250 OP 636: Performed by: ANESTHESIOLOGY

## 2018-10-23 PROCEDURE — 82306 VITAMIN D 25 HYDROXY: CPT | Performed by: NURSE PRACTITIONER

## 2018-10-23 PROCEDURE — 25000128 H RX IP 250 OP 636: Performed by: STUDENT IN AN ORGANIZED HEALTH CARE EDUCATION/TRAINING PROGRAM

## 2018-10-23 PROCEDURE — C9290 INJ, BUPIVACAINE LIPOSOME: HCPCS | Performed by: ANESTHESIOLOGY

## 2018-10-23 PROCEDURE — 25000565 ZZH ISOFLURANE, EA 15 MIN: Performed by: ORTHOPAEDIC SURGERY

## 2018-10-23 PROCEDURE — P9041 ALBUMIN (HUMAN),5%, 50ML: HCPCS | Performed by: NURSE ANESTHETIST, CERTIFIED REGISTERED

## 2018-10-23 PROCEDURE — 40000985 XR PELVIS PORT 1/2 VW

## 2018-10-23 PROCEDURE — 25000125 ZZHC RX 250: Performed by: NURSE ANESTHETIST, CERTIFIED REGISTERED

## 2018-10-23 PROCEDURE — 37000009 ZZH ANESTHESIA TECHNICAL FEE, EACH ADDTL 15 MIN: Performed by: ORTHOPAEDIC SURGERY

## 2018-10-23 PROCEDURE — 25000128 H RX IP 250 OP 636: Performed by: NURSE PRACTITIONER

## 2018-10-23 PROCEDURE — 40000986 XR PELVIS PORT 1/2 VW

## 2018-10-23 PROCEDURE — 00000146 ZZHCL STATISTIC GLUCOSE BY METER IP

## 2018-10-23 PROCEDURE — 25000128 H RX IP 250 OP 636: Performed by: ORTHOPAEDIC SURGERY

## 2018-10-23 PROCEDURE — 36415 COLL VENOUS BLD VENIPUNCTURE: CPT | Performed by: NURSE PRACTITIONER

## 2018-10-23 PROCEDURE — 99231 SBSQ HOSP IP/OBS SF/LOW 25: CPT | Performed by: NURSE PRACTITIONER

## 2018-10-23 PROCEDURE — 84100 ASSAY OF PHOSPHORUS: CPT | Performed by: NURSE PRACTITIONER

## 2018-10-23 PROCEDURE — 40000170 ZZH STATISTIC PRE-PROCEDURE ASSESSMENT II: Performed by: ORTHOPAEDIC SURGERY

## 2018-10-23 PROCEDURE — 36000064 ZZH SURGERY LEVEL 4 EA 15 ADDTL MIN - UMMC: Performed by: ORTHOPAEDIC SURGERY

## 2018-10-23 PROCEDURE — 25000132 ZZH RX MED GY IP 250 OP 250 PS 637: Mod: GY | Performed by: STUDENT IN AN ORGANIZED HEALTH CARE EDUCATION/TRAINING PROGRAM

## 2018-10-23 PROCEDURE — 83735 ASSAY OF MAGNESIUM: CPT | Performed by: NURSE PRACTITIONER

## 2018-10-23 PROCEDURE — 83970 ASSAY OF PARATHORMONE: CPT | Performed by: NURSE PRACTITIONER

## 2018-10-23 PROCEDURE — 0SRS01A REPLACEMENT OF LEFT HIP JOINT, FEMORAL SURFACE WITH METAL SYNTHETIC SUBSTITUTE, UNCEMENTED, OPEN APPROACH: ICD-10-PCS | Performed by: ORTHOPAEDIC SURGERY

## 2018-10-23 PROCEDURE — 37000008 ZZH ANESTHESIA TECHNICAL FEE, 1ST 30 MIN: Performed by: ORTHOPAEDIC SURGERY

## 2018-10-23 PROCEDURE — 25000125 ZZHC RX 250: Performed by: ORTHOPAEDIC SURGERY

## 2018-10-23 PROCEDURE — 36000062 ZZH SURGERY LEVEL 4 1ST 30 MIN - UMMC: Performed by: ORTHOPAEDIC SURGERY

## 2018-10-23 PROCEDURE — 85027 COMPLETE CBC AUTOMATED: CPT | Performed by: NURSE PRACTITIONER

## 2018-10-23 PROCEDURE — A9270 NON-COVERED ITEM OR SERVICE: HCPCS | Mod: GY | Performed by: STUDENT IN AN ORGANIZED HEALTH CARE EDUCATION/TRAINING PROGRAM

## 2018-10-23 PROCEDURE — 71000015 ZZH RECOVERY PHASE 1 LEVEL 2 EA ADDTL HR: Performed by: ORTHOPAEDIC SURGERY

## 2018-10-23 PROCEDURE — C1713 ANCHOR/SCREW BN/BN,TIS/BN: HCPCS | Performed by: ORTHOPAEDIC SURGERY

## 2018-10-23 PROCEDURE — 25000132 ZZH RX MED GY IP 250 OP 250 PS 637: Mod: GY | Performed by: NURSE PRACTITIONER

## 2018-10-23 PROCEDURE — A9270 NON-COVERED ITEM OR SERVICE: HCPCS | Mod: GY | Performed by: NURSE PRACTITIONER

## 2018-10-23 PROCEDURE — 12000003 ZZH R&B CRITICAL UMMC

## 2018-10-23 PROCEDURE — C1776 JOINT DEVICE (IMPLANTABLE): HCPCS | Performed by: ORTHOPAEDIC SURGERY

## 2018-10-23 DEVICE — IMPLANTABLE DEVICE: Type: IMPLANTABLE DEVICE | Site: HIP | Status: FUNCTIONAL

## 2018-10-23 RX ORDER — GABAPENTIN 100 MG/1
100 CAPSULE ORAL
Status: COMPLETED | OUTPATIENT
Start: 2018-10-23 | End: 2018-10-23

## 2018-10-23 RX ORDER — PROPOFOL 10 MG/ML
INJECTION, EMULSION INTRAVENOUS PRN
Status: DISCONTINUED | OUTPATIENT
Start: 2018-10-23 | End: 2018-10-23

## 2018-10-23 RX ORDER — ACETAMINOPHEN 325 MG/1
975 TABLET ORAL ONCE
Status: DISCONTINUED | OUTPATIENT
Start: 2018-10-23 | End: 2018-10-23 | Stop reason: HOSPADM

## 2018-10-23 RX ORDER — ALBUMIN, HUMAN INJ 5% 5 %
SOLUTION INTRAVENOUS CONTINUOUS PRN
Status: DISCONTINUED | OUTPATIENT
Start: 2018-10-23 | End: 2018-10-23

## 2018-10-23 RX ORDER — ACETAMINOPHEN 325 MG/1
975 TABLET ORAL ONCE
Status: COMPLETED | OUTPATIENT
Start: 2018-10-23 | End: 2018-10-23

## 2018-10-23 RX ORDER — SODIUM CHLORIDE, SODIUM LACTATE, POTASSIUM CHLORIDE, CALCIUM CHLORIDE 600; 310; 30; 20 MG/100ML; MG/100ML; MG/100ML; MG/100ML
INJECTION, SOLUTION INTRAVENOUS CONTINUOUS PRN
Status: DISCONTINUED | OUTPATIENT
Start: 2018-10-23 | End: 2018-10-23

## 2018-10-23 RX ORDER — ONDANSETRON 2 MG/ML
INJECTION INTRAMUSCULAR; INTRAVENOUS PRN
Status: DISCONTINUED | OUTPATIENT
Start: 2018-10-23 | End: 2018-10-23

## 2018-10-23 RX ORDER — FLUMAZENIL 0.1 MG/ML
0.2 INJECTION, SOLUTION INTRAVENOUS
Status: DISCONTINUED | OUTPATIENT
Start: 2018-10-23 | End: 2018-10-23 | Stop reason: HOSPADM

## 2018-10-23 RX ORDER — FENTANYL CITRATE 50 UG/ML
25-50 INJECTION, SOLUTION INTRAMUSCULAR; INTRAVENOUS
Status: DISCONTINUED | OUTPATIENT
Start: 2018-10-23 | End: 2018-10-23 | Stop reason: HOSPADM

## 2018-10-23 RX ORDER — ONDANSETRON 2 MG/ML
4-8 INJECTION INTRAMUSCULAR; INTRAVENOUS EVERY 6 HOURS PRN
Status: DISCONTINUED | OUTPATIENT
Start: 2018-10-23 | End: 2018-10-26 | Stop reason: HOSPADM

## 2018-10-23 RX ORDER — NALOXONE HYDROCHLORIDE 0.4 MG/ML
.1-.4 INJECTION, SOLUTION INTRAMUSCULAR; INTRAVENOUS; SUBCUTANEOUS
Status: DISCONTINUED | OUTPATIENT
Start: 2018-10-23 | End: 2018-10-23 | Stop reason: HOSPADM

## 2018-10-23 RX ORDER — CEFAZOLIN SODIUM 2 G/100ML
2 INJECTION, SOLUTION INTRAVENOUS EVERY 8 HOURS
Status: COMPLETED | OUTPATIENT
Start: 2018-10-24 | End: 2018-10-24

## 2018-10-23 RX ORDER — ONDANSETRON 2 MG/ML
4 INJECTION INTRAMUSCULAR; INTRAVENOUS EVERY 30 MIN PRN
Status: DISCONTINUED | OUTPATIENT
Start: 2018-10-23 | End: 2018-10-23 | Stop reason: HOSPADM

## 2018-10-23 RX ORDER — FENTANYL CITRATE 50 UG/ML
INJECTION, SOLUTION INTRAMUSCULAR; INTRAVENOUS PRN
Status: DISCONTINUED | OUTPATIENT
Start: 2018-10-23 | End: 2018-10-23

## 2018-10-23 RX ORDER — BUPIVACAINE HYDROCHLORIDE 2.5 MG/ML
INJECTION, SOLUTION EPIDURAL; INFILTRATION; INTRACAUDAL PRN
Status: DISCONTINUED | OUTPATIENT
Start: 2018-10-23 | End: 2018-10-23

## 2018-10-23 RX ORDER — MORPHINE SULFATE 2 MG/ML
2-4 INJECTION, SOLUTION INTRAMUSCULAR; INTRAVENOUS EVERY 5 MIN PRN
Status: DISCONTINUED | OUTPATIENT
Start: 2018-10-23 | End: 2018-10-23 | Stop reason: HOSPADM

## 2018-10-23 RX ORDER — NALOXONE HYDROCHLORIDE 0.4 MG/ML
.1-.4 INJECTION, SOLUTION INTRAMUSCULAR; INTRAVENOUS; SUBCUTANEOUS
Status: ACTIVE | OUTPATIENT
Start: 2018-10-23 | End: 2018-10-24

## 2018-10-23 RX ORDER — ONDANSETRON 4 MG/1
4 TABLET, ORALLY DISINTEGRATING ORAL EVERY 6 HOURS PRN
Status: DISCONTINUED | OUTPATIENT
Start: 2018-10-23 | End: 2018-10-26 | Stop reason: HOSPADM

## 2018-10-23 RX ORDER — SODIUM CHLORIDE, SODIUM LACTATE, POTASSIUM CHLORIDE, CALCIUM CHLORIDE 600; 310; 30; 20 MG/100ML; MG/100ML; MG/100ML; MG/100ML
INJECTION, SOLUTION INTRAVENOUS CONTINUOUS
Status: DISCONTINUED | OUTPATIENT
Start: 2018-10-23 | End: 2018-10-23 | Stop reason: HOSPADM

## 2018-10-23 RX ORDER — DEXAMETHASONE SODIUM PHOSPHATE 10 MG/ML
INJECTION, SOLUTION INTRAMUSCULAR; INTRAVENOUS PRN
Status: DISCONTINUED | OUTPATIENT
Start: 2018-10-23 | End: 2018-10-23

## 2018-10-23 RX ORDER — CEFAZOLIN SODIUM 1 G/3ML
1 INJECTION, POWDER, FOR SOLUTION INTRAMUSCULAR; INTRAVENOUS SEE ADMIN INSTRUCTIONS
Status: DISCONTINUED | OUTPATIENT
Start: 2018-10-23 | End: 2018-10-23 | Stop reason: HOSPADM

## 2018-10-23 RX ORDER — GABAPENTIN 300 MG/1
300 CAPSULE ORAL ONCE
Status: DISCONTINUED | OUTPATIENT
Start: 2018-10-23 | End: 2018-10-23 | Stop reason: HOSPADM

## 2018-10-23 RX ORDER — CEFAZOLIN SODIUM 2 G/100ML
2 INJECTION, SOLUTION INTRAVENOUS
Status: COMPLETED | OUTPATIENT
Start: 2018-10-23 | End: 2018-10-23

## 2018-10-23 RX ORDER — PROCHLORPERAZINE MALEATE 5 MG
5 TABLET ORAL EVERY 6 HOURS PRN
Status: DISCONTINUED | OUTPATIENT
Start: 2018-10-23 | End: 2018-10-26 | Stop reason: HOSPADM

## 2018-10-23 RX ORDER — ONDANSETRON 4 MG/1
4 TABLET, ORALLY DISINTEGRATING ORAL EVERY 30 MIN PRN
Status: DISCONTINUED | OUTPATIENT
Start: 2018-10-23 | End: 2018-10-23 | Stop reason: HOSPADM

## 2018-10-23 RX ORDER — LIDOCAINE HYDROCHLORIDE 20 MG/ML
INJECTION, SOLUTION INFILTRATION; PERINEURAL PRN
Status: DISCONTINUED | OUTPATIENT
Start: 2018-10-23 | End: 2018-10-23

## 2018-10-23 RX ORDER — EPHEDRINE SULFATE 50 MG/ML
INJECTION, SOLUTION INTRAMUSCULAR; INTRAVENOUS; SUBCUTANEOUS PRN
Status: DISCONTINUED | OUTPATIENT
Start: 2018-10-23 | End: 2018-10-23

## 2018-10-23 RX ADMIN — BUPIVACAINE 20 ML: 13.3 INJECTION, SUSPENSION, LIPOSOMAL INFILTRATION at 14:54

## 2018-10-23 RX ADMIN — Medication 5 MG: at 17:00

## 2018-10-23 RX ADMIN — BUPIVACAINE HYDROCHLORIDE 20 ML: 2.5 INJECTION, SOLUTION EPIDURAL; INFILTRATION; INTRACAUDAL; PERINEURAL at 14:54

## 2018-10-23 RX ADMIN — DEXAMETHASONE SODIUM PHOSPHATE 6 MG: 10 INJECTION, SOLUTION INTRAMUSCULAR; INTRAVENOUS at 17:40

## 2018-10-23 RX ADMIN — PHENYLEPHRINE HYDROCHLORIDE 100 MCG: 10 INJECTION, SOLUTION INTRAMUSCULAR; INTRAVENOUS; SUBCUTANEOUS at 16:55

## 2018-10-23 RX ADMIN — GABAPENTIN 100 MG: 100 CAPSULE ORAL at 14:35

## 2018-10-23 RX ADMIN — ONDANSETRON 4 MG: 2 INJECTION INTRAMUSCULAR; INTRAVENOUS at 15:30

## 2018-10-23 RX ADMIN — DILTIAZEM HYDROCHLORIDE 300 MG: 120 CAPSULE, COATED, EXTENDED RELEASE ORAL at 07:49

## 2018-10-23 RX ADMIN — PHENYLEPHRINE HYDROCHLORIDE 100 MCG: 10 INJECTION, SOLUTION INTRAMUSCULAR; INTRAVENOUS; SUBCUTANEOUS at 15:43

## 2018-10-23 RX ADMIN — OXYCODONE HYDROCHLORIDE 5 MG: 5 TABLET ORAL at 04:08

## 2018-10-23 RX ADMIN — PHENYLEPHRINE HYDROCHLORIDE 100 MCG: 10 INJECTION, SOLUTION INTRAMUSCULAR; INTRAVENOUS; SUBCUTANEOUS at 17:36

## 2018-10-23 RX ADMIN — ONDANSETRON 4 MG: 2 INJECTION INTRAMUSCULAR; INTRAVENOUS at 08:43

## 2018-10-23 RX ADMIN — CEFAZOLIN SODIUM 1 G: 2 INJECTION, SOLUTION INTRAVENOUS at 17:41

## 2018-10-23 RX ADMIN — ACETAMINOPHEN 975 MG: 325 TABLET, FILM COATED ORAL at 01:54

## 2018-10-23 RX ADMIN — PHENYLEPHRINE HYDROCHLORIDE 100 MCG: 10 INJECTION, SOLUTION INTRAMUSCULAR; INTRAVENOUS; SUBCUTANEOUS at 15:48

## 2018-10-23 RX ADMIN — SODIUM CHLORIDE 1000 ML: 9 INJECTION, SOLUTION INTRAVENOUS at 07:50

## 2018-10-23 RX ADMIN — ALBUMIN HUMAN: 0.05 INJECTION, SOLUTION INTRAVENOUS at 16:19

## 2018-10-23 RX ADMIN — SUGAMMADEX 140 MG: 100 INJECTION, SOLUTION INTRAVENOUS at 18:42

## 2018-10-23 RX ADMIN — SODIUM CHLORIDE, POTASSIUM CHLORIDE, SODIUM LACTATE AND CALCIUM CHLORIDE: 600; 310; 30; 20 INJECTION, SOLUTION INTRAVENOUS at 17:10

## 2018-10-23 RX ADMIN — CEFAZOLIN SODIUM 2 G: 2 INJECTION, SOLUTION INTRAVENOUS at 15:45

## 2018-10-23 RX ADMIN — ONDANSETRON HYDROCHLORIDE 4 MG: 2 INJECTION INTRAMUSCULAR; INTRAVENOUS at 19:27

## 2018-10-23 RX ADMIN — LIDOCAINE HYDROCHLORIDE 80 MG: 20 INJECTION, SOLUTION INFILTRATION; PERINEURAL at 15:36

## 2018-10-23 RX ADMIN — ONDANSETRON 4 MG: 2 INJECTION INTRAMUSCULAR; INTRAVENOUS at 11:26

## 2018-10-23 RX ADMIN — ACETAMINOPHEN 975 MG: 325 TABLET, FILM COATED ORAL at 14:35

## 2018-10-23 RX ADMIN — OXYCODONE HYDROCHLORIDE 5 MG: 5 TABLET ORAL at 07:50

## 2018-10-23 RX ADMIN — FENTANYL CITRATE 50 MCG: 50 INJECTION, SOLUTION INTRAMUSCULAR; INTRAVENOUS at 14:46

## 2018-10-23 RX ADMIN — ROCURONIUM BROMIDE 20 MG: 10 INJECTION INTRAVENOUS at 17:18

## 2018-10-23 RX ADMIN — FENTANYL CITRATE 25 MCG: 50 INJECTION, SOLUTION INTRAMUSCULAR; INTRAVENOUS at 16:10

## 2018-10-23 RX ADMIN — ROCURONIUM BROMIDE 10 MG: 10 INJECTION INTRAVENOUS at 17:06

## 2018-10-23 RX ADMIN — Medication 5 MG: at 17:15

## 2018-10-23 RX ADMIN — PROPOFOL 130 MG: 10 INJECTION, EMULSION INTRAVENOUS at 15:36

## 2018-10-23 RX ADMIN — ROCURONIUM BROMIDE 20 MG: 10 INJECTION INTRAVENOUS at 16:12

## 2018-10-23 RX ADMIN — Medication 10 MG: at 16:42

## 2018-10-23 RX ADMIN — ROCURONIUM BROMIDE 50 MG: 10 INJECTION INTRAVENOUS at 15:36

## 2018-10-23 RX ADMIN — SODIUM CHLORIDE 1000 ML: 9 INJECTION, SOLUTION INTRAVENOUS at 21:29

## 2018-10-23 RX ADMIN — FENTANYL CITRATE 50 MCG: 50 INJECTION, SOLUTION INTRAMUSCULAR; INTRAVENOUS at 16:00

## 2018-10-23 RX ADMIN — FENTANYL CITRATE 25 MCG: 50 INJECTION, SOLUTION INTRAMUSCULAR; INTRAVENOUS at 16:39

## 2018-10-23 ASSESSMENT — ACTIVITIES OF DAILY LIVING (ADL)
COGNITION: 0 - NO COGNITION ISSUES REPORTED
NUMBER_OF_TIMES_PATIENT_HAS_FALLEN_WITHIN_LAST_SIX_MONTHS: 1
TRANSFERRING: 0-->INDEPENDENT
ADLS_ACUITY_SCORE: 14
FALL_HISTORY_WITHIN_LAST_SIX_MONTHS: YES
ADLS_ACUITY_SCORE: 14
ADLS_ACUITY_SCORE: 14
RETIRED_EATING: 0-->INDEPENDENT
DRESS: 0-->INDEPENDENT
SWALLOWING: 0-->SWALLOWS FOODS/LIQUIDS WITHOUT DIFFICULTY
BATHING: 0-->INDEPENDENT
ADLS_ACUITY_SCORE: 14
AMBULATION: 0-->INDEPENDENT
WHICH_OF_THE_ABOVE_FUNCTIONAL_RISKS_HAD_A_RECENT_ONSET_OR_CHANGE?: FALL HISTORY
RETIRED_COMMUNICATION: 0-->UNDERSTANDS/COMMUNICATES WITHOUT DIFFICULTY
TOILETING: 0-->INDEPENDENT

## 2018-10-23 NOTE — ANESTHESIA PROCEDURE NOTES
Peripheral Nerve Block Procedure Note    Staff:     Anesthesiologist:  SHAUNA SINGH    Resident/CRNA:  VINNY VIRAMONTES    Block performed by resident/CRNA in the presence of a teaching physician    Location: Pre-op  Procedure Start/Stop TImes:      10/23/2018 2:45 PM     10/23/2018 2:56 PM    patient identified, IV checked, site marked, risks and benefits discussed, informed consent, monitors and equipment checked, pre-op evaluation, at physician/surgeon's request and post-op pain management      Correct Patient: Yes      Correct Position: Yes      Correct Site: Yes      Correct Procedure: Yes      Correct Laterality:  Yes    Site Marked:  Yes  Procedure details:     Procedure:  Fascia iliaca    ASA:  2    Laterality:  Left    Position:  Supine    Sterile Prep: chloraprep      Local skin infiltration:  None    Needle:  Short bevel    Needle gauge:  21    Needle length (inches):  3.13    Ultrasound: Yes      Ultrasound used to identify targeted nerve, plexus, or vascular structure and placed a needle adjacent to it      Permanent Image entered into patiient's record      Abnormal pain on injection: No      Blood Aspirated: No      Paresthesias:  No    Bleeding at site: No      Bolus via:  Needle    Infusion Method:  Single Shot    Complications:  None  Assessment/Narrative:     Injection made incrementally with aspirations every (mL):  5

## 2018-10-23 NOTE — PLAN OF CARE
Problem: Patient Care Overview  Goal: Plan of Care/Patient Progress Review  PT 7B: cancel - per chart review, pt with strict bedrest orders; on OR schedule for today; will reschedule PT evaluation.

## 2018-10-23 NOTE — PLAN OF CARE
Problem: Patient Care Overview  Goal: Plan of Care/Patient Progress Review  Patient is a/ox4 but forgetful. VSS, weaned from O2. +BS, soft non tender abdomen, no BM. Hall in place, adequate urine output, cath cares done. MIVF at 75 ml/hr via right ac PIV. C/o left hip pain, Tylenol 975 mg given as scheduled, and Oxycodone 5 mg with relief. +CMS, repositioned with care, immobilized LLE with pillow. Kept on NPO except meds, for OR today as add-on. GUANAKITO wipes done at 0400. Continue poc.   Vitals:    10/22/18 1516 10/22/18 2016 10/23/18 0023 10/23/18 0355   BP: 127/73 132/70 138/72 119/61   BP Location: Left arm Left arm Left arm Left arm   Pulse:  63 70 66   Resp: 18 18 18 18   Temp: 97.1  F (36.2  C) 96.7  F (35.9  C) 96.7  F (35.9  C) 97.7  F (36.5  C)   TempSrc: Oral Oral Oral Oral   SpO2: 97% 98% 97% 97%   Weight:       Height:

## 2018-10-23 NOTE — PLAN OF CARE
Problem: Patient Care Overview  Goal: Plan of Care/Patient Progress Review  OT/7B - Cancel. Pt currently on bedrest with plan for OR today for L femoral neck fracture. Will reschedule as appropriate.

## 2018-10-23 NOTE — PROGRESS NOTES
"/70 (BP Location: Left arm)  Pulse 63  Temp 96.7  F (35.9  C) (Oral)  Resp 18  Ht 1.753 m (5' 9\")  Wt 68 kg (150 lb)  SpO2 98%  BMI 22.15 kg/m2    VSS. Afebrile. Pt is forgetful during conversation, often repeating questions. AxO x4. CMS intact. Pt reports some n/t in L foot. Pt denies chest pain or shortness of breath. 98% on 3 L NC. L hip pain manageable w/ tylenol and oxy x1. Pt reports feeling unresolved nausea w/ oral intake. IV zofran given x1 w/ one additional dose given per MD orders. Lack of appetite. Hall inserted during shift w/ adequate yellow output. +Bs and Flatus, no BM during shift. Pt repositioned Q2hrs w/ pillows. UA sample sent. MIVF @ 75mL/hr. Plan for OR tomorrow. NPO status @ midnight. Continue plan of care.   "

## 2018-10-23 NOTE — PROGRESS NOTES
[facility], [place]    Trauma Service Tertiary Survey     Date of Service: 10/23/2018  Trauma mechanism: Fall down 2-3 stairs  Time/date of injury:10/22/2018 am  Known Injuries:  1. Left displaced femoral neck fracture  Other diagnoses:   1. Acute pain  2. Hypertension  3. Hyperlipidemia  4. Osteoporosis  5. GERD  6. Hiatal hernia S/P lap nissen 2013  7. leukocytosis      Procedure: Pending per Orthopedic surgery today 10/23  Plan:  1. Tertiary exam completed today. Denies other pain or discomfort. No further trauma imaging indicated. Reports no LOC or head strike with fall.   2. Left displaced femur fracture: Orthopedic Surgery consulted.   1. NPO for possible surgery today.   3. Keep on bedrest with NWB to LLE. Per-op labs obtained, EKG, CXR without trauma.   4. Acute pain: Regional Anesthesia was consulted for peripheral block yesterday but cancelled as the initial plan was for a JOHAN, however plan today for hemiarthroplasty, Ortho to coordinate need for block with RAPs team pre-op.  1. PRN Fentanyl for severe pain, intolerance to IV Dilaudid (severe nausea, vomiting), scheduled Tylenol, Oxycodone. Ice to left hip. PRN antiemetics available  5. Home medications:  Holding PTA Aspirin in anticipation of surgery. Other meds resumed  6. Leukocytosis: Probably trauma related, now resolved. Afebrile, no recent URI's, chills or malaise  7. Dirty UA, will hold on treating given lack of symptoms  8. PT/OT eval   9. Social work discharge planning     General Cares:  GI Prophylaxis: Resuming PTA PPI  DVT Prophylaxis: Mechanical for now/ chemical prophylaxis post op  Date of last stool/Bowel Regimen:PTA/ ordered  Pulmonary toilet:Incentive spirometer, cough and deep breathe  Lines / drains: Hall cath remains 2nd to immobility and need for strict I&O.    Code status: Full code      Disposition: inpatient anticipate 2-3 days     ETOH: Shahana Donaldson was asked if in the last 3-6 months there  "has been a time when she had 4 or more drinks in a single day/outing.. Patient answer to the screening question was in the negative. No intervention needed..    SUBJECTIVE: Shahana Donaldson is a 78 year old female who presents with evaluation following a fall. She states that she was in the process of moving things to her cabin and she fell down 2-3 carpeted stairs, landing on her left hip and now complains of left hip and groin pain. She reports worse left hip and groin pain with cough or movement and tolerable at rest. She denies any LOC, head strike, lightheadedness or dizziness. She states she has been in her usual states of health, no cough, cold or flu symptoms, no diarrhea or recent changes in her medications. She is not anticoagulated. Her only pain is in her left hip, no chest pain or shortness of breath.   Review of Systems   All other systems reviewed and are negative.      OBJECTIVE:  Blood pressure 116/54, pulse 66, temperature 97.4  F (36.3  C), temperature source Oral, resp. rate 16, height 1.753 m (5' 9\"), weight 68 kg (150 lb), SpO2 96 %, not currently breastfeeding.  Physical Exam   Constitutional: No distress.   HENT:   Head: Normocephalic and atraumatic.   Eyes: Conjunctivae are normal. Pupils are equal, round, and reactive to light.   Neck: Normal range of motion. Neck supple.   Cardiovascular: Normal rate and normal heart sounds.    Pulmonary/Chest: Effort normal. No respiratory distress. She has no wheezes. She has no rales.   Abdominal: Soft. Bowel sounds are normal.   Nauseated due to narcotics   Musculoskeletal: She exhibits tenderness. She exhibits no edema or deformity.   +cms   Neurological: She is alert.   Forgetful   Skin: Skin is warm and dry. She is not diaphoretic.   Psychiatric: She has a normal mood and affect. Her behavior is normal.       ROUTINE LABS: (Last four results)  [facility]  Recent Labs  Lab 10/23/18  0658 10/22/18  1141   NA  --  143   POTASSIUM  --  3.8   CHLORIDE  --  " 109   CO2  --  25   ANIONGAP  --  9   GLC  --  95   BUN  --  19   CR  --  0.71   GFRESTIMATED  --  79   GFRESTBLACK  --  >90   MARCK  --  8.5   MAG 2.0  --    PHOS 3.1  --      CBC  Recent Labs  Lab 10/23/18  0658 10/22/18  1141   WBC 6.4 11.3*   RBC 3.86 4.27   HGB 12.0 13.3   HCT 37.6 41.9   MCV 97 98   MCH 31.1 31.1   MCHC 31.9 31.7   RDW 13.0 12.9    211     INR  Recent Labs  Lab 10/22/18  1141   INR 0.98     Arterial Blood GasNo lab results found in last 7 days.    RADIOLOGY:  All radiology reviewed.    BRIAN Narayanan CNP

## 2018-10-23 NOTE — PROGRESS NOTES
"Orthopaedic Surgery Progress Note   October 23, 2018    Subjective: No acute events overnight. Pain controlled at rest and with medication. NPO for OR. Hall catheter.     Objective: /61 (BP Location: Left arm)  Pulse 66  Temp 97.7  F (36.5  C) (Oral)  Resp 18  Ht 1.753 m (5' 9\")  Wt 68 kg (150 lb)  SpO2 97%  BMI 22.15 kg/m2    General: NAD, alert and oriented, cooperative with exam.   Cardio: RRR, extremities wwp.   Respiratory: Non-labored breathing.  MSK: LLE: Toes wwp, DP 2+, bcr in all toes. +EHL/FHL/GSC/TA. SILT SP/DP/Sa/Schmid/T.     Labs:   WBC 6.4  Hgb 12.0  Plts 163    Assessment and Plan: Shahana Donaldson is a 78 year old female with PMH including HLD, GERD and HTN admitted after a GLF with a L femoral neck fracture.      Trauma Surgery Primary  OR: Today with Dr. Gutierrez for L hip hemiarthroplasty. NPO for OR today. Trauma clearance obtained. Consent obtained and placed in chart. Pre-op labs completed.   Activity: Bedrest.  Weight bearing status: NWB LLE.  Pain management: Per primary.    Antibiotics: Lilli-operatively.  Diet: NPO for OR today.   DVT prophylaxis: Hold chemical AC pre-operatively.  Imaging: No further imaging needed at this time.  Labs: Pre-op labs completed.  Bracing/Splinting: None.   Physical Therapy/Occupational Therapy: Eval and treat post-operatively.  Follow-up: TBD.  Disposition: Pending clinical course.     Nafisa Gaines MD  Orthopaedic Surgery Resident, PGY-4  Pager: [phone]    For questions about this patient during weekday business hours, please attempt to contact me at my pager prior to contacting the Orthopaedic Surgery resident on call. On the weekends and overnight, please page the Orthopaedic Surgery resident on call. Thank you!       "

## 2018-10-23 NOTE — PLAN OF CARE
Problem: Patient Care Overview  Goal: Plan of Care/Patient Progress Review  Outcome: No Change  VSS on 1L O2 via NC.   Neuro: A&Ox4. Forgetful at times. CMS intact.  GI/: Hall draining clear laith urine. Active BS, passing gas. Nausea, 4mg zofran given x2.   Diet: NPO + Meds  Incisions/Drains: Hall patent  IV Access: R PIV running NS @ 75mL  Activity: Bedrest, non weight bearing L leg  Pain: L hip pain reported, 5 mg oxycodone given x1.     New changes this shift: Sent to pre-op at 2:00. Compazine ordered to help with nausea.  Plan: Continue to monitor.

## 2018-10-23 NOTE — ANESTHESIA PREPROCEDURE EVALUATION
Anesthesia Plan      History & Physical Review  History and physical reviewed and following examination; no interval change.    ASA Status:  3 .    NPO Status:  > 8 hours    Plan for General with Intravenous induction. Maintenance will be Balanced.           Postoperative Care      Consents          Iwona Donaldson is a 78 year old RHD female with PMHx significant for HTN, HLD, CAD, hiatal hernia s/p Nissen who presents following a mechanical fall with left hip pain. The patient reports that [place] when she slipped on tiled floor in socks and fell on her left hip. She had immediate pain to the left hip and was unable to ambulate. No LOC, no head trauma. Denies pain elsewhere. No numbness or tingling to her lower extremities. She has not injured her left hip in the past. Of note, she did not have left hip pain prior to this event. She does have bilateral knee pain. She ambulates without assistive devices at baseline and is fairly active, working out at the [facility] 3x/week with her . Not on anticoagulants. Non-smoker and denies issues with bleeding, clotting, anesthesia. Last PO intake was dinner yesterday evening.           Allergies   Allergen Reactions     Dilaudid [Hydromorphone]      dizziness     Senna Hives     Recent Results (from the past 4320 hour(s))   Echocardiogram    Narrative    [id number]  [id number]  [id number]  [id number]           [facility]  Diagnostic and Treamtent-3rd Floor  [address] [place] [id number]     Name: IWONA DONALDSON  MRN: [id number]  : 1940  Study Date: 2018 01:08 PM  Age: 78 yrs  Gender: Female  Patient Location: [facility] [place] [facility]  Reason For Study: , Heart murmur  Ordering Physician: JORDI FINN  Referring Physician: JORDI FINN  Performed By: Kenyon Faye     BSA: 1.8 m2  Height: 68 in  Weight: 153 lb  HR: 68  BP: 153/86  mmHg  _____________________________________________________________________________  __        Procedure  Echocardiogram with two-dimensional, color and spectral Doppler performed.  _____________________________________________________________________________  __        Interpretation Summary  Left ventricular function, chamber size, wall motion, and wall thickness are  normal.The EF is 60-65%. No regional wall motion abnormalities are seen.  Right ventricular function, chamber size, wall motion, and thickness are  normal.  Trace to mild mitral insufficiency is present. Mild tricuspid insufficiency is  present.  The inferior vena cava was normal in size with preserved respiratory  variability. No pericardial effusion is present.     Previous study not available for comparison.  _____________________________________________________________________________  __        Left Ventricle  Left ventricular function, chamber size, wall motion, and wall thickness are  normal.The EF is 60-65%. Grade I or early diastolic dysfunction. No regional  wall motion abnormalities are seen.     Right Ventricle  Right ventricular function, chamber size, wall motion, and thickness are  normal.     Atria  Both atria appear normal.     Mitral Valve  The mitral valve is normal. Mild mitral annular calcification is present.  Trace to mild mitral insufficiency is present.        Aortic Valve  The aortic valve is tricuspid. Mild aortic valve sclerosis is present. No  aortic regurgitation is present.     Tricuspid Valve  The tricuspid valve is normal. Mild tricuspid insufficiency is present. The  peak velocity of the tricuspid regurgitant jet is not obtainable.     Pulmonic Valve  The pulmonic valve is normal. Trace pulmonic insufficiency is present.     Vessels  The aorta root is normal. The inferior vena cava was normal in size with  preserved respiratory variability.     Pericardium  No pericardial effusion is present.        Compared to  Previous Study  Previous study not available for comparison.  _____________________________________________________________________________  __  MMode/2D Measurements & Calculations  IVSd: 1.2 cm     LVIDd: 4.5 cm  LVIDs: 2.7 cm  LVPWd: 1.2 cm  FS: 39.4 %  LV mass(C)d: 198.5 grams  LV mass(C)dI: 108.8 grams/m2  Ao root diam: 3.3 cm  LA dimension: 3.4 cm  asc Aorta Diam: 3.1 cm  LA/Ao: 1.0  LVOT diam: 1.8 cm  LVOT area: 2.6 cm2  LA Volume (BP): 87.9 ml  LA Volume Index (BP): 48.3 ml/m2  RWT: 0.55           Doppler Measurements & Calculations  MV E max gloria: 87.3 cm/sec  MV A max gloria: 110.1 cm/sec  MV E/A: 0.79  MV dec slope: 504.2 cm/sec2  MV dec time: 0.18 sec  TR max gloria: 302.7 cm/sec  TR max P.6 mmHg  PAST MEDICAL HISTORY:   Past Medical History:   Diagnosis Date     Diverticulosis of colon (without mention of hemorrhage)      Essential hypertension, benign      Gastro-oesophageal reflux disease     nissen     Hemorrhoid      Nonsenile cataract      Osteoporosis      Other and unspecified hyperlipidemia      Rectocele      Symptomatic menopausal or female climacteric states        PAST SURGICAL HISTORY:   Past Surgical History:   Procedure Laterality Date     C NONSPECIFIC PROCEDURE      Bilateral Tubal Ligation     C NONSPECIFIC PROCEDURE      D&C secondary to menorrhagia     C NONSPECIFIC PROCEDURE      child birth x 2     COLONOSCOPY  2011    Procedure:COLONOSCOPY; Surgeon:HALINA SCOTT; Location: GI     COLONOSCOPY Left 11/10/2015    Procedure: COLONOSCOPY;  Surgeon: Raheem Colunga MD;  Location:  GI     GYN SURGERY      hysterectomy/oopherectomy; done for prolapse     LAPAROSCOPIC NISSEN FUNDOPLICATION  2013    Procedure: LAPAROSCOPIC NISSEN FUNDOPLICATION;  Laparoscopic Repair of Hiatel Hernia, NISSEN, Esophagoscopy, Gastroscopy And Duodenoscopy ;  Surgeon: Leonidas Valle MD;  Location: [facility]       FAMILY HISTORY:   Family History   Problem Relation Age of Onset      Hypertension Father      C.A.D. Father      Hypertension Mother      Breast Cancer Sister      EYE* Brother      Keratoconus     Glaucoma No family hx of      Macular Degeneration No family hx of        SOCIAL HISTORY:   Social History   Substance Use Topics     Smoking status: Never Smoker     Smokeless tobacco: Never Used     Alcohol use No     E/E' avg: 10.8  Lateral E/e': 10.2  Medial E/e': 11.5     QLAB 3DQ Advanced  Stroke Vol: 40.4 ml  10_EDV(3DQA): 66.1 ml  10_ESV(3DQA): 25.7 ml  10_EF(3DQA): 61.1 %  10_Tmsv 3-6: -50.0 msec  10_Tmsv 3-5: -54.0 msec  10_Tmsv 3-6 (%): -6.1 %     10_Tmsv 3-5 (%): -6.6 %  _____________________________________________________________________________  __           Report approved by: AMINA Nguyen 07/31/2018 02:24 PM            Lab Results   Component Value Date    WBC 6.4 10/23/2018    HGB 12.0 10/23/2018    HCT 37.6 10/23/2018     10/23/2018    CHOL 172 11/11/2016    TRIG 126 11/11/2016     11/11/2016    ALT 18 04/17/2018    AST 21 04/17/2018     10/22/2018    BUN 19 10/22/2018    CO2 25 10/22/2018    TSH 2.51 04/17/2018    INR 0.98 10/22/2018     Prescriptions Prior to Admission   Medication Sig Dispense Refill Last Dose     ASPIRIN 81 MG OR TABS 1 tab po QD (Once per day) 100 3 10/21/2018 at PM     atorvastatin (LIPITOR) 10 MG tablet Take 1 tablet (10 mg) by mouth daily 90 tablet 3 10/21/2018 at PM     CARTIA  MG 24 hr capsule TAKE ONE CAPSULE BY MOUTH ONE TIME DAILY  90 capsule 3 10/22/2018 at AM     Cholecalciferol (VITAMIN D3) 1000 UNIT TABS Take 1 tablet by mouth daily.   10/21/2018 at PM     ascorbic acid (VITAMIN C) 500 MG tablet Take 500 mg by mouth as needed (when thinking of it)    More than a month at Unknown time     clobetasol (TEMOVATE) 0.05 % ointment Apply sparingly to affected area 2 x daily for14 days. Then, apply small amount to affected area twice weekly for maintenance. (Patient taking differently: Apply sparingly to affected area  2 x daily for14 days as needed for rash. Then, apply small amount to affected area twice weekly for maintenance.) 30 g 2 More than a month at prn       .

## 2018-10-23 NOTE — BRIEF OP NOTE
[facility] [place]    Brief Operative Note    Pre-operative diagnosis: Left hip fracture  Post-operative diagnosis * No post-op diagnosis entered *  Procedure: Procedure(s):  LEFT HIP COLEMAN- ARTHROPLASTY   Surgeon: Surgeon(s) and Role:     * Araceli Gutierrez MD - Primary     * Jakob Anthony MD - Resident - Assisting  Anesthesia: General   Estimated blood loss: 150ml  Drains: None  Specimens: * No specimens in log *  Findings:   None.  Complications: None.  Implants: [employer] bipolar hemiarthroplasty    Trauma Primary  Activity: up with assist  Weight bearing status: WBAT; posterior hip precautions x 3 months  Antibiotics: Ancef x 24 hours  Diet: regular  DVT prophylaxis: Lovenox 40mg daily x 2 weeks then  x 6 weeks, SCDs  Imaging: PACU XR  Labs: hgb x 2 days  Bracing/Splinting: abductor pillow to be on while in bed  Dressings: Keep clean, dry and intact x 7 days post-op  Drains: none  Physical Therapy/Occupational Therapy: Eval and treat for mobilization and ambulation  Follow-up: Clinic with Dr. Gutierrez in 2 weeks.    Disposition: Pending progress with therapies, pain control on orals, and medical stability

## 2018-10-24 ENCOUNTER — APPOINTMENT (OUTPATIENT)
Dept: OCCUPATIONAL THERAPY | Facility: CLINIC | Age: 78
DRG: 469 | End: 2018-10-24
Attending: NURSE PRACTITIONER
Payer: MEDICARE

## 2018-10-24 ENCOUNTER — APPOINTMENT (OUTPATIENT)
Dept: PHYSICAL THERAPY | Facility: CLINIC | Age: 78
DRG: 469 | End: 2018-10-24
Attending: NURSE PRACTITIONER
Payer: MEDICARE

## 2018-10-24 LAB
ANION GAP SERPL CALCULATED.3IONS-SCNC: 10 MMOL/L (ref 3–14)
BUN SERPL-MCNC: 14 MG/DL (ref 7–30)
CALCIUM SERPL-MCNC: 8.3 MG/DL (ref 8.5–10.1)
CHLORIDE SERPL-SCNC: 109 MMOL/L (ref 94–109)
CO2 SERPL-SCNC: 21 MMOL/L (ref 20–32)
CREAT SERPL-MCNC: 0.62 MG/DL (ref 0.52–1.04)
ERYTHROCYTE [DISTWIDTH] IN BLOOD BY AUTOMATED COUNT: 12.7 % (ref 10–15)
GFR SERPL CREATININE-BSD FRML MDRD: >90 ML/MIN/1.7M2
GLUCOSE SERPL-MCNC: 129 MG/DL (ref 70–99)
HCT VFR BLD AUTO: 33.3 % (ref 35–47)
HGB BLD-MCNC: 10.7 G/DL (ref 11.7–15.7)
MCH RBC QN AUTO: 31.4 PG (ref 26.5–33)
MCHC RBC AUTO-ENTMCNC: 32.1 G/DL (ref 31.5–36.5)
MCV RBC AUTO: 98 FL (ref 78–100)
PLATELET # BLD AUTO: 160 10E9/L (ref 150–450)
POTASSIUM SERPL-SCNC: 4.1 MMOL/L (ref 3.4–5.3)
RBC # BLD AUTO: 3.41 10E12/L (ref 3.8–5.2)
SODIUM SERPL-SCNC: 141 MMOL/L (ref 133–144)
WBC # BLD AUTO: 8 10E9/L (ref 4–11)

## 2018-10-24 PROCEDURE — A9270 NON-COVERED ITEM OR SERVICE: HCPCS | Mod: GY | Performed by: NURSE PRACTITIONER

## 2018-10-24 PROCEDURE — 85027 COMPLETE CBC AUTOMATED: CPT | Performed by: NURSE PRACTITIONER

## 2018-10-24 PROCEDURE — 25000132 ZZH RX MED GY IP 250 OP 250 PS 637: Mod: GY | Performed by: NURSE PRACTITIONER

## 2018-10-24 PROCEDURE — 97530 THERAPEUTIC ACTIVITIES: CPT | Mod: GP

## 2018-10-24 PROCEDURE — 99222 1ST HOSP IP/OBS MODERATE 55: CPT | Performed by: INTERNAL MEDICINE

## 2018-10-24 PROCEDURE — 40000133 ZZH STATISTIC OT WARD VISIT: Performed by: OCCUPATIONAL THERAPIST

## 2018-10-24 PROCEDURE — 97116 GAIT TRAINING THERAPY: CPT | Mod: GP

## 2018-10-24 PROCEDURE — 12000003 ZZH R&B CRITICAL UMMC

## 2018-10-24 PROCEDURE — 99233 SBSQ HOSP IP/OBS HIGH 50: CPT | Performed by: NURSE PRACTITIONER

## 2018-10-24 PROCEDURE — 80048 BASIC METABOLIC PNL TOTAL CA: CPT | Performed by: NURSE PRACTITIONER

## 2018-10-24 PROCEDURE — 36415 COLL VENOUS BLD VENIPUNCTURE: CPT | Performed by: NURSE PRACTITIONER

## 2018-10-24 PROCEDURE — 97166 OT EVAL MOD COMPLEX 45 MIN: CPT | Mod: GO | Performed by: OCCUPATIONAL THERAPIST

## 2018-10-24 PROCEDURE — 97161 PT EVAL LOW COMPLEX 20 MIN: CPT | Mod: GP

## 2018-10-24 PROCEDURE — 99231 SBSQ HOSP IP/OBS SF/LOW 25: CPT | Performed by: NURSE PRACTITIONER

## 2018-10-24 PROCEDURE — 97535 SELF CARE MNGMENT TRAINING: CPT | Mod: GO | Performed by: OCCUPATIONAL THERAPIST

## 2018-10-24 PROCEDURE — 25000128 H RX IP 250 OP 636: Performed by: SURGERY

## 2018-10-24 PROCEDURE — 25000128 H RX IP 250 OP 636: Performed by: STUDENT IN AN ORGANIZED HEALTH CARE EDUCATION/TRAINING PROGRAM

## 2018-10-24 PROCEDURE — 97530 THERAPEUTIC ACTIVITIES: CPT | Mod: GO | Performed by: OCCUPATIONAL THERAPIST

## 2018-10-24 PROCEDURE — 40000193 ZZH STATISTIC PT WARD VISIT

## 2018-10-24 RX ORDER — POLYETHYLENE GLYCOL 3350 17 G/17G
17 POWDER, FOR SOLUTION ORAL 2 TIMES DAILY
Status: DISCONTINUED | OUTPATIENT
Start: 2018-10-24 | End: 2018-10-26 | Stop reason: HOSPADM

## 2018-10-24 RX ADMIN — VITAMIN D, TAB 1000IU (100/BT) 2000 UNITS: 25 TAB at 09:20

## 2018-10-24 RX ADMIN — CEFAZOLIN SODIUM 2 G: 2 INJECTION, SOLUTION INTRAVENOUS at 09:23

## 2018-10-24 RX ADMIN — CALCIUM 1500 MG: 500 TABLET ORAL at 09:21

## 2018-10-24 RX ADMIN — ACETAMINOPHEN 975 MG: 325 TABLET, FILM COATED ORAL at 09:23

## 2018-10-24 RX ADMIN — ACETAMINOPHEN 975 MG: 325 TABLET, FILM COATED ORAL at 01:14

## 2018-10-24 RX ADMIN — POLYETHYLENE GLYCOL 3350 17 G: 17 POWDER, FOR SOLUTION ORAL at 21:11

## 2018-10-24 RX ADMIN — ACETAMINOPHEN 975 MG: 325 TABLET, FILM COATED ORAL at 21:10

## 2018-10-24 RX ADMIN — ATORVASTATIN CALCIUM 10 MG: 10 TABLET, FILM COATED ORAL at 21:11

## 2018-10-24 RX ADMIN — DILTIAZEM HYDROCHLORIDE 300 MG: 120 CAPSULE, COATED, EXTENDED RELEASE ORAL at 09:21

## 2018-10-24 RX ADMIN — ACETAMINOPHEN 975 MG: 325 TABLET, FILM COATED ORAL at 15:09

## 2018-10-24 RX ADMIN — CEFAZOLIN SODIUM 2 G: 2 INJECTION, SOLUTION INTRAVENOUS at 01:06

## 2018-10-24 RX ADMIN — POLYETHYLENE GLYCOL 3350 17 G: 17 POWDER, FOR SOLUTION ORAL at 09:23

## 2018-10-24 RX ADMIN — ENOXAPARIN SODIUM 40 MG: 40 INJECTION SUBCUTANEOUS at 12:44

## 2018-10-24 RX ADMIN — DOCUSATE SODIUM 100 MG: 100 CAPSULE, LIQUID FILLED ORAL at 21:11

## 2018-10-24 RX ADMIN — DOCUSATE SODIUM 100 MG: 100 CAPSULE, LIQUID FILLED ORAL at 09:22

## 2018-10-24 ASSESSMENT — ACTIVITIES OF DAILY LIVING (ADL)
ADLS_ACUITY_SCORE: 12
ADLS_ACUITY_SCORE: 11
ADLS_ACUITY_SCORE: 12
ADLS_ACUITY_SCORE: 11
IADL_COMMENTS: OT: PT WAS IND

## 2018-10-24 ASSESSMENT — PAIN DESCRIPTION - DESCRIPTORS: DESCRIPTORS: ACHING

## 2018-10-24 NOTE — PLAN OF CARE
Problem: Patient Care Overview  Goal: Plan of Care/Patient Progress Review  Discharge Planner PT   Patient plan for discharge: TCU, after eval and PT discussion with pt and , they are in agreement with TCU stay prior to home  Current status: PT: Pt supine, agreeable to working with PT, supportive  present throughout. Pt educated briefly on hip precautions, will need written handouts and continued edu. Education continued through mobility. Pt transfers mod A hooklying>EOB and sup>sit. Mildly dizzy seated EOB and slight increase with standing, but VSS, resolved with supine again. Pt transfers sit<>stand mod A from raised bed, edu and continued tactile cues for application hip precs. Pt lea to take 3 steps sideways up bed and forward/backward with wh walker and step by step directions for sequence. Pt supine in bed with all needs in reach.  Barriers to return to prior living situation: functional mobility and ADL status with precautions, medical status  Recommendations for discharge: TCU  Rationale for recommendations: progress functional I with mobility, ADLs prior to home with supportive        Entered by: Mari Ruiz 10/24/2018 11:17 AM

## 2018-10-24 NOTE — PLAN OF CARE
Problem: Patient Care Overview  Goal: Plan of Care/Patient Progress Review  Patient is A/Ox4 but forgetful. VSS, on O2 at 2 L/nc +BS, +flatus, soft non tender abdomen, no BM, denied nausea. Hall in place, adequate clear urine output, cath cares done. MIVF at 75 ml/hr via right ac PIV. Left PIV SL. Denied pain, scheduled Tylenol 975 mg given. +CMS, repositioned with assist of 2, abductor pillow in place. LLE with ace wraps on, cdi, WBAT. Got order to start on regular diet. Continue poc.

## 2018-10-24 NOTE — PROGRESS NOTES
[facility], [place]  Trauma Service Progress Note    Date of Service (when I saw the patient): 10/24/2018     Assessment & Plan   Trauma mechanism: Fall down 2-3 stairs  Time/date of injury:10/22/2018 am  Known Injuries:  1. Left displaced femoral neck fracture  Other diagnoses:   1. Acute pain  2. Acute hypoxic respiratory failure  3. Hypertension  4. Hyperlipidemia  5. Constipation  6. Osteoporosis  7. Hiatal hernia S/P lap nissen 2013      Procedure: Pending per Orthopedic surgery today 10/23  Plan:  1. Admitted to the trauma service. Tertiary exam completed on 10/23.   2. Left displaced femur fracture s/p left hip jorge alberto-arthroplasty on 10/23. Okay to be up with assist, WBAT with posterior hip precautions for 3 months. DVT prophylaxis as below. Recheck hemoglobin tomorrow. PT/OT. Completed vanessa-operative antibiotics. Continue calcium and vitamin D supplementation. Follow-up with Dr. Gutierrez in 2 weeks.   3. Acute pain, stable. She received a block yesterday from anesthesia. Currently taking only acetaminophen and achieving adequate pain control. Recommend ice to left buttock to reduce edema. Oxycodone and IV morphine available prn.  4. Acute hypoxic respiratory failure, improving. Likely secondary to atelectasis from immobility and surgery/anesthesia. Currently on 2L by nasal cannula, wean to maintain SpO2 92-96%.  4. Constipation. Continue docusate BID, increase miralax to BID.   5. Hypertension, stable. Continue home diltiazem.  6. Hyperlipidemia, stable. Continue home atorvastatin.     General Cares:  GI Prophylaxis: none indicated  DVT Prophylaxis: Lovenox 40 mg daily x2 weeks followed by  for 6 weeks; SCDs   Date of last stool/Bowel Regimen: last BM PTA, regimen includes docusate and miralax  Pulmonary toilet:Incentive spirometer, cough and deep breathe  Lines / drains: Hall cath to be removed tomorrow.     Code status: Full code      Disposition: Likely to TCU, spoke  with SW about this. Anticipate discharge in 2-3 days      Interval History   Complained of dizziness when she dangled at the edge of the bed. Endorses mild headache. Denies shortness of breath, chest pain, abdominal pain, nausea, numbness/tingling. ROS x 8 negative with exception of those things listed in interval hx    Physical Exam   Temp: 98.6  F (37  C) Temp src: Oral BP: 128/73 Pulse: 79 Heart Rate: 81 Resp: 16 SpO2: 92 % O2 Device: Nasal cannula Oxygen Delivery: 2 LPM  Vitals:    10/22/18 0840   Weight: 68 kg (150 lb)     Vital Signs with Ranges  Temp:  [95.7  F (35.4  C)-98.6  F (37  C)] 98.6  F (37  C)  Pulse:  [78-79] 79  Heart Rate:  [66-93] 81  Resp:  [12-22] 16  BP: (105-140)/(44-79) 128/73  SpO2:  [90 %-98 %] 92 %  I/O last 3 completed shifts:  In: 2633.75 [P.O.:120; I.V.:2263.75]  Out: 1525 [Urine:1325; Blood:200]    Bagley Coma Scale - Total 15/15    Constitutional: Awake, alert, cooperative, no apparent distress  Eyes: No icterus  ENT: Normocephalic, atraumatic  Respiratory: No increased work of breathing, good air exchange, clear to auscultation bilaterally, no crackles or wheezing.  Cardiovascular:  regular rate and rhythm, normal S1 and S2, no S3/S4, no m/r/g. Trace edema of LLE.  GI: Normal bowel sounds, soft, non-distended, non-tender, no guarding  Genitourinary: Urine yellow and clear  Skin:  No erythema or ecchymosis noted, unable to assess LLE under ace wrap  Musculoskeletal: No deformities noted. Abductor pillow in place.  Neurologic: Awake, alert, oriented. Strength and sensation intact. No focal deficits  Neuropsychiatric: Calm, normal eye contact, alert, affect appropriate to situation, oriented, thought process normal.    BRIAN Glover CNP  To contact the trauma service use job code pager [phone],   Numeric texts or alpha text through [employer]  \

## 2018-10-24 NOTE — PROGRESS NOTES
"REGIONAL ANESTHESIA PAIN SERVICE  SUBJECTIVE  Interval history: Patient reports pain adequately controlled with nerve block and taking nonopioid analgesics (see below).  Currently rating pain minimal pain at rest and tolerable with activity, stood with PT and walked a few steps in her room this morning.  tolerating regular diet,  Voiding spontaneously, denies nausea.  Denies any weakness, paresthesias, circumoral numbness, metallic taste or tinnitus.      Medications related to Pain Management (Future)    Start     Dose/Rate Route Frequency Ordered Stop    10/24/18 2000  polyethylene glycol (MIRALAX/GLYCOLAX) Packet 17 g      17 g Oral 2 TIMES DAILY 10/24/18 1335      10/23/18 2040  bupivacaine liposome (EXPAREL) LONG ACTING injection was administered into the infiltration site to produce postsurgical analgesia. Duration of action is up to 72 hours, and other \"samuel\" medications should not be given for 96 hours with the exception of the lidocaine 5% patch (LIDODERM) and the lidocaine 10mg in potassium infusions. This entry is for INFORMATION ONLY.       Does not apply CONTINUOUS PRN 10/23/18 2040 10/27/18 2039    10/22/18 2000  docusate sodium (COLACE) capsule 100 mg      100 mg Oral 2 TIMES DAILY 10/22/18 1435      10/22/18 1415  acetaminophen (TYLENOL) tablet 975 mg      975 mg Oral EVERY 6 HOURS 10/22/18 1406      10/22/18 1406  morphine (PF) injection 2 mg      2 mg Intravenous EVERY 2 HOURS PRN 10/22/18 1406      10/22/18 1406  oxyCODONE IR (ROXICODONE) tablet 5 mg      5 mg Oral EVERY 3 HOURS PRN 10/22/18 1406      10/22/18 1406  methocarbamol (ROBAXIN) tablet 750 mg      750 mg Oral EVERY 6 HOURS PRN 10/22/18 1406      10/22/18 1406  acetaminophen (TYLENOL) Suppository 650 mg      650 mg Rectal EVERY 4 HOURS PRN 10/22/18 1406            OBJECTIVE:    /73  Pulse 79  Temp 98.6  F (37  C) (Oral)  Resp 16  Ht 1.753 m (5' 9\")  Wt 68 kg (150 lb)  SpO2 92%  BMI 22.15 kg/m2  Exam:  General: alert and no " distress  Neuro: Strength LLE 5/5    ASSESSMENT/PLAN:    Shahana Donaldson is a 78 year old female with fall 10/22 sustaining left displaced femoral neck fracture, now POD #1 s/p  ARTHROPLASTY HIP  ARTHROPLASTY HIP with single shot injection left fascia iliaca nerve block.  Total bupivacaine 0.25% 20 mL and liposomal (long-acting) bupivacaine (Exparel) 1.3% 20 mL administered 10/23/18 for postop pain control.  Patient is ambulating with walker, able to WBAT LLE.  No weakness or paresthesias.  No evidence of adverse side effects associated with nerve block injection.  Acheiving adequate pain control, with nerve block and oral nonopioid analgesics.  Anticipate 48-72 hours of pain control with long-acting local anesthetic. Additionally, patient will continue to require multimodal analgesia for musculoskeletal pain not controlled with long-acting local anesthetic.      - NO other local anesthetic use within 96 hours of liposomal bupivacaine (Exparel), unless approved by [facility]  - patient received verbal and written instructions about liposomal bupivacaine and counseling about pharmacologic and nonpharmacologic measures for acute postoperative pain management  - please call [facility] if questions or concerns    BRIAN Adams [facility]  Regional Anesthesia Pain Service ([facility])  10/24/2018 2:24 PM    [facility] Contact Info (for in-house use only):  Job code ID: [id number]   [employer] 0599  [facility] 0602  [employer] phone: dial [phone], enter jobcode ID, then enter call-back number.    Text: Use [employer] on the Intranet <Paging/Directory> tab and enter Jobcode ID.   If no call back at any time, contact the hospital  and ask for [facility]APS attending or backup

## 2018-10-24 NOTE — ANESTHESIA CARE TRANSFER NOTE
Patient: Shahana Donaldson    Procedure(s):  LEFT HIP COLEMAN- ARTHROPLASTY     Diagnosis: Left hip fracture  Diagnosis Additional Information: No value filed.    Anesthesia Type:   General     Note:  Airway :Face Mask  Patient transferred to:PACU  Comments: VSS. Breathing spontaneously at a regular rate with adequate tidal volumes and maintaining O2 sats on 6L facemask. Denies nausea or pain. No apparent complications from anesthesia.     Frannie Martínez MD  [facility] Anesthesia Resident  Handoff Report: Identifed the Patient, Identified the Reponsible Provider, Reviewed the pertinent medical history, Discussed the surgical course, Reviewed Intra-OP anesthesia mangement and issues during anesthesia, Set expectations for post-procedure period and Allowed opportunity for questions and acknowledgement of understanding      Vitals: (Last set prior to Anesthesia Care Transfer)    CRNA VITALS  10/23/2018 1819 - 10/23/2018 1904      10/23/2018             Pulse: 83    Ht Rate: 82    SpO2: 97 %    Resp Rate (observed): (!)  2                Electronically Signed By: Frannie Chaney MD  October 23, 2018  7:04 PM

## 2018-10-24 NOTE — PLAN OF CARE
Problem: Patient Care Overview  Goal: Plan of Care/Patient Progress Review  Discharge Planner OT   Patient plan for discharge: TCU  Current status: Pt mod A bed mobility, mod A x2 pivot transfers, needing frequent reminders for posterior hip precautions  Barriers to return to prior living situation: medical status  Recommendations for discharge: TCU  Rationale for recommendations: to increase ind in ADLS/IADLS       Entered by: Mimi Muniz 10/24/2018 3:09 PM

## 2018-10-24 NOTE — PROGRESS NOTES
"/51  Pulse 79  Temp 95.7  F (35.4  C) (Oral)  Resp 22  Ht 1.753 m (5' 9\")  Wt 68 kg (150 lb)  SpO2 94%  BMI 22.15 kg/m2    Pt up from PACU @ ~2100. VSS. Afebrile. Pt is forgetful during conversation, often repeating questions. AxO x4. CMS intact. Pt denies chest pain or shortness of breath. 94% on 3 L NC. Pt states pain is manageable from block received. Pt reports still feeling queasy, no emesis during shift. Hall intact w/ adequate laith output. +Bs and Flatus.  Pt repositioned w/ pillows. Abduction pillow in place. MIVF @ 75mL/hr.  Continue plan of care.   "

## 2018-10-24 NOTE — ANESTHESIA POSTPROCEDURE EVALUATION
Anesthesia POST Procedure Evaluation    Patient: Shahana Donaldson   MRN:     [id number] Gender:   female   Age:    78 year old :      1940        Preoperative Diagnosis: Left hip fracture   Procedure(s):  LEFT HIP COLEMAN- ARTHROPLASTY    Postop Comments: No value filed.       Anesthesia Type:  Not documented    Reportable Event: NO     PAIN: Uncomplicated   Sign Out status: Comfortable, Well controlled pain     PONV: No PONV   Sign Out status:  No Nausea or Vomiting     Neuro/Psych: Uneventful perioperative course   Sign Out Status: Preoperative baseline; Age appropriate mentation     Airway/Resp.: Uneventful perioperative course   Sign Out Status: Non labored breathing, age appropriate RR; Resp. Status within EXPECTED Parameters     CV: Uneventful perioperative course   Sign Out status: Appropriate BP and perfusion indices; Appropriate HR/Rhythm     Disposition:   Sign Out in:  PACU  Disposition:  Floor  Recovery Course: Uneventful  Follow-Up: Not required           Last Anesthesia Record Vitals:  CRNA VITALS  10/23/2018 1819 - 10/23/2018 1919      10/23/2018             Pulse: 83    Ht Rate: 82    SpO2: 97 %    Resp Rate (observed): (!)  2          Last PACU/Preop Vitals:  Vitals:    10/23/18 1915 10/23/18 1930 10/23/18 1945   BP: 121/68 133/73 109/50   Pulse:      Resp: 18 16 14   Temp:  36.4  C (97.6  F)    SpO2: 92% 94% 90%         Electronically Signed By: Adriane Taylor MD, 2018, 8:00 PM

## 2018-10-24 NOTE — CONSULTS
"[facility], [place]    Palliative Care Consultation Note    Patient: Shahana Donaldson  Date of Admission:  10/22/2018    Requesting provider/team: trauma  Reason for consult: trauma consult post fall - Goals of care      Recommendations:  Symptom management: Pain well controlled and patient without complaints - no symptom management recommendations.     Goals of care: Shahana had a very active life prior to hip fracture without any significant past medical problems. She is hoping to return to her active life style.  She understands that rehab facility is being recommended and is hopign that she can go to a facility close to home. She has not been told how long rehab will take yet but at this time feels she will do whatever it takes to be able to return to her active life style.   - advanced care planning. Kamini filled out form 10+ yrs ago. I reviewed advanced care directive, specifically page with \"hopes and wishes\" and explained importance of filling out this page. Kamini wants to fill out advanced care directive before leaving hospital. Will try to get support in filling out advanced care directive - longer form.   - full code        These recommendations have been discussed with trauma team.    Thank you for the opportunity to participate in the care of this patient and family. Our team will try and assist with advanced care planning before signing off. Please feel free to contact the on-call Palliative provider with any urgent needs.     Mary Donohue  Pager: [phone]  [facility] Inpatient Team Consult pager [phone] (M-F 8-4:30)  After-hours Answering Service [phone]   Palliative Clinic: [phone]       Assessment:  Shahana Donaldson is a 78 year old female with no sig PMH aside from hypertension who was walking down steps inside her house while carrying a box and in a rush and slipped or tripped on the second to last step and fell and fractured her left femur. " Now s/p left hip jorge alberto-arthroplasty on 10/23.     I met with Kamini and her , Jake, and daughter, Sara, today to introduce the Palliative Care team and services we provide; such as symptom management, assistance navigating chronic illness and goals for treatment, counseling, psychosocial and spiritual support.     Symptoms: pain well controlled. No complaints at this time.   Social:         Living situation: lives with her ; son and daughter live in the area and daughter has 3 kids middle school age       Support system: family, many friends, very involved in [employer] and sig support and friends with [employer]       Actual/Potential Caregiver: , daughter       Functional status: was completely independent prior to fall and fracture       Financial concerns: ND       Substance use disorder (past / present): no       Hobbies: [place], [place] in the summer, puzzles in the winter, very involved with [employer] activities    Coping: very well  Spiritual/Mu-ism:    Spiritual background: yes - Jain  Spiritual needs: no      Prognostic Information: pt has not been told how long to expect for recovery. Waiting to hear from surgeons.  Advance Care Planning: advanced directive reviewed with patient and her family. She would like to fill it out later in her hospital stay after discussing more with her family.          Decision making capacity: intact       Goals of Care: hoping for full recovery       Preferred way of decision making: with her  in consultation with children and doctors       Health care directive: filled one out 10+ yrs ago       Code Status:  Full Code    History of Present Illness   Sources of History:patient, electronic health record, patient's spouse and daughter    Shahana Donaldson is a 78 year old female with no sig PMH aside from hypertension who was walking down steps inside her house while carrying a box and in a rush and slipped or tripped on  the second to last step and fell and fractured her left femur. Now s/p left hip coleman-arthroplasty on 10/23.     Review of Systems: 10 point ROS neg other than the symptoms noted above in the HPI and here  Pain: none while lying in bed  Fatigue: 1  Nausea: 0  Constipation: 1  Diarrhea: 0  Depressive Symptoms: 0  Anxiety: 0  Drowsiness: 0  Poor Appetite: 0  Shortness of Breath: 0       Past Medical History:   Past Medical History:   Diagnosis Date     Diverticulosis of colon (without mention of hemorrhage)      Essential hypertension, benign      Gastro-oesophageal reflux disease     nissen     Hemorrhoid      Nonsenile cataract      Osteoporosis      Other and unspecified hyperlipidemia      Rectocele      Symptomatic menopausal or female climacteric states           Past Surgical History:   Past Surgical History:   Procedure Laterality Date     ARTHROPLASTY HIP Left 10/23/2018    Procedure: LEFT HIP COLEMAN- ARTHROPLASTY ;  Surgeon: Araceli Gutierrez MD;  Location:  OR     C NONSPECIFIC PROCEDURE      Bilateral Tubal Ligation     C NONSPECIFIC PROCEDURE      D&C secondary to menorrhagia     C NONSPECIFIC PROCEDURE      child birth x 2     COLONOSCOPY  5/24/2011    Procedure:COLONOSCOPY; Surgeon:HALINA SCOTT; Location: GI     COLONOSCOPY Left 11/10/2015    Procedure: COLONOSCOPY;  Surgeon: Raheem Colunga MD;  Location:  GI     GYN SURGERY      hysterectomy/oopherectomy; done for prolapse     LAPAROSCOPIC NISSEN FUNDOPLICATION  1/7/2013    Procedure: LAPAROSCOPIC NISSEN FUNDOPLICATION;  Laparoscopic Repair of Hiatel Hernia, NISSEN, Esophagoscopy, Gastroscopy And Duodenoscopy ;  Surgeon: Leonidas Valle MD;  Location:  OR             Family History:   Family History   Problem Relation Age of Onset     Hypertension Father      C.A.D. Father      Hypertension Mother      Breast Cancer Sister      EYE* Brother      Keratoconus     Glaucoma No family hx of      Macular Degeneration No family hx  of      Family history reviewed       Allergies:   Allergies   Allergen Reactions     Dilaudid [Hydromorphone]      dizziness     Senna Hives            Medications:   I have reviewed this patient's medication profile and medications given in the past 24 hours.         Physical Exam:   Vital Signs: Temp: 97.4  F (36.3  C) Temp src: Oral BP: 140/74 Pulse: 79 Heart Rate: 93 Resp: 16 SpO2: 93 % O2 Device: Nasal cannula Oxygen Delivery: 2 LPM  Weight: 150 lbs 0 oz    Physical Exam:  General: alert, lying in bed, appears stated age, comfortable, in NAD  Eyes: EOMI, sclera clear  HEENT: NC/AT; mucous membranes moist  Lungs: no increased work of breathing, speaking full sentences   Neuro: A&O x 3; CN II-XII grossly intact;  Neuropsych: alert, good eye contact, engaged, pleasant, sensorium intact     Data reviewed:    reviewed         Attestation:   Thank you for the opportunity to continue to participate in the care of this patient and family.  Please feel free to contact on-call palliative provider with any emergent needs.    We can be reached via team pager [phone] (answered 8-4:30Monday-Friday); after-hours answering service ([phone]); Main Palliative Clinic - [phone]      This patient has been seen and evaluated by me and discussed with trauma team.  I have reviewed today's vital signs, medications, labs and imaging. Total time on the floor involved in the patient's care: 65 minutes.  Greater than 50% of my time was spent counseling and/or coordination of care regarding symptoms and goals.     Mary Donohue MD  Palliative Care Physician  Pager [phone]  [facility] Inpatient Team Consult pager [phone] (M-F 8-4:30)  After-hours Answering Service [phone]

## 2018-10-24 NOTE — PROGRESS NOTES
10/24/18 1400   Quick Adds   Type of Visit Initial Occupational Therapy Evaluation   Living Environment   Lives With spouse   Living Arrangements house   Home Accessibility stairs to enter home;stairs within home   Number of Stairs to Enter Home 1  (no rail from garage)   Number of Stairs Within Home 14  (B rails from basement (entrance) to main level)   Transportation Available car;family or friend will provide   Living Environment Comment Lives with supportive  who is home   Self-Care   Dominant Hand right   Usual Activity Tolerance excellent   Current Activity Tolerance moderate   Regular Exercise yes   Activity/Exercise Type strength training  (and aerobic in a class, balance, stretches)   Exercise Amount/Frequency 2 times/wk   Activity/Exercise/Self-Care Comment Pt has a 2 wh walker and crutches available   Functional Level Prior   Ambulation 0-->independent   Transferring 0-->independent   Toileting 0-->independent   Bathing 0-->independent   Dressing 0-->independent   Eating 0-->independent   Communication 0-->understands/communicates without difficulty   Swallowing 0-->swallows foods/liquids without difficulty   Cognition 0 - no cognition issues reported   Fall history within last six months yes   Number of times patient has fallen within last six months 1   Which of the above functional risks had a recent onset or change? ambulation;transferring;toileting;bathing;dressing   Prior Functional Level Comment Pt was I and active prior   General Information   Onset of Illness/Injury or Date of Surgery - Date 10/22/18   Referring Physician Nae Felipe APRN CNP   Patient/Family Goals Statement to discharge to TCU   Additional Occupational Profile Info/Pertinent History of Current Problem is a 78 year old female with PMH including HLD, GERD and HTN admitted after a GLF with a L femoral neck fracture.     Precautions/Limitations left hip precautions  (posterior)   Weight-Bearing Status - LLE  "weight-bearing as tolerated   Cognitive Status Examination   Cognitive Comment OT: Pt forgetful and needing VC for precautions throughout OT tx session.    Sensory Examination   Sensory Quick Adds No deficits were identified   Range of Motion (ROM)   ROM Quick Adds No deficits were identified   Strength   Strength Comments OT: BUE overall deconditioned   Muscle Tone Assessment   Muscle Tone Comments OT: BUE overall deconditioned   Mobility   Bed Mobility Comments OT: Mod A    Transfer Skills   Transfer Comments OT: Mod A x2   Balance   Balance Comments OT: Mod A x2   Instrumental Activities of Daily Living (IADL)   IADL Comments OT: Pt was ind   Activities of Daily Living Analysis   Impairments Contributing to Impaired Activities of Daily Living balance impaired;cognition impaired;pain;post surgical precautions;strength decreased   General Therapy Interventions   Planned Therapy Interventions ADL retraining;IADL retraining;balance training;cognition;strengthening;transfer training;home program guidelines;progressive activity/exercise   Clinical Impression   Criteria for Skilled Therapeutic Interventions Met yes, treatment indicated   OT Diagnosis decreased ind in ADLS/IADLS   Influenced by the following impairments generalized weakness and post surgical precautions   Assessment of Occupational Performance 3-5 Performance Deficits   Identified Performance Deficits decreased ind in ADLS/IADLS   Clinical Decision Making (Complexity) Moderate complexity   Therapy Frequency daily   Predicted Duration of Therapy Intervention (days/wks) 11/15/18   Anticipated Discharge Disposition Transitional Care Facility   Risks and Benefits of Treatment have been explained. Yes   Patient, Family & other staff in agreement with plan of care Yes   [facility] AM-PAC TM \"6 Clicks\"   2016, Trustees of [facility], under license to [employer].  All rights reserved.   6 Clicks Short Forms Daily Activity Inpatient Short Form " "  [facility] AM-PAC  \"6 Clicks\" Daily Activity Inpatient Short Form   1. Putting on and taking off regular lower body clothing? 2 - A Lot   2. Bathing (including washing, rinsing, drying)? 2 - A Lot   3. Toileting, which includes using toilet, bedpan or urinal? 2 - A Lot   4. Putting on and taking off regular upper body clothing? 3 - A Little   5. Taking care of personal grooming such as brushing teeth? 3 - A Little   6. Eating meals? 4 - None   Daily Activity Raw Score (Score out of 24.Lower scores equate to lower levels of function) 16   Total Evaluation Time   Total Evaluation Time (Minutes) 8     "

## 2018-10-24 NOTE — PROGRESS NOTES
"Orthopaedic Surgery Progress Note    Subjective: No acute events overnight. Pain controlled.    Denies fever or chills, CP, SOB, numbness or tingling, motor dysfunction or weakness.    Objective: /71  Pulse 79  Temp 97.7  F (36.5  C) (Oral)  Resp 16  Ht 1.753 m (5' 9\")  Wt 68 kg (150 lb)  SpO2 93%  BMI 22.15 kg/m2    Physical Exam:  General: healthy, alert, no distress  Respiratory: Breathing not labored.  MSK:  Focused examination of:   LLE: Toes WWP with BCR, + DP pulse. Fires EHL/FHL/GSC/TA. SILT SP/DP/Sa/Schmid/T. Dressing c/d/i    Labs:    Recent Labs  Lab 10/24/18  0645 10/23/18  0658 10/22/18  1141   HGB 10.7* 12.0 13.3   WBC 8.0 6.4 11.3*       All cultures:  No results for input(s): CULT in the last 168 hours.    Assessment:  Shahana Donaldson is a 78 year old female who sustained a fall down stairs on 10/22.    Orthopaedic Injuries:  1. Left displaced femoral neck fracture    Procedures:  1. 10/23: Left jorge alberto-hip arthroplasty    Trauma Primary  Activity: up with assist  Weight bearing status: WBAT; posterior hip precautions x 3 months  Antibiotics: Ancef x 24 hours  Diet: regular  DVT prophylaxis: Lovenox 40mg daily x 2 weeks then  x 6 weeks, SCDs  Imaging: PACU XR  Labs: hgb x 2 days  Bracing/Splinting: abductor pillow to be on while in bed  Dressings: Keep clean, dry and intact x 7 days post-op  Drains: none  Physical Therapy/Occupational Therapy: Eval and treat for mobilization and ambulation  Follow-up: Clinic with Dr. Gutierrez in 2 weeks.     Disposition: Pending progress with therapies, pain control on orals, and medical stability    Jakob Anthony MD  Orthopaedic Resident, PGY-4  Pager: [phone]    "

## 2018-10-25 ENCOUNTER — APPOINTMENT (OUTPATIENT)
Dept: PHYSICAL THERAPY | Facility: CLINIC | Age: 78
DRG: 469 | End: 2018-10-25
Payer: MEDICARE

## 2018-10-25 ENCOUNTER — APPOINTMENT (OUTPATIENT)
Dept: OCCUPATIONAL THERAPY | Facility: CLINIC | Age: 78
DRG: 469 | End: 2018-10-25
Payer: MEDICARE

## 2018-10-25 LAB
ERYTHROCYTE [DISTWIDTH] IN BLOOD BY AUTOMATED COUNT: 12.9 % (ref 10–15)
HCT VFR BLD AUTO: 32.6 % (ref 35–47)
HGB BLD-MCNC: 10.4 G/DL (ref 11.7–15.7)
MCH RBC QN AUTO: 30.5 PG (ref 26.5–33)
MCHC RBC AUTO-ENTMCNC: 31.9 G/DL (ref 31.5–36.5)
MCV RBC AUTO: 96 FL (ref 78–100)
PLATELET # BLD AUTO: 162 10E9/L (ref 150–450)
RBC # BLD AUTO: 3.41 10E12/L (ref 3.8–5.2)
WBC # BLD AUTO: 10.1 10E9/L (ref 4–11)

## 2018-10-25 PROCEDURE — 25000132 ZZH RX MED GY IP 250 OP 250 PS 637: Mod: GY | Performed by: NURSE PRACTITIONER

## 2018-10-25 PROCEDURE — 97530 THERAPEUTIC ACTIVITIES: CPT | Mod: GP | Performed by: REHABILITATION PRACTITIONER

## 2018-10-25 PROCEDURE — 40000193 ZZH STATISTIC PT WARD VISIT: Performed by: REHABILITATION PRACTITIONER

## 2018-10-25 PROCEDURE — 36415 COLL VENOUS BLD VENIPUNCTURE: CPT | Performed by: NURSE PRACTITIONER

## 2018-10-25 PROCEDURE — 40000133 ZZH STATISTIC OT WARD VISIT

## 2018-10-25 PROCEDURE — 25000128 H RX IP 250 OP 636: Performed by: SURGERY

## 2018-10-25 PROCEDURE — 99232 SBSQ HOSP IP/OBS MODERATE 35: CPT | Performed by: NURSE PRACTITIONER

## 2018-10-25 PROCEDURE — 97116 GAIT TRAINING THERAPY: CPT | Mod: GP | Performed by: REHABILITATION PRACTITIONER

## 2018-10-25 PROCEDURE — 97535 SELF CARE MNGMENT TRAINING: CPT | Mod: GO

## 2018-10-25 PROCEDURE — 85027 COMPLETE CBC AUTOMATED: CPT | Performed by: NURSE PRACTITIONER

## 2018-10-25 PROCEDURE — 12000008 ZZH R&B INTERMEDIATE UMMC

## 2018-10-25 PROCEDURE — A9270 NON-COVERED ITEM OR SERVICE: HCPCS | Mod: GY | Performed by: NURSE PRACTITIONER

## 2018-10-25 RX ORDER — DILTIAZEM HYDROCHLORIDE 300 MG/1
300 CAPSULE, COATED, EXTENDED RELEASE ORAL DAILY
Qty: 90 CAPSULE | Refills: 0 | DISCHARGE
Start: 2018-10-25 | End: 2018-10-25

## 2018-10-25 RX ORDER — ASPIRIN 325 MG
325 TABLET, DELAYED RELEASE (ENTERIC COATED) ORAL DAILY
Qty: 42 TABLET | Refills: 0 | DISCHARGE
Start: 2018-11-08 | End: 2019-10-08

## 2018-10-25 RX ORDER — ACETAMINOPHEN 325 MG/1
975 TABLET ORAL EVERY 6 HOURS
Qty: 100 TABLET | Refills: 0 | DISCHARGE
Start: 2018-10-25 | End: 2018-11-01

## 2018-10-25 RX ORDER — CALCIUM CARBONATE 500(1250)
1500 TABLET ORAL 2 TIMES DAILY WITH MEALS
Qty: 60 TABLET | Refills: 0 | DISCHARGE
Start: 2018-10-25

## 2018-10-25 RX ORDER — METHOCARBAMOL 750 MG/1
750 TABLET, FILM COATED ORAL EVERY 6 HOURS PRN
Qty: 45 TABLET | Refills: 0 | DISCHARGE
Start: 2018-10-25 | End: 2019-05-30

## 2018-10-25 RX ORDER — ATORVASTATIN CALCIUM 10 MG/1
10 TABLET, FILM COATED ORAL DAILY
Qty: 90 TABLET | Refills: 3 | DISCHARGE
Start: 2018-10-25 | End: 2019-05-30

## 2018-10-25 RX ORDER — DILTIAZEM HYDROCHLORIDE 300 MG/1
300 CAPSULE, COATED, EXTENDED RELEASE ORAL DAILY
Qty: 90 CAPSULE | Refills: 0 | DISCHARGE
Start: 2018-10-25 | End: 2022-08-02

## 2018-10-25 RX ORDER — ASPIRIN 325 MG
325 TABLET, DELAYED RELEASE (ENTERIC COATED) ORAL DAILY
Qty: 28 TABLET | Refills: 0 | Status: SHIPPED | OUTPATIENT
Start: 2018-11-08 | End: 2018-10-25

## 2018-10-25 RX ORDER — ASPIRIN 325 MG
325 TABLET, DELAYED RELEASE (ENTERIC COATED) ORAL DAILY
Qty: 28 TABLET | Refills: 0 | DISCHARGE
Start: 2018-11-08 | End: 2018-10-25

## 2018-10-25 RX ORDER — DOCUSATE SODIUM 100 MG/1
100 CAPSULE, LIQUID FILLED ORAL 2 TIMES DAILY
Qty: 60 CAPSULE | Refills: 0 | DISCHARGE
Start: 2018-10-25

## 2018-10-25 RX ORDER — DOCUSATE SODIUM 100 MG/1
100 CAPSULE, LIQUID FILLED ORAL 2 TIMES DAILY
Qty: 60 CAPSULE | Refills: 0 | DISCHARGE
Start: 2018-10-25 | End: 2018-10-25

## 2018-10-25 RX ADMIN — ACETAMINOPHEN 975 MG: 325 TABLET, FILM COATED ORAL at 09:33

## 2018-10-25 RX ADMIN — ACETAMINOPHEN 975 MG: 325 TABLET, FILM COATED ORAL at 21:12

## 2018-10-25 RX ADMIN — ACETAMINOPHEN 975 MG: 325 TABLET, FILM COATED ORAL at 14:10

## 2018-10-25 RX ADMIN — CALCIUM 1500 MG: 500 TABLET ORAL at 17:51

## 2018-10-25 RX ADMIN — ACETAMINOPHEN 975 MG: 325 TABLET, FILM COATED ORAL at 01:48

## 2018-10-25 RX ADMIN — ATORVASTATIN CALCIUM 10 MG: 10 TABLET, FILM COATED ORAL at 20:27

## 2018-10-25 RX ADMIN — CALCIUM 1500 MG: 500 TABLET ORAL at 09:33

## 2018-10-25 RX ADMIN — DILTIAZEM HYDROCHLORIDE 300 MG: 120 CAPSULE, COATED, EXTENDED RELEASE ORAL at 09:32

## 2018-10-25 RX ADMIN — DOCUSATE SODIUM 100 MG: 100 CAPSULE, LIQUID FILLED ORAL at 09:33

## 2018-10-25 RX ADMIN — VITAMIN D, TAB 1000IU (100/BT) 2000 UNITS: 25 TAB at 09:32

## 2018-10-25 RX ADMIN — POLYETHYLENE GLYCOL 3350 17 G: 17 POWDER, FOR SOLUTION ORAL at 09:32

## 2018-10-25 RX ADMIN — ENOXAPARIN SODIUM 40 MG: 40 INJECTION SUBCUTANEOUS at 12:30

## 2018-10-25 ASSESSMENT — ACTIVITIES OF DAILY LIVING (ADL)
ADLS_ACUITY_SCORE: 13

## 2018-10-25 NOTE — DISCHARGE SUMMARY
[facility], [place]    Discharge Summary  Trauma Surgery Service    Date of Admission:  10/22/2018  Date of Discharge:  10/26/2018  Discharging Provider: Demi Abebe CNP  Date of Service (when I saw the patient): 10/26/18    Primary Provider: Jeramie Curran  Primary Care clinic: [address] [place] [id number]  Phone: [phone]  Fax number: [phone]     Discharge Diagnoses   1. S/p Fall down 2-3 stairs  2. Left displaced femoral neck fracture s/p left hemiarthroplasty 10/23/18  3. Acute pain, controlled   4. Hypertension  5. Hyperlipidemia  6. Osteoporosis  7. Hiatal hernia s/p lap nissen 2013    Hospital Course  Shahana Donaldson is a 78 year old female who presented on 10/22/18 with evaluation following a fall. She states that she was in the process of moving things to her cabin and she fell down 2-3 carpeted stairs, landing on her left hip.  She denied any LOC, head strike, lightheadedness, or dizziness with this fall. She was brought to [facility] for evaluation and and found to have left femoral neck fracture. She was seen by orthopedics and admitted to the Trauma service. Her hospital course is as listed below by problem.     Ground level fall. The patient sustained the above injury as a result of mechanical fall. The patient underwent tertiary examination to evaluate for additional injuries. The systematic review did not find any other injuries. She was seen by therapies for her fall.     Left femoral neck fracture s/p Left hemiarthroplasty 10/23/18. Osteoporosis. Ms. Donaldson was evaluated by Orthopedics and underwent left hip jorge alberto-arthroplasty on 10/23/15 by Dr. Gutierrez.  Please see operative note by Dr Gutierrez for details regarding the procedure. She will need Lovenox for DVT prophylaxis for 2 weeks followed by full strength aspirin for 6 weeks. She can be weight bearing as tolerated. She should follow up in the Orthopedic Clinic in  2  "weeks. She  was evaluated by physical and occupational therapies during her hospitalization.  Please see summary of most current therapy notes below.     She will continue on calcium and vitamin D for supplementation for her osteoporosis.     Acute pain. Ms. Donaldson's pain was controlled with acetaminophen alone. She had a single injection of Exparel by anesthesia on the day of surgery. She never required narcotic pain medication post-operatively.     Hypertension and hyperlipidemia. Her home medications (diltiazem and atorvastatin) were resume at prior to admission doses, no changes were made to her regimen.     Palliative Care Consult. The palliative care team was consulted to assist in the care and future life planning regarding the changes the above injuries have created. Often this sort of injury is a marker of general decline in the elderly population. They reviewed her advanced care directive and made a referral to [facility] to help her with this.    Therapy Recommendations:   Current status of physical therapies on discharge: \"according to functional mobility the  discharge recommendation is TCU for cont skilled PT to progress functional mobility. Pt needing cont step by step directional for all mobility skills. Pt amb up to 40'x 2 with WW needing CGA. Pt needing V.c for all hip precautions\"    Current status of occupational therapies on discharge: Patient performing below functional baseline and would benefit from continued skilled therapy services. Their last note indicated \"ModA for bed mobility and SBA for sitting EOB. Introduced adaptive equipment to increase IND with LB dressing. Sydney to doff/kyler socks using AE. STS Sydney+FWW with transfer to bedside chair. Throughout session, pt required multiple cues to follow L hip precautions\"    Significant Results and Procedures   DATE:  10/23/18  Procedure(s):  LEFT HIP COLEMAN- ARTHROPLASTY   Surgeon(s): Surgeon(s) and Role:     * Araceli Gutierrez, " "MD - Primary     * Jakob Anthony MD - Resident - Assisting    Code Status  Full Code    SUBJECTIVE:   Kamini denies pain this morning. She had some dizziness when getting up, but this is improved from yesterday. She denies nausea, vomiting. She is voiding okay and has had a BM. She is tolerating PO. She is eager to get to TCU.    Physical Exam   Temp: 98.3  F (36.8  C) Temp src: Oral BP: 132/77 Pulse: 80 Heart Rate: 97 Resp: 18 SpO2: 94 % O2 Device: None (Room air) Oxygen Delivery: 2 LPM  Vitals:    10/22/18 0840 10/25/18 1153   Weight: 68 kg (150 lb) 69.1 kg (152 lb 6.4 oz)     Vital Signs with Ranges  Temp:  [97  F (36.1  C)-98.3  F (36.8  C)] 98.3  F (36.8  C)  Pulse:  [76-80] 80  Heart Rate:  [80-97] 97  Resp:  [16-20] 18  BP: (126-158)/(70-86) 132/77  SpO2:  [89 %-97 %] 94 %  I/O last 3 completed shifts:  In: 840 [P.O.:840]  Out: 775 [Urine:775]    Constitutional: Awake, alert, cooperative, no apparent distress  Eyes: No icterus  ENT: Normocephalic, atraumatic  Respiratory: No increased work of breathing, good air exchange, clear to auscultation bilaterally, no crackles or wheezing.  Cardiovascular: Regular rate and rhythm, normal S1 and S2, no S3/S4, no m/r/g; no LE edema  GI: Normal bowel sounds, soft, non-distended, non-tender, no guarding  Genitourinary: Urine not assessed  Skin:  No erythema or ecchymosis noted  Musculoskeletal: No deformities noted  Neurologic: Awake, alert, oriented. Strength and sensation intact. No focal deficits  Neuropsychiatric: Calm, normal eye contact, alert, affect appropriate to situation, oriented, thought process normal.    Discharge Disposition   Discharged to short-term care facility  Condition at discharge: Stable  Discharge VS: Blood pressure 132/77, pulse 80, temperature 98.3  F (36.8  C), temperature source Oral, resp. rate 18, height 1.753 m (5' 9\"), weight 69.1 kg (152 lb 6.4 oz), SpO2 94 %, not currently breastfeeding.    Consultations This Hospital Stay "   1. ORTHOPAEDIC SURGERY ADULT/PEDS IP CONSULT  2. MEDICATION HISTORY IP PHARMACY CONSULT  3. PALLIATIVE CARE ADULT IP CONSULT  4. OCCUPATIONAL THERAPY ADULT IP CONSULT  5. PHYSICAL THERAPY ADULT IP CONSULT  6. SOCIAL WORK IP CONSULT    Discharge Orders     [facility] REFERRAL     General info for SNF   Length of Stay Estimate: Short Term Care: Estimated # of Days <30  Condition at Discharge: Stable  Level of care:skilled   Rehabilitation Potential: Good  Admission H&P remains valid and up-to-date: Yes  Recent Chemotherapy: N/A  Use Nursing Home Standing Orders: Yes     Mantoux instructions   Give two-step Mantoux (PPD) Per Facility Policy Yes     Reason for your hospital stay   You were hospitalized and treated for the following conditions:  Fall with left hip fracture s/p repair on 10/23/18    You were seen by the following services:  Trauma Service  Palliative Care service for advance care planning   Orthopedics  Physical and Occupational therapies     Follow Up and recommended labs and tests   Follow up with your primary care provider for continued medical care and hospital follow up in 5-10 days.     Medication Therapy Management Services  If you have any questions regarding your medications after discharge, this service is available to you.  Please call:  [phone] or [phone] (toll-free)  [facility]  [address] [place] [address]  [email or web address]    Trauma Clinic- as needed.   [facility]  Floor 4  [address] [place] [address]   Appointments: [phone]    Orthopaedic Clinic-- Follow up with Dr Gutierrez in 2 weeks   [facility]  Floor 4   [address] [place] [address]   Appointments: [phone]     Wound care (specify)   Site:   Left thigh Aquacell dressing   Instructions:   Okay to remove dressing on POD #7  (October 30)     Activity - Up with nursing assistance   Up  with nursing and assistive devices     Weight bearing status   Weight bearing as tolerated     Encourage PO fluids     Additional Discharge Instructions   Lovenox for 2 weeks and then full strength Aspirin (325mg) for 6 weeks per orthopedics for DVT prophylaxis     Full Code     Occupational Therapy Adult Consult   Evaluate and treat as clinically indicated.    Reason:  Left femoral neck fracture s/p Hemiarthroplasty     Physical Therapy Adult Consult   Evaluate and treat as clinically indicated.    Reason:  Left femoral neck fracture s/p Hemiarthroplasty     Hip precautions   Avoid flexion greater than 90 degrees (avoid bending past 90 degrees)   Avoid internal rotation (twisting your legs in or out)  Avoid adduction past midline (avoid crossing your legs)  Avoid pivoting.  Keep a pillow (or abductor pillow--pink foam) between your thighs to keep your knees from touching.     Fall precautions     Advance Diet as Tolerated   Follow this diet upon discharge: Regular diet.       Discharge Medications   Current Discharge Medication List      START taking these medications    Details   acetaminophen (TYLENOL) 325 MG tablet Take 3 tablets (975 mg) by mouth every 6 hours for 7 days  Qty: 100 tablet, Refills: 0    Associated Diagnoses: Closed fracture of left hip, initial encounter (H)      aspirin 325 MG EC tablet Take 1 tablet (325 mg) by mouth daily  Qty: 42 tablet, Refills: 0    Comments: For 6 weeks ( to start After Lovenox course)  Associated Diagnoses: Closed fracture of left hip, initial encounter (H)      calcium carbonate 500 mg, elemental, (OSCAL;OYSTER SHELL CALCIUM) 500 MG tablet Take 3 tablets (1,500 mg) by mouth 2 times daily (with meals)  Qty: 60 tablet, Refills: 0    Associated Diagnoses: Closed fracture of left hip, initial encounter (H)      docusate sodium (COLACE) 100 MG capsule Take 1 capsule (100 mg) by mouth 2 times daily  Qty: 60 capsule, Refills: 0    Associated Diagnoses: Drug-induced  constipation      enoxaparin (LOVENOX) 40 MG/0.4ML injection Inject 0.4 mLs (40 mg) Subcutaneous every 24 hours  Qty: 5.6 mL, Refills: 0    Associated Diagnoses: Closed fracture of left hip, initial encounter (H)      methocarbamol (ROBAXIN) 750 MG tablet Take 1 tablet (750 mg) by mouth every 6 hours as needed for muscle spasms  Qty: 45 tablet, Refills: 0    Associated Diagnoses: Closed fracture of left hip, initial encounter (H); Closed displaced midcervical fracture of left femur, initial encounter (H)         CONTINUE these medications which have CHANGED    Details   atorvastatin (LIPITOR) 10 MG tablet Take 1 tablet (10 mg) by mouth daily  Qty: 90 tablet, Refills: 3    Associated Diagnoses: Mixed hyperlipidemia      cholecalciferol (VITAMIN D3) 1000 UNIT tablet Take 2 tablets (2,000 Units) by mouth daily  Qty: 30 tablet, Refills: 0    Associated Diagnoses: Closed fracture of left hip, initial encounter (H)      diltiazem (CARTIA XT) 300 MG 24 hr capsule Take 1 capsule (300 mg) by mouth daily  Qty: 90 capsule, Refills: 0    Associated Diagnoses: Benign essential hypertension         CONTINUE these medications which have NOT CHANGED    Details   ascorbic acid (VITAMIN C) 500 MG tablet Take 500 mg by mouth as needed (when thinking of it)       clobetasol (TEMOVATE) 0.05 % ointment Apply sparingly to affected area 2 x daily for14 days. Then, apply small amount to affected area twice weekly for maintenance.  Qty: 30 g, Refills: 2    Associated Diagnoses: Lichen sclerosus         STOP taking these medications       ASPIRIN 81 MG OR TABS Comments:   Reason for Stopping:             Allergies   Allergies   Allergen Reactions     Dilaudid [Hydromorphone]      dizziness     Senna Hives     Data      Most Recent 3 CBC's:  Recent Labs   Lab Test  10/25/18   0711  10/24/18   0645  10/23/18   0658   WBC  10.1  8.0  6.4   HGB  10.4*  10.7*  12.0   MCV  96  98  97   PLT  162  160  163      Most Recent 3 BMP's:  Recent Labs   Lab  Test  10/24/18   0645  10/22/18   1141  04/17/18   0937   NA  141  143  140   POTASSIUM  4.1  3.8  3.8   CHLORIDE  109  109  108   CO2  21  25  25   BUN  14  19  19   CR  0.62  0.71  0.73   ANIONGAP  10  9  6   MARCK  8.3*  8.5  8.8   GLC  129*  95  94       Results for orders placed or performed during the hospital encounter of 10/22/18   XR Pelvis and Hip Left 2 Views     Value    Radiologist flags (Urgent)     Displaced transcervical fracture of the left femoral    Narrative    Exam: XR PELVIS AND HIP LEFT 2 VIEWS, 10/22/2018 10:11 AM    Indication: pain after fall;     Comparison: X-ray 4/17/2018    Findings:  1 view pelvis and 2 lateral views left hip.    Displaced transcervical fracture of the left femoral neck. Distal  aspect is slightly superiorly and laterally displaced.    Minimal degenerative changes. Partially visualized degenerative  changes of lumbar spine.    Osteopenic appearance of bone.      Impression:  Displaced transcervical fracture of the left femoral neck.     This report will be copied to the [facility] to ensure a  provider acknowledges the finding. [facility] is available Monday through Friday 8am-3:30 pm.     I have personally reviewed the examination and initial interpretation and I agree with the findings.    JERRELL RODRIGUEZ MD   XR Lumbar Spine 2/3 Views    Narrative    2 views lumbar spine radiographs 10/22/2018 10:19 AM    History: pain after fall;     Comparison: X-ray 4/17/2018    Findings: AP and lateral  views of the lumbar spine were obtained.    5  lumbar type vertebral bodies are assumed for the purpose of this  dictation.    There is no acute osseous abnormality.  Osteopenic appearance of bone.    There is multilevel degenerative changes of the lumbar spine and also  lower lumbar predominant facet arthropathy . Disc spaces relatively  preserved. Unchanged trace anterolisthesis of L4 over L5.    The visualized bowel gas pattern is non-obstructive.     Impression:  1.  No acute osseous abnormality.  2.  Multilevel degenerative changes.   3. Bones are osteopenic appearing.    I have personally reviewed the examination and initial interpretation and I agree with the findings.    JERRELL RODRIGUEZ MD   XR Chest Port 1 View    Narrative    XR CHEST PORT 1 VW 10/22/2018 12:31 PM    Comparison: Esophagram 5/9/2018, CT chest 9/2/2014, chest radiograph 6/24/2014    History: traumatic fall;     Findings: Single AP view of the chest. K silhouette is within normal  limits. The pulmonary vasculature is distinct. Blunting of the right  costophrenic angle. No pneumothorax. No left pleural effusion. No  acute airspace disease. Biapical scarring. The upper abdomen is  unremarkable.      Impression: No pneumothorax. No displaced rib fracture. Small right pleural effusion.    I have personally reviewed the examination and initial interpretation and I agree with the findings.    LYNN ODEN MD   XR Femur Left 2 Views    Narrative    4 views left femur radiographs 10/22/2018 2:05 PM    History: left fem neck fx;       Comparison: Pelvis radiograph same day.    Findings:4 views of the left femur were obtained.     Redemonstration of known left femoral neck fracture. Nonspecific  periostitis of the femoral diaphysis. No soft tissue swelling over the  knee, no joint effusion.     Soft tissue is unremarkable.      Impression:  1. Redemonstration of known left femoral neck fracture.  2. Nonspecific nonaggressive periostitis involving the proximal to mid femoral diaphysis.    I have personally reviewed the examination and initial interpretation and I agree with the findings.    JERRELL RODRIGUEZ MD   POC US Guidance Needle Placement    Impression    Left fascia iliaca    XR Pelvis Port 1/2 Views    Narrative    Exam: Intraoperative views of the left hip dated 10/23/2018.    COMPARISON: 10/22/2018.    CLINICAL HISTORY: Intra-Op evaluation.    FINDINGS: Intraoperative views of the left hip  were obtained. Surgical air is noted. New post surgical changes of a left hip hemiarthroplasty. The hardware appears intact. Postoperative air is noted.      IMPRESSION: Intraoperative film of a left hip hemiarthroplasty.    JERRELL RODRIGUEZ MD   XR Pelvis Port 1/2 Views    Narrative    Exam: 2 views of the left hip dated 10/23/2018.    COMPARISON: Same day.    CLINICAL HISTORY: Postop hemiarthroplasty.    FINDINGS: 2 views of the pelvis were obtained. Postsurgical changes of a left hip hemiarthroplasty. The hardware appears intact. Postoperative air is noted.      IMPRESSION: Postsurgical changes of a left hip hemiarthroplasty, without complication.    JERRELL RODRIGUEZ MD       Time Spent on this Encounter   I, Demi Abebe, personally saw the patient today and spent greater than 30 minutes discharging this patient.    We appreciate the opportunity to care for your patient while in the hospital.  Should you have any questions about their injuries or this discharge summary our contact information is below.    Trauma Services  [facility]   Department of Critical Care and Acute Care Surgery  [address] [address]  Office: [phone]

## 2018-10-25 NOTE — PLAN OF CARE
Problem: Patient Care Overview  Goal: Plan of Care/Patient Progress Review  OT/7B: Discharge Planner OT   Patient plan for discharge: TCU  Current status: Family present for OT. Reviewed L hip precautions. ModA for bed mobility and SBA for sitting EOB. Introduced adaptive equipment to increase IND with LB dressing. Sydney to doff/kyler socks using AE. STS Sydney+FWW with transfer to bedside chair. Throughout session, pt required multiple cues to follow L hip precautions.   Barriers to return to prior living situation: generalized weakness, L hip precautions, activity tolerance, safety/fall risk  Recommendations for discharge: TCU  Rationale for recommendations: Pt is performing below functional baseline and would benefit from continued skilled therapy services to increase strength/IND required for ADL/IADLs prior to returning home safely.        Entered by: Christina Fritz 10/25/2018 1:55 PM

## 2018-10-25 NOTE — PROGRESS NOTES
"Orthopaedic Surgery Progress Note    Subjective: No acute events overnight. Pain controlled.  Worked a little with PT yesterday. Looking forward to being more aggressive in PT.    Denies fever or chills, CP, SOB, numbness or tingling, motor dysfunction or weakness.    Objective: /73 (BP Location: Left arm)  Pulse 79  Temp 96.7  F (35.9  C) (Oral)  Resp 20  Ht 1.753 m (5' 9\")  Wt 68 kg (150 lb)  SpO2 95%  BMI 22.15 kg/m2    Physical Exam:  General: healthy, alert, no distress  Respiratory: Breathing not labored.  MSK:  Focused examination of:   LLE: Toes WWP with BCR, + DP pulse. Fires EHL/FHL/GSC/TA. SILT SP/DP/Sa/Schmid/T. Dressing c/d/i    Labs:    Recent Labs  Lab 10/25/18  0711 10/24/18  0645 10/23/18  0658 10/22/18  1141   HGB 10.4* 10.7* 12.0 13.3   WBC 10.1 8.0 6.4 11.3*       All cultures:  No results for input(s): CULT in the last 168 hours.    Assessment:  Shahana Donaldson is a 78 year old female who sustained a fall down stairs on 10/22.    Orthopaedic Injuries:  1. Left displaced femoral neck fracture    Procedures:  1. 10/23: Left jorge alberto-hip arthroplasty    Trauma Primary  Activity: up with assist  Weight bearing status: WBAT; posterior hip precautions x 3 months  Antibiotics: completed  Diet: regular  DVT prophylaxis: Lovenox 40mg daily x 2 weeks then  x 6 weeks, SCDs  Imaging: PACU XR  Labs: hgb x 2 days  Bracing/Splinting: abductor pillow to be on while in bed  Dressings: Keep clean, dry and intact x 7 days post-op  Drains: none  Physical Therapy/Occupational Therapy: Eval and treat for mobilization and ambulation  Follow-up: Clinic with Dr. Gutierrez in 2 weeks.     Disposition: Pending progress with therapies, pain control on orals, and medical stability; likely to a TCU    Jakob Anthony MD  Orthopaedic Resident, PGY-4  Pager: [phone]    "

## 2018-10-25 NOTE — PLAN OF CARE
Problem: Patient Care Overview  Goal: Plan of Care/Patient Progress Review  PT - per plan established by the Physical Therapist, according to functional mobility the  discharge recommendation is TCU for cont skilled PT to progress functional mobility. Pt needing cont step by step directional for all mobility skills. Pt amb up to 40'x 2 with WW needing CGA. Pt needing V.c for all hip precautions.  Discharge Planner PT   Patient plan for discharge: rehab   Current status: see above.   Barriers to return to prior living situation: pain, weakness, fatigue.   Recommendations for discharge: TCU   Rationale for recommendations: cont skilled PT to progress functional mobility.        Entered by: Omar Castro 10/25/2018 11:50 AM

## 2018-10-25 NOTE — PLAN OF CARE
Problem: Patient Care Overview  Goal: Plan of Care/Patient Progress Review  Pt is alert and oriented x4.AVSS. Sats high 90's on 2 L NC, pt was on RA, sat high 80's, pt was placed back on 2 L NC. Left hip pain is controlled with scheduled tylenol 975 mg tab po Q 6 hrs. Pt has abductor pillow when in bed.+ CMS. Pt was out of bed x3 today, sat on a chair for couple of hours.Pt worked with physical therapy.Fait appetite, No BM yet, pt is on bowel regime meds. Hall with adequate urine output.Family was here during day time.Continue with plan of care.

## 2018-10-25 NOTE — PLAN OF CARE
Problem: Patient Care Overview  Goal: Plan of Care/Patient Progress Review  Outcome: No Change  Afeb, vitals stable, 02 sats 95% on 2LPM/nc, does desat to upper 80's on room air, denies pain, left leg incision covered, dry and intact, cms intact with positive pedal pulses, denies N/T, refused the abd pillow, regular pillow in place, left leg aced toes to thigh, able to wiggles toes, good dorsi and planter flexion, belly soft with positive bowel sounds, lungs clear, johnson to gravity with good urine out, A&O x4 at midnight, getting alittle forgetful later in shift, redirected easily, bed alarm on, possible discharge to TCU when ready, pt now states she wants to go home with home PT/OT, waiting to talk with MD this am,

## 2018-10-25 NOTE — PLAN OF CARE
Problem: Patient Care Overview  Goal: Plan of Care/Patient Progress Review  Outcome: No Change  1067-4415: POD #1 L) hip jorge alberto-arthroplasty. VSS on 2L O2 via nc. A&Ox4. Hip abductor pillow on while in bed. WBAT OOB, Ax2. L) leg weaker than R) d/t nerve block administered yesterday. L) hip incision covered, CDI. CMS intact. L) leg ace wrapped. Pain controlled with scheduled Tylenol. Tolerating regular diet. PIV SL. LBM 10/21/18, hypoactive BS, Miralax and colace administered. Hall patent with adequate UO. PT/OT following.   Plan: possibly discharge to TCU facility 2-3 days.

## 2018-10-25 NOTE — PROGRESS NOTES
10/24/18 1000   Quick Adds   Type of Visit Initial PT Evaluation   Living Environment   Lives With spouse   Living Arrangements house   Home Accessibility stairs to enter home;stairs within home   Number of Stairs to Enter Home 1  (no rail from garage)   Number of Stairs Within Home 14  (B rails from basement (entrance) to main level)   Transportation Available car;family or friend will provide   Living Environment Comment Lives with supportive  who is home   Self-Care   Dominant Hand right   Usual Activity Tolerance excellent   Current Activity Tolerance fair   Regular Exercise yes   Activity/Exercise Type strength training  (and aerobic in a class, balance, stretches)   Exercise Amount/Frequency 2 times/wk   Activity/Exercise/Self-Care Comment Pt has a 2 wh walker and crutches available   Functional Level Prior   Ambulation 0-->independent   Transferring 0-->independent   Toileting 0-->independent   Bathing 0-->independent   Dressing 0-->independent   Eating 0-->independent   Communication 0-->understands/communicates without difficulty   Swallowing 0-->swallows foods/liquids without difficulty   Cognition 0 - no cognition issues reported   Fall history within last six months yes   Number of times patient has fallen within last six months 1   Which of the above functional risks had a recent onset or change? ambulation;transferring;toileting;bathing;dressing   Prior Functional Level Comment Pt was I and active prior   General Information   Onset of Illness/Injury or Date of Surgery - Date 10/22/18   Referring Physician Nae Felipe APRN CNP   Patient/Family Goals Statement Be able to move around easier   Pertinent History of Current Problem (include personal factors and/or comorbidities that impact the POC) 78 year old female who sustained a fall down stairs on 10/22. 10/23 L jorge alberto-hip arthroplasty   Precautions/Limitations left hip precautions   Weight-Bearing Status - LLE weight-bearing as  "tolerated   Weight-Bearing Status - RLE full weight-bearing   General Info Comments Pt supine, agreeable to working with PT and eager to try moving   Cognitive Status Examination   Orientation orientation to person, place and time   Level of Consciousness alert   Follows Commands and Answers Questions 100% of the time   Personal Safety and Judgment intact   Memory intact   Posture    Posture Protracted shoulders   Range of Motion (ROM)   ROM Comment L LE decreased due to pain, procedure and within expectations. R LE WFL   Strength   Strength Comments L LE decreased due to pain, procedure and within expectations. R LE WFL   Bed Mobility   Bed Mobility Comments Mod A sup<>sit   Transfer Skills   Transfer Comments Mod A sit<>stand   Gait   Gait Comments Min A to amb 3 steps with  walker   Balance   Balance Comments fair with  walker   General Therapy Interventions   Planned Therapy Interventions bed mobility training;transfer training;gait training;strengthening;risk factor education;balance training;home program guidelines;progressive activity/exercise   Clinical Impression   Criteria for Skilled Therapeutic Intervention yes, treatment indicated   PT Diagnosis impaired functional mobility   Influenced by the following impairments decreased strength, ROM, inc pain   Functional limitations due to impairments decreased I with transfesr, gait, bed mob, barrier navigation, ADls   Clinical Presentation Stable/Uncomplicated   Clinical Decision Making (Complexity) Low complexity   Therapy Frequency` daily   Predicted Duration of Therapy Intervention (days/wks) 10/27   Anticipated Discharge Disposition Transitional Care Facility   Risk & Benefits of therapy have been explained Yes   Patient, Family & other staff in agreement with plan of care Yes   [facility] AM-PAC TM \"6 Clicks\"   2016, Trustees of [facility], under license to [employer].  All rights reserved.   6 Clicks Short Forms Basic Mobility " "Inpatient Short Form   [facility] AM-PAC  \"6 Clicks\" V.2 Basic Mobility Inpatient Short Form   1. Turning from your back to your side while in a flat bed without using bedrails? 2 - A Lot   2. Moving from lying on your back to sitting on the side of a flat bed without using bedrails? 2 - A Lot   3. Moving to and from a bed to a chair (including a wheelchair)? 2 - A Lot   4. Standing up from a chair using your arms (e.g., wheelchair, or bedside chair)? 2 - A Lot   5. To walk in hospital room? 2 - A Lot   6. Climbing 3-5 steps with a railing? 2 - A Lot   Basic Mobility Raw Score (Score out of 24.Lower scores equate to lower levels of function) 12   Total Evaluation Time   Total Evaluation Time (Minutes) 8     "

## 2018-10-25 NOTE — PROGRESS NOTES
Social Work: Assessment with Discharge Plan    Patient Name:  Shahana Donaldson  :  1940  Age:  78 year old  MRN:  [id number]  Risk/Complexity Score:  Filed Complexity Screen Score: 4  Completed assessment with:  Patient, pt's spouse (Jake), and chart review    Presenting Information   Reason for Referral:  Discharge plan and TCU Placement  Date of Intake:  2018  Referral Source:  Physician  Decision Maker:  Patient is her own decision maker  Alternate Decision Maker:  Verbally designated as pt's spouse  Health Care Directive:  Patient considering completing - discussed with the Palliative Care Team; please refer to this documentation for details as needed  Living Situation:  House; pt lives with her spouse and there are stairs in the home  Previous Functional Status:  Independent  Patient and family understanding of hospitalization:  Pt appears to appropriately understand, and also expresses understanding for restorative care at TCU  Cultural/Language/Spiritual Considerations:  English speaking; no cultural or spiritual considerations identified  Adjustment to Illness:  Appropriate to situation; pt expresses hopefulness related to gaining her strength back    Physical Health  Reason for Admission:    1. Closed fracture of left hip, initial encounter (H)    2. Closed displaced midcervical fracture of left femur, initial encounter (H)    3. Accidental fall on or from stairs or steps, initial encounter      Services Needed/Recommended:  TCU    Mental Health/Chemical Dependency  Diagnosis:  None identified  Support/Services in Place:  None identified this time  Services Needed/Recommended:  None indicated at this time    Support System  Significant relationship at present time:  Pt's spouse, Jake ([phone])  Family of origin is available for support:  Yes  Other support available:  Pt and spouse report having friends that live locally and appear involved in their community  Gaps in support  system:  None identified  Patient is caregiver to:  None     Provider Information   Primary Care Physician:  Jeramie Curran   [phone]   Clinic:  [address] [place] [address] / [facility] [id number]      :  N/A    Financial   Income Source:  Social Security/custodial  Financial Concerns:  None identified  Insurance:    Payor/Plan Subscriber Name Rel Member # Group #   MEDICARE - MEDICARE MARILIN PHIPPS*  [id number]       ATTN CLAIMS, [address]   BCBS - BCBS OF [place] [employer]*  [id number]      [address] [address]       Discharge Plan   Patient and family discharge goal:  TCU  Provided education on discharge plan:  YES  Patient agreeable to discharge plan:  YES  A list of Medicare Certified Facilities was provided to the patient and/or family to encourage patient choice. Patient's choices for facility are:  Pt and pt's spouse reviewed TCUs and provided choices in order of preference below:    1. [facility] (P: [phone], F: [phone]) - referral initiated and pending review    2. [facility] (P: [phone], F: [phone]) - referral initiated and pending review    3. [facility] (P: [phone], F: [phone]) - referral initiated and pending review    Will NH provide Skilled rehabilitation or complex medical:  YES  General information regarding anticipated insurance coverage and possible out of pocket cost was discussed. Patient and patient's family are aware patient may incur the cost of transportation to the facility, pending insurance payment: YES  Barriers to discharge:  Medical stability and discharge placement    Discharge Recommendations   Anticipated Disposition:  Facility:  TCU - placement TBD  Transportation Needs:  Family:  Spouse, Jake is preference  Name of Transportation Company and Phone:  [employer] ([phone]) - discussed that if pt is unable to safely ambulate in and out of vehicle, then wheelchair  transportation can be arranged.  If able, pt/spouse agreed they are hopeful that Jake can provide transportation    Additional comments   CARRIE met with pt and pt's spouse at bedside to discuss TCU placement.  Both were willing to engage with SW, and in agreement to this plan.  Pt and spouse denied ever having been to a TCU in the past, but were familiar with some of the facilities from living in the area and visiting friends.  Pt/spouse denied further questions on discharge process at this time, and SW explained that someone from the  care team will continue to follow up on placement options and transportation needs.    In anticipation of TCU discharge likely soon, PAS completed: [id number]    Selma Nava, [facility]

## 2018-10-25 NOTE — PROGRESS NOTES
[facility], [place]  Trauma Service Progress Note    Date of Service (when I saw the patient): 10/25/2018     Assessment & Plan     Trauma mechanism: Fall down 2-3 stairs  Time/date of injury: 10/22/2018 am  Known Injuries:  1. Left displaced femoral neck fracture  Other diagnoses:   1. Acute pain, improved  2. Acute hypoxic respiratory failure, resolved  3. Hypertension  4. Hyperlipidemia  5. Constipation  6. Osteoporosis  7. Hiatal hernia S/P lap nissen 2013     Procedure: Left hip jorge alberto-arthroplasty  Plan:  1. Admitted to the trauma service. Tertiary exam completed on 10/23.   2. Left displaced femur fracture s/p left hip jorge alberto-arthroplasty on 10/23. Okay to be up with assist, WBAT with posterior hip precautions for 3 months. DVT prophylaxis as below. Hemoglobin stable. PT/OT. Completed vanessa-operative antibiotics. Continue calcium and vitamin D supplementation. Follow-up with Dr. Gutierrez in 2 weeks.   3. Acute pain, stable. Denies pain at this time. Currently taking only acetaminophen and achieving adequate pain control. Recommend ice to left buttock to reduce edema. Has not needed oxycodone or morphine in the last 2 days, will discontinue.  4. Acute hypoxic respiratory failure, resolved. Off oxygen this morning. Continue to encourage activity and incentive spirometer.  4. Constipation. BM this morning. Continue docusate and miralax BID.  5. Hypertension, stable. Continue home diltiazem.  6. Hyperlipidemia, stable. Continue home atorvastatin.      General Cares:  GI Prophylaxis: none indicated  DVT Prophylaxis: Lovenox 40 mg daily x2 weeks followed by  for 6 weeks; SCDs   Date of last stool/Bowel Regimen: BM this morning, regimen includes docusate and miralax  Pulmonary toilet: Incentive spirometer, cough and deep breathe  Lines / drains: Hall catheter discontinued     Code status: Full code      Disposition: Likely to TCU, spoke with SW about this. Anticipate discharge in 2-3  days        Interval History   Patient complains only of slight headache. Denies shortness of breath, chest pain, abdominal pain, nausea. Eating okay, says she can drink more water. Dizziness still present when up but improved from yesterday. No numbness or tingling. ROS x 8 negative with exception of those things listed in interval hx.    Physical Exam   Temp: 96.7  F (35.9  C) Temp src: Oral BP: 132/73   Heart Rate: 85 Resp: 20 SpO2: 95 % O2 Device: Nasal cannula Oxygen Delivery: 2 LPM  Vitals:    10/22/18 0840 10/25/18 1153   Weight: 68 kg (150 lb) 69.1 kg (152 lb 6.4 oz)     Vital Signs with Ranges  Temp:  [96.6  F (35.9  C)-97.8  F (36.6  C)] 96.7  F (35.9  C)  Heart Rate:  [73-89] 85  Resp:  [20-25] 20  BP: (115-154)/(67-77) 132/73  SpO2:  [94 %-98 %] 95 %  I/O last 3 completed shifts:  In: 780 [P.O.:780]  Out: 1625 [Urine:1625]    [place] Coma Scale - Total 15/15  Constitutional: Awake, alert, cooperative, no apparent distress  Eyes: No icterus  ENT: Normocephalic, atraumatic  Respiratory: No increased work of breathing, good air exchange, clear to auscultation bilaterally, no crackles or wheezing.  Cardiovascular:  regular rate and rhythm, normal S1 and S2, no S3/S4, no m/r/g. Trace edema of LLE. ACE wrap in place.  GI: Normal bowel sounds, soft, non-distended, non-tender, no guarding  Genitourinary: Urine not assessed  Skin:  No erythema or ecchymosis noted, unable to assess LLE under ace wrap  Musculoskeletal: No deformities noted. Abductor pillow in place.  Neurologic: Awake, alert, oriented. Strength and sensation intact. No focal deficits  Neuropsychiatric: Calm, normal eye contact, alert, affect appropriate to situation, oriented, thought process normal.      BRIAN Glover CNP  To contact the trauma service use job code pager [phone],   Numeric texts or alpha text through [facility]

## 2018-10-26 ENCOUNTER — APPOINTMENT (OUTPATIENT)
Dept: OCCUPATIONAL THERAPY | Facility: CLINIC | Age: 78
DRG: 469 | End: 2018-10-26
Payer: MEDICARE

## 2018-10-26 VITALS
DIASTOLIC BLOOD PRESSURE: 77 MMHG | TEMPERATURE: 98.3 F | SYSTOLIC BLOOD PRESSURE: 132 MMHG | BODY MASS INDEX: 22.57 KG/M2 | WEIGHT: 152.4 LBS | RESPIRATION RATE: 18 BRPM | HEIGHT: 69 IN | OXYGEN SATURATION: 94 % | HEART RATE: 80 BPM

## 2018-10-26 PROCEDURE — 25000128 H RX IP 250 OP 636: Performed by: SURGERY

## 2018-10-26 PROCEDURE — 25000132 ZZH RX MED GY IP 250 OP 250 PS 637: Mod: GY | Performed by: NURSE PRACTITIONER

## 2018-10-26 PROCEDURE — 97535 SELF CARE MNGMENT TRAINING: CPT | Mod: GO

## 2018-10-26 PROCEDURE — A9270 NON-COVERED ITEM OR SERVICE: HCPCS | Mod: GY | Performed by: NURSE PRACTITIONER

## 2018-10-26 PROCEDURE — 40000133 ZZH STATISTIC OT WARD VISIT

## 2018-10-26 PROCEDURE — 99239 HOSP IP/OBS DSCHRG MGMT >30: CPT | Performed by: NURSE PRACTITIONER

## 2018-10-26 RX ADMIN — ACETAMINOPHEN 975 MG: 325 TABLET, FILM COATED ORAL at 13:37

## 2018-10-26 RX ADMIN — DILTIAZEM HYDROCHLORIDE 300 MG: 120 CAPSULE, COATED, EXTENDED RELEASE ORAL at 08:46

## 2018-10-26 RX ADMIN — ACETAMINOPHEN 975 MG: 325 TABLET, FILM COATED ORAL at 03:36

## 2018-10-26 RX ADMIN — VITAMIN D, TAB 1000IU (100/BT) 2000 UNITS: 25 TAB at 08:46

## 2018-10-26 RX ADMIN — DOCUSATE SODIUM 100 MG: 100 CAPSULE, LIQUID FILLED ORAL at 08:47

## 2018-10-26 RX ADMIN — ENOXAPARIN SODIUM 40 MG: 40 INJECTION SUBCUTANEOUS at 13:37

## 2018-10-26 RX ADMIN — CALCIUM 1500 MG: 500 TABLET ORAL at 08:46

## 2018-10-26 ASSESSMENT — ACTIVITIES OF DAILY LIVING (ADL)
ADLS_ACUITY_SCORE: 10
ADLS_ACUITY_SCORE: 11
ADLS_ACUITY_SCORE: 13
ADLS_ACUITY_SCORE: 11

## 2018-10-26 NOTE — PLAN OF CARE
Problem: Patient Care Overview  Goal: Plan of Care/Patient Progress Review  Care from 7am until 3:30 pm  Pt is alert and oriented x4, forgetful.AVSS.Left hip pain is controlled with scheduled tylenol 975 mg tab po Q 6 hrs. Fair appetite.Pt had BM x2 today.Hall was removed at 12:30 pm, pt did not void by 3:30 pm.pt worked with physical therapy.Up to bathroom with assist of one and a walker. Pt sat on a chair for couple of hours.Abductor pillow is in place while pt is on bed. and daughter at bed side, supportive of pt.Possible discontinue to TCU tomorrow.

## 2018-10-26 NOTE — PLAN OF CARE
Problem: Patient Care Overview  Goal: Plan of Care/Patient Progress Review  OT/7B: Discharge Planner OT   Patient plan for discharge: TCU  Current status: Performed STS CGA+FWW. Ambulated to the bathroom ~10 ft x2 CGA+FWW. Toilet transfer Sydney+FWW. Educated on different ways to remember precautions. Educated and reviewed relevant AE. Demonstrates progression this session with increased level of IND and mobility during functional activity.   Barriers to return to prior living situation: generalized weakness, hip precautions, fall risk  Recommendations for discharge: TCU  Rationale for recommendations: Pt is functioning below baseline and would benefit from continued skilled therapy services to increase IND and strength/mobility required for ADL/IADLs prior to returning home safely.        Entered by: Christina Fritz 10/26/2018 3:25 PM         Occupational Therapy Discharge Summary    Reason for therapy discharge:    Discharged to transitional care facility.    Progress towards therapy goal(s). See goals on Care Plan in [facility] electronic health record for goal details.  Goals partially met.  Barriers to achieving goals:   discharge from facility.    Therapy recommendation(s):    Continued therapy is recommended.  Rationale/Recommendations:  to increase ADL/IADL IND prior to return home.

## 2018-10-26 NOTE — PLAN OF CARE
"Problem: Patient Care Overview  Goal: Plan of Care/Patient Progress Review  Outcome: No Change  Vital signs:  Temp: 97.5  F (36.4  C) Temp src: Axillary BP: 140/78 Pulse: 80 Heart Rate: 80 Resp: 18 SpO2: 97 % O2 Device: Nasal cannula Oxygen Delivery: 2 LPM     Activity: Up with Ax1-2, gait belt, walker. Pt feels more comfortable with Ax2.   Neuros: A&O x4, however, was disoriented to situation at times. Forgetful. Bed alarm on for safety.  Cardiac: WDL.  Respiratory: Desatted to 89%. On 2L O2 via NC, O2 sats mid 90s.   GI/: Voided 25 mL, Bladder scan 279 mL/hr. Voided 150 mL at 0500. Trauma notified regarding  mL this shift.   Diet: Regular. Encouraging fluids.  Lines: Left PIV SL.  Incisions/Drains: Left hip dressing c/d/i.   Pain/nausea: Pt c/o discomfort, however, states scheduled tylenol \"makes it tolerable\"  Plan: Most likely discharge to TCU.            "

## 2018-10-26 NOTE — PLAN OF CARE
Problem: Fractured Hip (Adult)  Goal: Signs and Symptoms of Listed Potential Problems Will be Absent, Minimized or Managed (Fractured Hip)  Signs and symptoms of listed potential problems will be absent, minimized or managed by discharge/transition of care (reference Fractured Hip (Adult) CPG).   Patient afebrile. BP slightly elevated. Daily bp med given. Will recheck at noon. CMS intact. Denies numbness/tingling LLE. Tylenol for pain with adequate relief. Up to bathroom to void w/ sba of 1, walker and gait belt. Had bm this am. Up to chair for breakfast, good po. Left hip dressing dry and intact. @ bedside, supportive. Alert and orientated x 3 this am, no confusion noted.     Ready to discharge to tcu today. Report attempted x 1. Abductor pillow and discharge orders given to transport.

## 2018-10-26 NOTE — PLAN OF CARE
Problem: Patient Care Overview  Goal: Plan of Care/Patient Progress Review  Outcome: No Change  A&O x4. Forgetful at times.  AVSS on RA. Denied pain. Scheduled tylenol given. Left hip dressing cdi. CMS intact. Tolerating diet.Pt ambulated to bathroom with assist 1 and walker. BM x2. Voided x1. Refused to bladder scan. Abductor pillow in place when in bed. Possible discharge to TCU.

## 2018-10-26 NOTE — PROGRESS NOTES
The palliative care team will sign off a this time, we have made a referral to [facility] for Shahana to receive a consult to complete an Advance Directive.      VERONICA ANDERSON RN   Nurse Clinician  [place] Palliative Care Team

## 2018-10-26 NOTE — PROGRESS NOTES
Social Work Services Discharge Note      Patient Name:  Shahana Donaldson     Anticipated Discharge Date:  10/26/2018    Discharge Disposition:   TCU:  [facility]  [phone] f: [phone]    Following MD:  Per facility      Pre-Admission Screening (PAS) online form has been completed.  The Level of Care (LOC) is:  Determined  Confirmation Code is:  [id number] today with TCU location  Patient/caregiver informed of referral to [facility] Line for Pre-Admission Screening for skilled nursing facility (SNF) placement and to expect a phone call post discharge from SNF.     Additional Services/Equipment Arranged:  HE w/c transport at 1530. Pt and  are aware that transportation might be private pay if insurance does not cover this.      Patient / Family response to discharge plan:  Pt and spouse are agreeable to discharge to [facility] with a private room and are aware of the additional $20/day charge for the room. Pt had mentioned to Trauma team that she was interested in [facility], but had not informed SW of this. Writer did make a referral to [facility] and facility did not have openings. Pt and  also aware that pt had been accepted to [facility] and declined this option.      Persons notified of above discharge plan:  Trauma Team, RN, Charge RN, TCU admissions, Pt and       Staff Discharge Instructions:  Please fax discharge orders and signed hard scripts for any controlled substances.  Please print a packet and send with patient.     CTS Handoff completed:  Yes-PCP    Medicare Notice of Rights provided to the patient/family:  Yes

## 2018-10-29 ENCOUNTER — OFFICE VISIT - HEALTHEAST (OUTPATIENT)
Dept: GERIATRICS | Facility: CLINIC | Age: 78
End: 2018-10-29

## 2018-10-29 ENCOUNTER — AMBULATORY - HEALTHEAST (OUTPATIENT)
Dept: ADMINISTRATIVE | Facility: CLINIC | Age: 78
End: 2018-10-29

## 2018-10-29 DIAGNOSIS — S72.009A FEMORAL NECK FRACTURE (H): ICD-10-CM

## 2018-10-29 DIAGNOSIS — M81.0 OSTEOPOROSIS: ICD-10-CM

## 2018-10-29 DIAGNOSIS — I10 HTN (HYPERTENSION): ICD-10-CM

## 2018-10-29 DIAGNOSIS — Z96.649 S/P HIP HEMIARTHROPLASTY: ICD-10-CM

## 2018-10-29 DIAGNOSIS — R52 PAIN MANAGEMENT: ICD-10-CM

## 2018-10-31 NOTE — OP NOTE
DATE OF PROCEDURE: 10/23/18     PREOPERATIVE DIAGNOSIS:   1. Left displaced femoral neck fracture     POSTOPERATIVE DIAGNOSIS:  1. Left displaced femoral neck fracture    PROCEDURE:   1. Left hip hemiarthroplasty    STAFF SURGEON: Araceli Gutierrez MD   ASSISTANT: Aneudy Anthony MD    ANESTHESIA: General endotracheal anesthesia.   ESTIMATED BLOOD LOSS: 150 mL.     COMPLICATIONS: None.   IMPLANTS:   1. Biomet Avenir stem, size 5 standard offset  2. Biomet femoral head 28mm +7  3. Biomet Bipolar cup 28mm for 49 mm O.D. cup    BRIEF PATIENT HISTORY: Mrs. Donaldson is a 78 year old female who sustained a left displaced femoral neck fracture in a fall yesterday. She was seen in the Emergency Department where radiographs demonstrated this fracture fracture. The patient was seen by Orthopaedics and we discussed the recommendation for operative management of this fracture in the form of hip hemiarthroplasty. We discussed the risks, benefits and alternatives to surgery and the patient wished to proceed with surgery.    DESCRIPTION OF PROCEDURE: The patient was identified in the preoperative area and the correct left hip was marked for surgery.The patient was taken to the operating room and surrendered to general endotracheal anesthesia. She was moved to the operating table in the right lateral decubitus position with the left leg up. The left lower extremity was prepped and draped in the usual sterile fashion. A timeout was held in accordance with hospital policy, confirming correct patient, side, site, procedure and administration of IV antibiotics prior to incision. I completed a posterior approach to the hip. Dissection was carried down through the proximal IT and split into the medius. The external rotators were identified and dissected off the greater troch. The sciatic nerve was palpated and in fact was quite prominent so was monitored throughout the procedure and protected from positioning and retractors. The nerve  was protected throughout and safe during the entirety of the procedure.The capsule was identified and a T incision completed with tags. The fracture was identified and a freshened cut was made approx 1cm above the lesser trochanter. The capsular incision was then carried down to but not through the labrum. The femoral head was then removed and sized to a 48. The cup was inspected and cartilage was intact. We then positioned the proximal femur for instrumentation. The  was used to ensure appropriate lateralization. The canal was entered and then broaches used for sizing. A size 5 was felt to be appropriate and stable. Trial necks and heads were assessed and maximum stability and leg length symmetry was obtained with a standard neck, 49mm cup and +7 head. Trial components were removed. The canal was irrigated and the final stem impacted. The stem was rotationally stable and appropriately positioned. We completed a trail reduction and stability evaluation with the components above. We had stability to and beyond 90 degrees flexion and internal rotation to 70. Limb lengths were symmetric within 1-2mm. The final components were placed and joint seated. The wound was copiously irrigated. The external rotators were repaired through bone tunnels and the incision closed in layered fashion. Sterile dressings were applied and an abductor pillow placed. The patient was then awoken and transported to recovery in stable condition. There were no apparent intraoperative complications.     POSTOPERATIVE PLAN:   Patient will be WBAT. Will have posterior hip precautions. Lovenox and then ASA for prophylaxis.   Return in 2 weeks for wound check.     Araceli Gutierrez MD

## 2018-11-01 ENCOUNTER — OFFICE VISIT - HEALTHEAST (OUTPATIENT)
Dept: GERIATRICS | Facility: CLINIC | Age: 78
End: 2018-11-01

## 2018-11-01 ENCOUNTER — TRANSFERRED RECORDS (OUTPATIENT)
Dept: HEALTH INFORMATION MANAGEMENT | Facility: CLINIC | Age: 78
End: 2018-11-01

## 2018-11-01 DIAGNOSIS — I10 ESSENTIAL HYPERTENSION: ICD-10-CM

## 2018-11-01 DIAGNOSIS — R52 PAIN MANAGEMENT: ICD-10-CM

## 2018-11-01 DIAGNOSIS — S72.002D CLOSED FRACTURE OF NECK OF LEFT FEMUR WITH ROUTINE HEALING, SUBSEQUENT ENCOUNTER: ICD-10-CM

## 2018-11-01 DIAGNOSIS — Z96.649 S/P HIP HEMIARTHROPLASTY: ICD-10-CM

## 2018-11-05 ENCOUNTER — OFFICE VISIT - HEALTHEAST (OUTPATIENT)
Dept: GERIATRICS | Facility: CLINIC | Age: 78
End: 2018-11-05

## 2018-11-05 ENCOUNTER — TRANSFERRED RECORDS (OUTPATIENT)
Dept: HEALTH INFORMATION MANAGEMENT | Facility: CLINIC | Age: 78
End: 2018-11-05

## 2018-11-05 DIAGNOSIS — M81.0 OSTEOPOROSIS: ICD-10-CM

## 2018-11-05 DIAGNOSIS — I10 ESSENTIAL HYPERTENSION: ICD-10-CM

## 2018-11-05 DIAGNOSIS — D62 ACUTE BLOOD LOSS ANEMIA: ICD-10-CM

## 2018-11-05 DIAGNOSIS — S72.002D CLOSED FRACTURE OF NECK OF LEFT FEMUR WITH ROUTINE HEALING, SUBSEQUENT ENCOUNTER: ICD-10-CM

## 2018-11-05 DIAGNOSIS — R52 PAIN MANAGEMENT: ICD-10-CM

## 2018-11-05 DIAGNOSIS — Z96.649 S/P HIP HEMIARTHROPLASTY: ICD-10-CM

## 2018-11-06 ENCOUNTER — PATIENT OUTREACH (OUTPATIENT)
Dept: CARE COORDINATION | Facility: CLINIC | Age: 78
End: 2018-11-06

## 2018-11-07 ENCOUNTER — RADIANT APPOINTMENT (OUTPATIENT)
Dept: GENERAL RADIOLOGY | Facility: CLINIC | Age: 78
End: 2018-11-07
Attending: ORTHOPAEDIC SURGERY
Payer: COMMERCIAL

## 2018-11-07 ENCOUNTER — OFFICE VISIT (OUTPATIENT)
Dept: ORTHOPEDICS | Facility: CLINIC | Age: 78
End: 2018-11-07
Payer: COMMERCIAL

## 2018-11-07 DIAGNOSIS — Z09 FOLLOW-UP EXAMINATION FOLLOWING SURGERY: ICD-10-CM

## 2018-11-07 DIAGNOSIS — Z09 FOLLOW-UP EXAMINATION FOLLOWING SURGERY: Primary | ICD-10-CM

## 2018-11-07 NOTE — MR AVS SNAPSHOT
After Visit Summary   11/7/2018    Shahana Donaldson    MRN: [id number]           Patient Information     Date Of Birth          1940        Visit Information        Provider Department      11/7/2018 12:45 PM Araceli Gutierrez MD [facility]        Today's Diagnoses     Follow-up examination following surgery    -  1       Follow-ups after your visit        Your next 10 appointments already scheduled     Nov 21, 2018  7:05 AM CST   (Arrive by 6:50 AM)   Return Visit with Jeramie Curran MD   [facility] ([facility])    [address] [place] [address]  4th Floor  [facility] [id number] [phone]            Nov 21, 2018 10:00 AM CST   MA SCREENING DIGITAL BILATERAL with [id number]   [facility] ([facility])    [address] [place] [address], 2nd Floor  [facility] [id number] [phone]           How do I prepare for my exam? (Food and drink instructions) No Food and Drink Restrictions.  How do I prepare for my exam? (Other instructions) Do not use any powder, lotion or deodorant under your arms or on your breast. If you do, we will ask you to remove it before your exam.  What should I wear: Wear comfortable, two-piece clothing.  How long does the exam take: Most scans will take 15 minutes.  What should I bring: Bring any previous mammograms from other facilities or have them mailed to the breast center.  Do I need a :  No  is needed.  What do I need to tell my doctor: If you have any allergies, tell your care team.  What should I do after the exam: No restrictions, You may resume normal activities.  What is this test: This test is an x-ray of the breast to look for breast disease. The breast is pressed between two plates to flatten and spread the tissue. An X-ray is taken of the breast from different angles.  Who should I call with questions: If you have any questions, please call the  "Imaging Department where you will have your exam. Directions, parking instructions, and other information is available on our website, [email or web address].  Other information about my exam Three-dimensional (3D) mammograms are available at [place] locations in [facility], [facility], [place], [facility] and [place].  Health locations include [place] and [facility] in [place].  Benefits of 3D mammograms include * Improved rate of cancer detection * Decreases your chance of having to go back for more tests, which means fewer: * \"False-positive\" results (This means that there is an abnormal area but it isn't cancer.) * Invasive testing procedures, such as a biopsy or surgery * Can provide clearer images of the breast if you have dense breast tissue.  *3D mammography is an optional exam that anyone can have with a 2D mammogram. It doesn't replace or take the place of a 2D mammogram. 2D mammograms remain an effective screening test for all women.  Not all insurance companies cover the cost of a 3D mammogram. Check with your insurance. Three-dimensional (3D) mammograms are available at [place] locations in [facility], [facility], [facility], and [place]. Health locations include [place] and [facility] in [place]. Benefits of 3D mammograms include: - Improved rate of cancer detection - Decreases your chance of having to go back for more tests, which means fewer: - \"False-positive\" results (This means that there is an abnormal area but it isn't cancer.) - Invasive testing procedures, such as a biopsy or surgery - Can provide clearer images of the breast if you have dense breast tissue. 3D mammography is an optional exam that anyone can have with a 2D mammogram. It doesn't replace or take the place of a 2D mammogram. 2D mammograms remain an effective screening test for all women.  Not all insurance " companies cover the cost of a 3D mammogram. Check with your insurance.            Dec 05, 2018  7:30 AM CST   (Arrive by 7:15 AM)   Return Visit with Araceli Gutierrez MD   [facility] ([facility] [place])    [address]  4th Floor  [facility] [place] [id number] [phone]            May 30, 2019  8:00 AM CDT   RETURN GENERAL with Josue Trujillo MD   [facility] ([facility])    [address]  [address] [address]  [facility] [place] [id number] [phone]              Who to contact     Please call your clinic at [phone] to:    Ask questions about your health    Make or cancel appointments    Discuss your medicines    Learn about your test results    Speak to your doctor            Additional Information About Your Visit        [employer] Information     [employer] gives you secure access to your electronic health record. If you see a primary care provider, you can also send messages to your care team and make appointments. If you have questions, please call your primary care clinic.  If you do not have a primary care provider, please call [phone] and they will assist you.      [employer] is an electronic gateway that provides easy, online access to your medical records. With [employer], you can request a clinic appointment, read your test results, renew a prescription or communicate with your care team.     To access your existing account, please contact your [facility] or call [phone] for assistance.        Care EveryWhere ID     This is your Care EveryWhere ID. This could be used by other organizations to access your [place] medical records  [id number]         Blood Pressure from Last 3 Encounters:   10/26/18 132/77   08/07/18 (!) 165/91   07/31/18 153/86    Weight from Last 3 Encounters:   10/25/18 69.1 kg (152 lb 6.4 oz)   08/07/18 69.4 kg (153 lb)   07/31/18 69.4 kg (153 lb 1.6 oz)                  Today's Medication Changes          These changes are accurate as of 11/7/18 11:59 PM.  If you have any questions, ask your nurse or doctor.               These medicines have changed or have updated prescriptions.        Dose/Directions    clobetasol 0.05 % ointment   Commonly known as:  TEMOVATE   This may have changed:  additional instructions   Used for:  Lichen sclerosus        Apply sparingly to affected area 2 x daily for14 days. Then, apply small amount to affected area twice weekly for maintenance.   Quantity:  30 g   Refills:  2                Primary Care Provider Office Phone # Fax #    Jeramie Curran [phone] [address] [address]        Equal Access to Services     ABHIJEET NEFF : Hadii chantelle Valdovinos, wavanda graf, qadrewta kaalmada asad, celso crane. So [facility] [phone].    ATENCIÓN: Si habla español, tiene a quiroz disposición servicios gratuitos de asistencia lingüística. Llame al [phone].    We comply with applicable federal civil rights laws and [place] laws. We do not discriminate on the basis of race, color, national origin, age, disability, sex, sexual orientation, or gender identity.            Thank you!     Thank you for choosing [facility]  for your care. Our goal is always to provide you with excellent care. Hearing back from our patients is one way we can continue to improve our services. Please take a few minutes to complete the written survey that you may receive in the mail after your visit with us. Thank you!             Your Updated Medication List - Protect others around you: Learn how to safely use, store and throw away your medicines at www.disposemymeds.org.          This list is accurate as of 11/7/18 11:59 PM.  Always use your most recent med list.                   Brand Name Dispense Instructions for use Diagnosis    ascorbic acid 500 MG tablet     VITAMIN C     Take 500 mg by mouth as needed (when thinking of it)        aspirin 325 MG EC tablet     42 tablet    Take 1 tablet (325 mg) by mouth daily    Closed fracture of left hip, initial encounter (H)       atorvastatin 10 MG tablet    LIPITOR    90 tablet    Take 1 tablet (10 mg) by mouth daily    Mixed hyperlipidemia       calcium carbonate 500 mg (elemental) 500 MG tablet    OSCAL;OYSTER SHELL CALCIUM    60 tablet    Take 3 tablets (1,500 mg) by mouth 2 times daily (with meals)    Closed fracture of left hip, initial encounter (H)       cholecalciferol 1000 UNIT tablet    vitamin D3    30 tablet    Take 2 tablets (2,000 Units) by mouth daily    Closed fracture of left hip, initial encounter (H)       clobetasol 0.05 % ointment    TEMOVATE    30 g    Apply sparingly to affected area 2 x daily for14 days. Then, apply small amount to affected area twice weekly for maintenance.    Lichen sclerosus       diltiazem 300 MG 24 hr capsule    CARTIA XT    90 capsule    Take 1 capsule (300 mg) by mouth daily    Benign essential hypertension       docusate sodium 100 MG capsule    COLACE    60 capsule    Take 1 capsule (100 mg) by mouth 2 times daily    Drug-induced constipation       enoxaparin 40 MG/0.4ML injection    LOVENOX    5.6 mL    Inject 0.4 mLs (40 mg) Subcutaneous every 24 hours    Closed fracture of left hip, initial encounter (H)       methocarbamol 750 MG tablet    ROBAXIN    45 tablet    Take 1 tablet (750 mg) by mouth every 6 hours as needed for muscle spasms    Closed fracture of left hip, initial encounter (H), Closed displaced midcervical fracture of left femur, initial encounter (H)

## 2018-11-07 NOTE — PROGRESS NOTES
CHIEF COMPLAINT: 2 weeks status post left hemiarthroplasty  DATE OF SURGERY: 10/23/18    HISTORY OF PRESENT ILLNESS: Shahana Donaldson is an extremely pleasant 78 year-old female who is 2 weeks status post the above procedure for a left displaced femoral neck fracture. She's planning on discharging from TCU today. She did not require any narcotics following her surgery. She has been ambulating with a walker or cane.  No issues with wound drainage and she has not noticed erythema surrounding her wound.     EXAM:  Pleasant adult  in NAD  Respirations even and unlabored.  Left lower extremity: Incision clean, dry, and intact. No erythema, active drainage. Distally neurovascularly intact without deficits. SILT dp/sp/t/quiroz/saph. 5/5 GSC/TA/EHL/FHL.     IMAGING:  Left hip XRs: well positioned implant in stable alignment. No lucencies to suggest hardware loosening. No new fractures visualized.    ASSESSMENT:  1. 2 weeks status post left hemiarthroplasty, doing very well.    PLAN:  The patient is progressing as expected following her hemiarthroplasty. She will:  - Continue WBAT  - Continue posterior hip precautions  - ASA for DVT prophylaxis  - Continue calcium and vitamin D  - Follow up in 4 weeks    Assessment and plan discussed with Dr. Gutierrez.    Apurva Dennis MD  Orthopaedic Surgery Resident, PGY1     I have personally examined this patient and have reviewed the clinical presentation and progress note with the resident.  I agree with the treatment plan as outlined.  The plan was formulated with the resident on the day of the resident's note.

## 2018-11-07 NOTE — NURSING NOTE
Reason For Visit:   Chief Complaint   Patient presents with     Surgical Followup     2 week pop left jorge alberto JOHAN.  DOS: 10/23/2018       PCP: Jeramie Curran  Ref: Self    ?  No  Occupation none.  Currently working? No.  Work status?  Retired.  Date of injury: 10/22/2018  Type of injury: fell down stairs.  Date of surgery: 10/23/2018  Type of surgery: 1. Left hip hemiarthroplasty.  Smoker: No  Request smoking cessation information: No        There were no vitals taken for this visit.      Pain Assessment  Patient Currently in Pain: Yes  0-10 Pain Scale: 3  Primary Pain Location: Leg  Pain Orientation: Left  Pain Descriptors:  (unsteady, discomfort)  Alleviating Factors: Rest  Aggravating Factors: Walking, Standing    Selma Levy ATC

## 2018-11-07 NOTE — LETTER
11/7/2018       RE: Shahana Donaldson  [address] [place] [facility] [address]     Dear Colleague,    Thank you for referring your patient, Shahana Donaldson, [facility]. Please see a copy of my visit note below.    CHIEF COMPLAINT: 2 weeks status post left hemiarthroplasty  DATE OF SURGERY: 10/23/18    HISTORY OF PRESENT ILLNESS: Shahana Donaldson is an extremely pleasant 78 year-old female who is 2 weeks status post the above procedure for a left displaced femoral neck fracture. She's planning on discharging from TCU today. She did not require any narcotics following her surgery. She has been ambulating with a walker or cane.  No issues with wound drainage and she has not noticed erythema surrounding her wound.     EXAM:  Pleasant adult  in NAD  Respirations even and unlabored.  Left lower extremity: Incision clean, dry, and intact. No erythema, active drainage. Distally neurovascularly intact without deficits. SILT dp/sp/t/quiroz/saph. 5/5 GSC/TA/EHL/FHL.     IMAGING:  Left hip XRs: well positioned implant in stable alignment. No lucencies to suggest hardware loosening. No new fractures visualized.    ASSESSMENT:  1. 2 weeks status post left hemiarthroplasty, doing very well.    PLAN:  The patient is progressing as expected following her hemiarthroplasty. She will:  - Continue WBAT  - Continue posterior hip precautions  - ASA for DVT prophylaxis  - Continue calcium and vitamin D  - Follow up in 4 weeks    Assessment and plan discussed with Dr. Gutierrez.    Apurva Dennis MD  Orthopaedic Surgery Resident, PGY1     I have personally examined this patient and have reviewed the clinical presentation and progress note with the resident.  I agree with the treatment plan as outlined.  The plan was formulated with the resident on the day of the resident's note.       Again, thank you for allowing me to participate in the care of your  patient.      Sincerely,    Araceli Gutierrez MD

## 2018-11-08 ENCOUNTER — OFFICE VISIT - HEALTHEAST (OUTPATIENT)
Dept: GERIATRICS | Facility: CLINIC | Age: 78
End: 2018-11-08

## 2018-11-08 ENCOUNTER — RECORDS - HEALTHEAST (OUTPATIENT)
Dept: LAB | Facility: CLINIC | Age: 78
End: 2018-11-08

## 2018-11-08 DIAGNOSIS — Z96.649 S/P HIP HEMIARTHROPLASTY: ICD-10-CM

## 2018-11-08 DIAGNOSIS — S72.002D CLOSED FRACTURE OF NECK OF LEFT FEMUR WITH ROUTINE HEALING, SUBSEQUENT ENCOUNTER: ICD-10-CM

## 2018-11-08 LAB
ANION GAP SERPL CALCULATED.3IONS-SCNC: 9 MMOL/L (ref 5–18)
BUN SERPL-MCNC: 17 MG/DL (ref 8–28)
CALCIUM SERPL-MCNC: 9.1 MG/DL (ref 8.5–10.5)
CHLORIDE BLD-SCNC: 108 MMOL/L (ref 98–107)
CO2 SERPL-SCNC: 24 MMOL/L (ref 22–31)
CREAT SERPL-MCNC: 0.69 MG/DL (ref 0.6–1.1)
ERYTHROCYTE [DISTWIDTH] IN BLOOD BY AUTOMATED COUNT: 14.2 % (ref 11–14.5)
GFR SERPL CREATININE-BSD FRML MDRD: >60 ML/MIN/1.73M2
GLUCOSE BLD-MCNC: 86 MG/DL (ref 70–125)
HCT VFR BLD AUTO: 35.3 % (ref 35–47)
HGB BLD-MCNC: 11 G/DL (ref 12–16)
MCH RBC QN AUTO: 31.3 PG (ref 27–34)
MCHC RBC AUTO-ENTMCNC: 31.2 G/DL (ref 32–36)
MCV RBC AUTO: 101 FL (ref 80–100)
PLATELET # BLD AUTO: 380 THOU/UL (ref 140–440)
PMV BLD AUTO: 9.9 FL (ref 8.5–12.5)
POTASSIUM BLD-SCNC: 4 MMOL/L (ref 3.5–5)
RBC # BLD AUTO: 3.51 MILL/UL (ref 3.8–5.4)
SODIUM SERPL-SCNC: 141 MMOL/L (ref 136–145)
WBC: 4.7 THOU/UL (ref 4–11)

## 2018-11-09 ENCOUNTER — AMBULATORY - HEALTHEAST (OUTPATIENT)
Dept: GERIATRICS | Facility: CLINIC | Age: 78
End: 2018-11-09

## 2018-11-13 ENCOUNTER — TELEPHONE (OUTPATIENT)
Dept: ORTHOPEDICS | Facility: CLINIC | Age: 78
End: 2018-11-13

## 2018-11-13 NOTE — TELEPHONE ENCOUNTER
[facility] Call Center    Phone Message    May a detailed message be left on voicemail: no    Reason for Call: Other: Mera from [facility] wants to clarify if Pt should be taking both aspirin and ibuprofen at the same time. Pt is taking a few per day and there is a know interaction. Please call her back at [phone].     Action Taken: Message routed to:  [facility] ([facility]

## 2018-11-21 ENCOUNTER — RADIANT APPOINTMENT (OUTPATIENT)
Dept: MAMMOGRAPHY | Facility: CLINIC | Age: 78
End: 2018-11-21
Attending: INTERNAL MEDICINE
Payer: COMMERCIAL

## 2018-11-21 ENCOUNTER — OFFICE VISIT (OUTPATIENT)
Dept: INTERNAL MEDICINE | Facility: CLINIC | Age: 78
End: 2018-11-21
Payer: COMMERCIAL

## 2018-11-21 VITALS
SYSTOLIC BLOOD PRESSURE: 144 MMHG | BODY MASS INDEX: 21.86 KG/M2 | HEART RATE: 63 BPM | DIASTOLIC BLOOD PRESSURE: 77 MMHG | RESPIRATION RATE: 12 BRPM | WEIGHT: 148 LBS

## 2018-11-21 DIAGNOSIS — Z87.310 PERSONAL HISTORY OF HEALED OSTEOPOROSIS FRACTURE: ICD-10-CM

## 2018-11-21 DIAGNOSIS — I10 BENIGN ESSENTIAL HYPERTENSION: ICD-10-CM

## 2018-11-21 DIAGNOSIS — Z12.39 SCREENING BREAST EXAMINATION: ICD-10-CM

## 2018-11-21 DIAGNOSIS — E55.9 VITAMIN D DEFICIENCY: ICD-10-CM

## 2018-11-21 DIAGNOSIS — R93.7 ABNORMAL BONE DENSITY SCREENING: ICD-10-CM

## 2018-11-21 DIAGNOSIS — R93.7 ABNORMAL BONE DENSITY SCREENING: Primary | ICD-10-CM

## 2018-11-21 LAB
ALBUMIN SERPL-MCNC: 3.9 G/DL (ref 3.4–5)
ALP SERPL-CCNC: 144 U/L (ref 40–150)
ALT SERPL W P-5'-P-CCNC: 25 U/L (ref 0–50)
ANION GAP SERPL CALCULATED.3IONS-SCNC: 6 MMOL/L (ref 3–14)
AST SERPL W P-5'-P-CCNC: 23 U/L (ref 0–45)
BASOPHILS # BLD AUTO: 0 10E9/L (ref 0–0.2)
BASOPHILS NFR BLD AUTO: 0.4 %
BILIRUB SERPL-MCNC: 0.5 MG/DL (ref 0.2–1.3)
BUN SERPL-MCNC: 23 MG/DL (ref 7–30)
CALCIUM SERPL-MCNC: 8.6 MG/DL (ref 8.5–10.1)
CHLORIDE SERPL-SCNC: 108 MMOL/L (ref 94–109)
CHOLEST SERPL-MCNC: 180 MG/DL
CO2 SERPL-SCNC: 28 MMOL/L (ref 20–32)
CREAT SERPL-MCNC: 0.72 MG/DL (ref 0.52–1.04)
DEPRECATED CALCIDIOL+CALCIFEROL SERPL-MC: 42 UG/L (ref 20–75)
DIFFERENTIAL METHOD BLD: ABNORMAL
EOSINOPHIL # BLD AUTO: 0.2 10E9/L (ref 0–0.7)
EOSINOPHIL NFR BLD AUTO: 3.5 %
ERYTHROCYTE [DISTWIDTH] IN BLOOD BY AUTOMATED COUNT: 13.4 % (ref 10–15)
GFR SERPL CREATININE-BSD FRML MDRD: 78 ML/MIN/1.7M2
GLUCOSE SERPL-MCNC: 92 MG/DL (ref 70–99)
HCT VFR BLD AUTO: 40.4 % (ref 35–47)
HDLC SERPL-MCNC: 92 MG/DL
HGB BLD-MCNC: 12.7 G/DL (ref 11.7–15.7)
IMM GRANULOCYTES # BLD: 0 10E9/L (ref 0–0.4)
IMM GRANULOCYTES NFR BLD: 0.4 %
LDLC SERPL CALC-MCNC: 64 MG/DL
LYMPHOCYTES # BLD AUTO: 1.4 10E9/L (ref 0.8–5.3)
LYMPHOCYTES NFR BLD AUTO: 29.5 %
MCH RBC QN AUTO: 31.1 PG (ref 26.5–33)
MCHC RBC AUTO-ENTMCNC: 31.4 G/DL (ref 31.5–36.5)
MCV RBC AUTO: 99 FL (ref 78–100)
MONOCYTES # BLD AUTO: 0.5 10E9/L (ref 0–1.3)
MONOCYTES NFR BLD AUTO: 10.8 %
NEUTROPHILS # BLD AUTO: 2.6 10E9/L (ref 1.6–8.3)
NEUTROPHILS NFR BLD AUTO: 55.4 %
NONHDLC SERPL-MCNC: 89 MG/DL
NRBC # BLD AUTO: 0 10*3/UL
NRBC BLD AUTO-RTO: 0 /100
PLATELET # BLD AUTO: 279 10E9/L (ref 150–450)
POTASSIUM SERPL-SCNC: 3.6 MMOL/L (ref 3.4–5.3)
PROT SERPL-MCNC: 7.6 G/DL (ref 6.8–8.8)
RBC # BLD AUTO: 4.08 10E12/L (ref 3.8–5.2)
SODIUM SERPL-SCNC: 142 MMOL/L (ref 133–144)
TRIGL SERPL-MCNC: 126 MG/DL
WBC # BLD AUTO: 4.6 10E9/L (ref 4–11)

## 2018-11-21 ASSESSMENT — PAIN SCALES - GENERAL: PAINLEVEL: NO PAIN (0)

## 2018-11-21 NOTE — NURSING NOTE
Chief Complaint   Patient presents with     Physical     patient states she is here for a yearly physical     MARY SZYMANSKI at 7:00 AM on 11/21/2018.

## 2018-11-21 NOTE — PROGRESS NOTES
HPI:    Pat comes in for physical today. She had L hip fracture 10/23/2018 with surgery with jacquie Conti. She saw Dr. Gutierrez 11/7/2018. Overall doing well and is back home. She is getting PT.  She increased her Diltiazem  mg to 300 mg at our last visit 7/31/2018.  She denies any new HEENT, cardiopulmonary, abdominal, , GYN, neurological, systemic, endocrine, skin, vascular, psychiatric complaints. She is with her  today.     PE:    Vitals noted.   gen nad cooperative alert, LCTA, B good air movement, RRR, S1, S2, , grossly normal neurological exam L hip well healed surgical scar w/o drainage, no erythema, no tenderness.     A/P:    1. HTN: stable on current medication. She  increaseed her Diltiazem to 300 mg PO every day  2. Will check Vit D and get DEXA scan. She states she had taken Fosamax in the past but not for several years  3. Immunizations; She got influenza vaccination 11/12/2018. She got Shingrix vaccine  4. Mammogram today 11/21/2018  5. She will continue to follow up with Dr. Matt dhillon for L hip surgery and has appt. On 12/5/2018  6. Resting echo done on 7/31/2018; nl LVF EF 60-65%. Trace to mild mitral insufficiency.  7. She denies any new skin issues and does not feel she needs to see Dermatology this year.   8. Increased cholesterol; on Lipitor and will check lipids and liver tests today  9. Colonoscopy 11/10/2015; given age no further colonoscopy recommended (next would be at 80)

## 2018-11-21 NOTE — PATIENT INSTRUCTIONS
[facility] Medication Refill Request Information:  * Please contact your pharmacy regarding ANY request for medication refills.  ** [facility] Prescription Fax = [phone]  * Please allow 3 business days for routine medication refills.  * Please allow 5 business days for controlled substance medication refills.     [facility] Test Result notification information:  *You will be notified with in 7-10 days of your appointment day regarding the results of your test.  If you are on MyChart you will be notified as soon as the provider has reviewed the results and signed off on them.    [facility]: [phone]     Imaging [phone] [facility] [place]  [phone] [facility]  [phone] [facility]    Please go to the lab on the first floor before you leave today.     DEXA Screening Tool    Dexa Scan  Is the patient taking any calcium supplements? yes, if yes patient must stop calcium supplements 24 hours prior to appointment.

## 2018-11-22 ENCOUNTER — MEDICAL CORRESPONDENCE (OUTPATIENT)
Dept: HEALTH INFORMATION MANAGEMENT | Facility: CLINIC | Age: 78
End: 2018-11-22

## 2018-12-05 ENCOUNTER — RADIANT APPOINTMENT (OUTPATIENT)
Dept: BONE DENSITY | Facility: CLINIC | Age: 78
End: 2018-12-05
Attending: INTERNAL MEDICINE
Payer: MEDICARE

## 2018-12-05 ENCOUNTER — OFFICE VISIT (OUTPATIENT)
Dept: ORTHOPEDICS | Facility: CLINIC | Age: 78
End: 2018-12-05
Payer: MEDICARE

## 2018-12-05 DIAGNOSIS — R93.7 ABNORMAL BONE DENSITY SCREENING: ICD-10-CM

## 2018-12-05 DIAGNOSIS — Z87.310 PERSONAL HISTORY OF HEALED OSTEOPOROSIS FRACTURE: ICD-10-CM

## 2018-12-05 DIAGNOSIS — I10 BENIGN ESSENTIAL HYPERTENSION: ICD-10-CM

## 2018-12-05 DIAGNOSIS — Z09 FOLLOW-UP EXAMINATION FOLLOWING SURGERY: Primary | ICD-10-CM

## 2018-12-05 ASSESSMENT — ENCOUNTER SYMPTOMS
NAIL CHANGES: 0
SKIN CHANGES: 0

## 2018-12-05 NOTE — LETTER
12/5/2018    RE: Shahana Donaldson  [address]  [facility] [address]     Dear Colleague,    Thank you for referring your patient, Shahana Donaldson, to [facility]. Please see a copy of my visit note below.    CHIEF COMPLAINT: 2 weeks status post left hip hemiarthroplasty  DATE OF SURGERY: 10/23/18    HISTORY OF PRESENT ILLNESS: Shahana Donaldson is an extremely pleasant 78 year-old female who is now 6 weeks status post the above procedure for a left displaced femoral neck fracture. She notes no concerns - she is working on hip abductor strengthening.     EXAM:  Pleasant adult  in NAD  Respirations even and unlabored.  Left lower extremity: Incision well healed. Distally neurovascularly intact without deficits. SILT dp/sp/t/quiroz/saph. 5/5 GSC/TA/EHL/FHL.  Able to ambulate without assist. Good hip flexion and external rotation without pain.    IMAGING:  None new    ASSESSMENT:  1. Six weeks status post left hemiarthroplasty    PLAN:  We discussed her progress and encouraged continued hip strengthening and ambulation. She is having a DEXA today and her PCP is also working with her on bone health. She will return in 8 weeks or prn.     Araceli Gutierrez MD

## 2018-12-05 NOTE — NURSING NOTE
Reason For Visit:   Chief Complaint   Patient presents with     Surgical Followup     6 week pop left Hip Nima arthroplasty.  DOS: 10/23/2018       PCP: Jeramie Curran  Ref: Self     ?  No  Occupation none.  Currently working? No.  Work status?  Retired.  Date of injury: 10/22/2018  Type of injury: fell down stairs.  Date of surgery: 10/23/2018  Type of surgery: 1. Left hip hemiarthroplasty.  Smoker: No  Request smoking cessation information: No     There were no vitals taken for this visit.      Pain Assessment  Patient Currently in Pain: Yes  0-10 Pain Scale: 4  Primary Pain Location: Hip  Pain Orientation: Left  Pain Descriptors: Sore  Alleviating Factors: Pain medication (aspirn, tylenol)  Aggravating Factors: Walking (alot)    Selma Levy ATC

## 2018-12-06 ENCOUNTER — TELEPHONE (OUTPATIENT)
Dept: ENDOCRINOLOGY | Facility: CLINIC | Age: 78
End: 2018-12-06

## 2018-12-06 ENCOUNTER — TELEPHONE (OUTPATIENT)
Dept: INTERNAL MEDICINE | Facility: CLINIC | Age: 78
End: 2018-12-06

## 2018-12-06 DIAGNOSIS — M85.9 LOW BONE DENSITY: Primary | ICD-10-CM

## 2018-12-06 NOTE — TELEPHONE ENCOUNTER
Placed endo referral low bone density        Dear Pat;    Your bone density DEXA scan shows low bone density and I recommend you see the endocrinology doctors. I placed a referral today and you can call [phone] to schedule this appointment.    EDMOND Curran MD

## 2018-12-06 NOTE — TELEPHONE ENCOUNTER
[facility] Call Center    Phone Message    May a detailed message be left on voicemail: yes    Reason for Call: Other: Pt has a referral for low bone density and wants to see Dr. Cummins - Not sure if  see's for them. Please call pt if he does or does not.      Action Taken: Message routed to:  [facility] Ron

## 2018-12-23 NOTE — PROGRESS NOTES
CHIEF COMPLAINT: 2 weeks status post left hip hemiarthroplasty  DATE OF SURGERY: 10/23/18    HISTORY OF PRESENT ILLNESS: Shahana Donaldson is an extremely pleasant 78 year-old female who is now 6 weeks status post the above procedure for a left displaced femoral neck fracture. She notes no concerns - she is working on hip abductor strengthening.     EXAM:  Pleasant adult  in NAD  Respirations even and unlabored.  Left lower extremity: Incision well healed. Distally neurovascularly intact without deficits. SILT dp/sp/t/quiroz/saph. 5/5 GSC/TA/EHL/FHL.  Able to ambulate without assist. Good hip flexion and external rotation without pain.    IMAGING:  None new    ASSESSMENT:  1. Six weeks status post left hemiarthroplasty    PLAN:  We discussed her progress and encouraged continued hip strengthening and ambulation. She is having a DEXA today and her PCP is also working with her on bone health. She will return in 8 weeks or prn.

## 2019-01-05 ENCOUNTER — MEDICAL CORRESPONDENCE (OUTPATIENT)
Dept: HEALTH INFORMATION MANAGEMENT | Facility: CLINIC | Age: 79
End: 2019-01-05

## 2019-01-15 ENCOUNTER — OFFICE VISIT (OUTPATIENT)
Dept: ENDOCRINOLOGY | Facility: CLINIC | Age: 79
End: 2019-01-15
Payer: MEDICARE

## 2019-01-15 VITALS
BODY MASS INDEX: 22.79 KG/M2 | HEART RATE: 74 BPM | HEIGHT: 68 IN | DIASTOLIC BLOOD PRESSURE: 89 MMHG | SYSTOLIC BLOOD PRESSURE: 148 MMHG | WEIGHT: 150.4 LBS

## 2019-01-15 DIAGNOSIS — M81.0 SENILE OSTEOPOROSIS: ICD-10-CM

## 2019-01-15 RX ORDER — ZOLEDRONIC ACID 5 MG/100ML
5 INJECTION, SOLUTION INTRAVENOUS ONCE
Status: CANCELLED
Start: 2019-01-21 | End: 2019-01-21

## 2019-01-15 ASSESSMENT — PAIN SCALES - GENERAL: PAINLEVEL: NO PAIN (1)

## 2019-01-15 ASSESSMENT — MIFFLIN-ST. JEOR: SCORE: 1211.2

## 2019-01-15 NOTE — PATIENT INSTRUCTIONS
CALCIUM Recommendation 1500 mg/day in divided dose of no more than 500 mg/dose. This includes what is in your food and also what is in any supplements you are taking.      Dietary sources of calcium: These also contain vitamin D  Milk / buttermilk              8 oz            300 mg  Dry milk powder       5 Tbsp             300 mg  Yogurt                          1 cup           400 mg  Ice cream   1/2 cup          100 mg  Hard cheese                     1.5 oz          300 mg  Cottage cheese                1/2 cup        65 mg  Spinach, cooked  1 cup  240 mg  Tofu, soft regular           4 oz          120 to 390 mg  Sardines                       3 oz               370 mg  Mackerel canned         3 oz                250 mg  Canned salmon with bones 3 oz        170-210 mg  Calcium fortified cereals are available  SILK soy and almond milk products are calcium fortified  Orange juice  Fortified with Calcium       8 oz            300 mg  Low fat dairy sources are recommended      Recommended exercise goals:    30 minutes of moderate exercise on most days of the week .  Weight bearing exercise (ie, walking, jogging, hiking, dancing)    Strength training 2 or more times/week in addition to other weight -being exercise.  Strength training -- uses weight or resistance beyond that seen in everyday activities -(pilates, weight training with free weights, weight machines or resistance bands)    OSTEOPOROSIS of the spine: avoid exercise machines that incorporate spinal flexion, trunk rotation, or forward bending   Specifically avoid abdominal exercisers, stationary bikes with moving handles, cross-country ski machines, rowing machines, and bicep curl machines      ____________________________________________________    GENERAL INFORMATION FOR PATIENTS TAKING RECLAST    Kidney function and calcium should be monitored before and 2 weeks after each dose.    Be sure you are well hydrated, drink plenty of water.  Drink at least 2  glasses of water in the morning before the infusion, unless otherwise instructed by your doctor.      It is important that you continue to take adequate calcium and vitamin D prior to, and following, Reclast infusion.  Typical calcium doses are in the range of 2143-5658 mg/day  (in divided doses) and vitamin D dose ranges are 400-1200 IU/day.     The most common side effects, which can occur from 3-14 days after an infusion include fever, muscle aches, flu -like symptoms, headache, joint aches, bone pain.  Treatment with acetaminophen can reduce these symptoms.      Rare, but potentially serious, side effects include jaw problems (osteonecrosis) and kidney damage.      You should not take Reclast if you have the following:   Low calcium  Pregnancy or breast feeding  Significantly reduced kidney function (creatinine clearance < 35 ml/minute)  Recent or upcoming dental procedures    You should not take NSAID (non-steroidal anti-inflammatory)  medications (such as Ibuprofen) within 2 weeks before or after Reclast.      Reclast is administered in the Infusion Center.  Appointment for the first infusion is set up through my clinic ([phone] to schedule).  The infusion of Reclast is give over a period of 30 minutes.    ON THE DAY OF INFUSION:  Take your usual dose of calcium and vitamin D.  Drink at least 2 glasses of water before the infusion.  You may take acetaminophen as needed for symptoms after Reclast.

## 2019-01-15 NOTE — LETTER
"1/15/2019       RE: Shahana Donaldson  [address] [facility] [address]     Dear Colleague,    Thank you for referring your patient, Shahana Donaldson, to the [facility] ENDOCRINOLOGY at [facility]. Please see a copy of my visit note below.    Endocrine Consult note    I had to write this note twice due to a system error causing me to lose all my data    Attending Assessment/Plan :     1.  Osteoporosis; history of hip fracture.  Height loss from 5'11 to 5'8.  Because of the history of Nissen I would lean toward recommending yearly IV Reclast x 3 years   I have counseled her today on calcium intake, bone exercise and Reclast - see patient information  Yearly labs on Reclast -  Continue DXA every 2 years - next 12/5/2020 or thereafter- see me after next dxa  Labs today :SPEP and cortisol     2.  history of Nissen fundoplication for GERD - as per # 1    3.  History of corticosteroid including intra-articular- steroids are a risk for the bone.      4.  Post menopausal, history of premature menopause. This is a risk factor for the bone.      Brenda Cummins MD    Chief complaint:  Shahana is a 78 year old female seen in consultation at the request of Dr. Curran for osteoporosis.  I have reviewed Care Everywhere including [facility], [facility] lab reports, imaging reports and provider notes as indicated.        HISTORY OF PRESENT ILLNESS  Kamini presents, along with her .  She notes she is the sister of one of my other patients.    12/5/18 DXA lowest T-score -3.7 L spine; Significant  Loss of BMD compared with 2009.  She has had the following fractures  10/22/18: left femoral neck fracture-- fell down a few stairs  Right Foot bone fracture 25 years ago - fell off a log -  Maximum adult height 5'11\".  Her height was not measured before the appt today but it was measured afterwards  She believes she lost one inch but he measured height suggests " "she lost 3 inches    I have reviewed all relevant labs   4/13/05: PTH 41  4/29/05: 25 hour urine calcium 170 mg/24 hours  9/18/07: PTH 63, 25 OHD 39  10/20/20: 25 OHD 37  10/25/11 25 OHD 36  4/17/18: TSH 2.51  10/23/18: PTH 34  11/21/18: Ca 8.6, alk phos 144, vitamin D 42, total protein 7.6, creatinine 0.72    Menopause was \"early\"- in her 30's.  She did take estrogen for a while.    She has had the following exposures, based on the med list  Alendronate 12/15/05-10/25/11-- it was stopped when it was deemed that it wasn't good for you.   Prednisone burst and taper 1/18/13  Kenalog injection 12/23/15  methylprednisolone depot 3/15/16  Topical steroid - clobetasol -- vaginally and as needed.    No history of kidney stone  There is a family history of osteoporosis in her sister.     She is currently on the following calcium  Calcium carbonate 1250 mg/tsp , 1 tsp/day liquid-- debate on which dose she is on- ?   Vitamin D3 1000 international unit(s)/day  Milk on cereal  Ice cream quite often - 1/2 cup/day  Yogurt no  Cheese lots - couple slices/day or in cooking.    I have reviewed images on pACS   12/5/18 DXA images  5/17/16 MRI L spine - no compression  9.2.14 CT chest- adrenals visualized; appear full; thyroid normal but not fully visualized  9/4/12 CT Abdomen adrenals look normal     REVIEW OF SYSTEMS  Much better  Energy is OK  Sleep is not always so easy due to leg pains; this is improved if she gets up and walks aound  Cardiac: negative  Respiratory: negative  GI: negative; no longer has GERD due to the Nissen  No back pain  Some left groin little pain - since surgery   ? Muscle pain   10 system ROS otherwise as per the HPI or negative    Past Medical History  Past Medical History:   Diagnosis Date     Diverticulosis of colon (without mention of hemorrhage)      Essential hypertension, benign      Gastro-oesophageal reflux disease     nissen     Hemorrhoid      Nonsenile cataract      Osteoporosis      Other and " unspecified hyperlipidemia      Rectocele      Symptomatic menopausal or female climacteric states      Past Surgical History:   Procedure Laterality Date     ARTHROPLASTY HIP Left 10/23/2018    Procedure: LEFT HIP COLEMAN- ARTHROPLASTY ;  Surgeon: Araceli Gutierrez MD;  Location: [facility] OR     C NONSPECIFIC PROCEDURE      Bilateral Tubal Ligation     C NONSPECIFIC PROCEDURE      D&C secondary to menorrhagia     C NONSPECIFIC PROCEDURE      child birth x 2     COLONOSCOPY  5/24/2011    Procedure:COLONOSCOPY; Surgeon:HALINA SCOTT; [facility]     COLONOSCOPY Left 11/10/2015    Procedure: COLONOSCOPY;  Surgeon: Raheem Colunga MD;  Location: [facility] GI     GYN SURGERY      hysterectomy/oopherectomy; done for prolapse     LAPAROSCOPIC NISSEN FUNDOPLICATION  1/7/2013    Procedure: LAPAROSCOPIC NISSEN FUNDOPLICATION;  Laparoscopic Repair of Hiatel Hernia, NISSEN, Esophagoscopy, Gastroscopy And Duodenoscopy ;  Surgeon: Leonidas Valle MD;  Location: [facility] OR       Medications    Current Outpatient Medications   Medication Sig Dispense Refill     Acetaminophen (TYLENOL PO) Take 1,000 mg by mouth every 6 hours as needed for mild pain or fever       ascorbic acid (VITAMIN C) 500 MG tablet Take 500 mg by mouth as needed (when thinking of it)        aspirin 325 MG EC tablet Take 1 tablet (325 mg) by mouth daily 42 tablet 0     atorvastatin (LIPITOR) 10 MG tablet Take 1 tablet (10 mg) by mouth daily 90 tablet 3     calcium carbonate 500 mg, elemental, (OSCAL;OYSTER SHELL CALCIUM) 500 MG tablet Take 3 tablets (1,500 mg) by mouth 2 times daily (with meals) 60 tablet 0     cholecalciferol (VITAMIN D3) 1000 UNIT tablet Take 2 tablets (2,000 Units) by mouth daily 30 tablet 0     clobetasol (TEMOVATE) 0.05 % ointment Apply sparingly to affected area 2 x daily for14 days. Then, apply small amount to affected area twice weekly for maintenance. (Patient taking differently: Apply sparingly to affected area 2 x daily for14 days  "as needed for rash. Then, apply small amount to affected area twice weekly for maintenance.) 30 g 2     diltiazem (CARTIA XT) 300 MG 24 hr capsule Take 1 capsule (300 mg) by mouth daily 90 capsule 0     docusate sodium (COLACE) 100 MG capsule Take 1 capsule (100 mg) by mouth 2 times daily 60 capsule 0     methocarbamol (ROBAXIN) 750 MG tablet Take 1 tablet (750 mg) by mouth every 6 hours as needed for muscle spasms 45 tablet 0       Allergies  Allergies   Allergen Reactions     Dilaudid [Hydromorphone]      dizziness     Senna Hives       Family History  family history includes Breast Cancer in her sister; C.A.D. in her father; EYE* in her brother; Hypertension in her father and mother; Osteoporosis in her sister.    Social History  Social History     Tobacco Use     Smoking status: Never Smoker     Smokeless tobacco: Never Used   Substance Use Topics     Alcohol use: No     Drug use: No      ; retired RN, used to work in a dermatology clinic; exercise: walking a lot[place] [facility] [employer] [facility] [employer]; dance class in the past.     Physical Exam  /89   Pulse 74   Ht 1.728 m (5' 8.03\")   Wt 68.2 kg (150 lb 6.4 oz)   BMI 22.85 kg/m     Body mass index is 22.85 kg/m .  GENERAL : pleasant woman  In no apparent distress  SKIN: Normal color, normal temperature, texture.  No hirsutism, alopecia or purple striae.     EYES: PERRL, EOMI, No scleral icterus,  No proptosis, conjunctival redness, stare, retraction  MOUTH: Moist, pink; pharynx clear  NECK: No visible masses. No palpable adenopathy, or masses. No carotid bruits.   THYROID: not palpable  RESP: Lungs clear to auscultation bilaterally  CARDIAC: Regular rate and rhythm, normal S1 S2, without murmurs, rubs or gallops    BACK kyphosis   ABDOMEN: Normal bowel sounds; soft, nontender, no HSM or masses       NEURO: awake, alert, responds appropriately to questions.  Cranial nerves intact.  Moves all extremities; Gait normal.  No " tremor of the outstretched hand.  DTRs   0/4 ,   EXTREMITIES: No clubbing, cyanosis or edema.    DATA REVIEW      Results for DIMITRI PHIPPS (MRN [id number]) as of 1/18/2019 09:41   Ref. Range 1/16/2019 07:08   Cortisol Serum Latest Ref Range: 4 - 22 ug/dL 17.0   Albumin Fraction Latest Ref Range: 3.7 - 5.1 g/dL 4.2   Alpha 1 Fraction Latest Ref Range: 0.2 - 0.4 g/dL 0.3   Alpha 2 Fraction Latest Ref Range: 0.5 - 0.9 g/dL 0.6   Beta Fraction Latest Ref Range: 0.6 - 1.0 g/dL 0.7   ELP Interpretation: Unknown Essentially claritza...   Gamma Fraction Latest Ref Range: 0.7 - 1.6 g/dL 1.1   Monoclonal Peak Latest Ref Range: 0.0 g/dL 0.0         Brenda Cummins MD

## 2019-01-15 NOTE — PROGRESS NOTES
"Endocrine Consult note    I had to write this note twice due to a system error causing me to lose all my data    Attending Assessment/Plan :     1.  Osteoporosis; history of hip fracture.  Height loss from 5'11 to 5'8.  Because of the history of Nissen I would lean toward recommending yearly IV Reclast x 3 years   I have counseled her today on calcium intake, bone exercise and Reclast - see patient information  Yearly labs on Reclast -  Continue DXA every 2 years - next 12/5/2020 or thereafter- see me after next dxa  Labs today :SPEP and cortisol     2.  history of Nissen fundoplication for GERD - as per # 1    3.  History of corticosteroid including intra-articular- steroids are a risk for the bone.      4.  Post menopausal, history of premature menopause. This is a risk factor for the bone.      Brenda Cummins MD    Chief complaint:  Shahana is a 78 year old female seen in consultation at the request of Dr. Curran for osteoporosis.  I have reviewed Care Everywhere including [facility], [facility], [facility] [facility] lab reports, imaging reports and provider notes as indicated.        HISTORY OF PRESENT ILLNESS  Kamini presents, along with her .  She notes she is the sister of one of my other patients.    12/5/18 DXA lowest T-score -3.7 L spine; Significant  Loss of BMD compared with 2009.  She has had the following fractures  10/22/18: left femoral neck fracture-- fell down a few stairs  Right Foot bone fracture 25 years ago - fell off a log -  Maximum adult height 5'11\".  Her height was not measured before the appt today but it was measured afterwards  She believes she lost one inch but he measured height suggests she lost 3 inches    I have reviewed all relevant labs   4/13/05: PTH 41  4/29/05: 25 hour urine calcium 170 mg/24 hours  9/18/07: PTH 63, 25 OHD 39  10/20/20: 25 OHD 37  10/25/11 25 OHD 36  4/17/18: TSH 2.51  10/23/18: PTH 34  11/21/18: Ca 8.6, alk phos 144, vitamin D 42, total protein 7.6, " "creatinine 0.72    Menopause was \"early\"- in her 30's.  She did take estrogen for a while.    She has had the following exposures, based on the med list  Alendronate 12/15/05-10/25/11-- it was stopped when it was deemed that it wasn't good for you.   Prednisone burst and taper 1/18/13  Kenalog injection 12/23/15  methylprednisolone depot 3/15/16  Topical steroid - clobetasol -- vaginally and as needed.    No history of kidney stone  There is a family history of osteoporosis in her sister.     She is currently on the following calcium  Calcium carbonate 1250 mg/tsp , 1 tsp/day liquid-- debate on which dose she is on- ?   Vitamin D3 1000 international unit(s)/day  Milk on cereal  Ice cream quite often - 1/2 cup/day  Yogurt no  Cheese lots - couple slices/day or in cooking.    I have reviewed images on pACS   12/5/18 DXA images  5/17/16 MRI L spine - no compression  9.2.14 CT chest- adrenals visualized; appear full; thyroid normal but not fully visualized  9/4/12 CT Abdomen adrenals look normal     REVIEW OF SYSTEMS  Much better  Energy is OK  Sleep is not always so easy due to leg pains; this is improved if she gets up and walks aound  Cardiac: negative  Respiratory: negative  GI: negative; no longer has GERD due to the Nissen  No back pain  Some left groin little pain - since surgery   ? Muscle pain   10 system ROS otherwise as per the HPI or negative    Past Medical History  Past Medical History:   Diagnosis Date     Diverticulosis of colon (without mention of hemorrhage)      Essential hypertension, benign      Gastro-oesophageal reflux disease     nissen     Hemorrhoid      Nonsenile cataract      Osteoporosis      Other and unspecified hyperlipidemia      Rectocele      Symptomatic menopausal or female climacteric states      Past Surgical History:   Procedure Laterality Date     ARTHROPLASTY HIP Left 10/23/2018    Procedure: LEFT HIP COLEMAN- ARTHROPLASTY ;  Surgeon: Araceli Gutierrez MD;  Location:  OR     " C NONSPECIFIC PROCEDURE      Bilateral Tubal Ligation     C NONSPECIFIC PROCEDURE      D&C secondary to menorrhagia     C NONSPECIFIC PROCEDURE      child birth x 2     COLONOSCOPY  5/24/2011    Procedure:COLONOSCOPY; Surgeon:HALINA SCOTT; Location: GI     COLONOSCOPY Left 11/10/2015    Procedure: COLONOSCOPY;  Surgeon: Raheem Colunga MD;  Location:  GI     GYN SURGERY      hysterectomy/oopherectomy; done for prolapse     LAPAROSCOPIC NISSEN FUNDOPLICATION  1/7/2013    Procedure: LAPAROSCOPIC NISSEN FUNDOPLICATION;  Laparoscopic Repair of Hiatel Hernia, NISSEN, Esophagoscopy, Gastroscopy And Duodenoscopy ;  Surgeon: Leonidas Valle MD;  Location:  OR       Medications    Current Outpatient Medications   Medication Sig Dispense Refill     Acetaminophen (TYLENOL PO) Take 1,000 mg by mouth every 6 hours as needed for mild pain or fever       ascorbic acid (VITAMIN C) 500 MG tablet Take 500 mg by mouth as needed (when thinking of it)        aspirin 325 MG EC tablet Take 1 tablet (325 mg) by mouth daily 42 tablet 0     atorvastatin (LIPITOR) 10 MG tablet Take 1 tablet (10 mg) by mouth daily 90 tablet 3     calcium carbonate 500 mg, elemental, (OSCAL;OYSTER SHELL CALCIUM) 500 MG tablet Take 3 tablets (1,500 mg) by mouth 2 times daily (with meals) 60 tablet 0     cholecalciferol (VITAMIN D3) 1000 UNIT tablet Take 2 tablets (2,000 Units) by mouth daily 30 tablet 0     clobetasol (TEMOVATE) 0.05 % ointment Apply sparingly to affected area 2 x daily for14 days. Then, apply small amount to affected area twice weekly for maintenance. (Patient taking differently: Apply sparingly to affected area 2 x daily for14 days as needed for rash. Then, apply small amount to affected area twice weekly for maintenance.) 30 g 2     diltiazem (CARTIA XT) 300 MG 24 hr capsule Take 1 capsule (300 mg) by mouth daily 90 capsule 0     docusate sodium (COLACE) 100 MG capsule Take 1 capsule (100 mg) by mouth 2 times daily 60  "capsule 0     methocarbamol (ROBAXIN) 750 MG tablet Take 1 tablet (750 mg) by mouth every 6 hours as needed for muscle spasms 45 tablet 0       Allergies  Allergies   Allergen Reactions     Dilaudid [Hydromorphone]      dizziness     Senna Hives       Family History  family history includes Breast Cancer in her sister; C.A.D. in her father; EYE* in her brother; Hypertension in her father and mother; Osteoporosis in her sister.    Social History  Social History     Tobacco Use     Smoking status: Never Smoker     Smokeless tobacco: Never Used   Substance Use Topics     Alcohol use: No     Drug use: No      ; retired RN, used to work in a dermatology clinic; exercise: walking a lot[place] [facility] [employer] [facility] [employer]; dance class in the past.     Physical Exam  /89   Pulse 74   Ht 1.728 m (5' 8.03\")   Wt 68.2 kg (150 lb 6.4 oz)   BMI 22.85 kg/m    Body mass index is 22.85 kg/m .  GENERAL : pleasant woman  In no apparent distress  SKIN: Normal color, normal temperature, texture.  No hirsutism, alopecia or purple striae.     EYES: PERRL, EOMI, No scleral icterus,  No proptosis, conjunctival redness, stare, retraction  MOUTH: Moist, pink; pharynx clear  NECK: No visible masses. No palpable adenopathy, or masses. No carotid bruits.   THYROID: not palpable  RESP: Lungs clear to auscultation bilaterally  CARDIAC: Regular rate and rhythm, normal S1 S2, without murmurs, rubs or gallops    BACK kyphosis   ABDOMEN: Normal bowel sounds; soft, nontender, no HSM or masses       NEURO: awake, alert, responds appropriately to questions.  Cranial nerves intact.  Moves all extremities; Gait normal.  No tremor of the outstretched hand.  DTRs   0/4 ,   EXTREMITIES: No clubbing, cyanosis or edema.    DATA REVIEW      Results for DIMITRI PHIPPS (MRN [id number]) as of 1/18/2019 09:41   Ref. Range 1/16/2019 07:08   Cortisol Serum Latest Ref Range: 4 - 22 ug/dL 17.0   Albumin Fraction Latest Ref " Range: 3.7 - 5.1 g/dL 4.2   Alpha 1 Fraction Latest Ref Range: 0.2 - 0.4 g/dL 0.3   Alpha 2 Fraction Latest Ref Range: 0.5 - 0.9 g/dL 0.6   Beta Fraction Latest Ref Range: 0.6 - 1.0 g/dL 0.7   ELP Interpretation: Unknown Essentially claritza...   Gamma Fraction Latest Ref Range: 0.7 - 1.6 g/dL 1.1   Monoclonal Peak Latest Ref Range: 0.0 g/dL 0.0

## 2019-01-16 DIAGNOSIS — M81.0 SENILE OSTEOPOROSIS: ICD-10-CM

## 2019-01-16 LAB — CORTIS SERPL-MCNC: 17 UG/DL (ref 4–22)

## 2019-01-17 LAB
ALBUMIN SERPL ELPH-MCNC: 4.2 G/DL (ref 3.7–5.1)
ALPHA1 GLOB SERPL ELPH-MCNC: 0.3 G/DL (ref 0.2–0.4)
ALPHA2 GLOB SERPL ELPH-MCNC: 0.6 G/DL (ref 0.5–0.9)
B-GLOBULIN SERPL ELPH-MCNC: 0.7 G/DL (ref 0.6–1)
GAMMA GLOB SERPL ELPH-MCNC: 1.1 G/DL (ref 0.7–1.6)
M PROTEIN SERPL ELPH-MCNC: 0 G/DL
PROT PATTERN SERPL ELPH-IMP: NORMAL

## 2019-01-18 ENCOUNTER — DOCUMENTATION ONLY (OUTPATIENT)
Dept: ENDOCRINOLOGY | Facility: CLINIC | Age: 79
End: 2019-01-18

## 2019-01-18 NOTE — PROGRESS NOTES
I can't tell if my check out orders from 1/15 are being addressed relative to prior auth Reclast and scheduling Reclast in infusion center?     Brenda Cummins MD

## 2019-01-22 ENCOUNTER — TELEPHONE (OUTPATIENT)
Dept: ENDOCRINOLOGY | Facility: CLINIC | Age: 79
End: 2019-01-22

## 2019-01-24 ENCOUNTER — TELEPHONE (OUTPATIENT)
Dept: ENDOCRINOLOGY | Facility: CLINIC | Age: 79
End: 2019-01-24

## 2019-01-24 NOTE — TELEPHONE ENCOUNTER
----- Message from Adeola Delcid RN sent at 1/24/2019  2:19 PM CST -----  Regarding: FW: reclast/FYI  Scheduled. Thank you.   ----- Message -----  From: Nika Ojeda  Sent: 1/24/2019   2:16 PM  To: Adeola Delcid RN  Subject: RE: reclast                                      This is done.  ----- Message -----  From: Adeola Delcid RN  Sent: 1/24/2019  10:03 AM  To: [id number]  Subject: reclast                                          This is all set for the patient.         Thanks!         Lisa Barakat  [facility]

## 2019-01-30 ENCOUNTER — OFFICE VISIT (OUTPATIENT)
Dept: ORTHOPEDICS | Facility: CLINIC | Age: 79
End: 2019-01-30
Payer: MEDICARE

## 2019-01-30 DIAGNOSIS — Z09 FOLLOW-UP EXAMINATION FOLLOWING SURGERY: Primary | ICD-10-CM

## 2019-01-30 ASSESSMENT — ENCOUNTER SYMPTOMS
MYALGIAS: 0
ARTHRALGIAS: 1
MUSCLE CRAMPS: 0
BACK PAIN: 0
MUSCLE WEAKNESS: 0
JOINT SWELLING: 0
STIFFNESS: 0
NECK PAIN: 0

## 2019-01-30 NOTE — NURSING NOTE
Reason For Visit:   Chief Complaint   Patient presents with     Surgical Followup     3 month F/U. S/P LEFT HIP COLEMAN- ARTHROPLASTY  on 10/23/18        PCP: Jeramie Curran  Ref: Self     ?  No  Occupation none.  Currently working? No.  Work status?  Retired.  Date of injury: 10/22/2018  Type of injury: fell down stairs.  Date of surgery: 10/23/2018  Type of surgery: 1. Left hip hemiarthroplasty.  Smoker: No  Request smoking cessation information: No    There were no vitals taken for this visit.      Pain Assessment  Patient Currently in Pain: Ortiz Levy, ATC

## 2019-01-30 NOTE — PROGRESS NOTES
CHIEF CONCERN:  3 months status post left hip hemiarthroplasty  DATE OF SURGERY: 10/23/18    HISTORY OF PRESENT ILLNESS: Shahana Donaldson is a very pleasant 78 year old F who is now 3 months status post the above procedure for displaced left femoral neck fracture.  She reports that she has intermittent mild groin pain that is unrelated to any specific activity.  We discussed that this pain is not unexpected and should continue to improve with time.  Patient was comfortable with this and encouraged to continue with her strengthening and ambulation.      PHYSICAL EXAM:    General: Adult in no acute distress. Articulates and communicates with normal affect.  Respiratory: Respirations even and unlabored  CV: wwp   Skin: Intact. No erythema.   Inspection:  no gross deformities of BLE  Motor: Able to ambulate without assist. Able to fire hip flexors, hip extensors, hip abductors, hip adductors without pain.    IMAGING:  No new imaging    ASSESSMENT:  3 months status post left hemiarthroplasty    PLAN:  We discussed patient's continued progress in recovery status post left hip hemiarthroplasty. We encouraged continued hip strengthening and ambulation.  Given her DEXA scan results we discussed the importance of follow-up with endocrinology.  She reports that she has an appointment for her first Reclast injection next week.  We discussed that at this point in time she can continue to progress with her strengthening and ambulation as tolerated.  We would recommend hip flexion precautions until she is 6 months postop.  She can follow-up with us as needed.  Patient and her  had no additional concerns or questions at this time.      Glendy Ndiaye MD  Orthopaedic Surgery, PGY-1    I have personally examined this patient and have reviewed the clinical presentation and progress note with the resident.  I agree with the treatment plan as outlined.  The plan was formulated with the resident on the day of the  resident's note.     Araceli Gutierrez MD    Answers for HPI/ROS submitted by the patient on 1/30/2019   General Symptoms: No  Skin Symptoms: No  HENT Symptoms: No  EYE SYMPTOMS: No  HEART SYMPTOMS: No  LUNG SYMPTOMS: No  INTESTINAL SYMPTOMS: No  URINARY SYMPTOMS: No  GYNECOLOGIC SYMPTOMS: No  BREAST SYMPTOMS: No  SKELETAL SYMPTOMS: Yes  BLOOD SYMPTOMS: No  NERVOUS SYSTEM SYMPTOMS: No  MENTAL HEALTH SYMPTOMS: No  Back pain: No  Muscle aches: No  Neck pain: No  Swollen joints: No  Joint pain: Yes  Bone pain: No  Muscle cramps: No  Muscle weakness: No  Joint stiffness: No  Bone fracture: No

## 2019-01-30 NOTE — LETTER
1/30/2019       RE: Shahana Donaldson  [address] [facility] [address]     Dear Colleague,    Thank you for referring your patient, Shahana Donaldson, to [facility]. Please see a copy of my visit note below.    CHIEF CONCERN:  3 months status post left hip hemiarthroplasty  DATE OF SURGERY: 10/23/18    HISTORY OF PRESENT ILLNESS: Shahana Donaldson is a very pleasant 78 year old F who is now 3 months status post the above procedure for displaced left femoral neck fracture.  She reports that she has intermittent mild groin pain that is unrelated to any specific activity.  We discussed that this pain is not unexpected and should continue to improve with time.  Patient was comfortable with this and encouraged to continue with her strengthening and ambulation.    PHYSICAL EXAM:    General: Adult in no acute distress. Articulates and communicates with normal affect.  Respiratory: Respirations even and unlabored  CV: wwp   Skin: Intact. No erythema.   Inspection:  no gross deformities of BLE  Motor: Able to ambulate without assist. Able to fire hip flexors, hip extensors, hip abductors, hip adductors without pain.    IMAGING:  No new imaging    ASSESSMENT:  3 months status post left hemiarthroplasty    PLAN:  We discussed patient's continued progress in recovery status post left hip hemiarthroplasty. We encouraged continued hip strengthening and ambulation.  Given her DEXA scan results we discussed the importance of follow-up with endocrinology.  She reports that she has an appointment for her first Reclast injection next week.  We discussed that at this point in time she can continue to progress with her strengthening and ambulation as tolerated.  We would recommend hip flexion precautions until she is 6 months postop.  She can follow-up with us as needed.  Patient and her  had no additional concerns or questions at this  time.      Glendy Ndiaye MD  Orthopaedic Surgery, PGY-1    I have personally examined this patient and have reviewed the clinical presentation and progress note with the resident.  I agree with the treatment plan as outlined.  The plan was formulated with the resident on the day of the resident's note.     Araceli Gutierrez MD

## 2019-02-08 ENCOUNTER — TELEPHONE (OUTPATIENT)
Dept: ENDOCRINOLOGY | Facility: CLINIC | Age: 79
End: 2019-02-08

## 2019-02-08 ENCOUNTER — INFUSION THERAPY VISIT (OUTPATIENT)
Dept: INFUSION THERAPY | Facility: CLINIC | Age: 79
End: 2019-02-08
Payer: MEDICARE

## 2019-02-08 ENCOUNTER — APPOINTMENT (OUTPATIENT)
Dept: LAB | Facility: CLINIC | Age: 79
End: 2019-02-08
Payer: MEDICARE

## 2019-02-08 VITALS
DIASTOLIC BLOOD PRESSURE: 81 MMHG | WEIGHT: 151.9 LBS | SYSTOLIC BLOOD PRESSURE: 145 MMHG | HEART RATE: 77 BPM | BODY MASS INDEX: 23.02 KG/M2 | HEIGHT: 68 IN

## 2019-02-08 DIAGNOSIS — M81.0 SENILE OSTEOPOROSIS: Primary | ICD-10-CM

## 2019-02-08 DIAGNOSIS — S72.002A LEFT DISPLACED FEMORAL NECK FRACTURE (H): ICD-10-CM

## 2019-02-08 LAB
ALBUMIN SERPL-MCNC: 3.9 G/DL (ref 3.4–5)
CALCIUM SERPL-MCNC: 8.2 MG/DL (ref 8.5–10.1)
CREAT SERPL-MCNC: 0.71 MG/DL (ref 0.52–1.04)
GFR SERPL CREATININE-BSD FRML MDRD: 82 ML/MIN/{1.73_M2}

## 2019-02-08 PROCEDURE — 82565 ASSAY OF CREATININE: CPT

## 2019-02-08 PROCEDURE — 82310 ASSAY OF CALCIUM: CPT

## 2019-02-08 PROCEDURE — 36415 COLL VENOUS BLD VENIPUNCTURE: CPT

## 2019-02-08 PROCEDURE — 82040 ASSAY OF SERUM ALBUMIN: CPT

## 2019-02-08 RX ORDER — ZOLEDRONIC ACID 5 MG/100ML
5 INJECTION, SOLUTION INTRAVENOUS ONCE
Status: DISCONTINUED | OUTPATIENT
Start: 2019-02-08 | End: 2019-02-08 | Stop reason: CLARIF

## 2019-02-08 RX ORDER — ZOLEDRONIC ACID 5 MG/100ML
5 INJECTION, SOLUTION INTRAVENOUS ONCE
Status: CANCELLED
Start: 2019-02-08 | End: 2019-02-08

## 2019-02-08 ASSESSMENT — MIFFLIN-ST. JEOR: SCORE: 1217.99

## 2019-02-08 NOTE — TELEPHONE ENCOUNTER
Returned call to designated number # 2.  Reached infusion center.  Calcium 8.2. She cannot have reclast today . I will send her a mychart note with further instructions.  Brenda Cummins MD

## 2019-02-08 NOTE — TELEPHONE ENCOUNTER
----- Message from Adeola Delcid RN sent at 2/8/2019  3:30 PM CST -----  Regarding: reclast/calcium result/need phone call  Here for reclast  Tawanna Browne RN, Pt had appointment at 2pm,  Labs: Calcium 8.2; corrected calcium 8.28

## 2019-02-08 NOTE — NURSING NOTE
Chief Complaint   Patient presents with     Blood Draw     Pt here for blood draw via IV; IV placed by RN; vitals completed by TATO Ortiz CMA February 8, 2019  1:31 PM

## 2019-02-08 NOTE — PATIENT INSTRUCTIONS
Dear Shahana Donaldson    Thank you for choosing [facility] ([facility]) for your infusion.  The following information is a summary of our appointment as well as important reminders.      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  ZOLEDRONIC ACID (GERMÁN le dron ik AS id) lowers the amount of calcium loss from bone. It is used to treat too much calcium in your blood from cancer. It is also used to prevent complications of cancer that has spread to the bone.  This medicine may be used for other purposes; ask your health care provider or pharmacist if you have questions.  What should I tell my health care provider before I take this medicine?  They need to know if you have any of these conditions:    aspirin-sensitive asthma    cancer, especially if you are receiving medicines used to treat cancer    dental disease or wear dentures    infection    kidney disease    receiving corticosteroids like dexamethasone or prednisone    an unusual or allergic reaction to zoledronic acid, other medicines, foods, dyes, or preservatives    pregnant or trying to get pregnant    breast-feeding  How should I use this medicine?  This medicine is for infusion into a vein. It is given by a health care professional in a hospital or clinic setting.  Talk to your pediatrician regarding the use of this medicine in children. Special care may be needed.  Overdosage: If you think you have taken too much of this medicine contact a poison control center or emergency room at once.  NOTE: This medicine is only for you. Do not share this medicine with others.  What if I miss a dose?  It is important not to miss your dose. Call your  doctor or health care professional if you are unable to keep an appointment.  What may interact with this medicine?    certain antibiotics given by injection    NSAIDs, medicines for pain and inflammation, like ibuprofen or naproxen    some diuretics like bumetanide, furosemide    teriparatide    thalidomide  This list may not describe all possible interactions. Give your health care provider a list of all the medicines, herbs, non-prescription drugs, or dietary supplements you use. Also tell them if you smoke, drink alcohol, or use illegal drugs. Some items may interact with your medicine.  What should I watch for while using this medicine?  Visit your doctor or health care professional for regular checkups. It may be some time before you see the benefit from this medicine. Do not stop taking your medicine unless your doctor tells you to. Your doctor may order blood tests or other tests to see how you are doing.  Women should inform their doctor if they wish to become pregnant or think they might be pregnant. There is a potential for serious side effects to an unborn child. Talk to your health care professional or pharmacist for more information.  You should make sure that you get enough calcium and vitamin D while you are taking this medicine. Discuss the foods you eat and the vitamins you take with your health care professional.  Some people who take this medicine have severe bone, joint, and/or muscle pain. This medicine may also increase your risk for jaw problems or a broken thigh bone. Tell your doctor right away if you have severe pain in your jaw, bones, joints, or muscles. Tell your doctor if you have any pain that does not go away or that gets worse.  Tell your dentist and dental surgeon that you are taking this medicine. You should not have major dental surgery while on this medicine. See your dentist to have a dental exam and fix any dental problems before starting this medicine. Take good care of your  teeth while on this medicine. Make sure you see your dentist for regular follow-up appointments.  What side effects may I notice from receiving this medicine?  Side effects that you should report to your doctor or health care professional as soon as possible:    allergic reactions like skin rash, itching or hives, swelling of the face, lips, or tongue    anxiety, confusion, or depression    breathing problems    changes in vision    eye pain    feeling faint or lightheaded, falls    jaw pain, especially after dental work    mouth sores    muscle cramps, stiffness, or weakness    redness, blistering, peeling or loosening of the skin, including inside the mouth    trouble passing urine or change in the amount of urine  Side effects that usually do not require medical attention (report to your doctor or health care professional if they continue or are bothersome):    bone, joint, or muscle pain    constipation    diarrhea    fever    hair loss    irritation at site where injected    loss of appetite    nausea, vomiting    stomach upset    trouble sleeping    trouble swallowing    weak or tired  This list may not describe all possible side effects. Call your doctor for medical advice about side effects. You may report side effects to FDA at 4-500-FDA-2938.  Where should I keep my medicine?  This drug is given in a hospital or clinic and will not be stored at home.  NOTE:This sheet is a summary. It may not cover all possible information. If you have questions about this medicine, talk to your doctor, pharmacist, or health care provider. Copyright  2016 [employer] Standard             We look forward in seeing you on your next appointment here at [facility].  Please don t hesitate to call us at [phone] to reschedule any of your appointments or to speak with one of the [facility] registered nurses.  It was a pleasure taking care of you today.    Sincerely,    [facility]  Specialty Infusion & Procedure  [address]  [address]  Phone:  [phone]

## 2019-02-08 NOTE — TELEPHONE ENCOUNTER
Here for reclast [phone] Hollie appointment at 2pm,  Calcium 8.2; corrected calcium 8.28 -     Spoke w/ Hollie - gave her Dr Cummins's message, she states results of today are low - so they will hold the reclast.

## 2019-02-08 NOTE — PROGRESS NOTES
"Nursing Note  Shahana Donaldson [facility] for:   Chief Complaint   Patient presents with     Blood Draw     Pt here for blood draw via IV; IV placed by RN; vitals completed by [facility]     Infusion     Reclast infusion     During today's [facility] appointment, orders from Dr. Brenda Cummins were completed.  Frequency: once    Progress note:  Patient identification verified by name and date of birth.  Assessment completed.  Vitals recorded in Doc Flowsheets.  Patient was provided with education regarding infusion and possible side effects.  Patient verbalized understanding.      needed: No  Premedications: were not ordered.  Labs: were drawn in [facility] and reviewed by nurse. Calcium 8.2, Albumin added on and corrected calcium 8.2 as well.   Treatment Conditions: Unable to infuse due to low calcium level.         Discharge Plan:   Follow up plan of care with: primary medical doctor.  Discharge instructions were reviewed with patient.  Patient/representative verbalized understanding of discharge instructions and all questions answered.  Patient discharged from [facility] in stable condition.    Hollie Bowman RN        /81   Pulse 77   Ht 1.728 m (5' 8.03\")   Wt 68.9 kg (151 lb 14.4 oz)   BMI 23.08 kg/m      "

## 2019-02-20 ENCOUNTER — TELEPHONE (OUTPATIENT)
Dept: ENDOCRINOLOGY | Facility: CLINIC | Age: 79
End: 2019-02-20

## 2019-02-21 NOTE — TELEPHONE ENCOUNTER
Please call her and read her the mychart note I sent her 2/8/19 which I see she still hasn't read.  See what she has to say, let me know.    Thanks    Brenda Cummins MD

## 2019-03-05 NOTE — TELEPHONE ENCOUNTER
I read her the  my chart and she is taking the   Calcium carbonate suspension as written  500 mg TID so 1500 mg  total   Vitamin D 3 2000 international units a day ( you  had 1000)   Orange Juice  1/2 c daily   Milk  3/4 cup daily on cereal   Ice cream  1/2 cup every evening   1 slice of cheese a day   She thought Bina was still monitoring her calcium levels.

## 2019-03-10 DIAGNOSIS — E78.2 MIXED HYPERLIPIDEMIA: ICD-10-CM

## 2019-03-13 RX ORDER — ATORVASTATIN CALCIUM 10 MG/1
TABLET, FILM COATED ORAL
Qty: 90 TABLET | Refills: 2 | OUTPATIENT
Start: 2019-03-13

## 2019-04-05 DIAGNOSIS — E83.51 HYPOCALCEMIA: Primary | ICD-10-CM

## 2019-04-19 DIAGNOSIS — E83.51 HYPOCALCEMIA: ICD-10-CM

## 2019-04-19 LAB — CA-I SERPL ISE-MCNC: 4.8 MG/DL (ref 4.4–5.2)

## 2019-04-19 PROCEDURE — 82310 ASSAY OF CALCIUM: CPT

## 2019-04-19 PROCEDURE — 36415 COLL VENOUS BLD VENIPUNCTURE: CPT

## 2019-04-19 PROCEDURE — 82330 ASSAY OF CALCIUM: CPT

## 2019-04-20 LAB — CALCIUM SERPL-MCNC: 9.1 MG/DL (ref 8.5–10.1)

## 2019-04-24 RX ORDER — ZOLEDRONIC ACID 5 MG/100ML
5 INJECTION, SOLUTION INTRAVENOUS ONCE
Status: CANCELLED
Start: 2019-04-24

## 2019-04-25 ENCOUNTER — INFUSION THERAPY VISIT (OUTPATIENT)
Dept: INFUSION THERAPY | Facility: CLINIC | Age: 79
End: 2019-04-25
Payer: MEDICARE

## 2019-04-25 VITALS
RESPIRATION RATE: 16 BRPM | HEART RATE: 70 BPM | OXYGEN SATURATION: 95 % | SYSTOLIC BLOOD PRESSURE: 161 MMHG | DIASTOLIC BLOOD PRESSURE: 88 MMHG | TEMPERATURE: 98.1 F

## 2019-04-25 DIAGNOSIS — M81.0 SENILE OSTEOPOROSIS: Primary | ICD-10-CM

## 2019-04-25 PROCEDURE — 96365 THER/PROPH/DIAG IV INF INIT: CPT

## 2019-04-25 PROCEDURE — 25000128 H RX IP 250 OP 636: Mod: ZF

## 2019-04-25 RX ORDER — ZOLEDRONIC ACID 5 MG/100ML
5 INJECTION, SOLUTION INTRAVENOUS ONCE
Status: CANCELLED
Start: 2019-04-25

## 2019-04-25 RX ORDER — ZOLEDRONIC ACID 5 MG/100ML
5 INJECTION, SOLUTION INTRAVENOUS ONCE
Status: COMPLETED | OUTPATIENT
Start: 2019-04-25 | End: 2019-04-25

## 2019-04-25 RX ADMIN — ZOLEDRONIC ACID 5 MG: 0.05 INJECTION, SOLUTION INTRAVENOUS at 12:24

## 2019-04-25 NOTE — PATIENT INSTRUCTIONS
Dear Shahana Donaldson    Thank you for choosing [facility] ([facility]) for your infusion.  The following information is a summary of our appointment as well as important reminders.      Patient Education     Zometa or Reclast  Generic Name: Zoledronic Acid  Drug type  Zometa or Reclast works to slow the growth of cancer in the bones.  What this drug is used for  Bone metastasis (spread of cancer to the bones), or ________________________________________  How this drug is given  This drug is given through an IV line, a small tube inserted into a vein. Please let your nurse know right away if you feel pain, discomfort or heat during your infusion; or if you see redness on the skin where the IV is placed.  The amount of medicine that you receive depends on many things. Your doctor will decide on the best dose for you.   Make sure your health care team knows about all the medicines and supplements you take. This will help prevent drug interactions.   Side effects  Most people do not have all the side effects listed. Side effects usually go away after the treatment is complete. Some of the rare side effects are not listed here, so tell your doctor if you have any unusual symptoms.  Common side effects   (occur in more than 3 out of 10 of people):    Weakness    Fever    Chills    Feeling tired    Feeling dizzy    Feeling sleepy  Less common side effects   (occur in less than 3 out of 10 people):    Bone, joint or muscle pain    Headache    Confusion    Redness at IV site    Low blood calcium    Feel sick to your stomach (nausea)  Rare but serious side effects    Damage to the jaw bone. Tell your dentist that you are taking this drug before having any dental work. Be sure to have regular dental exams.    Kidney problems  Call your doctor right away if you have:    Signs of an allergic reaction: Breathing problems, feel like your throat is closing up, swelling  of the mouth, face, lips or tongue, or hives (red, itchy blotches on the skin).    Fever of 100.5  F (38 C) or higher or chills    Feel faint or dizzy    Feel confused  Call your doctor within 24 hours if you feel:    So sick to your stomach, that you cannot eat    So tired that you cannot care for yourself    You are vomiting more than 5 times in 24 hours    Unable to eat or drink for 24 hours  If you have any side effects, call us. We can help you cope with them.   Other information: __________________________  _________________________________________  _________________________________________  For more information, see:  www.chemocare.com   www.medlineplus.gov/druginformation.htm  www.cancer.gov/cancertopics/coping/chemotherapy-and-you  For informational purposes only. Not to replace the advice of your health care provider.   Copyright   2016 [employer]. All rights reserved. [employer] 113073 - 11/16.  For informational purposes only. Not to replace the advice of your health care provider.  Copyright   2018 [employer]. All rights reserved.           We look forward in seeing you on your next appointment here at [facility].  Please don t hesitate to call us at [phone] to reschedule any of your appointments or to speak with one of the [facility] registered nurses.  It was a pleasure taking care of you today.    Sincerely,    [facility]  Specialty Infusion [facility] Procedure Center  [address] [place] [address]  [id number]  Phone:  [phone]

## 2019-04-25 NOTE — PROGRESS NOTES
Nursing Note  Shahana Donaldson presents [facility] for:   Chief Complaint   Patient presents with     Infusion     Reclast     During today's [facility] appointment, orders from Dr. Cummins were completed.  Frequency: once    Progress note:  Patient identification verified by name and date of birth.  Assessment completed.  Vitals recorded in Doc Flowsheets.  Patient was provided with education regarding infusion and possible side effects.  Patient verbalized understanding.     present during visit today: Not Applicable.    Premedications: were not ordered.    Infusion length and rate:  200 ml/hr.    Labs: were not ordered for this appointment.    Vascular access: peripheral IV placed today.    Treatment Conditions: patient's Creatinine Clearance is within paramaters to administer medication per orders.    Post Infusion Assessment:  Patient tolerated infusion without incident.       Discharge Plan:   Follow up plan of care with: primary medical doctor.  Discharge instructions were reviewed with patient.  Patient/representative verbalized understanding of discharge instructions and all questions answered.  Patient discharged from [facility] in stable condition.    Robyn Kerns RN       Administrations This Visit     zoledronic Acid (RECLAST) infusion 5 mg     Admin Date  04/25/2019 Action  New Bag Dose  5 mg Rate  200 mL/hr Route  Intravenous Administered By  Robyn Kerns RN                  /84   Pulse 68   Temp 98.1  F (36.7  C) (Oral)   Resp 16   SpO2 95%

## 2019-05-16 DIAGNOSIS — E78.2 MIXED HYPERLIPIDEMIA: ICD-10-CM

## 2019-05-17 RX ORDER — ATORVASTATIN CALCIUM 10 MG/1
TABLET, FILM COATED ORAL
Qty: 90 TABLET | Refills: 2 | Status: SHIPPED | OUTPATIENT
Start: 2019-05-17 | End: 2020-01-31

## 2019-05-30 ENCOUNTER — OFFICE VISIT (OUTPATIENT)
Dept: OPHTHALMOLOGY | Facility: CLINIC | Age: 79
End: 2019-05-30
Attending: OPHTHALMOLOGY
Payer: MEDICARE

## 2019-05-30 DIAGNOSIS — H25.13 AGE-RELATED NUCLEAR CATARACT OF BOTH EYES: Primary | ICD-10-CM

## 2019-05-30 DIAGNOSIS — H54.40 BLIND RIGHT EYE: ICD-10-CM

## 2019-05-30 PROCEDURE — G0463 HOSPITAL OUTPT CLINIC VISIT: HCPCS | Mod: 25,ZF

## 2019-05-30 PROCEDURE — 76519 ECHO EXAM OF EYE: CPT | Mod: ZF | Performed by: OPHTHALMOLOGY

## 2019-05-30 PROCEDURE — 92015 DETERMINE REFRACTIVE STATE: CPT | Mod: 52,GY,ZF

## 2019-05-30 ASSESSMENT — CONF VISUAL FIELD
OD_SUPERIOR_NASAL_RESTRICTION: 1
OS_NORMAL: 1
OD_SUPERIOR_TEMPORAL_RESTRICTION: 1
OD_INFERIOR_TEMPORAL_RESTRICTION: 1
OD_INFERIOR_NASAL_RESTRICTION: 1

## 2019-05-30 ASSESSMENT — VISUAL ACUITY
OS_CC+: +2
METHOD: SNELLEN - LINEAR
CORRECTION_TYPE: GLASSES
OS_CC: 20/30
OD_CC: 20/800

## 2019-05-30 ASSESSMENT — REFRACTION_MANIFEST
OD_SPHERE: -2.00
OS_CYLINDER: +1.75
OD_CYLINDER: SPHERE
OS_AXIS: 155
OS_SPHERE: -1.75
OS_ADD: +2.75

## 2019-05-30 ASSESSMENT — SLIT LAMP EXAM - LIDS
COMMENTS: MGD
COMMENTS: MGD

## 2019-05-30 ASSESSMENT — REFRACTION_WEARINGRX
OS_SPHERE: -1.75
OS_AXIS: 165
OS_CYLINDER: +1.75
OD_SPHERE: -2.00
OD_ADD: +2.75
OD_CYLINDER: SPHERE
OS_ADD: +2.75

## 2019-05-30 ASSESSMENT — TONOMETRY
OS_IOP_MMHG: 8
IOP_METHOD: ICARE
OD_IOP_MMHG: 7

## 2019-05-30 ASSESSMENT — EXTERNAL EXAM - RIGHT EYE: OD_EXAM: NORMAL

## 2019-05-30 ASSESSMENT — CUP TO DISC RATIO: OS_RATIO: 0.35

## 2019-05-30 ASSESSMENT — EXTERNAL EXAM - LEFT EYE: OS_EXAM: NORMAL

## 2019-05-30 NOTE — NURSING NOTE
Chief Complaints and History of Present Illnesses   Patient presents with     Annual Eye Exam     Chief Complaint(s) and History of Present Illness(es)     Annual Eye Exam               Comments     Shahana Donaldson is a 79 year old female with the following diagnoses:      1. Age-related nuclear cataract of both eyes   2. Blind right eye   3. Staphyloma of right eye      Feels vision has decreased since the last visit.   Updated glasses at last visit.     Florence CHAVEZ 7:54 AM May 30, 2019

## 2019-07-03 ENCOUNTER — OFFICE VISIT (OUTPATIENT)
Dept: INTERNAL MEDICINE | Facility: CLINIC | Age: 79
End: 2019-07-03
Payer: MEDICARE

## 2019-07-03 VITALS
BODY MASS INDEX: 23.42 KG/M2 | DIASTOLIC BLOOD PRESSURE: 89 MMHG | WEIGHT: 154.5 LBS | SYSTOLIC BLOOD PRESSURE: 170 MMHG | HEIGHT: 68 IN | OXYGEN SATURATION: 95 % | HEART RATE: 60 BPM

## 2019-07-03 DIAGNOSIS — I10 BENIGN ESSENTIAL HYPERTENSION: ICD-10-CM

## 2019-07-03 DIAGNOSIS — I10 BENIGN ESSENTIAL HYPERTENSION: Primary | ICD-10-CM

## 2019-07-03 LAB
ALBUMIN SERPL-MCNC: 3.9 G/DL (ref 3.4–5)
ALP SERPL-CCNC: 97 U/L (ref 40–150)
ALT SERPL W P-5'-P-CCNC: 18 U/L (ref 0–50)
ANION GAP SERPL CALCULATED.3IONS-SCNC: 4 MMOL/L (ref 3–14)
AST SERPL W P-5'-P-CCNC: 21 U/L (ref 0–45)
BASOPHILS # BLD AUTO: 0 10E9/L (ref 0–0.2)
BASOPHILS NFR BLD AUTO: 0.7 %
BILIRUB SERPL-MCNC: 0.6 MG/DL (ref 0.2–1.3)
BUN SERPL-MCNC: 23 MG/DL (ref 7–30)
CALCIUM SERPL-MCNC: 8.8 MG/DL (ref 8.5–10.1)
CHLORIDE SERPL-SCNC: 106 MMOL/L (ref 94–109)
CO2 SERPL-SCNC: 29 MMOL/L (ref 20–32)
CREAT SERPL-MCNC: 0.77 MG/DL (ref 0.52–1.04)
DIFFERENTIAL METHOD BLD: NORMAL
EOSINOPHIL # BLD AUTO: 0.1 10E9/L (ref 0–0.7)
EOSINOPHIL NFR BLD AUTO: 2.2 %
ERYTHROCYTE [DISTWIDTH] IN BLOOD BY AUTOMATED COUNT: 12.7 % (ref 10–15)
GFR SERPL CREATININE-BSD FRML MDRD: 73 ML/MIN/{1.73_M2}
GLUCOSE SERPL-MCNC: 83 MG/DL (ref 70–99)
HCT VFR BLD AUTO: 42.4 % (ref 35–47)
HGB BLD-MCNC: 13.5 G/DL (ref 11.7–15.7)
IMM GRANULOCYTES # BLD: 0 10E9/L (ref 0–0.4)
IMM GRANULOCYTES NFR BLD: 0.2 %
INTERPRETATION ECG - MUSE: NORMAL
LYMPHOCYTES # BLD AUTO: 1.4 10E9/L (ref 0.8–5.3)
LYMPHOCYTES NFR BLD AUTO: 33.3 %
MCH RBC QN AUTO: 31.5 PG (ref 26.5–33)
MCHC RBC AUTO-ENTMCNC: 31.8 G/DL (ref 31.5–36.5)
MCV RBC AUTO: 99 FL (ref 78–100)
MONOCYTES # BLD AUTO: 0.5 10E9/L (ref 0–1.3)
MONOCYTES NFR BLD AUTO: 11.6 %
NEUTROPHILS # BLD AUTO: 2.2 10E9/L (ref 1.6–8.3)
NEUTROPHILS NFR BLD AUTO: 52 %
NRBC # BLD AUTO: 0 10*3/UL
NRBC BLD AUTO-RTO: 0 /100
PLATELET # BLD AUTO: 230 10E9/L (ref 150–450)
POTASSIUM SERPL-SCNC: 3.8 MMOL/L (ref 3.4–5.3)
PROT SERPL-MCNC: 7.6 G/DL (ref 6.8–8.8)
RBC # BLD AUTO: 4.29 10E12/L (ref 3.8–5.2)
SODIUM SERPL-SCNC: 140 MMOL/L (ref 133–144)
WBC # BLD AUTO: 4.2 10E9/L (ref 4–11)

## 2019-07-03 ASSESSMENT — PAIN SCALES - GENERAL: PAINLEVEL: NO PAIN (0)

## 2019-07-03 ASSESSMENT — ACTIVITIES OF DAILY LIVING (ADL)
IN_THE_PAST_7_DAYS,_DID_YOU_NEED_HELP_FROM_OTHERS_TO_PERFORM_EVERYDAY_ACTIVITIES_SUCH_AS_EATING,_GETTING_DRESSED,_GROOMING,_BATHING,_WALKING,_OR_USING_THE_TOILET: N
IN_THE_PAST_7_DAYS,_DID_YOU_NEED_HELP_FROM_OTHERS_TO_TAKE_CARE_OF_THINGS_SUCH_AS_LAUNDRY_AND_HOUSEKEEPING,_BANKING,_SHOPPING,_USING_THE_TELEPHONE,_FOOD_PREPARATION,_TRANSPORTATION,_OR_TAKING_YOUR_OWN_MEDICATIONS?: N

## 2019-07-03 ASSESSMENT — MIFFLIN-ST. JEOR: SCORE: 1224.79

## 2019-07-03 NOTE — PROGRESS NOTES
Surgeon (please enter first and last name):  Josue Trujillo MD  Location of Surgery: 7/15/2019  Date of Surgery:  [facility] OR  Procedure:  Right Eye Phacoemulsification with Intraocular Lens, Dexamethasone  History of reaction to anesthesia?  No Combined MAC with Topical

## 2019-07-03 NOTE — NURSING NOTE
"79 year old  Chief Complaint   Patient presents with     Pre-Op Exam     Pt is here for a pre op examination.        Blood pressure 170/89, pulse 60, height 1.728 m (5' 8.03\"), weight 70.1 kg (154 lb 8 oz), SpO2 95 %, not currently breastfeeding. Body mass index is 23.47 kg/m .  BP completed using cuff size:      Aruna Ocasio CMA  July 3, 2019 6:58 AM  "

## 2019-07-03 NOTE — NURSING NOTE
EKG performed  Results to provider  Patient tolerated well. Cherelle Cheng CMA at 7:22 AM on 7/3/2019

## 2019-07-03 NOTE — PROGRESS NOTES
HPI:    Pat comes in today in for preoperative evaluation for  R eye cataract surgery. It is planned to get surgery on the L eye after this. She had decreased vision for several months. She denies any anesthesia or bleeding issues. She has good cardiopulmonary functional status. She had L hip fracture 10/23/2018 with surgery with jacquie Conti. She saw Dr. Gutierrez 11/7/2018 and 12/5/2018.  Overall doing well and is back home.  She increased her Diltiazem  mg to 300 mg at our last visit 7/31/2018.  She denies any new HEENT, cardiopulmonary, abdominal, , GYN, neurological, systemic, endocrine, skin, vascular, psychiatric complaints. She is with her  today.     PE:    Vitals noted; in gen nad cooperative alert, HEENT, oropharynx clear, no exudate, neck supple nl rom, No B carotid bruits, LCTA, B good air movement, RRR, S1, S2, No MRG, no acute abdominal findings, grossly normal neurological exam    EKG; SR at 61; no acute findings. No significant change from previous 10/22/2018    A/P:    Low risk for planned cataract surgery and she has no cardiopulmonary complaints.     She can remain on her same medications.     HTN; she remains Diltiazem 300 mg and somewhat elevated today, will follow.     Laboratory tests and EKG stable today         Total time spent 25 minutes.  More than 50% of the time spent with Ms. Donaldson on counseling / coordinating her care

## 2019-07-03 NOTE — PATIENT INSTRUCTIONS
Primary Care Center Phone Number [phone]  [facility] Medication Refill Request Information:  * Please contact your pharmacy regarding ANY request for medication refills.  ** [facility] Pres[facility] Fax = [phone]  * Please allow 3 business days for routine medication refills.  * Please allow 5 business days for controlled substance medication refills.     [facility] Test Result notification information:  *You will be notified with in 7-10 days of your appointment day regarding the results of your test.  If you are on MyChart you will be notified as soon as the provider has reviewed the results and signed off on them.

## 2019-07-12 ENCOUNTER — ANESTHESIA EVENT (OUTPATIENT)
Dept: SURGERY | Facility: AMBULATORY SURGERY CENTER | Age: 79
End: 2019-07-12

## 2019-07-15 ENCOUNTER — OFFICE VISIT (OUTPATIENT)
Dept: OPHTHALMOLOGY | Facility: CLINIC | Age: 79
End: 2019-07-15
Payer: MEDICARE

## 2019-07-15 ENCOUNTER — HOSPITAL ENCOUNTER (OUTPATIENT)
Facility: AMBULATORY SURGERY CENTER | Age: 79
End: 2019-07-15
Attending: OPHTHALMOLOGY
Payer: MEDICARE

## 2019-07-15 ENCOUNTER — ANESTHESIA (OUTPATIENT)
Dept: SURGERY | Facility: AMBULATORY SURGERY CENTER | Age: 79
End: 2019-07-15

## 2019-07-15 VITALS
DIASTOLIC BLOOD PRESSURE: 73 MMHG | SYSTOLIC BLOOD PRESSURE: 117 MMHG | OXYGEN SATURATION: 95 % | HEART RATE: 62 BPM | RESPIRATION RATE: 16 BRPM | TEMPERATURE: 98.2 F

## 2019-07-15 DIAGNOSIS — Z96.1 PSEUDOPHAKIA: Primary | ICD-10-CM

## 2019-07-15 DIAGNOSIS — H25.813 COMBINED FORM OF AGE-RELATED CATARACT, BOTH EYES: Primary | ICD-10-CM

## 2019-07-15 DEVICE — EYE IMP IOL AMO PCL TECNIS ZCB00 19.5: Type: IMPLANTABLE DEVICE | Site: EYE | Status: FUNCTIONAL

## 2019-07-15 RX ORDER — TROPICAMIDE 10 MG/ML
1 SOLUTION/ DROPS OPHTHALMIC
Status: COMPLETED | OUTPATIENT
Start: 2019-07-15 | End: 2019-07-15

## 2019-07-15 RX ORDER — SODIUM CHLORIDE, SODIUM LACTATE, POTASSIUM CHLORIDE, CALCIUM CHLORIDE 600; 310; 30; 20 MG/100ML; MG/100ML; MG/100ML; MG/100ML
500 INJECTION, SOLUTION INTRAVENOUS CONTINUOUS
Status: DISCONTINUED | OUTPATIENT
Start: 2019-07-15 | End: 2019-07-16 | Stop reason: HOSPADM

## 2019-07-15 RX ORDER — TIMOLOL 5 MG/ML
SOLUTION/ DROPS OPHTHALMIC PRN
Status: DISCONTINUED | OUTPATIENT
Start: 2019-07-15 | End: 2019-07-15 | Stop reason: HOSPADM

## 2019-07-15 RX ORDER — FENTANYL CITRATE 50 UG/ML
INJECTION, SOLUTION INTRAMUSCULAR; INTRAVENOUS PRN
Status: DISCONTINUED | OUTPATIENT
Start: 2019-07-15 | End: 2019-07-15

## 2019-07-15 RX ORDER — MEPERIDINE HYDROCHLORIDE 25 MG/ML
12.5 INJECTION INTRAMUSCULAR; INTRAVENOUS; SUBCUTANEOUS
Status: DISCONTINUED | OUTPATIENT
Start: 2019-07-15 | End: 2019-07-16 | Stop reason: HOSPADM

## 2019-07-15 RX ORDER — CYCLOPENTOLATE HYDROCHLORIDE 10 MG/ML
1 SOLUTION/ DROPS OPHTHALMIC
Status: COMPLETED | OUTPATIENT
Start: 2019-07-15 | End: 2019-07-15

## 2019-07-15 RX ORDER — OFLOXACIN 3 MG/ML
1 SOLUTION/ DROPS OPHTHALMIC 3 TIMES DAILY
Qty: 5 ML | Refills: 1 | Status: SHIPPED | OUTPATIENT
Start: 2019-07-15 | End: 2019-07-30

## 2019-07-15 RX ORDER — MOXIFLOXACIN IN NACL,ISO-OS/PF 0.3MG/0.3
SYRINGE (ML) INTRAOCULAR PRN
Status: DISCONTINUED | OUTPATIENT
Start: 2019-07-15 | End: 2019-07-15 | Stop reason: HOSPADM

## 2019-07-15 RX ORDER — KETOROLAC TROMETHAMINE 4 MG/ML
1 SOLUTION/ DROPS OPHTHALMIC 4 TIMES DAILY
Qty: 5 ML | Refills: 0 | Status: SHIPPED | OUTPATIENT
Start: 2019-07-15 | End: 2019-07-30

## 2019-07-15 RX ORDER — PROPARACAINE HYDROCHLORIDE 5 MG/ML
1 SOLUTION/ DROPS OPHTHALMIC ONCE
Status: COMPLETED | OUTPATIENT
Start: 2019-07-15 | End: 2019-07-15

## 2019-07-15 RX ORDER — FENTANYL CITRATE 50 UG/ML
25-50 INJECTION, SOLUTION INTRAMUSCULAR; INTRAVENOUS
Status: DISCONTINUED | OUTPATIENT
Start: 2019-07-15 | End: 2019-07-16 | Stop reason: HOSPADM

## 2019-07-15 RX ORDER — LIDOCAINE 40 MG/G
CREAM TOPICAL
Status: DISCONTINUED | OUTPATIENT
Start: 2019-07-15 | End: 2019-07-16 | Stop reason: HOSPADM

## 2019-07-15 RX ORDER — PREDNISOLONE ACETATE 10 MG/ML
1 SUSPENSION/ DROPS OPHTHALMIC 4 TIMES DAILY
Qty: 5 ML | Refills: 1 | Status: SHIPPED | OUTPATIENT
Start: 2019-07-15 | End: 2019-09-10

## 2019-07-15 RX ORDER — ACETAMINOPHEN 325 MG/1
975 TABLET ORAL ONCE
Status: COMPLETED | OUTPATIENT
Start: 2019-07-15 | End: 2019-07-15

## 2019-07-15 RX ORDER — NALOXONE HYDROCHLORIDE 0.4 MG/ML
.1-.4 INJECTION, SOLUTION INTRAMUSCULAR; INTRAVENOUS; SUBCUTANEOUS
Status: DISCONTINUED | OUTPATIENT
Start: 2019-07-15 | End: 2019-07-16 | Stop reason: HOSPADM

## 2019-07-15 RX ORDER — ONDANSETRON 4 MG/1
4 TABLET, ORALLY DISINTEGRATING ORAL EVERY 30 MIN PRN
Status: DISCONTINUED | OUTPATIENT
Start: 2019-07-15 | End: 2019-07-16 | Stop reason: HOSPADM

## 2019-07-15 RX ORDER — BALANCED SALT SOLUTION 6.4; .75; .48; .3; 3.9; 1.7 MG/ML; MG/ML; MG/ML; MG/ML; MG/ML; MG/ML
SOLUTION OPHTHALMIC PRN
Status: DISCONTINUED | OUTPATIENT
Start: 2019-07-15 | End: 2019-07-15 | Stop reason: HOSPADM

## 2019-07-15 RX ORDER — SODIUM CHLORIDE, SODIUM LACTATE, POTASSIUM CHLORIDE, CALCIUM CHLORIDE 600; 310; 30; 20 MG/100ML; MG/100ML; MG/100ML; MG/100ML
INJECTION, SOLUTION INTRAVENOUS CONTINUOUS
Status: DISCONTINUED | OUTPATIENT
Start: 2019-07-15 | End: 2019-07-16 | Stop reason: HOSPADM

## 2019-07-15 RX ORDER — PHENYLEPHRINE HYDROCHLORIDE 25 MG/ML
1 SOLUTION/ DROPS OPHTHALMIC
Status: COMPLETED | OUTPATIENT
Start: 2019-07-15 | End: 2019-07-15

## 2019-07-15 RX ORDER — DEXAMETHASONE SODIUM PHOSPHATE 4 MG/ML
INJECTION, SOLUTION INTRA-ARTICULAR; INTRALESIONAL; INTRAMUSCULAR; INTRAVENOUS; SOFT TISSUE PRN
Status: DISCONTINUED | OUTPATIENT
Start: 2019-07-15 | End: 2019-07-15 | Stop reason: HOSPADM

## 2019-07-15 RX ORDER — TETRACAINE HYDROCHLORIDE 5 MG/ML
SOLUTION OPHTHALMIC PRN
Status: DISCONTINUED | OUTPATIENT
Start: 2019-07-15 | End: 2019-07-15 | Stop reason: HOSPADM

## 2019-07-15 RX ORDER — ONDANSETRON 2 MG/ML
4 INJECTION INTRAMUSCULAR; INTRAVENOUS EVERY 30 MIN PRN
Status: DISCONTINUED | OUTPATIENT
Start: 2019-07-15 | End: 2019-07-16 | Stop reason: HOSPADM

## 2019-07-15 RX ORDER — LIDOCAINE HYDROCHLORIDE 10 MG/ML
INJECTION, SOLUTION EPIDURAL; INFILTRATION; INTRACAUDAL; PERINEURAL PRN
Status: DISCONTINUED | OUTPATIENT
Start: 2019-07-15 | End: 2019-07-15 | Stop reason: HOSPADM

## 2019-07-15 RX ADMIN — TROPICAMIDE 1 DROP: 10 SOLUTION/ DROPS OPHTHALMIC at 08:49

## 2019-07-15 RX ADMIN — TROPICAMIDE 1 DROP: 10 SOLUTION/ DROPS OPHTHALMIC at 07:42

## 2019-07-15 RX ADMIN — PROPARACAINE HYDROCHLORIDE 1 DROP: 5 SOLUTION/ DROPS OPHTHALMIC at 08:45

## 2019-07-15 RX ADMIN — PHENYLEPHRINE HYDROCHLORIDE 1 DROP: 25 SOLUTION/ DROPS OPHTHALMIC at 08:55

## 2019-07-15 RX ADMIN — PHENYLEPHRINE HYDROCHLORIDE 1 DROP: 25 SOLUTION/ DROPS OPHTHALMIC at 07:43

## 2019-07-15 RX ADMIN — PHENYLEPHRINE HYDROCHLORIDE 1 DROP: 25 SOLUTION/ DROPS OPHTHALMIC at 07:39

## 2019-07-15 RX ADMIN — CYCLOPENTOLATE HYDROCHLORIDE 1 DROP: 10 SOLUTION/ DROPS OPHTHALMIC at 07:34

## 2019-07-15 RX ADMIN — CYCLOPENTOLATE HYDROCHLORIDE 1 DROP: 10 SOLUTION/ DROPS OPHTHALMIC at 07:40

## 2019-07-15 RX ADMIN — PHENYLEPHRINE HYDROCHLORIDE 1 DROP: 25 SOLUTION/ DROPS OPHTHALMIC at 08:51

## 2019-07-15 RX ADMIN — TROPICAMIDE 1 DROP: 10 SOLUTION/ DROPS OPHTHALMIC at 07:38

## 2019-07-15 RX ADMIN — ACETAMINOPHEN 975 MG: 325 TABLET ORAL at 07:38

## 2019-07-15 RX ADMIN — CYCLOPENTOLATE HYDROCHLORIDE 1 DROP: 10 SOLUTION/ DROPS OPHTHALMIC at 08:54

## 2019-07-15 RX ADMIN — TROPICAMIDE 1 DROP: 10 SOLUTION/ DROPS OPHTHALMIC at 08:53

## 2019-07-15 RX ADMIN — PHENYLEPHRINE HYDROCHLORIDE 1 DROP: 25 SOLUTION/ DROPS OPHTHALMIC at 08:47

## 2019-07-15 RX ADMIN — TROPICAMIDE 1 DROP: 10 SOLUTION/ DROPS OPHTHALMIC at 07:32

## 2019-07-15 RX ADMIN — TROPICAMIDE 1 DROP: 10 SOLUTION/ DROPS OPHTHALMIC at 08:45

## 2019-07-15 RX ADMIN — FENTANYL CITRATE 25 MCG: 50 INJECTION, SOLUTION INTRAMUSCULAR; INTRAVENOUS at 09:10

## 2019-07-15 RX ADMIN — CYCLOPENTOLATE HYDROCHLORIDE 1 DROP: 10 SOLUTION/ DROPS OPHTHALMIC at 08:50

## 2019-07-15 RX ADMIN — PROPARACAINE HYDROCHLORIDE 1 DROP: 5 SOLUTION/ DROPS OPHTHALMIC at 07:30

## 2019-07-15 RX ADMIN — SODIUM CHLORIDE, SODIUM LACTATE, POTASSIUM CHLORIDE, CALCIUM CHLORIDE 500 ML: 600; 310; 30; 20 INJECTION, SOLUTION INTRAVENOUS at 07:44

## 2019-07-15 RX ADMIN — PHENYLEPHRINE HYDROCHLORIDE 1 DROP: 25 SOLUTION/ DROPS OPHTHALMIC at 07:36

## 2019-07-15 RX ADMIN — CYCLOPENTOLATE HYDROCHLORIDE 1 DROP: 10 SOLUTION/ DROPS OPHTHALMIC at 08:46

## 2019-07-15 RX ADMIN — CYCLOPENTOLATE HYDROCHLORIDE 1 DROP: 10 SOLUTION/ DROPS OPHTHALMIC at 07:44

## 2019-07-15 ASSESSMENT — TONOMETRY
OS_IOP_MMHG: 7
IOP_METHOD: ICARE
OD_IOP_MMHG: 4

## 2019-07-15 ASSESSMENT — VISUAL ACUITY
OS_SC+: -2
OD_SC: 3'/200 E
METHOD: SNELLEN - LINEAR
OS_SC: 20/50

## 2019-07-15 ASSESSMENT — EXTERNAL EXAM - RIGHT EYE: OD_EXAM: NORMAL

## 2019-07-15 ASSESSMENT — SLIT LAMP EXAM - LIDS
COMMENTS: MGD
COMMENTS: MGD

## 2019-07-15 ASSESSMENT — EXTERNAL EXAM - LEFT EYE: OS_EXAM: NORMAL

## 2019-07-15 NOTE — PROGRESS NOTES
Assessment & Plan      Shahana Donaldson is a 79 year old female with the following diagnoses:   1. Pseudophakia         left eye, day 0    Doing well  Keep patch in place at night for 5 days  Start post-operative drops and taper according to instructions  Post-operative do's and don'ts reviewed, questions answered    Recheck 2-3 weeks with refraction      Prasanna Ren  PGY2 Ophthalmology       Attending Physician Attestation:  Complete documentation of historical and exam elements from today's encounter can be found in the full encounter summary report (not reduplicated in this progress note).  I personally obtained the chief complaint(s) and history of present illness.  I confirmed and edited as necessary the review of systems, past medical/surgical history, family history, social history, and examination findings as documented by others; and I examined the patient myself.  I personally reviewed the relevant tests, images, and reports as documented above.  I formulated and edited as necessary the assessment and plan and discussed the findings and management plan with the patient and family. . - Josue Trujillo MD

## 2019-07-15 NOTE — DISCHARGE INSTRUCTIONS
[facility]     Home Care Following Cataract Surgery    If you have a gauze eye patch on, please do not remove it until it is removed by your doctor at your first appointment after your surgery.  You will start your eye drops the next day.    OR    If you only have a clear eye shield on, you may remove the eye shield when you get home and begin eye drops today as directed by your doctor.      Wear the clear eye shield for protection when sleeping for the next 5 days.      Do not rub the eye that had the operation.      Your eye might be sensitive to light.  Wearing sunglasses may be more comfortable for you.      You may have some discomfort and irritation.  Acetaminophen (Tylenol) or Ibuprofen (Advil) may be taken for discomfort. If pain persists please call your doctor s office.      Keep the eye that had the surgery dry. You may wash your hair, bathe or shower, but keep your eye closed while doing so.       Avoid bending over, strenuous activity or heavy lifting until this activity has been approved by your doctor.      You have a follow-up appointment with your doctor today or tomorrow at the [facility] ([phone]).  Bring all your prescribed eye drops with you to this follow-up appointment.        If you take glaucoma medications, bring them with you to your follow-up appointment tomorrow.      Use medication exactly as prescribed by your doctor. You may restart your regular home medications.       Occasional blood-tinged tears are normal the day or two after surgery. However, if there is large or persistent bleeding from the eye, that is not normal, and you should contact the clinic.      Call your doctor s office at [phone] if any of the following should occur:    - Any sudden vision changes, including decreased vision  - Nausea or severe headache  - Increase in pain that is not controlled with Acetaminophen (Tylenol) or Ibuprofen  "(Advil)  - Signs of infection (pus, increasing redness or tenderness)  - Severe sensitivity to light  - An increase in floaters (black spots in front of your vision)    [facility]  Home Care Following Anesthesia  For 24 hours after surgery:  1. Get plenty of rest.  A responsible adult must stay with you for at least 24 hours after you leave the surgery center.  2. Do not drive or use heavy equipment.  If you have weakness or tingling, don't drive or use heavy equipment until this feeling goes away.   3. Do not drink alcohol.   4. Avoid strenuous or risky activities.  Ask for help when climbing stairs.  5. You may feel lightheaded.  IF so, sit for a few minutes before standing.  Have someone help you get up.   6. If you have nausea (feel sick to your stomach): Drink only clear liquids such as apple juice, ginger ale, broth or 7-Up.  Rest may also help.  Be sure to drink enough fluids.  Move to a regular diet as you feel able.   7. You may have a slight fever.  Call the doctor if your fever is over 100 F (37.7 C) (taken under the tongue) or lasts longer than 24 hours.  8. You may have a dry mouth, a sore throat, muscle aches or trouble sleeping. These should go away after 24 hours.  9. Do not make important or legal decisions.        Today you received a Marcaine or bupivacaine block to numb the nerves near your surgery site.  This is a block using local anesthetic or \"numbing\" medication injected around the nerves to anesthetize or \"numb\" the area supplied by those nerves.  This block is injected into the muscle layer near your surgical site.  The medication may numb the location where you had surgery for 6-18 hours, but may last up to 24 hours.  If your surgical site is an arm or leg you should be careful with your affected limb, since it is possible to injure your limb without being aware of it due to the numbing.  Until full feeling returns, you should guard against bumping or " hitting your limb, and avoid extreme hot or cold temperatures on the skin.  As the block wears off, the feeling will return as a tingling or prickly sensation near your surgical site.  You will experience more discomfort from your incision as the feeling returns.  You may want to take a pain pill (a narcotic or Tylenol if this was prescribed by your surgeon) when you start to experience mild pain before the pain beccomes more severe.  If your pain medications do not control your pain you should notifiy your surgeon.    Tips for taking pain medications  To get the best pain relief possible, remember these points:    Take pain medications as directed, before pain becomes severe.    Pain medication can upset your stomach: taking it with food may help.    Constipation is a common side effect of pain medication. Drink plenty of  fluids.    Eat foods high in fiber. Take a stool softener if recommended by your doctor or pharmacist.    Do not drink alcohol, drive or operate machinery while taking pain medications.    Ask about other ways to control pain, such as with heat, ice or relaxation.    Tylenol/Acetaminophen Consumption  To help encourage the safe use of acetaminophen, the makers of TYLENOL  have lowered the maximum daily dose for single-ingredient Extra Strength TYLENOL  (acetaminophen) products sold in the [place] from 8 pills per day (4,000 mg) to 6 pills per day (3,000 mg). The dosing interval has also changed from 2 pills every 4-6 hours to 2 pills every 6 hours.    If you feel your pain relief is insufficient, you may take Tylenol/Acetaminophen in addition to your narcotic pain medication.     Be careful not to exceed 3,000 mg of Tylenol/Acetaminophen in a 24 hour period from all sources.    If you are taking extra strength Tylenol/acetaminophen (500 mg), the maximum dose is 6 tablets in 24 hours.    If you are taking regular strength acetaminophen (325 mg), the maximum dose is 9 tablets in 24 hours.    **975 mg  Tylenol given at 7:38, can take again at 1:38 PM    Call a doctor for any of the followin. Signs of infection (fever, growing tenderness at the surgery site, a large amount of drainage or bleeding, severe pain, foul-smelling drainage, redness, swelling).  2. It has been over 8 to 10 hours since surgery and you are still not able to urinate (pass water).  3. Headache for over 24 hours.  Your doctor is:       Dr. Josue Trujillo, Ophthalmology: [phone]               Or dial [phone] and ask for the resident on call for:  Ophthalmology  For emergency care, call the:  [facility]:  [phone] (TTY for hearing impaired: [phone])

## 2019-07-15 NOTE — ANESTHESIA CARE TRANSFER NOTE
Patient: Shahana Donaldson    Procedure(s):  Left Eye Phacoemulsification with Intraocular Lens    Diagnosis: Mature Cataracts  Diagnosis Additional Information: No value filed.    Anesthesia Type:   No value filed.     Note:  Airway :Room Air  Patient transferred to:Phase II  Comments: Oni Report: Identifed the Patient, Identified the Reponsible Provider, Reviewed the pertinent medical history, Discussed the surgical course, Reviewed Intra-OP anesthesia mangement and issues during anesthesia, Set expectations for post-procedure period and Allowed opportunity for questions and acknowledgement of understanding      Vitals: (Last set prior to Anesthesia Care Transfer)    CRNA VITALS  7/15/2019 0858 - 7/15/2019 0932      7/15/2019             Pulse:  59    Ht Rate:  57    SpO2:  97 %    Resp Rate (set):  10                Electronically Signed By: BRIAN Singh CRNA  July 15, 2019  9:32 AM

## 2019-07-15 NOTE — OP NOTE
PREOPERATIVE DIAGNOSIS: Visually significant cataract, Left eye   POSTOPERATIVE DIAGNOSIS: Same   PROCEDURES:   1. Cataract extraction with intraocular lens implant Left eye.  SURGEON: Josue Trujillo M.D.  Assistant: Prasanna Ren MD     INDICATIONS: The patient Shahana Donaldson presented to the eye clinic with decreased vision secondary to cataract in the Left eye. The risks, benefits and alternatives to cataract extraction were discussed. The patient elected to proceed. All questions were answered to the patient's satisfaction.   DESCRIPTION OF PROCEDURE:   Prior to the procedure, appropriate cardiac and respiratory monitors were applied to the patient.  In the pre-operative holding area, a drop of topical tetracaine followed by lidocaine gel followed by povidone iodine.  The patient was brought to the operating room where a surgical pause was carried out to identify with all members of the surgical team the correct surgical site.  With adequate anesthesia, the Left eye was prepped and draped in the usual sterile fashion. A lid speculum was placed, and the operating microscope was rotated into position. A paracentesis was created.  Through this limbal paracentesis, the anterior chamber was filled with preservative-free lidocaine followed by viscoelastic.  A temporal wound was created at the limbus using a 2.6 mm blade. A capsulorrhexis was initiated using a bent 25-gauge needle and was completed in continuous and circular fashion using the capsulorrhexis forceps. The lens nucleus was hydrodissected using balanced salt solution.  The lens nucleus was rotated and removed using phacoemulsification in a stop and chop technique.  Residual cortical material was removed using irrigation-aspiration.  The capsular bag was reinflated to its maximal extent with cohesive viscoelastic.  A 19.5 diopter ZCBOO inserted into the capsular bag.  The lens power selected was reviewed using the intraocular lens power  measurements that were obtained preoperatively to confirm that the correct lens was selected for the desired post-operative refractive state. The residual viscoelastic was removed in its entirety, the wound were hydrated and found to be self-sealing.  Intracameral moxifloxacin was administered. Subconjunctival Dexamethasone (2 mg) was injected.. Tactile pressure was confirmed to be in a normal range.  The lid speculum was removed and a patch and shield were applied.  The patient tolerated the procedure well, and there were no complications.    PLAN: The patient will be discharged to home and will follow up tomorrow morning in the eye clinic.  EBL:  None  Complications:  None  Implant Name Type Inv. Item Serial No.  Lot No. LRB No. Used   EYE IMP IOL NOLVIA PCL TECNIS ZCB00 19.5 Lens/Eye Implant EYE IMP IOL NOLVIA PCL TECNIS ZCB00 19.5 [id number] [employer]  Right 1        Attending Physician Procedure Attestation: I was present for the entire procedure       Josue Trujillo MD  , Comprehensive Ophthalmology  Department of Ophthalmology and Visual Neurosciences  [facility]

## 2019-07-15 NOTE — OR NURSING
Per initial order of consent and patient statement, right eye was planned procedure eye. Eye drops were given as ordered into right eye. After Dr. Trujillo saw patient, procedure plan was to do the left eye and right eye was ordered incorrectly. Dr. Trujillo changed consent on paper, in the computer, and patient aware and agrees with plan. Dr. Trujillo ordered eye drops again for the correct left eye. Drops given per order.

## 2019-07-15 NOTE — ANESTHESIA POSTPROCEDURE EVALUATION
Anesthesia POST Procedure Evaluation    Patient: Shahana Donaldson   MRN:     [id number] Gender:   female   Age:    79 year old :      1940        Preoperative Diagnosis: Mature Cataracts   Procedure(s):  Left Eye Phacoemulsification with Intraocular Lens   Postop Comments: No value filed.       Anesthesia Type:  MAC  No value filed.    Reportable Event: NO     PAIN: Uncomplicated   Sign Out status: Comfortable, Well controlled pain     PONV: No PONV   Sign Out status:  No Nausea or Vomiting     Neuro/Psych: Uneventful perioperative course   Sign Out Status: Preoperative baseline; Age appropriate mentation     Airway/Resp.: Uneventful perioperative course   Sign Out Status: Non labored breathing, age appropriate RR; Resp. Status within EXPECTED Parameters     CV: Uneventful perioperative course   Sign Out status: Appropriate BP and perfusion indices; Appropriate HR/Rhythm     Disposition:   Sign Out in:  PACU  Disposition:  Phase II; Home  Recovery Course: Uneventful  Follow-Up: Not required           Last Anesthesia Record Vitals:  CRNA VITALS  7/15/2019 0858 - 7/15/2019 0958      7/15/2019             Pulse:  59    Ht Rate:  57    SpO2:  97 %    Resp Rate (set):  10          Last PACU Vitals:  Vitals Value Taken Time   /74 7/15/2019  9:31 AM   Temp 36.8  C (98.2  F) 7/15/2019  9:31 AM   Pulse 53 7/15/2019  9:31 AM   Resp 16 7/15/2019  9:31 AM   SpO2 95 % 7/15/2019  9:31 AM   Temp src     NIBP 112/64 7/15/2019  9:26 AM   Pulse 59 7/15/2019  9:28 AM   SpO2 97 % 7/15/2019  9:28 AM   Resp     Temp     Ht Rate 57 7/15/2019  9:28 AM   Temp 2           Electronically Signed By: Chris Franco MD, July 15, 2019, 11:27 AM

## 2019-07-30 ENCOUNTER — OFFICE VISIT (OUTPATIENT)
Dept: OPHTHALMOLOGY | Facility: CLINIC | Age: 79
End: 2019-07-30
Attending: OPHTHALMOLOGY
Payer: MEDICARE

## 2019-07-30 DIAGNOSIS — H54.40 BLIND RIGHT EYE: ICD-10-CM

## 2019-07-30 DIAGNOSIS — Z96.1 PSEUDOPHAKIA: Primary | ICD-10-CM

## 2019-07-30 PROCEDURE — G0463 HOSPITAL OUTPT CLINIC VISIT: HCPCS | Mod: ZF

## 2019-07-30 PROCEDURE — 92015 DETERMINE REFRACTIVE STATE: CPT | Mod: GY,ZF

## 2019-07-30 RX ORDER — PREDNISOLONE ACETATE 10 MG/ML
1-2 SUSPENSION/ DROPS OPHTHALMIC 2 TIMES DAILY
COMMUNITY
End: 2019-09-10

## 2019-07-30 ASSESSMENT — REFRACTION_WEARINGRX
OS_CYLINDER: +1.75
OD_SPHERE: -2.00
OS_SPHERE: -1.75
OD_CYLINDER: SPHERE
OS_ADD: +2.75
OS_AXIS: 165
OD_ADD: +2.75

## 2019-07-30 ASSESSMENT — VISUAL ACUITY
OD_SC: 3'/200E
OS_SC+: -2
METHOD: SNELLEN - LINEAR
OS_SC: 20/25

## 2019-07-30 ASSESSMENT — CUP TO DISC RATIO: OS_RATIO: 0.3

## 2019-07-30 ASSESSMENT — SLIT LAMP EXAM - LIDS
COMMENTS: MGD
COMMENTS: MGD

## 2019-07-30 ASSESSMENT — TONOMETRY
IOP_METHOD: TONOPEN
OD_IOP_MMHG: 13
OS_IOP_MMHG: 09

## 2019-07-30 ASSESSMENT — REFRACTION_MANIFEST
OD_SPHERE: BALANCE
OS_AXIS: 155
OS_SPHERE: -0.25
OS_ADD: +2.75
OS_CYLINDER: +0.25

## 2019-07-30 ASSESSMENT — EXTERNAL EXAM - LEFT EYE: OS_EXAM: NORMAL

## 2019-07-30 ASSESSMENT — EXTERNAL EXAM - RIGHT EYE: OD_EXAM: NORMAL

## 2019-07-30 NOTE — PROGRESS NOTES
Vision much improved, no eye pain redness or discharge. Noticing some small new floaters, not bothersome just new. No flashes.  Still taking Prednisolone 2x day.       Assessment & Plan      Shahana Donaldson is a 79 year old female with the following diagnoses:   1. Pseudophakia - Left Eye    2. Staphyloma of right eye    3. Blind right eye         PostOp left eye, week 2 f/u    Doing well, very happy with vision  Discussed symptoms of retinal tear/detachment and the need to be seen urgently should they occur     Continue post op drops and taper according to instructions  Refractive options reviewed  Refraction given   Monocular precaution discussed  Post-operative do's and don'ts reviewed, questions answered    Patient disposition:   Return in about 1 year (around 7/30/2020) for DFE.      Prasanna Ren  PGY2 Ophthalmology       Attending Physician Attestation:  Complete documentation of historical and exam elements from today's encounter can be found in the full encounter summary report (not reduplicated in this progress note).  I personally obtained the chief complaint(s) and history of present illness.  I confirmed and edited as necessary the review of systems, past medical/surgical history, family history, social history, and examination findings as documented by others; and I examined the patient myself.  I personally reviewed the relevant tests, images, and reports as documented above.  I formulated and edited as necessary the assessment and plan and discussed the findings and management plan with the patient and family. . - Josue Trujillo MD

## 2019-07-30 NOTE — NURSING NOTE
Chief Complaints and History of Present Illnesses   Patient presents with     Post Op (Ophthalmology) Left Eye     Chief Complaint(s) and History of Present Illness(es)     Post Op (Ophthalmology) Left Eye               Comments     POP S/p Cataract extraction with intraocular lens implant Left eye 7/15/2019.  The patient notes she is doing well.  She denies eye pain.  Juana Higgins, JESSIKA 7:27 AM 07/30/2019

## 2019-08-09 ENCOUNTER — TELEPHONE (OUTPATIENT)
Dept: INTERNAL MEDICINE | Facility: CLINIC | Age: 79
End: 2019-08-09

## 2019-08-09 ENCOUNTER — HOSPITAL ENCOUNTER (EMERGENCY)
Facility: CLINIC | Age: 79
Discharge: HOME | End: 2019-08-09
Attending: EMERGENCY MEDICINE | Admitting: EMERGENCY MEDICINE
Payer: MEDICARE

## 2019-08-09 ENCOUNTER — APPOINTMENT (OUTPATIENT)
Dept: CT IMAGING | Facility: CLINIC | Age: 79
End: 2019-08-09
Attending: EMERGENCY MEDICINE
Payer: MEDICARE

## 2019-08-09 ENCOUNTER — APPOINTMENT (OUTPATIENT)
Dept: GENERAL RADIOLOGY | Facility: CLINIC | Age: 79
End: 2019-08-09
Attending: EMERGENCY MEDICINE
Payer: MEDICARE

## 2019-08-09 VITALS
RESPIRATION RATE: 16 BRPM | TEMPERATURE: 97.6 F | SYSTOLIC BLOOD PRESSURE: 128 MMHG | BODY MASS INDEX: 22.13 KG/M2 | DIASTOLIC BLOOD PRESSURE: 88 MMHG | HEART RATE: 75 BPM | HEIGHT: 70 IN | WEIGHT: 154.6 LBS | OXYGEN SATURATION: 98 %

## 2019-08-09 DIAGNOSIS — R07.89 OTHER CHEST PAIN: ICD-10-CM

## 2019-08-09 DIAGNOSIS — R03.0 ELEVATED BLOOD PRESSURE READING: ICD-10-CM

## 2019-08-09 DIAGNOSIS — R07.9 CHEST PAIN, UNSPECIFIED TYPE: ICD-10-CM

## 2019-08-09 DIAGNOSIS — R07.9 CHEST PAIN: Primary | ICD-10-CM

## 2019-08-09 LAB
ALBUMIN SERPL-MCNC: 4.1 G/DL (ref 3.4–5)
ALBUMIN UR-MCNC: NEGATIVE MG/DL
ALP SERPL-CCNC: 96 U/L (ref 40–150)
ALT SERPL W P-5'-P-CCNC: 17 U/L (ref 0–50)
ANION GAP SERPL CALCULATED.3IONS-SCNC: 7 MMOL/L (ref 3–14)
APPEARANCE UR: CLEAR
AST SERPL W P-5'-P-CCNC: 20 U/L (ref 0–45)
BASOPHILS # BLD AUTO: 0 10E9/L (ref 0–0.2)
BASOPHILS NFR BLD AUTO: 0.6 %
BILIRUB SERPL-MCNC: 0.7 MG/DL (ref 0.2–1.3)
BILIRUB UR QL STRIP: NEGATIVE
BUN SERPL-MCNC: 17 MG/DL (ref 7–30)
CALCIUM SERPL-MCNC: 8.9 MG/DL (ref 8.5–10.1)
CHLORIDE SERPL-SCNC: 108 MMOL/L (ref 94–109)
CO2 SERPL-SCNC: 25 MMOL/L (ref 20–32)
COLOR UR AUTO: ABNORMAL
CREAT BLD-MCNC: 0.6 MG/DL (ref 0.52–1.04)
CREAT SERPL-MCNC: 0.7 MG/DL (ref 0.52–1.04)
DIFFERENTIAL METHOD BLD: NORMAL
EOSINOPHIL # BLD AUTO: 0.1 10E9/L (ref 0–0.7)
EOSINOPHIL NFR BLD AUTO: 0.8 %
ERYTHROCYTE [DISTWIDTH] IN BLOOD BY AUTOMATED COUNT: 12.6 % (ref 10–15)
GFR SERPL CREATININE-BSD FRML MDRD: 82 ML/MIN/{1.73_M2}
GFR SERPL CREATININE-BSD FRML MDRD: >90 ML/MIN/{1.73_M2}
GLUCOSE SERPL-MCNC: 92 MG/DL (ref 70–99)
GLUCOSE UR STRIP-MCNC: NEGATIVE MG/DL
HCT VFR BLD AUTO: 44.8 % (ref 35–47)
HGB BLD-MCNC: 14.3 G/DL (ref 11.7–15.7)
HGB UR QL STRIP: NEGATIVE
HYALINE CASTS #/AREA URNS LPF: 1 /LPF (ref 0–2)
IMM GRANULOCYTES # BLD: 0 10E9/L (ref 0–0.4)
IMM GRANULOCYTES NFR BLD: 0.3 %
INTERPRETATION ECG - MUSE: NORMAL
KETONES UR STRIP-MCNC: NEGATIVE MG/DL
LEUKOCYTE ESTERASE UR QL STRIP: ABNORMAL
LIPASE SERPL-CCNC: 236 U/L (ref 73–393)
LYMPHOCYTES # BLD AUTO: 1.4 10E9/L (ref 0.8–5.3)
LYMPHOCYTES NFR BLD AUTO: 22.2 %
MCH RBC QN AUTO: 31.2 PG (ref 26.5–33)
MCHC RBC AUTO-ENTMCNC: 31.9 G/DL (ref 31.5–36.5)
MCV RBC AUTO: 98 FL (ref 78–100)
MONOCYTES # BLD AUTO: 0.5 10E9/L (ref 0–1.3)
MONOCYTES NFR BLD AUTO: 7.3 %
NEUTROPHILS # BLD AUTO: 4.3 10E9/L (ref 1.6–8.3)
NEUTROPHILS NFR BLD AUTO: 68.8 %
NITRATE UR QL: NEGATIVE
NRBC # BLD AUTO: 0 10*3/UL
NRBC BLD AUTO-RTO: 0 /100
NT-PROBNP SERPL-MCNC: 78 PG/ML (ref 0–1800)
PH UR STRIP: 6.5 PH (ref 5–7)
PLATELET # BLD AUTO: 225 10E9/L (ref 150–450)
POTASSIUM SERPL-SCNC: 4 MMOL/L (ref 3.4–5.3)
PROT SERPL-MCNC: 8.3 G/DL (ref 6.8–8.8)
RBC # BLD AUTO: 4.59 10E12/L (ref 3.8–5.2)
RBC #/AREA URNS AUTO: 8 /HPF (ref 0–2)
SODIUM SERPL-SCNC: 140 MMOL/L (ref 133–144)
SOURCE: ABNORMAL
SP GR UR STRIP: 1 (ref 1–1.03)
SQUAMOUS #/AREA URNS AUTO: 5 /HPF (ref 0–1)
TROPONIN I SERPL-MCNC: <0.015 UG/L (ref 0–0.04)
TROPONIN I SERPL-MCNC: <0.015 UG/L (ref 0–0.04)
UROBILINOGEN UR STRIP-MCNC: NORMAL MG/DL (ref 0–2)
WBC # BLD AUTO: 6.3 10E9/L (ref 4–11)
WBC #/AREA URNS AUTO: 4 /HPF (ref 0–5)

## 2019-08-09 PROCEDURE — 82565 ASSAY OF CREATININE: CPT | Mod: 91

## 2019-08-09 PROCEDURE — 99285 EMERGENCY DEPT VISIT HI MDM: CPT | Mod: 25 | Performed by: EMERGENCY MEDICINE

## 2019-08-09 PROCEDURE — 71275 CT ANGIOGRAPHY CHEST: CPT

## 2019-08-09 PROCEDURE — 96360 HYDRATION IV INFUSION INIT: CPT | Performed by: EMERGENCY MEDICINE

## 2019-08-09 PROCEDURE — 85025 COMPLETE CBC W/AUTO DIFF WBC: CPT | Performed by: EMERGENCY MEDICINE

## 2019-08-09 PROCEDURE — 71045 X-RAY EXAM CHEST 1 VIEW: CPT

## 2019-08-09 PROCEDURE — 80053 COMPREHEN METABOLIC PANEL: CPT | Performed by: EMERGENCY MEDICINE

## 2019-08-09 PROCEDURE — 25000128 H RX IP 250 OP 636: Performed by: STUDENT IN AN ORGANIZED HEALTH CARE EDUCATION/TRAINING PROGRAM

## 2019-08-09 PROCEDURE — 81001 URINALYSIS AUTO W/SCOPE: CPT | Performed by: EMERGENCY MEDICINE

## 2019-08-09 PROCEDURE — 83880 ASSAY OF NATRIURETIC PEPTIDE: CPT | Performed by: EMERGENCY MEDICINE

## 2019-08-09 PROCEDURE — 25000128 H RX IP 250 OP 636: Performed by: EMERGENCY MEDICINE

## 2019-08-09 PROCEDURE — 83690 ASSAY OF LIPASE: CPT | Performed by: EMERGENCY MEDICINE

## 2019-08-09 PROCEDURE — 93010 ELECTROCARDIOGRAM REPORT: CPT | Mod: Z6 | Performed by: EMERGENCY MEDICINE

## 2019-08-09 PROCEDURE — 93005 ELECTROCARDIOGRAM TRACING: CPT | Performed by: EMERGENCY MEDICINE

## 2019-08-09 PROCEDURE — 84484 ASSAY OF TROPONIN QUANT: CPT | Performed by: EMERGENCY MEDICINE

## 2019-08-09 RX ORDER — NITROGLYCERIN 0.4 MG/1
0.4 TABLET SUBLINGUAL EVERY 5 MIN PRN
Status: DISCONTINUED | OUTPATIENT
Start: 2019-08-09 | End: 2019-08-09 | Stop reason: HOSPADM

## 2019-08-09 RX ORDER — IOPAMIDOL 755 MG/ML
100 INJECTION, SOLUTION INTRAVASCULAR ONCE
Status: COMPLETED | OUTPATIENT
Start: 2019-08-09 | End: 2019-08-09

## 2019-08-09 RX ADMIN — SODIUM CHLORIDE 1000 ML: 9 INJECTION, SOLUTION INTRAVENOUS at 12:46

## 2019-08-09 RX ADMIN — IOPAMIDOL 100 ML: 755 INJECTION, SOLUTION INTRAVENOUS at 13:48

## 2019-08-09 ASSESSMENT — ENCOUNTER SYMPTOMS
CONFUSION: 0
FEVER: 0
SHORTNESS OF BREATH: 0
HEADACHES: 0
NECK STIFFNESS: 0
ARTHRALGIAS: 0
EYE REDNESS: 0
DIFFICULTY URINATING: 0
ABDOMINAL PAIN: 0
COLOR CHANGE: 0

## 2019-08-09 ASSESSMENT — MIFFLIN-ST. JEOR: SCORE: 1256.51

## 2019-08-09 NOTE — DISCHARGE INSTRUCTIONS
Take your medications as directed.  Follow-up with your primary care provider to arrange outpatient stress test.    Return to the emergency department if recurrent symptoms or other concerns.

## 2019-08-09 NOTE — PROGRESS NOTES
Social Work: Assessment with Discharge Plan    Patient Name:  Shahana Donaldson  :  1940  Age:  79 year old  MRN:  [id number]  Risk/Complexity Score:     Completed assessment with: Chart review, patient, patient's spouse    Presenting Information   Reason for Referral:  Discharge plan  Date of Intake:  2019  Referral Source:  Chart Review  Decision Maker:  Spouse Jake [phone]  Alternate Decision Maker:  -  Health Care Directive:  Will bring in copy  Living Situation:  House  Previous Functional Status:  Independent  Patient and family understanding of hospitalization: chest pain  Cultural/Language/Spiritual Considerations:  [employer] per demographics  Adjustment to Illness:  conversant    Physical Health  Reason for Admission:  No diagnosis found.  Services Needed/Recommended:  Home with no services    Mental Health/Chemical Dependency  Diagnosis:  denied  Support/Services in Place:  none  Services Needed/Recommended:  none    Support System  Significant relationship at present time:  spouse  Family of origin is available for support:  yes  Other support available:  none  Gaps in support system:  none  Patient is caregiver to:  None     Provider Information   Primary Care Physician:  Jeramie Curran   [phone]   Clinic:  [address] [place] [address] / [facility] [address]      :  -    Financial   Income Source:  Not discussed.  Financial Concerns:  None reported.  Insurance:    Payor/Plan Subscriber Name Rel Member # Group #   MEDICARE - MEDICARE DIMITRI DONALDSONRI*  [id number]       ATTN CLAIMS, [address]   BCBS - [place]*  [id number]      [address] [address]       Discharge Plan   Patient and family discharge goal:  home  Provided education on discharge plan:  YES  Patient agreeable to discharge plan:  YES  A list of Medicare Certified Facilities was provided to the patient and/or family to encourage patient choice. Patient's choices for facility  are:  Not applicable at time of assessment.   Will NH provide Skilled rehabilitation or complex medical:  Not applicable at time of assessment.   General information regarding anticipated insurance coverage and possible out of pocket cost was discussed. Patient and patient's family are aware patient may incur the cost of transportation to the facility, pending insurance payment: YES  Barriers to discharge:  TBD - Pending patient's progress and further recommendation.    Discharge Recommendations   Anticipated Disposition:  Home, no needs identified  Transportation Needs:  Family:  spouse  Name of Transportation Company and Phone:  -    Additional comments   CARRIE consulted via chart review given patient's age (79) .  SW met with patient along with her spouse for brief assessment.    Kamini is a 79-year-old   female who typically lives independent in her [facility] along with her spouse.  She denies any current services and no current need for them at this time.  She had a left hip fracture last fall with subsequent surgery then went to [facility].  She had a brief stay there before returning back home.  Anticipate discharge home once medically stable, spouse is present with her in the ED and will transport home.    SAYRA Fairchild, [facility]  Social Work Services, Emergency Dept [facility]  Pager: [phone] Mon-Sat 9 am - 9 pm, on-call/after hours pager [phone]    This note was created in part by the use of Dragon voice recognition dictation system. Inadvertent grammatical errors and typographical errors may still exist.

## 2019-08-09 NOTE — ED AVS SNAPSHOT
[facility], [place], Emergency Department  [address] [place] [id number]  Phone:  [phone]                                    Shahana Donaldson   MRN: [id number]    Department:  [facility], Emergency Department   Date of Visit:  8/9/2019           After Visit Summary Signature Page    I have received my discharge instructions, and my questions have been answered. I have discussed any challenges I see with this plan with the nurse or doctor.    ..........................................................................................................................................  Patient/Patient Representative Signature      ..........................................................................................................................................  Patient Representative Print Name and Relationship to Patient    ..................................................               ................................................  Date                                   Time    ..........................................................................................................................................  Reviewed by Signature/Title    ...................................................              ..............................................  Date                                               Time          22EPIC Rev 08/18

## 2019-08-09 NOTE — ED PROVIDER NOTES
[facility]ERGENCY DEPARTMENT ([facility])  August 9, 2019    History     Chief Complaint   Patient presents with     Chest Pain     Hypertension     HPI  Shahana Donaldson is a 79 year old female with a past medical history significant for essential HTN, coronary atherosclerosis, GERD, and s/p Nissen fundoplication w/out gastrostomy tube (1/7/2013) who presents to the Emergency Department for evaluation of left-sided chest pain.  The patient states that she developed a sharp pain in the left side of her anterolateral chest this morning around 930.  She subsequently took her blood pressure and found to be elevated: 170-190/80- 115.  The patient denies any associated dyspnea or diaphoresis.  No nausea or vomiting.  No radiation of the pain.  The pain persists upon arrival to the emergency department.  She believes that she forgot to take her diltiazem yesterday.  She had not yet taken it this morning when she had the onset of pain and subsequently did so.  She has not had similar symptoms in the past.  She denies any leg pain or swelling.  She did have her left hip replaced in November of last year.       I have reviewed the Medications, Allergies, Past Medical and Surgical History, and Social History in the [facility] system.    Past Medical History:   Diagnosis Date     Diverticulosis of colon (without mention of hemorrhage)      Essential hypertension, benign      Gastro-oesophageal reflux disease     nissen     Hemorrhoid      Nonsenile cataract      Osteoporosis      Other and unspecified hyperlipidemia      Rectocele      Symptomatic menopausal or female climacteric states        Past Surgical History:   Procedure Laterality Date     ARTHROPLASTY HIP Left 10/23/2018    Procedure: LEFT HIP COLEMAN- ARTHROPLASTY ;  Surgeon: Araceli Gutierrez MD;  Location: [facility] OR     C NONSPECIFIC PROCEDURE      Bilateral Tubal Ligation     C NONSPECIFIC PROCEDURE      D&C secondary to menorrhagia     C NONSPECIFIC  PROCEDURE      child birth x 2     CATARACT IOL, RT/LT Left 07/15/2019     COLONOSCOPY  5/24/2011    Procedure:COLONOSCOPY; Surgeon:HALINA SCOTT; Location: GI     COLONOSCOPY Left 11/10/2015    Procedure: COLONOSCOPY;  Surgeon: Raheem Colunga MD;  Location:  GI     GYN SURGERY      hysterectomy/oopherectomy; done for prolapse     LAPAROSCOPIC NISSEN FUNDOPLICATION  1/7/2013    Procedure: LAPAROSCOPIC NISSEN FUNDOPLICATION;  Laparoscopic Repair of Hiatel Hernia, NISSEN, Esophagoscopy, Gastroscopy And Duodenoscopy ;  Surgeon: Leonidas Valle MD;  Location:  OR     PHACOEMULSIFICATION WITH STANDARD INTRAOCULAR LENS IMPLANT Left 7/15/2019    Procedure: Left Eye Phacoemulsification with Intraocular Lens;  Surgeon: Josue Trujillo MD;  Location:  OR       Family History   Problem Relation Age of Onset     Hypertension Father      C.A.D. Father      Hypertension Mother      Breast Cancer Sister      Osteoporosis Sister      EYE* Brother         Keratoconus     Glaucoma No family hx of      Macular Degeneration No family hx of        Social History     Tobacco Use     Smoking status: Never Smoker     Smokeless tobacco: Never Used   Substance Use Topics     Alcohol use: No     Current Facility-Administered Medications   Medication     nitroGLYcerin (NITROSTAT) sublingual tablet 0.4 mg     Current Outpatient Medications   Medication     Acetaminophen (TYLENOL PO)     ascorbic acid (VITAMIN C) 500 MG tablet     aspirin 325 MG EC tablet     atorvastatin (LIPITOR) 10 MG tablet     calcium carbonate 500 mg, elemental, (OSCAL;OYSTER SHELL CALCIUM) 500 MG tablet     cholecalciferol (VITAMIN D3) 1000 UNIT tablet     clobetasol (TEMOVATE) 0.05 % ointment     diltiazem (CARTIA XT) 300 MG 24 hr capsule     docusate sodium (COLACE) 100 MG capsule     prednisoLONE acetate (PRED FORTE) 1 % ophthalmic suspension     prednisoLONE acetate (PRED FORTE) 1 % ophthalmic suspension        Allergies   Allergen  "Reactions     Dilaudid [Hydromorphone]      dizziness     Senna Hives       Review of Systems   Constitutional: Negative for fever.   HENT: Negative for congestion.    Eyes: Negative for redness.   Respiratory: Negative for shortness of breath.    Cardiovascular: Positive for chest pain.   Gastrointestinal: Negative for abdominal pain.   Genitourinary: Negative for difficulty urinating.   Musculoskeletal: Negative for arthralgias and neck stiffness.   Skin: Negative for color change.   Neurological: Negative for headaches.   Psychiatric/Behavioral: Negative for confusion.   All other systems reviewed and are negative.      Physical Exam   BP: (!) 196/102  Pulse: 81  Heart Rate: 66  Temp: 97.6  F (36.4  C)  Resp: 16  Height: 177.8 cm (5' 10\")  Weight: 70.1 kg (154 lb 9.6 oz)  SpO2: 95 %      Physical Exam   Constitutional: She appears well-developed and well-nourished. No distress.   HENT:   Head: Normocephalic and atraumatic.   Mouth/Throat: Oropharynx is clear and moist. No oropharyngeal exudate.   Eyes: Pupils are equal, round, and reactive to light. No scleral icterus.   Neck: Normal range of motion.   Cardiovascular: Normal rate, regular rhythm, normal heart sounds and intact distal pulses.   Pulmonary/Chest: Breath sounds normal. No respiratory distress.   Abdominal: Soft. Bowel sounds are normal. There is no tenderness.   Musculoskeletal: Normal range of motion. She exhibits no edema or tenderness.   Neurological: She is alert. She has normal strength. Coordination normal.   Skin: Skin is warm. No rash noted. She is not diaphoretic.   Nursing note and vitals reviewed.      ED Course        Procedures             EKG Interpretation:      Interpreted by VERONICA REYES MD  Time reviewed: 1145  Symptoms at time of EKG: chest pain   Rhythm: normal sinus   Rate: normal  Axis: normal  Ectopy: none  Conduction: normal  ST Segments/ T Waves: No ST-T wave changes  Q Waves: none  Comparison to prior: Unchanged from " 7/3/19    Clinical Impression: normal EKG    Results for orders placed or performed during the hospital encounter of 08/09/19   XR Chest Port 1 View    Narrative    Exam: XR CHEST PORT 1 VW, 8/9/2019 12:16 PM    Indication: chest pain    Comparison: Chest x-ray 10/22/2018    Findings:   Frontal x-ray of the chest. No pneumothorax. No pleural effusion.  Cardiomediastinal silhouette is nonenlarged. No acute airspace  opacity. No acute osseous abnormality Visualized upper abdomen is  unremarkable.      Impression    Impression: No acute airspace opacity.    I have personally reviewed the examination and initial interpretation  and I agree with the findings.    JUWAN HERNANDEZ MD   CTA Chest Abdomen with Contrast    Narrative    Exam: Computed tomographic angiography of the chest, abdomen and  pelvis without and with contrast including 3D reformations dated  8/9/2019 2:13 PM    Clinical information: Left-sided chest pain, concern for aortic  dissection.    Technique: Axial images through the chest and abdomen obtained before  the administration of intravenous contrast media and following the  injection of contrast media in the arterial phase. Source images  reviewed as well as 3D and multi-planar reconstructions at a 3D  workstation. Arterial phase imaging performed with ECG gating.    Contrast: iopamidol (ISOVUE-370) solution 100 mL    DLP: 615 mGy*cm    Comparison: Chest x-ray performed the same day.    Findings:      Despite ECG gating, there is still some motion artifact affecting the  evaluation of the aortic root and ascending aorta.    Normal variant a variant origin of the right subclavian artery off the  distal arch, coursing posterior to the esophagus.    Mildly dilated ascending aorta measuring 3.6 cm. On contrast-enhanced  images, there is no evidence of dissection. No evidence of intramural  hematoma on noncontrast images. Mild tortuosity of the descending  thoracic aorta with likely adjacent atelectasis or  scarring of the  left lower lobe.    Heart size is normal. No pleural or pericardial effusion. No enlarged  lymph nodes in the chest or axilla.    Linear opacities in the lung bases likely represent scarring or  atelectasis.    The entire pelvis is not included on the field-of-view.    In the visualized abdomen pelvis, the liver, spleen, pancreas,  gallbladder, adrenal glands, and kidneys are within normal limits.  There are diverticula of the sigmoid colon without evidence of  diverticulitis. No ascites. No enlarged lymph nodes. The bladder is  distended. Arteriomegaly of the bilateral common iliac arteries.    No aggressive or suspicious appearing osseous lesion.      Impression    Impression:    1. No evidence of aortic dissection or or other acute aortic  abnormality. No abnormalities in the chest to suggest etiology of left  sided chest pain.    2. Minimally dilated aortic root at 3.6 cm in diameter. Normal variant  aberrant origin of the right subclavian artery.        EPIFANIO HUA   CBC with platelets differential   Result Value Ref Range    WBC 6.3 4.0 - 11.0 10e9/L    RBC Count 4.59 3.8 - 5.2 10e12/L    Hemoglobin 14.3 11.7 - 15.7 g/dL    Hematocrit 44.8 35.0 - 47.0 %    MCV 98 78 - 100 fl    MCH 31.2 26.5 - 33.0 pg    MCHC 31.9 31.5 - 36.5 g/dL    RDW 12.6 10.0 - 15.0 %    Platelet Count 225 150 - 450 10e9/L    Diff Method Automated Method     % Neutrophils 68.8 %    % Lymphocytes 22.2 %    % Monocytes 7.3 %    % Eosinophils 0.8 %    % Basophils 0.6 %    % Immature Granulocytes 0.3 %    Nucleated RBCs 0 0 /100    Absolute Neutrophil 4.3 1.6 - 8.3 10e9/L    Absolute Lymphocytes 1.4 0.8 - 5.3 10e9/L    Absolute Monocytes 0.5 0.0 - 1.3 10e9/L    Absolute Eosinophils 0.1 0.0 - 0.7 10e9/L    Absolute Basophils 0.0 0.0 - 0.2 10e9/L    Abs Immature Granulocytes 0.0 0 - 0.4 10e9/L    Absolute Nucleated RBC 0.0    Comprehensive metabolic panel   Result Value Ref Range    Sodium 140 133 - 144 mmol/L     Potassium 4.0 3.4 - 5.3 mmol/L    Chloride 108 94 - 109 mmol/L    Carbon Dioxide 25 20 - 32 mmol/L    Anion Gap 7 3 - 14 mmol/L    Glucose 92 70 - 99 mg/dL    Urea Nitrogen 17 7 - 30 mg/dL    Creatinine 0.70 0.52 - 1.04 mg/dL    GFR Estimate 82 >60 mL/min/[1.73_m2]    GFR Estimate If Black >90 >60 mL/min/[1.73_m2]    Calcium 8.9 8.5 - 10.1 mg/dL    Bilirubin Total 0.7 0.2 - 1.3 mg/dL    Albumin 4.1 3.4 - 5.0 g/dL    Protein Total 8.3 6.8 - 8.8 g/dL    Alkaline Phosphatase 96 40 - 150 U/L    ALT 17 0 - 50 U/L    AST 20 0 - 45 U/L   Lipase   Result Value Ref Range    Lipase 236 73 - 393 U/L   Troponin I   Result Value Ref Range    Troponin I ES <0.015 0.000 - 0.045 ug/L   Nt probnp inpatient (BNP)   Result Value Ref Range    N-Terminal Pro BNP Inpatient 78 0 - 1,800 pg/mL   UA with Microscopic   Result Value Ref Range    Color Urine Straw     Appearance Urine Clear     Glucose Urine Negative NEG^Negative mg/dL    Bilirubin Urine Negative NEG^Negative    Ketones Urine Negative NEG^Negative mg/dL    Specific Gravity Urine 1.005 1.003 - 1.035    Blood Urine Negative NEG^Negative    pH Urine 6.5 5.0 - 7.0 pH    Protein Albumin Urine Negative NEG^Negative mg/dL    Urobilinogen mg/dL Normal 0.0 - 2.0 mg/dL    Nitrite Urine Negative NEG^Negative    Leukocyte Esterase Urine Small (A) NEG^Negative    Source Midstream Urine     WBC Urine 4 0 - 5 /HPF    RBC Urine 8 (H) 0 - 2 /HPF    Squamous Epithelial /HPF Urine 5 (H) 0 - 1 /HPF    Hyaline Casts 1 0 - 2 /LPF   Creatinine POCT   Result Value Ref Range    Creatinine 0.6 0.52 - 1.04 mg/dL    GFR Estimate >90 >60 mL/min/[1.73_m2]    GFR Estimate If Black >90 >60 mL/min/[1.73_m2]   Troponin I   Result Value Ref Range    Troponin I ES <0.015 0.000 - 0.045 ug/L   EKG 12 lead   Result Value Ref Range    Interpretation ECG Click View Image link to view waveform and result           Critical Care time:       Medications   nitroGLYcerin (NITROSTAT) sublingual tablet 0.4 mg (has no  administration in time range)   0.9% sodium chloride BOLUS (1,000 mLs Intravenous New Bag 8/9/19 1246)   iopamidol (ISOVUE-370) solution 100 mL (100 mLs Intravenous Given 8/9/19 1348)   sodium chloride (PF) 0.9% PF flush 90 mL (90 mLs Intravenous Given 8/9/19 1348)           Assessments & Plan (with Medical Decision Making)   79 year old female to the emergency department with hypertension and left-sided chest pain.  Patient complains of a left-sided chest pain that was sharp in character without any associated symptoms that began a couple of hours prior to coming to the emergency department.  The patient does not have any acute ischemic change in her EKG and her troponin and delta troponin are negative.  Patient does not have any dyspnea or pleuritic nature to the chest pain as well as no leg pain or swelling.  As such, do not suspect pulmonary embolism.  Due to her hypertension and chest pain, I did have clinical concern for aortic dissection.  A CT angiogram of the chest, abdomen, and pelvis did not reveal any evidence for dissection.  The patient's blood pressure improved spontaneously in the emerge department to normal levels.  Her chest pain also resolved spontaneously.  I did offer observation admission for serial troponins and stress test but patient and her  prefer outpatient follow-up.  Patient has been asymptomatic for hours and has a normal delta troponin so this does seem reasonable.  Patient asked to return for any recurrent symptoms or other concerns.  Additionally, patient was encouraged to take her blood pressure medications as directed as she likely missed a dose yesterday and was late in taking her dose today.    I have reviewed the nursing notes.    I have reviewed the findings, diagnosis, plan and need for follow up with the patient.       Medication List      There are no discharge medications for this visit.         Final diagnoses:   Chest pain, unspecified type   Elevated blood  pressure reading       8/9/2019   [facility], [place], EMERGENCY DEPARTMENT     Estevan Gore MD  08/09/19 4821

## 2019-08-09 NOTE — ED TRIAGE NOTES
Pt presents with c/o left-sided chest pain since this AM at 0900. BP at home 190s/110s. Took home diltiazem.

## 2019-08-09 NOTE — TELEPHONE ENCOUNTER
[facility] Call Center    Phone Message    May a detailed message be left on voicemail: yes    Reason for Call: Order(s): Other:   Reason for requested: Stress Test  Date needed: ASAP  Provider name: Jeramie Curran    Symptoms or Concerns     If patient has red-flag symptoms, warm transfer to triage line    Current symptom or concern: Patients  called and patient is currently at the [facility] ER due to having high blood pressure this morning. They did do a CT scan and did not find anything abnormal per her . They were advised to follow up with Dr Jeramie Curran and have a stress test ordered and scheduled.    Patient would prefer to see PCP for her follow up appointment from the ER. Jake stated they anticipate that she will be discharged today 8/9/19.    Please call Jake to discuss.          Action Taken: Message routed to:  [facility] ([facility]): RENEE

## 2019-09-10 ENCOUNTER — HOSPITAL ENCOUNTER (OUTPATIENT)
Dept: CARDIOLOGY | Facility: CLINIC | Age: 79
Discharge: HOME | End: 2019-09-10
Attending: INTERNAL MEDICINE | Admitting: INTERNAL MEDICINE
Payer: MEDICARE

## 2019-09-10 ENCOUNTER — OFFICE VISIT (OUTPATIENT)
Dept: INTERNAL MEDICINE | Facility: CLINIC | Age: 79
End: 2019-09-10
Payer: MEDICARE

## 2019-09-10 VITALS
DIASTOLIC BLOOD PRESSURE: 83 MMHG | OXYGEN SATURATION: 95 % | HEART RATE: 58 BPM | RESPIRATION RATE: 20 BRPM | SYSTOLIC BLOOD PRESSURE: 163 MMHG | TEMPERATURE: 97.4 F | WEIGHT: 155 LBS | BODY MASS INDEX: 22.24 KG/M2

## 2019-09-10 DIAGNOSIS — R07.89 OTHER CHEST PAIN: ICD-10-CM

## 2019-09-10 DIAGNOSIS — R31.29 OTHER MICROSCOPIC HEMATURIA: Primary | ICD-10-CM

## 2019-09-10 DIAGNOSIS — R07.9 CHEST PAIN: ICD-10-CM

## 2019-09-10 PROCEDURE — 25500064 ZZH RX 255 OP 636: Performed by: INTERNAL MEDICINE

## 2019-09-10 PROCEDURE — 93016 CV STRESS TEST SUPVJ ONLY: CPT | Performed by: INTERNAL MEDICINE

## 2019-09-10 PROCEDURE — 93325 DOPPLER ECHO COLOR FLOW MAPG: CPT | Mod: 26 | Performed by: INTERNAL MEDICINE

## 2019-09-10 PROCEDURE — 93018 CV STRESS TEST I&R ONLY: CPT | Performed by: INTERNAL MEDICINE

## 2019-09-10 PROCEDURE — 93350 STRESS TTE ONLY: CPT | Mod: 26 | Performed by: INTERNAL MEDICINE

## 2019-09-10 PROCEDURE — 93321 DOPPLER ECHO F-UP/LMTD STD: CPT | Mod: 26 | Performed by: INTERNAL MEDICINE

## 2019-09-10 PROCEDURE — 40000264 ECHO STRESS ECHOCARDIOGRAM

## 2019-09-10 RX ADMIN — PERFLUTREN 5 ML: 6.52 INJECTION, SUSPENSION INTRAVENOUS at 09:07

## 2019-09-10 ASSESSMENT — PAIN SCALES - GENERAL: PAINLEVEL: NO PAIN (0)

## 2019-09-10 NOTE — PROGRESS NOTES
HPI:    Pat comes in for follow up today. She was in the ED 8/9/2019 for L-sided chest pain. She had CT angiogram Chest/abdomen/pelvis that was unremarkable. She denies any further chest sxs. She states she overall feels well. Her  Jake thinks she has a little more SOB with walking. Kamini seems to be unconcerned about this. She remains on her same BP medications. She had UA in the ED with 8 RBC's. She denies any urinary complaints. She had L hip fracture 10/23/2018 with surgery with jacquie Conti. She saw Dr. Gutierrez 11/7/2018.   She denies any new HEENT, cardiopulmonary, abdominal, , GYN, neurological, systemic, endocrine, skin, vascular, psychiatric complaints.      PE:    Vitals noted.   gen nad cooperative alert, LCTA, B good air movement, RRR, S1, S2, , grossly normal neurological exam L hip well healed surgical scar w/o drainage, no erythema, no tenderness.     A/P:    1. HTN: she is on 300 mg Diltiazem. Will follow based on exercise stress test today (9/10/2019).   2. Will check Vit D and get DEXA scan. She states she had taken Fosamax in the past but not for several years  3. Immunizations. She got Shingrix vaccine  4. Mammogram  11/21/2018  5. She  followed up with Dr. Matt dhillon for L hip surgery seen 12/5/2018  6. Resting echo done on 7/31/2018; nl LVF EF 60-65%. Trace to mild mitral insufficiency.  7. She denies any new skin issues and does not feel she needs to see Dermatology this year.   8. Increased cholesterol; on Lipitor and will check lipids and liver tests checked  9. Colonoscopy 11/10/2015; given age no further colonoscopy recommended (next would be at 80)  10. She has exercise stress test scheduled for today and we will follow up in a few weeks  11. Urology referral for hematuria. As above she had abdominal CT imaging in the ED 8/9/2019    Total time spent 25 minutes.  More than 50% of the time spent with Ms. Donaldson on counseling / coordinating her care

## 2019-09-10 NOTE — PATIENT INSTRUCTIONS
[facility] Medication Refill Request Information:  * Please contact your pharmacy regarding ANY request for medication refills.  ** [facility] Prescription Fax = [phone]  * Please allow 3 business days for routine medication refills.  * Please allow 5 business days for controlled substance medication refills.     [facility] Test Result notification information:  *You will be notified with in 7-10 days of your appointment day regarding the results of your test.  If you are on MyChart you will be notified as soon as the provider has reviewed the results and signed off on them.    [facility]: [phone]

## 2019-09-10 NOTE — NURSING NOTE
Chief Complaint   Patient presents with     Hospital F/U     pt here following a hospital visit     Hypertension     pt here for blood pressure       Cherelle Cheng CMA at 7:05 AM on 9/10/2019.

## 2019-09-11 NOTE — TELEPHONE ENCOUNTER
MEDICAL RECORDS REQUEST   [facility]  Urology Clinic  [address] [place] [address]  PHONE: [phone]  Fax: [phone]        FUTURE VISIT INFORMATION                                                   Shahana Donaldson, : 1940 scheduled for future visit at [facility]    APPOINTMENT INFORMATION:    Date: 10/02/19 4PM     Provider: Elsa Chen RN     Reason for Visit/Diagnosis: Microscopic hematuria     REFERRAL INFORMATION:    Referring provider: JORDI FINN    Specialty: MD     Referring providers clinic:  [facility]     Clinic contact number:  [phone]    RECORDS REQUESTED FOR VISIT                                                     NOTES  STATUS/DETAILS   OFFICE NOTE from referring provider  yes   OFFICE NOTE from other specialist  no   DISCHARGE SUMMARY from hospital  no   DISCHARGE REPORT from the ER  no   OPERATIVE REPORT  no   MEDICATION LIST  no   LABS     URINALYSIS (UA)  yes     PRE-VISIT CHECKLIST      Record collection complete Yes- All recs in epic    Appointment appropriately scheduled           (right time/right provider) Yes   MyChart activation Yes   Questionnaire complete If no, please explain: In process      Completed by: Oly Prieto

## 2019-09-18 ENCOUNTER — PRE VISIT (OUTPATIENT)
Dept: UROLOGY | Facility: CLINIC | Age: 79
End: 2019-09-18

## 2019-09-18 NOTE — TELEPHONE ENCOUNTER
Reason for Visit: Consult    Diagnosis: Microscopic hematuria    Orders/Procedures/Records: Records available    Contact Patient: N/A    Rooming Requirements: Normal      Amelia Koenig  09/18/19  12:53 PM

## 2019-10-01 ENCOUNTER — HEALTH MAINTENANCE LETTER (OUTPATIENT)
Age: 79
End: 2019-10-01

## 2019-10-01 NOTE — PROGRESS NOTES
Chief Complaint:   Microscopic hematuria         History of Present Illness:    Shahana Donaldson is a very pleasant 79 year old female with a history of divertculosis, HTN, hemorrhoids, and rectocele who presents for evaluation of microscopic hematuria. Per chart review, she had one UA collected on 8/9/19, which showed 8 RBC/HPF. The patient denies any gross hematuria.  She currently denies any urinary frequency, dysuria, pyuria, hesitancy, intermittency, feelings of incomplete emptying, or any recent history of urinary tract infections or stones. Of note, she had a hysterectomy in 2013 due to prolapse.     Hematuria Risk Factors:  Age >40: Yes  Smoking history: No  Occupational exposure to chemicals or dyes (ie, benzenes, aromatic amines): No  History of urologic disorder or disease: No  History of irritative voiding symptoms: No  History of urinary tract infection: No  Analgesic abuse: No  History of pelvic irradiation: No         Past Medical History:     Past Medical History:   Diagnosis Date     Diverticulosis of colon (without mention of hemorrhage)      Essential hypertension, benign      Gastro-oesophageal reflux disease     nissen     Hemorrhoid      Nonsenile cataract      Osteoporosis      Other and unspecified hyperlipidemia      Rectocele      Symptomatic menopausal or female climacteric states             Past Surgical History:     Past Surgical History:   Procedure Laterality Date     ARTHROPLASTY HIP Left 10/23/2018    Procedure: LEFT HIP COLEMAN- ARTHROPLASTY ;  Surgeon: Araceli Gutierrez MD;  Location: [facility]     C NONSPECIFIC PROCEDURE      Bilateral Tubal Ligation     C NONSPECIFIC PROCEDURE      D&C secondary to menorrhagia     C NONSPECIFIC PROCEDURE      child birth x 2     CATARACT IOL, RT/LT Left 07/15/2019     COLONOSCOPY  5/24/2011    Procedure:COLONOSCOPY; Surgeon:HALINA SCOTT; Location:[facility]     COLONOSCOPY Left 11/10/2015    Procedure: COLONOSCOPY;  Surgeon: Cristine  Raheem Foreman MD;  Location:  GI     GYN SURGERY      hysterectomy/oopherectomy; done for prolapse     LAPAROSCOPIC NISSEN FUNDOPLICATION  1/7/2013    Procedure: LAPAROSCOPIC NISSEN FUNDOPLICATION;  Laparoscopic Repair of Hiatel Hernia, NISSEN, Esophagoscopy, Gastroscopy And Duodenoscopy ;  Surgeon: Leonidas Valle MD;  Location:  OR     PHACOEMULSIFICATION WITH STANDARD INTRAOCULAR LENS IMPLANT Left 7/15/2019    Procedure: Left Eye Phacoemulsification with Intraocular Lens;  Surgeon: Josue Trujillo MD;  Location:  OR            Medications     Current Outpatient Medications   Medication     Acetaminophen (TYLENOL PO)     aspirin 325 MG EC tablet     atorvastatin (LIPITOR) 10 MG tablet     calcium carbonate 500 mg, elemental, (OSCAL;OYSTER SHELL CALCIUM) 500 MG tablet     cholecalciferol (VITAMIN D3) 1000 UNIT tablet     clobetasol (TEMOVATE) 0.05 % ointment     diltiazem (CARTIA XT) 300 MG 24 hr capsule     docusate sodium (COLACE) 100 MG capsule     No current facility-administered medications for this visit.             Family History:     Family History   Problem Relation Age of Onset     Hypertension Father      C.A.D. Father      Hypertension Mother      Breast Cancer Sister      Osteoporosis Sister      EYE* Brother         Keratoconus     Glaucoma No family hx of      Macular Degeneration No family hx of             Social History:     Social History     Socioeconomic History     Marital status:      Spouse name: Not on file     Number of children: Not on file     Years of education: Not on file     Highest education level: Not on file   Occupational History     Employer: [employer]   Social Needs     Financial resource strain: Not on file     Food insecurity:     Worry: Not on file     Inability: Not on file     Transportation needs:     Medical: Not on file     Non-medical: Not on file   Tobacco Use     Smoking status: Never Smoker     Smokeless tobacco: Never Used  "  Substance and Sexual Activity     Alcohol use: No     Drug use: No     Sexual activity: Yes     Partners: Male     Birth control/protection: Post-menopausal   Lifestyle     Physical activity:     Days per week: Not on file     Minutes per session: Not on file     Stress: Not on file   Relationships     Social connections:     Talks on phone: Not on file     Gets together: Not on file     Attends Mandaen service: Not on file     Active member of club or organization: Not on file     Attends meetings of clubs or organizations: Not on file     Relationship status: Not on file     Intimate partner violence:     Fear of current or ex partner: Not on file     Emotionally abused: Not on file     Physically abused: Not on file     Forced sexual activity: Not on file   Other Topics Concern     Parent/sibling w/ CABG, MI or angioplasty before 65F 55M? Not Asked   Social History Narrative    The patient has a 0 pk yr tobacco hx.  She has no active use.  Alcohol use is 0 alcoholic drinks per week.  She denies use of recreational drugs.          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            Allergies:   Dilaudid [hydromorphone] and Senna         Review of Systems:  From intake questionnaire   Negative 14 system review except as noted on HPI, nurse's note.         Physical Exam:   Patient is a 79 year old  female   Vitals: Blood pressure (!) 160/87, pulse 75, height 1.676 m (5' 6\"), weight 68.9 kg (152 lb), not currently breastfeeding.  General Appearance Adult: Alert, no acute distress, oriented  Lungs: no respiratory distress, or pursed lip breathing  Heart: No obvious jugular venous distension present  Abdomen: soft, nontender, no organomegaly or masses, Body mass index is 24.53 kg/m .  : deferred      Labs and Pathology:    I personally reviewed all applicable laboratory data and went over findings with patient  Significant for:    CBC RESULTS:  Recent Labs   Lab Test 08/09/19  1147 " 07/03/19  0742 11/21/18  0740 10/25/18  0711   WBC 6.3 4.2 4.6 10.1   HGB 14.3 13.5 12.7 10.4*    230 279 162        BMP RESULTS:  Recent Labs   Lab Test 08/09/19  1154 08/09/19  1147 07/03/19  0742 04/19/19  1516 02/08/19  1336 11/21/18  0740 10/24/18  0645   NA  --  140 140  --   --  142 141   POTASSIUM  --  4.0 3.8  --   --  3.6 4.1   CHLORIDE  --  108 106  --   --  108 109   CO2  --  25 29  --   --  28 21   ANIONGAP  --  7 4  --   --  6 10   GLC  --  92 83  --   --  92 129*   BUN  --  17 23  --   --  23 14   CR  --  0.70 0.77  --  0.71 0.72 0.62   GFRESTIMATED >90 82 73  --  82 78 >90   GFRESTBLACK >90 >90 85  --  >90 >90 >90   MARCK  --  8.9 8.8 9.1 8.2* 8.6 8.3*       UA RESULTS:   Recent Labs   Lab Test 08/09/19  1221 10/22/18  2200 10/22/18  1257   SG 1.005 1.016 1.010   URINEPH 6.5 7.0 7.0   NITRITE Negative Negative Negative   RBCU 8* 2 1   WBCU 4 3 1         Imaging:    I personally reviewed all applicable imaging and went over findings with patient.  Significant for:    Results for orders placed or performed during the hospital encounter of 08/09/19   XR Chest Port 1 View    Narrative    Exam: XR CHEST PORT 1 VW, 8/9/2019 12:16 PM    Indication: chest pain    Comparison: Chest x-ray 10/22/2018    Findings:   Frontal x-ray of the chest. No pneumothorax. No pleural effusion.  Cardiomediastinal silhouette is nonenlarged. No acute airspace  opacity. No acute osseous abnormality Visualized upper abdomen is  unremarkable.      Impression    Impression: No acute airspace opacity.    I have personally reviewed the examination and initial interpretation  and I agree with the findings.    JUWAN HERNANDEZ MD   CTA Chest Abdomen with Contrast    Narrative    Exam: Computed tomographic angiography of the chest, abdomen and  pelvis without and with contrast including 3D reformations dated  8/9/2019 2:13 PM    Clinical information: Left-sided chest pain, concern for aortic  dissection.    Technique: Axial images  through the chest and abdomen obtained before  the administration of intravenous contrast media and following the  injection of contrast media in the arterial phase. Source images  reviewed as well as 3D and multi-planar reconstructions at a 3D  workstation. Arterial phase imaging performed with ECG gating.    Contrast: iopamidol (ISOVUE-370) solution 100 mL    DLP: 615 mGy*cm    Comparison: Chest x-ray performed the same day.    Findings:      Despite ECG gating, there is still some motion artifact affecting the  evaluation of the aortic root and ascending aorta.    Normal variant a variant origin of the right subclavian artery off the  distal arch, coursing posterior to the esophagus.    Mildly dilated ascending aorta measuring 3.6 cm. On contrast-enhanced  images, there is no evidence of dissection. No evidence of intramural  hematoma on noncontrast images. Mild tortuosity of the descending  thoracic aorta with likely adjacent atelectasis or scarring of the  left lower lobe.    Heart size is normal. No pleural or pericardial effusion. No enlarged  lymph nodes in the chest or axilla.    Linear opacities in the lung bases likely represent scarring or  atelectasis.    The entire pelvis is not included on the field-of-view.    In the visualized abdomen pelvis, the liver, spleen, pancreas,  gallbladder, adrenal glands, and kidneys are within normal limits.  There are diverticula of the sigmoid colon without evidence of  diverticulitis. No ascites. No enlarged lymph nodes. The bladder is  distended. Arteriomegaly of the bilateral common iliac arteries.    No aggressive or suspicious appearing osseous lesion.      Impression    Impression:    1. No evidence of aortic dissection or or other acute aortic  abnormality. No abnormalities in the chest to suggest etiology of left  sided chest pain.    2. Minimally dilated aortic root at 3.6 cm in diameter. Normal variant  aberrant origin of the right subclavian  artery.        EPIFANIO HUA              Assessment and Plan:     Assessment: 79 year old female with microscopic hematuria that was identified on urinalysis obtained 8/9/19. The differential diagnosis at this point includes stone disease, infection, vaginal/rectal contaminant, urothelial malignancy, renal disorder versus another yet unknown diagnosis.    At this time, recommend proceeding with partial hematuria evaluation to include:  - Urinalysis and urine culture to rule out an acute urinary tract infection.   - Urine cytology to look for cells concerning for malignancy.    If the above urine testing reveals abnormal findings, will proceed with full workup to include:  - CT urogram for upper tract imaging.  - Cystoscopy with the first available urologist (with the exception of Dr. Seth or Dr. Díaz) to evaluate the interior of the bladder. Follow up for hematuria as recommended by urologist performing cystoscopic evaluation.      Elsa Chen, CNP  Department of Urology

## 2019-10-02 ENCOUNTER — PRE VISIT (OUTPATIENT)
Dept: UROLOGY | Facility: CLINIC | Age: 79
End: 2019-10-02

## 2019-10-02 ENCOUNTER — OFFICE VISIT (OUTPATIENT)
Dept: UROLOGY | Facility: CLINIC | Age: 79
End: 2019-10-02
Payer: MEDICARE

## 2019-10-02 VITALS
BODY MASS INDEX: 24.43 KG/M2 | WEIGHT: 152 LBS | HEART RATE: 75 BPM | HEIGHT: 66 IN | SYSTOLIC BLOOD PRESSURE: 160 MMHG | DIASTOLIC BLOOD PRESSURE: 87 MMHG

## 2019-10-02 DIAGNOSIS — R31.29 MICROSCOPIC HEMATURIA: Primary | ICD-10-CM

## 2019-10-02 LAB
ALBUMIN UR-MCNC: NEGATIVE MG/DL
AMORPH CRY #/AREA URNS HPF: ABNORMAL /HPF
APPEARANCE UR: ABNORMAL
BILIRUB UR QL STRIP: NEGATIVE
COLOR UR AUTO: YELLOW
GLUCOSE UR STRIP-MCNC: NEGATIVE MG/DL
HGB UR QL STRIP: NEGATIVE
KETONES UR STRIP-MCNC: NEGATIVE MG/DL
LEUKOCYTE ESTERASE UR QL STRIP: ABNORMAL
MUCOUS THREADS #/AREA URNS LPF: PRESENT /LPF
NITRATE UR QL: NEGATIVE
PH UR STRIP: 7 PH (ref 5–7)
RBC #/AREA URNS AUTO: 4 /HPF (ref 0–2)
SOURCE: ABNORMAL
SP GR UR STRIP: 1.01 (ref 1–1.03)
SQUAMOUS #/AREA URNS AUTO: 15 /HPF (ref 0–1)
UROBILINOGEN UR STRIP-MCNC: 0 MG/DL (ref 0–2)
WBC #/AREA URNS AUTO: 13 /HPF (ref 0–5)

## 2019-10-02 ASSESSMENT — ENCOUNTER SYMPTOMS
DIFFICULTY URINATING: 0
WHEEZING: 0
FLANK PAIN: 0
SHORTNESS OF BREATH: 1
DYSPNEA ON EXERTION: 1
SNORES LOUDLY: 0
HEMOPTYSIS: 0
COUGH: 0
SPUTUM PRODUCTION: 0
HEMATURIA: 1
DYSURIA: 0

## 2019-10-02 ASSESSMENT — PATIENT HEALTH QUESTIONNAIRE - PHQ9
SUM OF ALL RESPONSES TO PHQ QUESTIONS 1-9: 1
10. IF YOU CHECKED OFF ANY PROBLEMS, HOW DIFFICULT HAVE THESE PROBLEMS MADE IT FOR YOU TO DO YOUR WORK, TAKE CARE OF THINGS AT HOME, OR GET ALONG WITH OTHER PEOPLE: SOMEWHAT DIFFICULT
SUM OF ALL RESPONSES TO PHQ QUESTIONS 1-9: 1

## 2019-10-02 ASSESSMENT — PAIN SCALES - GENERAL: PAINLEVEL: NO PAIN (0)

## 2019-10-02 ASSESSMENT — MIFFLIN-ST. JEOR: SCORE: 1181.22

## 2019-10-02 NOTE — LETTER
10/2/2019       RE: Shahana Donaldson  [address] [facility] [address]     Dear Colleague,    Thank you for referring your patient, Shahana Donaldson, to the [facility] at [facility]. Please see a copy of my visit note below.            Chief Complaint:   Microscopic hematuria         History of Present Illness:    Shahana Donaldson is a very pleasant 79 year old female with a history of divertculosis, HTN, hemorrhoids, and rectocele who presents for evaluation of microscopic hematuria. Per chart review, she had one UA collected on 8/9/19, which showed 8 RBC/HPF. The patient denies any gross hematuria.  She currently denies any urinary frequency, dysuria, pyuria, hesitancy, intermittency, feelings of incomplete emptying, or any recent history of urinary tract infections or stones. Of note, she had a hysterectomy in 2013 due to prolapse.     Hematuria Risk Factors:  Age >40: Yes  Smoking history: No  Occupational exposure to chemicals or dyes (ie, benzenes, aromatic amines): No  History of urologic disorder or disease: No  History of irritative voiding symptoms: No  History of urinary tract infection: No  Analgesic abuse: No  History of pelvic irradiation: No         Past Medical History:     Past Medical History:   Diagnosis Date     Diverticulosis of colon (without mention of hemorrhage)      Essential hypertension, benign      Gastro-oesophageal reflux disease     nissen     Hemorrhoid      Nonsenile cataract      Osteoporosis      Other and unspecified hyperlipidemia      Rectocele      Symptomatic menopausal or female climacteric states             Past Surgical History:     Past Surgical History:   Procedure Laterality Date     ARTHROPLASTY HIP Left 10/23/2018    Procedure: LEFT HIP COLEMAN- ARTHROPLASTY ;  Surgeon: Araceli Gutierrez MD;  Location: [facility] OR     C NONSPECIFIC PROCEDURE      Bilateral  Tubal Ligation     C NONSPECIFIC PROCEDURE      D&C secondary to menorrhagia     C NONSPECIFIC PROCEDURE      child birth x 2     CATARACT IOL, RT/LT Left 07/15/2019     COLONOSCOPY  5/24/2011    Procedure:COLONOSCOPY; Surgeon:HALINA SCOTT; [facility]     COLONOSCOPY Left 11/10/2015    Procedure: COLONOSCOPY;  Surgeon: Raheem Colunga MD;  Location:  GI     GYN SURGERY      hysterectomy/oopherectomy; done for prolapse     LAPAROSCOPIC NISSEN FUNDOPLICATION  1/7/2013    Procedure: LAPAROSCOPIC NISSEN FUNDOPLICATION;  Laparoscopic Repair of Hiatel Hernia, NISSEN, Esophagoscopy, Gastroscopy And Duodenoscopy ;  Surgeon: Leonidas Valle MD;  [facility]     PHACOEMULSIFICATION WITH STANDARD INTRAOCULAR LENS IMPLANT Left 7/15/2019    Procedure: Left Eye Phacoemulsification with Intraocular Lens;  Surgeon: Josue Trujillo MD;  [facility]            Medications     Current Outpatient Medications   Medication     Acetaminophen (TYLENOL PO)     aspirin 325 MG EC tablet     atorvastatin (LIPITOR) 10 MG tablet     calcium carbonate 500 mg, elemental, (OSCAL;OYSTER SHELL CALCIUM) 500 MG tablet     cholecalciferol (VITAMIN D3) 1000 UNIT tablet     clobetasol (TEMOVATE) 0.05 % ointment     diltiazem (CARTIA XT) 300 MG 24 hr capsule     docusate sodium (COLACE) 100 MG capsule     No current facility-administered medications for this visit.             Family History:     Family History   Problem Relation Age of Onset     Hypertension Father      C.A.D. Father      Hypertension Mother      Breast Cancer Sister      Osteoporosis Sister      EYE* Brother         Keratoconus     Glaucoma No family hx of      Macular Degeneration No family hx of             Social History:     Social History     Socioeconomic History     Marital status:      Spouse name: Not on file     Number of children: Not on file     Years of education: Not on file     Highest education level: Not on file   Occupational  "History     Employer: [employer]   Social Needs     Financial resource strain: Not on file     Food insecurity:     Worry: Not on file     Inability: Not on file     Transportation needs:     Medical: Not on file     Non-medical: Not on file   Tobacco Use     Smoking status: Never Smoker     Smokeless tobacco: Never Used   Substance and Sexual Activity     Alcohol use: No     Drug use: No     Sexual activity: Yes     Partners: Male     Birth control/protection: Post-menopausal   Lifestyle     Physical activity:     Days per week: Not on file     Minutes per session: Not on file     Stress: Not on file   Relationships     Social connections:     Talks on phone: Not on file     Gets together: Not on file     Attends Zoroastrianism service: Not on file     Active member of club or organization: Not on file     Attends meetings of clubs or organizations: Not on file     Relationship status: Not on file     Intimate partner violence:     Fear of current or ex partner: Not on file     Emotionally abused: Not on file     Physically abused: Not on file     Forced sexual activity: Not on file   Other Topics Concern     Parent/sibling w/ CABG, MI or angioplasty before 65F 55M? Not Asked   Social History Narrative    The patient has a 0 pk yr tobacco hx.  She has no active use.  Alcohol use is 0 alcoholic drinks per week.  She denies use of recreational drugs.          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            Allergies:   Dilaudid [hydromorphone] and Senna         Review of Systems:  From intake questionnaire   Negative 14 system review except as noted on HPI, nurse's note.         Physical Exam:   Patient is a 79 year old  female   Vitals: Blood pressure (!) 160/87, pulse 75, height 1.676 m (5' 6\"), weight 68.9 kg (152 lb), not currently breastfeeding.  General Appearance Adult: Alert, no acute distress, oriented  Lungs: no respiratory distress, or pursed lip breathing  Heart: No " obvious jugular venous distension present  Abdomen: soft, nontender, no organomegaly or masses, Body mass index is 24.53 kg/m .  : deferred      Labs and Pathology:    I personally reviewed all applicable laboratory data and went over findings with patient  Significant for:    CBC RESULTS:  Recent Labs   Lab Test 08/09/19  1147 07/03/19  0742 11/21/18  0740 10/25/18  0711   WBC 6.3 4.2 4.6 10.1   HGB 14.3 13.5 12.7 10.4*    230 279 162        BMP RESULTS:  Recent Labs   Lab Test 08/09/19  1154 08/09/19  1147 07/03/19  0742 04/19/19  1516 02/08/19  1336 11/21/18  0740 10/24/18  0645   NA  --  140 140  --   --  142 141   POTASSIUM  --  4.0 3.8  --   --  3.6 4.1   CHLORIDE  --  108 106  --   --  108 109   CO2  --  25 29  --   --  28 21   ANIONGAP  --  7 4  --   --  6 10   GLC  --  92 83  --   --  92 129*   BUN  --  17 23  --   --  23 14   CR  --  0.70 0.77  --  0.71 0.72 0.62   GFRESTIMATED >90 82 73  --  82 78 >90   GFRESTBLACK >90 >90 85  --  >90 >90 >90   MARCK  --  8.9 8.8 9.1 8.2* 8.6 8.3*       UA RESULTS:   Recent Labs   Lab Test 08/09/19  1221 10/22/18  2200 10/22/18  1257   SG 1.005 1.016 1.010   URINEPH 6.5 7.0 7.0   NITRITE Negative Negative Negative   RBCU 8* 2 1   WBCU 4 3 1         Imaging:    I personally reviewed all applicable imaging and went over findings with patient.  Significant for:    Results for orders placed or performed during the hospital encounter of 08/09/19   XR Chest Port 1 View    Narrative    Exam: XR CHEST PORT 1 VW, 8/9/2019 12:16 PM    Indication: chest pain    Comparison: Chest x-ray 10/22/2018    Findings:   Frontal x-ray of the chest. No pneumothorax. No pleural effusion.  Cardiomediastinal silhouette is nonenlarged. No acute airspace  opacity. No acute osseous abnormality Visualized upper abdomen is  unremarkable.      Impression    Impression: No acute airspace opacity.    I have personally reviewed the examination and initial interpretation  and I agree with the  findings.    JUWAN HERNANDEZ MD   CTA Chest Abdomen with Contrast    Narrative    Exam: Computed tomographic angiography of the chest, abdomen and  pelvis without and with contrast including 3D reformations dated  8/9/2019 2:13 PM    Clinical information: Left-sided chest pain, concern for aortic  dissection.    Technique: Axial images through the chest and abdomen obtained before  the administration of intravenous contrast media and following the  injection of contrast media in the arterial phase. Source images  reviewed as well as 3D and multi-planar reconstructions at a 3D  workstation. Arterial phase imaging performed with ECG gating.    Contrast: iopamidol (ISOVUE-370) solution 100 mL    DLP: 615 mGy*cm    Comparison: Chest x-ray performed the same day.    Findings:      Despite ECG gating, there is still some motion artifact affecting the  evaluation of the aortic root and ascending aorta.    Normal variant a variant origin of the right subclavian artery off the  distal arch, coursing posterior to the esophagus.    Mildly dilated ascending aorta measuring 3.6 cm. On contrast-enhanced  images, there is no evidence of dissection. No evidence of intramural  hematoma on noncontrast images. Mild tortuosity of the descending  thoracic aorta with likely adjacent atelectasis or scarring of the  left lower lobe.    Heart size is normal. No pleural or pericardial effusion. No enlarged  lymph nodes in the chest or axilla.    Linear opacities in the lung bases likely represent scarring or  atelectasis.    The entire pelvis is not included on the field-of-view.    In the visualized abdomen pelvis, the liver, spleen, pancreas,  gallbladder, adrenal glands, and kidneys are within normal limits.  There are diverticula of the sigmoid colon without evidence of  diverticulitis. No ascites. No enlarged lymph nodes. The bladder is  distended. Arteriomegaly of the bilateral common iliac arteries.    No aggressive or suspicious  appearing osseous lesion.      Impression    Impression:    1. No evidence of aortic dissection or or other acute aortic  abnormality. No abnormalities in the chest to suggest etiology of left  sided chest pain.    2. Minimally dilated aortic root at 3.6 cm in diameter. Normal variant  aberrant origin of the right subclavian artery.        EPIFANIO HUA              Assessment and Plan:     Assessment: 79 year old female with microscopic hematuria that was identified on urinalysis obtained 8/9/19. The differential diagnosis at this point includes stone disease, infection, vaginal/rectal contaminant, urothelial malignancy, renal disorder versus another yet unknown diagnosis.    At this time, recommend proceeding with partial hematuria evaluation to include:  - Urinalysis and urine culture to rule out an acute urinary tract infection.   - Urine cytology to look for cells concerning for malignancy.    If the above urine testing reveals abnormal findings, will proceed with full workup to include:  - CT urogram for upper tract imaging.  - Cystoscopy with the first available urologist (with the exception of Dr. Seth or Dr. Díaz) to evaluate the interior of the bladder. Follow up for hematuria as recommended by urologist performing cystoscopic evaluation.      Elsa Chen CNP  Department of Urology     Again, thank you for allowing me to participate in the care of your patient.      Sincerely,    Elsa Chen CNP

## 2019-10-02 NOTE — PATIENT INSTRUCTIONS
UROLOGY CLINIC VISIT PATIENT INSTRUCTIONS    1) We will send your urine for repeat analysis, culture, and cytology to look for any unusual cells in the urinary tract.   2) If there is still blood present on our tests today, we will pursue the full workup to include a CT scan and cystoscopy (camera) to look inside your bladder.  3) If the blood is no longer present, we will monitor urine samples every 6 months.     I will contact you by phone with results.     If you have any issues, questions or concerns in the meantime, do not hesitate to contact us at [phone] or via [employer].     It was a pleasure meeting with you today.  Thank you for allowing me and my team the privilege of caring for you today.  YOU are the reason we are here, and I truly hope we provided you with the excellent service you deserve.  Please let us know if there is anything else we can do for you so that we can be sure you are leaving completely satisfied with your care experience.    Elsa Chen, CNP

## 2019-10-03 LAB
BACTERIA SPEC CULT: ABNORMAL
COPATH REPORT: NORMAL
Lab: ABNORMAL
SPECIMEN SOURCE: ABNORMAL

## 2019-10-04 DIAGNOSIS — R31.9 URINARY TRACT INFECTION WITH HEMATURIA, SITE UNSPECIFIED: Primary | ICD-10-CM

## 2019-10-04 DIAGNOSIS — N39.0 URINARY TRACT INFECTION WITH HEMATURIA, SITE UNSPECIFIED: Primary | ICD-10-CM

## 2019-10-04 DIAGNOSIS — R31.29 MICROSCOPIC HEMATURIA: Primary | ICD-10-CM

## 2019-10-04 RX ORDER — CEPHALEXIN 500 MG/1
500 CAPSULE ORAL 2 TIMES DAILY
Qty: 10 CAPSULE | Refills: 0 | Status: SHIPPED | OUTPATIENT
Start: 2019-10-04 | End: 2019-11-25

## 2019-10-08 ENCOUNTER — OFFICE VISIT (OUTPATIENT)
Dept: INTERNAL MEDICINE | Facility: CLINIC | Age: 79
End: 2019-10-08
Payer: MEDICARE

## 2019-10-08 VITALS
HEART RATE: 76 BPM | TEMPERATURE: 97.5 F | OXYGEN SATURATION: 96 % | WEIGHT: 150.8 LBS | DIASTOLIC BLOOD PRESSURE: 82 MMHG | SYSTOLIC BLOOD PRESSURE: 142 MMHG | BODY MASS INDEX: 24.34 KG/M2

## 2019-10-08 DIAGNOSIS — R06.09 DOE (DYSPNEA ON EXERTION): Primary | ICD-10-CM

## 2019-10-08 RX ORDER — ASPIRIN 81 MG/1
81 TABLET, CHEWABLE ORAL DAILY
COMMUNITY
End: 2020-01-03

## 2019-10-08 ASSESSMENT — PAIN SCALES - GENERAL: PAINLEVEL: NO PAIN (0)

## 2019-10-08 NOTE — PATIENT INSTRUCTIONS
[facility] Medication Refill Request Information:  * Please contact your pharmacy regarding ANY request for medication refills.  ** [facility] Prescription Fax = [phone]  * Please allow 3 business days for routine medication refills.  * Please allow 5 business days for controlled substance medication refills.     [facility] Test Result notification information:  *You will be notified with in 7-10 days of your appointment day regarding the results of your test.  If you are on MyChart you will be notified as soon as the provider has reviewed the results and signed off on them.    [facility]: [phone]

## 2019-10-08 NOTE — PROGRESS NOTES
HPI:    Pat comes in for follow up today. She was in the ED 8/9/2019 for L-sided chest pain. She had CT angiogram Chest/abdomen/pelvis that was unremarkable. She denies any further chest sxs. She states she overall feels well. Her  Jake thinks she has a little more SOB with walking. Kamini seems to be unconcerned about this. She remains on her same BP medications. She had UA in the ED with 8 RBC's. She denies any urinary complaints. She had L hip fracture 10/23/2018 with surgery with jacquie Conti. She saw Dr. Gutierrez 11/7/2018.   She denies any new HEENT, cardiopulmonary, abdominal, , GYN, neurological, systemic, endocrine, skin, vascular, psychiatric complaints.      PE:    Vitals noted.   gen nad cooperative alert, LCTA, B good air movement, RRR, S1, S2, , grossly normal neurological exam L hip well healed surgical scar w/o drainage, no erythema, no tenderness.     A/P:    1. HTN: she is on 300 mg Diltiazem. She did not take her antihypertensive medications today.   2. Will check Vit D and get DEXA scan. She states she had taken Fosamax in the past but not for several years  3. Immunizations. She got Shingrix vaccine; she will wait on influenza vaccination.   4. Mammogram  11/21/2018  5. She  followed up with Dr. Matt dhillon for L hip surgery seen 12/5/2018  6. Resting echo done on 7/31/2018; nl LVF EF 60-65%. Trace to mild mitral insufficiency.  7. She denies any new skin issues and does not feel she needs to see Dermatology this year.   8. Increased cholesterol; on Lipitor and will check lipids and liver tests checked  9. Colonoscopy 11/10/2015; given age no further colonoscopy recommended (next would be at 80)  10. She had exercise stress test done 9/10/2019 normal but very limited exercise tolerance.  She still has some ALVAREZ, placed Cardiology referral if she wants.   11. She was seen in Urology 10/2/2019 for hematuria. As above she had abdominal CT imaging in the ED 8/9/2019. She had abnormal   UA with positive UC. Given antibiotics. Recommend repeat UA/UC and if positive proceed with cystoscopy and repeat CT imaging.     Total time spent 25 minutes.  More than 50% of the time spent with Ms. Donaldson on counseling / coordinating her care

## 2019-10-08 NOTE — NURSING NOTE
Chief Complaint   Patient presents with     Results     pt here to discuss test results       Cherelle Cheng CMA at 6:52 AM on 10/8/2019.

## 2019-10-18 DIAGNOSIS — R31.29 MICROSCOPIC HEMATURIA: ICD-10-CM

## 2019-10-18 LAB
ALBUMIN UR-MCNC: NEGATIVE MG/DL
APPEARANCE UR: CLEAR
BACTERIA #/AREA URNS HPF: ABNORMAL /HPF
BILIRUB UR QL STRIP: NEGATIVE
COLOR UR AUTO: YELLOW
GLUCOSE UR STRIP-MCNC: NEGATIVE MG/DL
HGB UR QL STRIP: NEGATIVE
KETONES UR STRIP-MCNC: NEGATIVE MG/DL
LEUKOCYTE ESTERASE UR QL STRIP: NEGATIVE
NITRATE UR QL: NEGATIVE
NON-SQ EPI CELLS #/AREA URNS LPF: ABNORMAL /LPF
PH UR STRIP: 7 PH (ref 5–7)
RBC #/AREA URNS AUTO: ABNORMAL /HPF
SOURCE: ABNORMAL
SP GR UR STRIP: 1.01 (ref 1–1.03)
UROBILINOGEN UR STRIP-ACNC: 0.2 EU/DL (ref 0.2–1)
WBC #/AREA URNS AUTO: ABNORMAL /HPF

## 2019-10-18 PROCEDURE — 87086 URINE CULTURE/COLONY COUNT: CPT | Performed by: NURSE PRACTITIONER

## 2019-10-18 PROCEDURE — 81001 URINALYSIS AUTO W/SCOPE: CPT | Performed by: NURSE PRACTITIONER

## 2019-10-19 LAB
BACTERIA SPEC CULT: NO GROWTH
SPECIMEN SOURCE: NORMAL

## 2019-10-21 ENCOUNTER — DOCUMENTATION ONLY (OUTPATIENT)
Dept: CARE COORDINATION | Facility: CLINIC | Age: 79
End: 2019-10-21

## 2019-10-22 DIAGNOSIS — Z87.440 PERSONAL HISTORY OF URINARY TRACT INFECTION: Primary | ICD-10-CM

## 2019-10-22 DIAGNOSIS — R31.29 MICROSCOPIC HEMATURIA: ICD-10-CM

## 2019-10-22 NOTE — TELEPHONE ENCOUNTER
RECORDS RECEIVED FROM: Internal   DATE RECEIVED: 10-25-19   NOTES STATUS DETAILS   OFFICE NOTE from referring provider    Internal 10-8-19 Dr. Curran   OFFICE NOTE from other cardiologist    Internal Last seen 8-20-14 by Dr. Jensen   DISCHARGE SUMMARY from hospital    N/A    DISCHARGE REPORT from the ER   Internal 8-9-19   OPERATIVE REPORT    N/A    MEDICATION LIST   Internal    LABS     BMP   Care Everywhere 11-8-18   CBC   Internal 8-9-19   CMP   Internal 8-9-19   Lipids   Internal 11-21-18   TSH   Internal 4-17-18   DIAGNOSTIC PROCEDURES     EKG   Internal 8-9-19   Monitor Reports   N/A    IMAGING (DISC & REPORT)      Echo   Internal 9-10-19   Stress Tests   Internal 9-10-19   Cath   N/A    MRI/MRA   N/A    CT/CTA   Internal 8-9-19

## 2019-10-24 NOTE — PROGRESS NOTES
CARDIOLOGY CLINIC INITIAL CONSULTATION    HPI:  Ms. Alicea is a 78 yo female who receives primary care from Dr. Jeramie Curran and was referred by him for dyspnea on exertion. Ms. Alicea was followed by my colleague Dr. Jensen for non-cardiac chest discomfort and was last seen in 2014. She is accompanied in clinic today by her .     Ms. Alicea is a very pleasant women with history of HTN and dyslipidemia. There was also concern for microvascular coronary disease in the past.     Over the past 2-3 years Ms. Donaldson has noticed progressive dyspnea on exertion and in the last year she has had to slow down her walking. She walks with her  nearly every day and does a 6 block loop. She also goes to the [facility] regularly for resistance exercises. She denies dyspnea at rest. She denies rest or exertional chest pain, lightheadedness, palpitations. No orthopnea, PND, change in skin or bowel activity. She does have mild BLE edema by the end of the day that resolves by morning.     As part of her workup, Ms. Donaldson had a stress echo done last month. She has poor exercise tolerance and a hypertensive response to exercise. She met her target heart rate. Normal LVEF with no regional wall motion abnormalities but her her LVEF did not augment; likely due to hypertensive response.     ASSESSMENT AND PLAN  Ms. Donaldson is a 78 yo female with history of HTN, dyslipidemia, microvascular disease who presents with progressive dyspnea on exertion. She is concerned that her blood pressure is the driving factor to her symptoms and I agree. Her constellation of symptoms and test findings are consistent with diastolic heart failure or HFpEF that is exacerbated with exertion. Her blood pressures are high and rise rapidly with active. Pulse pressure is high indicating arterial stiffening. She is euvolemic on exam so I don't think she would benefit from a diuretic. The problem comes when her blood pressure rises.    I  recommend starting a low dose amlodipine to improve resting and exertional blood pressure. I will start low at 2.5mg and increase based on her response. She is very well educated on her health and plans to monitor her blood pressure daily. I will have her return for a BP visit in 2-3 weeks which can guide us in dose adjustment. Then I will see her back in 2-3 months to see if her symptoms improve.     We also discussed the use of low dose aspirin. At her age with no CVD history, the risk of bleeding is likely equal to or higher than any CVD risk reduction. I discussed this balance with her and she elected to stop ASA today.     If blood pressure is controlled and her symptoms do not improve, I will consider coronary artery evaluation and/or increased antianginal therapy with nitrates.     Thank you for the opportunity to participate in the care of Ms. Kamini Donaldson. Please feel free to contact me with any additional questions or concerns.    Ortega Francisco MD    Preventive Cardiology  Pager: [phone]    PAST MEDICAL HISTORY:  Patient Active Problem List   Diagnosis     Essential hypertension, benign     Mixed hyperlipidemia     Symptomatic menopausal or female climacteric states     Diverticulosis of large intestine     Hyperlipidemia     Coronary atherosclerosis     Esophageal reflux     vulvar cyst     Hiatal hernia     S/P Nissen fundoplication (without gastrostomy tube) procedure     History of tubal ligation     History of dilatation and curettage     History of hysterectomy     Lichen sclerosus     Left displaced femoral neck fracture (H)     Senile osteoporosis     Lichen planus     Seborrheic dermatitis     Past Medical History:   Diagnosis Date     Diverticulosis of colon (without mention of hemorrhage)      Essential hypertension, benign      Gastro-oesophageal reflux disease     nissen     Hemorrhoid      Nonsenile cataract      Osteoporosis      Other and unspecified  hyperlipidemia      Rectocele      Symptomatic menopausal or female climacteric states        CURRENT MEDICATIONS:  Current Outpatient Medications   Medication Sig Dispense Refill     Acetaminophen (TYLENOL PO) Take 1,000 mg by mouth every 6 hours as needed for mild pain or fever       aspirin (ASA) 81 MG chewable tablet Take 81 mg by mouth daily       atorvastatin (LIPITOR) 10 MG tablet TAKE ONE TABLET BY MOUTH ONE TIME DAILY  90 tablet 2     calcium carbonate 500 mg, elemental, (OSCAL;OYSTER SHELL CALCIUM) 500 MG tablet Take 3 tablets (1,500 mg) by mouth 2 times daily (with meals) 60 tablet 0     cholecalciferol (VITAMIN D3) 1000 UNIT tablet Take 2 tablets (2,000 Units) by mouth daily 30 tablet 0     clobetasol (TEMOVATE) 0.05 % ointment Apply sparingly to affected area 2 x daily for14 days. Then, apply small amount to affected area twice weekly for maintenance. (Patient taking differently: Apply sparingly to affected area 2 x daily for14 days as needed for rash. Then, apply small amount to affected area twice weekly for maintenance.) 30 g 2     diltiazem (CARTIA XT) 300 MG 24 hr capsule Take 1 capsule (300 mg) by mouth daily 90 capsule 0     docusate sodium (COLACE) 100 MG capsule Take 1 capsule (100 mg) by mouth 2 times daily (Patient taking differently: Take 100 mg by mouth 2 times daily as needed ) 60 capsule 0       PAST SURGICAL HISTORY:  Past Surgical History:   Procedure Laterality Date     ARTHROPLASTY HIP Left 10/23/2018    Procedure: LEFT HIP COLEMAN- ARTHROPLASTY ;  Surgeon: Araceli Gutierrez MD;  Location: [facility] OR     C NONSPECIFIC PROCEDURE      Bilateral Tubal Ligation     C NONSPECIFIC PROCEDURE      D&C secondary to menorrhagia     C NONSPECIFIC PROCEDURE      child birth x 2     CATARACT IOL, RT/LT Left 07/15/2019     COLONOSCOPY  5/24/2011    Procedure:COLONOSCOPY; Surgeon:HALINA SCOTT; Location:[facility]     COLONOSCOPY Left 11/10/2015    Procedure: COLONOSCOPY;  Surgeon: Raheem Colunga,  "MD;  [facility]: [facility]     GYN SURGERY      hysterectomy/oopherectomy; done for prolapse     LAPAROSCOPIC NISSEN FUNDOPLICATION  1/7/2013    Procedure: LAPAROSCOPIC NISSEN FUNDOPLICATION;  Laparoscopic Repair of Hiatel Hernia, NISSEN, Esophagoscopy, Gastroscopy And Duodenoscopy ;  Surgeon: Leonidas Valle MD;  Location:  OR     PHACOEMULSIFICATION WITH STANDARD INTRAOCULAR LENS IMPLANT Left 7/15/2019    Procedure: Left Eye Phacoemulsification with Intraocular Lens;  Surgeon: Josue Trujillo MD;  Location: [facility]       ALLERGIES  Dilaudid [hydromorphone] and Senna    FAMILY HX:  Family History   Problem Relation Age of Onset     Hypertension Father      C.A.D. Father      Hypertension Mother      Breast Cancer Sister      Osteoporosis Sister      EYE* Brother         Keratoconus     Glaucoma No family hx of      Macular Degeneration No family hx of        SOCIAL HX:  Social History     Tobacco Use     Smoking status: Never Smoker     Smokeless tobacco: Never Used   Substance Use Topics     Alcohol use: No     Drug use: No        ROS:  Comprehensive ROS is negative except as noted in HPI.    VITAL SIGNS:  BP (!) 159/83 (BP Location: Left arm, Patient Position: Chair, Cuff Size: Adult Regular)   Pulse 73   Ht 1.753 m (5' 9\")   Wt 68.5 kg (151 lb)   SpO2 95%   BMI 22.30 kg/m    Body mass index is 22.3 kg/m .  Wt Readings from Last 2 Encounters:   10/25/19 68.5 kg (151 lb)   10/08/19 68.4 kg (150 lb 12.8 oz)       PHYSICAL EXAM  Gen: pleasant womaen sitting comfortably in NAD  Head: nc/at  Eyes: EOMI, PERRL  ENT: no OP lesions or erythema  Neck: supple, no LAD  CV: nml s1/s2, no 2/6 CONNIE at right 2nd ics; no JVD  Chest: clear lungs  Abd: soft, NT, NABS  Ext: warm, no LE edema  Skin: no rash on limited exam  Neuro: awake, alert, oriented, nml speech and gait  Vasc: 2+ bilateral carotid, brachial, radial, DP and PT pulses; no bruits noted    LABS: personally reviewed  CMP  Recent Labs   Lab Test " 08/09/19  1154 08/09/19  1147 07/03/19  0742 04/19/19  1516 02/08/19  1336 11/21/18  0740 10/24/18  0645 10/23/18  0658  04/17/18  0937  01/08/13  0700   NA  --  140 140  --   --  142 141  --    < > 140   < > 136   POTASSIUM  --  4.0 3.8  --   --  3.6 4.1  --    < > 3.8   < > 4.2   CHLORIDE  --  108 106  --   --  108 109  --    < > 108   < > 103   CO2  --  25 29  --   --  28 21  --    < > 25   < > 27   ANIONGAP  --  7 4  --   --  6 10  --    < > 6   < > 6   GLC  --  92 83  --   --  92 129*  --    < > 94   < > 104*   BUN  --  17 23  --   --  23 14  --    < > 19   < > 12   CR  --  0.70 0.77  --  0.71 0.72 0.62  --    < > 0.73   < > 0.62   GFRESTIMATED >90 82 73  --  82 78 >90  --    < > 77   < > >90   GFRESTBLACK >90 >90 85  --  >90 >90 >90  --    < > >90   < > >90   MARCK  --  8.9 8.8 9.1 8.2* 8.6 8.3*  --    < > 8.8   < > 8.1*   MAG  --   --   --   --   --   --   --  2.0  --   --   --  1.9   PHOS  --   --   --   --   --   --   --  3.1  --   --   --  3.3   PROTTOTAL  --  8.3 7.6  --   --  7.6  --   --   --  7.9   < >  --    ALBUMIN  --  4.1 3.9  --  3.9 3.9  --   --   --  4.2   < >  --    BILITOTAL  --  0.7 0.6  --   --  0.5  --   --   --  0.8   < >  --    ALKPHOS  --  96 97  --   --  144  --   --   --  124   < >  --    AST  --  20 21  --   --  23  --   --   --  21   < >  --    ALT  --  17 18  --   --  25  --   --   --  18   < >  --     < > = values in this interval not displayed.     CBC  Recent Labs   Lab Test 08/09/19  1147 07/03/19  0742 11/21/18  0740 10/25/18  0711   WBC 6.3 4.2 4.6 10.1   RBC 4.59 4.29 4.08 3.41*   HGB 14.3 13.5 12.7 10.4*   HCT 44.8 42.4 40.4 32.6*   MCV 98 99 99 96   MCH 31.2 31.5 31.1 30.5   MCHC 31.9 31.8 31.4* 31.9   RDW 12.6 12.7 13.4 12.9    230 279 162     INR  Recent Labs   Lab Test 10/22/18  1141 01/07/13  0640   INR 0.98 0.97     Recent Labs   Lab Test 11/21/18  0740 11/11/16  0802 10/15/15  0855 09/02/14  1040   CHOL 180 172 169 188   HDL 92 100 105 114   LDL 64 47 49 58    TRIG 126 126 73 82   CHOLHDLRATIO  --   --  1.6 1.6       Most recent EKG: personally reviewed and discussed with the patient  10/2/19: NSR no ischemic changes; normal ecg    Most recent ECHO: personally reviewed and discussed with the patient  7/31/18 TTE  Left ventricular function, chamber size, wall motion, and wall thickness are  normal.The EF is 60-65%. No regional wall motion abnormalities are seen.  Right ventricular function, chamber size, wall motion, and thickness are  normal.  Trace to mild mitral insufficiency is present. Mild tricuspid insufficiency is  present.  The inferior vena cava was normal in size with preserved respiratory  variability. No pericardial effusion is present.     Previous study not available for comparison.    Most recent STRESS TEST:  personally reviewed report and discussed with the patient  9/10/19 Exercise echo  Interpretation Summary  Exercise stress echocardiogram with no inducible ischemia in the setting of  very limited exercise tolerance.     Target heart rate achieved. Hypertensive blood pressure response to exercise.  No angina symptoms with exercise.  No ECG evidence of ischemia.  Normal segmental and global LV function with EF of approximately 55-60% at  rest; with exercise left ventricular cavity size and LVEF did not change  significantly.  No stress induced regional wall motion abnormalities.  Less than average functional capacity for age.     Normal aortic root and no significant valvular dysfunction noted on screening  2D and Doppler examination. PA systolic pressure is mildly elevated.    Most recent CARDIAC CATH:   Coronary angiogram (01/05/2000): per Dr. Jensen's note  60 yo female with chronic stable angina with a positive stress echo undergoes evaluation. Coronary angiography reveals widely patent arteries with no flow limiting lesions. LV gram shows normal function with no RWMA or MR. Coronary flow reserve in the LAD was 1.4 and in the Cx was 1.1.The  patient has microvascular angina

## 2019-10-25 ENCOUNTER — OFFICE VISIT (OUTPATIENT)
Dept: CARDIOLOGY | Facility: CLINIC | Age: 79
End: 2019-10-25
Attending: INTERNAL MEDICINE
Payer: MEDICARE

## 2019-10-25 ENCOUNTER — PRE VISIT (OUTPATIENT)
Dept: CARDIOLOGY | Facility: CLINIC | Age: 79
End: 2019-10-25

## 2019-10-25 VITALS
HEIGHT: 69 IN | BODY MASS INDEX: 22.36 KG/M2 | OXYGEN SATURATION: 95 % | DIASTOLIC BLOOD PRESSURE: 83 MMHG | HEART RATE: 73 BPM | SYSTOLIC BLOOD PRESSURE: 159 MMHG | WEIGHT: 151 LBS

## 2019-10-25 DIAGNOSIS — I50.30 HEART FAILURE WITH PRESERVED EJECTION FRACTION, NYHA CLASS II (H): Primary | ICD-10-CM

## 2019-10-25 DIAGNOSIS — R06.09 DOE (DYSPNEA ON EXERTION): ICD-10-CM

## 2019-10-25 DIAGNOSIS — I10 ESSENTIAL HYPERTENSION, BENIGN: ICD-10-CM

## 2019-10-25 DIAGNOSIS — I10 BENIGN ESSENTIAL HYPERTENSION: ICD-10-CM

## 2019-10-25 LAB — INTERPRETATION ECG - MUSE: NORMAL

## 2019-10-25 PROCEDURE — 93010 ELECTROCARDIOGRAM REPORT: CPT | Mod: ZP | Performed by: INTERNAL MEDICINE

## 2019-10-25 PROCEDURE — 99204 OFFICE O/P NEW MOD 45 MIN: CPT | Mod: ZP | Performed by: INTERNAL MEDICINE

## 2019-10-25 PROCEDURE — 93005 ELECTROCARDIOGRAM TRACING: CPT | Mod: ZF

## 2019-10-25 PROCEDURE — G0463 HOSPITAL OUTPT CLINIC VISIT: HCPCS | Mod: 25,ZF

## 2019-10-25 RX ORDER — AMLODIPINE BESYLATE 5 MG/1
2.5 TABLET ORAL DAILY
Qty: 90 TABLET | Refills: 3 | Status: SHIPPED | OUTPATIENT
Start: 2019-10-25 | End: 2019-11-15

## 2019-10-25 ASSESSMENT — ENCOUNTER SYMPTOMS
SLEEP DISTURBANCES DUE TO BREATHING: 0
LIGHT-HEADEDNESS: 0
SPUTUM PRODUCTION: 0
ORTHOPNEA: 0
COUGH DISTURBING SLEEP: 0
DYSPNEA ON EXERTION: 1
COUGH: 0
LEG PAIN: 0
POSTURAL DYSPNEA: 0
HYPOTENSION: 0
SNORES LOUDLY: 0
SYNCOPE: 0
PALPITATIONS: 0
HYPERTENSION: 0
EXERCISE INTOLERANCE: 0
WHEEZING: 0
HEMOPTYSIS: 0
SHORTNESS OF BREATH: 1

## 2019-10-25 ASSESSMENT — PAIN SCALES - GENERAL: PAINLEVEL: NO PAIN (0)

## 2019-10-25 ASSESSMENT — MIFFLIN-ST. JEOR: SCORE: 1224.31

## 2019-10-25 NOTE — LETTER
10/25/2019      RE: Shahana Donaldson  [address] [facility] [address]       Dear Colleague,    Thank you for the opportunity to participate in the care of your patient, Shahana Donaldson, at the [facility] at [facility]. Please see a copy of my visit note below.    CARDIOLOGY CLINIC INITIAL CONSULTATION    HPI:  Ms. Alicea is a 80 yo female who receives primary care from Dr. Jeramie Curran and was referred by him for dyspnea on exertion. Ms. Alicea was followed by my colleague Dr. Jensen for non-cardiac chest discomfort and was last seen in 2014. She is accompanied in clinic today by her .     Ms. Alicea is a very pleasant women with history of HTN and dyslipidemia. There was also concern for microvascular coronary disease in the past.     Over the past 2-3 years Ms. Donaldson has noticed progressive dyspnea on exertion and in the last year she has had to slow down her walking. She walks with her  nearly every day and does a 6 block loop. She also goes to the [facility] regularly for resistance exercises. She denies dyspnea at rest. She denies rest or exertional chest pain, lightheadedness, palpitations. No orthopnea, PND, change in skin or bowel activity. She does have mild BLE edema by the end of the day that resolves by morning.     As part of her workup, Ms. Donaldson had a stress echo done last month. She has poor exercise tolerance and a hypertensive response to exercise. She met her target heart rate. Normal LVEF with no regional wall motion abnormalities but her her LVEF did not augment; likely due to hypertensive response.     ASSESSMENT AND PLAN  Ms. Donaldson is a 80 yo female with history of HTN, dyslipidemia, microvascular disease who presents with progressive dyspnea on exertion. She is concerned that her blood pressure is the driving factor to her symptoms and I agree. Her constellation of symptoms and test findings  are consistent with diastolic heart failure or HFpEF that is exacerbated with exertion. Her blood pressures are high and rise rapidly with active. Pulse pressure is high indicating arterial stiffening. She is euvolemic on exam so I don't think she would benefit from a diuretic. The problem comes when her blood pressure rises.    I recommend starting a low dose amlodipine to improve resting and exertional blood pressure. I will start low at 2.5mg and increase based on her response. She is very well educated on her health and plans to monitor her blood pressure daily. I will have her return for a BP visit in 2-3 weeks which can guide us in dose adjustment. Then I will see her back in 2-3 months to see if her symptoms improve.     We also discussed the use of low dose aspirin. At her age with no CVD history, the risk of bleeding is likely equal to or higher than any CVD risk reduction. I discussed this balance with her and she elected to stop ASA today.     If blood pressure is controlled and her symptoms do not improve, I will consider coronary artery evaluation and/or increased antianginal therapy with nitrates.     Thank you for the opportunity to participate in the care of Ms. Kamini Donaldson. Please feel free to contact me with any additional questions or concerns.    Ortega Francisco MD    Preventive Cardiology  Pager: [phone]    PAST MEDICAL HISTORY:  Patient Active Problem List   Diagnosis     Essential hypertension, benign     Mixed hyperlipidemia     Symptomatic menopausal or female climacteric states     Diverticulosis of large intestine     Hyperlipidemia     Coronary atherosclerosis     Esophageal reflux     vulvar cyst     Hiatal hernia     S/P Nissen fundoplication (without gastrostomy tube) procedure     History of tubal ligation     History of dilatation and curettage     History of hysterectomy     Lichen sclerosus     Left displaced femoral neck fracture (H)     Senile  osteoporosis     Lichen planus     Seborrheic dermatitis     Past Medical History:   Diagnosis Date     Diverticulosis of colon (without mention of hemorrhage)      Essential hypertension, benign      Gastro-oesophageal reflux disease     nissen     Hemorrhoid      Nonsenile cataract      Osteoporosis      Other and unspecified hyperlipidemia      Rectocele      Symptomatic menopausal or female climacteric states        CURRENT MEDICATIONS:  Current Outpatient Medications   Medication Sig Dispense Refill     Acetaminophen (TYLENOL PO) Take 1,000 mg by mouth every 6 hours as needed for mild pain or fever       aspirin (ASA) 81 MG chewable tablet Take 81 mg by mouth daily       atorvastatin (LIPITOR) 10 MG tablet TAKE ONE TABLET BY MOUTH ONE TIME DAILY  90 tablet 2     calcium carbonate 500 mg, elemental, (OSCAL;OYSTER SHELL CALCIUM) 500 MG tablet Take 3 tablets (1,500 mg) by mouth 2 times daily (with meals) 60 tablet 0     cholecalciferol (VITAMIN D3) 1000 UNIT tablet Take 2 tablets (2,000 Units) by mouth daily 30 tablet 0     clobetasol (TEMOVATE) 0.05 % ointment Apply sparingly to affected area 2 x daily for14 days. Then, apply small amount to affected area twice weekly for maintenance. (Patient taking differently: Apply sparingly to affected area 2 x daily for14 days as needed for rash. Then, apply small amount to affected area twice weekly for maintenance.) 30 g 2     diltiazem (CARTIA XT) 300 MG 24 hr capsule Take 1 capsule (300 mg) by mouth daily 90 capsule 0     docusate sodium (COLACE) 100 MG capsule Take 1 capsule (100 mg) by mouth 2 times daily (Patient taking differently: Take 100 mg by mouth 2 times daily as needed ) 60 capsule 0       PAST SURGICAL HISTORY:  Past Surgical History:   Procedure Laterality Date     ARTHROPLASTY HIP Left 10/23/2018    Procedure: LEFT HIP COLEMAN- ARTHROPLASTY ;  Surgeon: Araceli Gutierrez MD;  Location: [facility] OR     C NONSPECIFIC PROCEDURE      Bilateral Tubal Ligation     C  "NONSPECIFIC PROCEDURE      D&C secondary to menorrhagia     C NONSPECIFIC PROCEDURE      child birth x 2     CATARACT IOL, RT/LT Left 07/15/2019     COLONOSCOPY  5/24/2011    Procedure:COLONOSCOPY; Surgeon:HALINA SCOTT; Location: GI     COLONOSCOPY Left 11/10/2015    Procedure: COLONOSCOPY;  Surgeon: Raheem Colunga MD;  Location:  GI     GYN SURGERY      hysterectomy/oopherectomy; done for prolapse     LAPAROSCOPIC NISSEN FUNDOPLICATION  1/7/2013    Procedure: LAPAROSCOPIC NISSEN FUNDOPLICATION;  Laparoscopic Repair of Hiatel Hernia, NISSEN, Esophagoscopy, Gastroscopy And Duodenoscopy ;  Surgeon: Leonidas Valle MD;  [facility]     PHACOEMULSIFICATION WITH STANDARD INTRAOCULAR LENS IMPLANT Left 7/15/2019    Procedure: Left Eye Phacoemulsification with Intraocular Lens;  Surgeon: Josue Trujillo MD;  Location: [facility] OR       ALLERGIES  Dilaudid [hydromorphone] and Senna    FAMILY HX:  Family History   Problem Relation Age of Onset     Hypertension Father      C.A.D. Father      Hypertension Mother      Breast Cancer Sister      Osteoporosis Sister      EYE* Brother         Keratoconus     Glaucoma No family hx of      Macular Degeneration No family hx of        SOCIAL HX:  Social History     Tobacco Use     Smoking status: Never Smoker     Smokeless tobacco: Never Used   Substance Use Topics     Alcohol use: No     Drug use: No        ROS:  Comprehensive ROS is negative except as noted in HPI.    VITAL SIGNS:  BP (!) 159/83 (BP Location: Left arm, Patient Position: Chair, Cuff Size: Adult Regular)   Pulse 73   Ht 1.753 m (5' 9\")   Wt 68.5 kg (151 lb)   SpO2 95%   BMI 22.30 kg/m     Body mass index is 22.3 kg/m .  Wt Readings from Last 2 Encounters:   10/25/19 68.5 kg (151 lb)   10/08/19 68.4 kg (150 lb 12.8 oz)       PHYSICAL EXAM  Gen: pleasant womaen sitting comfortably in NAD  Head: nc/at  Eyes: EOMI, PERRL  ENT: no OP lesions or erythema  Neck: supple, no LAD  CV: nml s1/s2, no " 2/6 CONNIE at right 2nd ics; no JVD  Chest: clear lungs  Abd: soft, NT, NABS  Ext: warm, no LE edema  Skin: no rash on limited exam  Neuro: awake, alert, oriented, nml speech and gait  Vasc: 2+ bilateral carotid, brachial, radial, DP and PT pulses; no bruits noted    LABS: personally reviewed  CMP  Recent Labs   Lab Test 08/09/19  1154 08/09/19  1147 07/03/19  0742 04/19/19  1516 02/08/19  1336 11/21/18  0740 10/24/18  0645 10/23/18  0658  04/17/18  0937  01/08/13  0700   NA  --  140 140  --   --  142 141  --    < > 140   < > 136   POTASSIUM  --  4.0 3.8  --   --  3.6 4.1  --    < > 3.8   < > 4.2   CHLORIDE  --  108 106  --   --  108 109  --    < > 108   < > 103   CO2  --  25 29  --   --  28 21  --    < > 25   < > 27   ANIONGAP  --  7 4  --   --  6 10  --    < > 6   < > 6   GLC  --  92 83  --   --  92 129*  --    < > 94   < > 104*   BUN  --  17 23  --   --  23 14  --    < > 19   < > 12   CR  --  0.70 0.77  --  0.71 0.72 0.62  --    < > 0.73   < > 0.62   GFRESTIMATED >90 82 73  --  82 78 >90  --    < > 77   < > >90   GFRESTBLACK >90 >90 85  --  >90 >90 >90  --    < > >90   < > >90   MARCK  --  8.9 8.8 9.1 8.2* 8.6 8.3*  --    < > 8.8   < > 8.1*   MAG  --   --   --   --   --   --   --  2.0  --   --   --  1.9   PHOS  --   --   --   --   --   --   --  3.1  --   --   --  3.3   PROTTOTAL  --  8.3 7.6  --   --  7.6  --   --   --  7.9   < >  --    ALBUMIN  --  4.1 3.9  --  3.9 3.9  --   --   --  4.2   < >  --    BILITOTAL  --  0.7 0.6  --   --  0.5  --   --   --  0.8   < >  --    ALKPHOS  --  96 97  --   --  144  --   --   --  124   < >  --    AST  --  20 21  --   --  23  --   --   --  21   < >  --    ALT  --  17 18  --   --  25  --   --   --  18   < >  --     < > = values in this interval not displayed.     CBC  Recent Labs   Lab Test 08/09/19  1147 07/03/19  0742 11/21/18  0740 10/25/18  0711   WBC 6.3 4.2 4.6 10.1   RBC 4.59 4.29 4.08 3.41*   HGB 14.3 13.5 12.7 10.4*   HCT 44.8 42.4 40.4 32.6*   MCV 98 99 99 96   MCH 31.2  31.5 31.1 30.5   MCHC 31.9 31.8 31.4* 31.9   RDW 12.6 12.7 13.4 12.9    230 279 162     INR  Recent Labs   Lab Test 10/22/18  1141 01/07/13  0640   INR 0.98 0.97     Recent Labs   Lab Test 11/21/18  0740 11/11/16  0802 10/15/15  0855 09/02/14  1040   CHOL 180 172 169 188   HDL 92 100 105 114   LDL 64 47 49 58   TRIG 126 126 73 82   CHOLHDLRATIO  --   --  1.6 1.6       Most recent EKG: personally reviewed and discussed with the patient  10/2/19: NSR no ischemic changes; normal ecg    Most recent ECHO: personally reviewed and discussed with the patient  7/31/18 TTE  Left ventricular function, chamber size, wall motion, and wall thickness are  normal.The EF is 60-65%. No regional wall motion abnormalities are seen.  Right ventricular function, chamber size, wall motion, and thickness are  normal.  Trace to mild mitral insufficiency is present. Mild tricuspid insufficiency is  present.  The inferior vena cava was normal in size with preserved respiratory  variability. No pericardial effusion is present.     Previous study not available for comparison.    Most recent STRESS TEST:  personally reviewed report and discussed with the patient  9/10/19 Exercise echo  Interpretation Summary  Exercise stress echocardiogram with no inducible ischemia in the setting of  very limited exercise tolerance.     Target heart rate achieved. Hypertensive blood pressure response to exercise.  No angina symptoms with exercise.  No ECG evidence of ischemia.  Normal segmental and global LV function with EF of approximately 55-60% at  rest; with exercise left ventricular cavity size and LVEF did not change  significantly.  No stress induced regional wall motion abnormalities.  Less than average functional capacity for age.     Normal aortic root and no significant valvular dysfunction noted on screening  2D and Doppler examination. PA systolic pressure is mildly elevated.    Most recent CARDIAC CATH:   Coronary angiogram (01/05/2000):  per Dr. Jensen's note  60 yo female with chronic stable angina with a positive stress echo undergoes evaluation. Coronary angiography reveals widely patent arteries with no flow limiting lesions. LV gram shows normal function with no RWMA or MR. Coronary flow reserve in the LAD was 1.4 and in the Cx was 1.1.The patient has microvascular angina      Please do not hesitate to contact me if you have any questions/concerns.     Sincerely,     Ortega Francisco MD

## 2019-10-25 NOTE — NURSING NOTE
New Medication: amlodipine. Patient was educated regarding newly prescribed medication, including discussion of  the indication, administration, side effects, and when to report to MD or RN. Patient demonstrated understanding of this information and agreed to call with further questions or concerns.  Medication Change: Ok to stop aspirin daily.   Return Appointment: 2 to 3 months. Patient given instructions regarding scheduling next clinic visit. Patient demonstrated understanding of this information and agreed to call with further questions or concerns.  Patient stated she understood all health information given and agreed to call with further questions or concerns.

## 2019-10-25 NOTE — PATIENT INSTRUCTIONS
You were seen today in the Cardiovascular Clinic at the [facility].     Cardiology Providers you saw during your visit: Dr. Jad Francisco     Diagnosis:   Encounter Diagnoses   Name Primary?     ALVAREZ (dyspnea on exertion)      Heart failure with preserved ejection fraction, NYHA class II (H) Yes     Essential hypertension, benign         Results: Discussed with you today EKG, stress echo      Orders:   Orders Placed This Encounter   Procedures     Follow-Up with Cardiologist     EKG 12-lead, tracing only (Same Day)       Current Medication List  Current Outpatient Medications   Medication Sig Dispense Refill     Acetaminophen (TYLENOL PO) Take 1,000 mg by mouth every 6 hours as needed for mild pain or fever       amLODIPine (NORVASC) 5 MG tablet Take 0.5 tablets (2.5 mg) by mouth daily 90 tablet 3     aspirin (ASA) 81 MG chewable tablet Take 81 mg by mouth daily       atorvastatin (LIPITOR) 10 MG tablet TAKE ONE TABLET BY MOUTH ONE TIME DAILY  90 tablet 2     calcium carbonate 500 mg, elemental, (OSCAL;OYSTER SHELL CALCIUM) 500 MG tablet Take 3 tablets (1,500 mg) by mouth 2 times daily (with meals) 60 tablet 0     cholecalciferol (VITAMIN D3) 1000 UNIT tablet Take 2 tablets (2,000 Units) by mouth daily 30 tablet 0     clobetasol (TEMOVATE) 0.05 % ointment Apply sparingly to affected area 2 x daily for14 days. Then, apply small amount to affected area twice weekly for maintenance. (Patient taking differently: Apply sparingly to affected area 2 x daily for14 days as needed for rash. Then, apply small amount to affected area twice weekly for maintenance.) 30 g 2     diltiazem (CARTIA XT) 300 MG 24 hr capsule Take 1 capsule (300 mg) by mouth daily 90 capsule 0     docusate sodium (COLACE) 100 MG capsule Take 1 capsule (100 mg) by mouth 2 times daily (Patient taking differently: Take 100 mg by mouth 2 times daily as needed ) 60 capsule 0       Recommendations:   1. Start amlodipine 2.5mg every day in the evening.  "  2. Check your blood pressure 1-2 times/day for 2-3 weeks after starting amlodipine.   3. Come in for a blood pressure with your log in about 3 weeks and we can see if we need to go up on the amlodipine.   4. Ok to stop daily aspirin.  5. Come back and see Dr. Jad Francisco in 2-3 months.        Please feel free to call me with any questions or concerns.       Michael Stuart LPN     Questions: [phone].   First press #1 for the [facility] and then press #3 for \"Medical Questions\" to reach us Cardiology Nurses.     [phone].   First press #1 for the [facility] and then press #1     On Call Cardiologist for after hours or on weekends: [phone]   option #4 and ask to speak to the on-call Cardiologist.          If you need a medication refill please contact your pharmacy.  Please allow 3 business days for your refill to be completed.  ________________________________________________________________________________________________________________________________  "

## 2019-10-28 RX ORDER — DILTIAZEM HYDROCHLORIDE 300 MG/1
300 CAPSULE, COATED, EXTENDED RELEASE ORAL DAILY
Qty: 90 CAPSULE | Refills: 3 | Status: SHIPPED | OUTPATIENT
Start: 2019-10-28 | End: 2020-12-14

## 2019-10-28 NOTE — TELEPHONE ENCOUNTER
Cartia XT Oral Capsule Extended Release 24 Hour 300 MG    Last Written Prescription Date:  10/22/2018-10/26/2018  Last Fill Quantity: 90,   # refills: 0  Last Office Visit : 10/8/2019  Future Office visit:  None    Routing refill request to provider for review/approval because:  Blood pressures out of range.   This medication was ordered when the Pt was in the hospital in 2018.   Would the doctor like the Pt to continue with this medication?   Sending to clinic for review.       Blood pressure under 140/90 in past 12 months          BP Readings from Last 3 Encounters:   10/25/19 (!) 159/83   10/08/19 (!) 142/82   10/02/19 (!) 160/87             So Huff RN  Central Triage Red Flags/Med Refills

## 2019-11-11 ENCOUNTER — OFFICE VISIT (OUTPATIENT)
Dept: DERMATOLOGY | Facility: CLINIC | Age: 79
End: 2019-11-11
Payer: MEDICARE

## 2019-11-11 DIAGNOSIS — L82.0 INFLAMED SEBORRHEIC KERATOSIS: Primary | ICD-10-CM

## 2019-11-11 DIAGNOSIS — Z12.83 SKIN CANCER SCREENING: ICD-10-CM

## 2019-11-11 DIAGNOSIS — L90.0 LICHEN SCLEROSUS: ICD-10-CM

## 2019-11-11 DIAGNOSIS — Z87.898 HISTORY OF ATYPICAL NEVUS: ICD-10-CM

## 2019-11-11 DIAGNOSIS — L82.1 SEBORRHEIC KERATOSIS: ICD-10-CM

## 2019-11-11 RX ORDER — CLOBETASOL PROPIONATE 0.5 MG/G
OINTMENT TOPICAL 2 TIMES DAILY
Qty: 60 G | Refills: 3 | Status: SHIPPED | OUTPATIENT
Start: 2019-11-11 | End: 2020-09-05

## 2019-11-11 ASSESSMENT — PAIN SCALES - GENERAL: PAINLEVEL: NO PAIN (0)

## 2019-11-11 NOTE — PATIENT INSTRUCTIONS
Cryotherapy    What is it?    Use of a very cold liquid, such as liquid nitrogen, to freeze and destroy abnormal skin cells that need to be removed    What should I expect?    Tenderness and redness    A small blister that might grow and fill with dark purple blood. There may be crusting.    More than one treatment may be needed if the lesions do not go away.    How do I care for the treated area?    Gently wash the area with your hands when bathing.    Use a thin layer of Vaseline to help with healing. You may use a Band-Aid.     The area should heal within 7-10 days and may leave behind a pink or lighter color.     Do not use an antibiotic or Neosporin ointment.     You may take acetaminophen (Tylenol) for pain.     Call your Doctor if you have:    Severe pain    Signs of infection (warmth, redness, cloudy yellow drainage, and or a bad smell)    Questions or concerns    Who should I call with questions?       [facility]: [phone]       [facility]: [phone]       For urgent needs outside of business hours call the [facility] at [phone]        and ask for the dermatology resident on call

## 2019-11-11 NOTE — NURSING NOTE
Dermatology Rooming Note    Shahana Donaldson's goals for this visit include:   Chief Complaint   Patient presents with     Derm Problem     Rash, Pat states she needs a new dermatologist .     Denise Terrazas LPN

## 2019-11-11 NOTE — PROGRESS NOTES
[facility] Dermatology Note      Dermatology Problem List:  1. Lichen planus, using clobetasol ointment p.r.n.  2. Biopsy-proven lichen sclerosis of the vaginal area,   -clobetasol 0.05% ointment BID prn  3. 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  4. Contact dermatitis due to  on hands in 2014.  5. ISK's  - s/p cryo  6. Skin cancer screening 11/11/19    Encounter Date: Nov 11, 2019    CC:  Chief Complaint   Patient presents with     Derm Problem     Rash, Pat states she needs a new dermatologist .         History of Present Illness:  Ms. Shahana Donaldson is a 79 year old female with a history of atypical junctional melanocytic proliferation on the left upper posterior arm who presents in self referral as a new patient in the dermatology clinic for a skin cancer screening. She would to transition her care as her dermatology provider has left her previous practice.     She reports that there are red and bumpy lesions on her chest that she would like to have evaluated. These lesions are bothersome to her and she scratches at them at times.     She reports that her lichen sclerosus in the vaginal area is well controlled with application of clobetasol prn. The clobetasol helps significantly with itching and irritation. Her lichen planus is also well controlled, and is currently non-bothersome. Denies painful, bleeding, or non-healing lesions. This has been biopsied in the past.     Denies a family history of skin diseases or skin cancers.    Admits to frequent sun exposure in her youth. 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     Otherwise she is feeling well without additional skin concerns at this time.    Past Medical History:   Patient Active Problem List   Diagnosis     Essential hypertension, benign     Mixed hyperlipidemia     Symptomatic menopausal or female climacteric states     Diverticulosis of large intestine      Hyperlipidemia     Coronary atherosclerosis     Esophageal reflux     vulvar cyst     Hiatal hernia     S/P Nissen fundoplication (without gastrostomy tube) procedure     History of tubal ligation     History of dilatation and curettage     History of hysterectomy     Lichen sclerosus     Left displaced femoral neck fracture (H)     Senile osteoporosis     Lichen planus     Seborrheic dermatitis     Past Medical History:   Diagnosis Date     Diverticulosis of colon (without mention of hemorrhage)      Essential hypertension, benign      Gastro-oesophageal reflux disease     nissen     Hemorrhoid      Nonsenile cataract      Osteoporosis      Other and unspecified hyperlipidemia      Rectocele      Symptomatic menopausal or female climacteric states      Past Surgical History:   Procedure Laterality Date     ARTHROPLASTY HIP Left 10/23/2018    Procedure: LEFT HIP COLEMAN- ARTHROPLASTY ;  Surgeon: Araceli Gutierrez MD;  Location: [facility] OR     C NONSPECIFIC PROCEDURE      Bilateral Tubal Ligation     C NONSPECIFIC PROCEDURE      D&C secondary to menorrhagia     C NONSPECIFIC PROCEDURE      child birth x 2     CATARACT IOL, RT/LT Left 07/15/2019     COLONOSCOPY  5/24/2011    Procedure:COLONOSCOPY; Surgeon:HALINA SCOTT; [facility]     COLONOSCOPY Left 11/10/2015    Procedure: COLONOSCOPY;  Surgeon: Raheem Colunga MD;  Location: [facility] GI     GYN SURGERY      hysterectomy/oopherectomy; done for prolapse     LAPAROSCOPIC NISSEN FUNDOPLICATION  1/7/2013    Procedure: LAPAROSCOPIC NISSEN FUNDOPLICATION;  Laparoscopic Repair of Hiatel Hernia, NISSEN, Esophagoscopy, Gastroscopy And Duodenoscopy ;  Surgeon: Leonidas Valle MD;  Location: [facility] OR     PHACOEMULSIFICATION WITH STANDARD INTRAOCULAR LENS IMPLANT Left 7/15/2019    Procedure: Left Eye Phacoemulsification with Intraocular Lens;  Surgeon: Josue Trujillo MD;  Location:  OR       Social History:  Admits to frequent sun exposure in her youth. She  is a retired nurse. She is . She likes to garden in the summer.    reports that she has never smoked. She has never used smokeless tobacco. She reports that she does not drink alcohol or use drugs.    Family History:  Denies a family history of skin diseases or skin cancers.  Family History   Problem Relation Age of Onset     Hypertension Father      C.A.D. Father      Hypertension Mother      Breast Cancer Sister      Osteoporosis Sister      EYE* Brother         Keratoconus     Glaucoma No family hx of      Macular Degeneration No family hx of        Medications:  Current Outpatient Medications   Medication Sig Dispense Refill     Acetaminophen (TYLENOL PO) Take 1,000 mg by mouth every 6 hours as needed for mild pain or fever       amLODIPine (NORVASC) 5 MG tablet Take 0.5 tablets (2.5 mg) by mouth daily 90 tablet 3     aspirin (ASA) 81 MG chewable tablet Take 81 mg by mouth daily       atorvastatin (LIPITOR) 10 MG tablet TAKE ONE TABLET BY MOUTH ONE TIME DAILY  90 tablet 2     calcium carbonate 500 mg, elemental, (OSCAL;OYSTER SHELL CALCIUM) 500 MG tablet Take 3 tablets (1,500 mg) by mouth 2 times daily (with meals) 60 tablet 0     cholecalciferol (VITAMIN D3) 1000 UNIT tablet Take 2 tablets (2,000 Units) by mouth daily 30 tablet 0     clobetasol (TEMOVATE) 0.05 % ointment Apply sparingly to affected area 2 x daily for14 days. Then, apply small amount to affected area twice weekly for maintenance. (Patient taking differently: Apply sparingly to affected area 2 x daily for14 days as needed for rash. Then, apply small amount to affected area twice weekly for maintenance.) 30 g 2     diltiazem (CARTIA XT) 300 MG 24 hr capsule Take 1 capsule (300 mg) by mouth daily 90 capsule 0     diltiazem ER COATED BEADS (CARTIA XT) 300 MG 24 hr capsule Take 1 capsule (300 mg) by mouth daily 90 capsule 3     docusate sodium (COLACE) 100 MG capsule Take 1 capsule (100 mg) by mouth 2 times daily (Patient taking differently:  Take 100 mg by mouth 2 times daily as needed ) 60 capsule 0       Allergies   Allergen Reactions     Dilaudid [Hydromorphone]      dizziness     Senna Hives       Review of Systems:  -As per HPI  -Constitutional: The patient denies fatigue, fevers, chills, unintended weight loss, and night sweats.  -HEENT: Patient denies nonhealing oral sores.  -Skin: As above in HPI. No additional skin concerns.    Physical exam:  Vitals: There were no vitals taken for this visit.  GEN: This is a well developed, well-nourished female in no acute distress, in a pleasant mood.    SKIN: Full skin, which includes the head/face, both arms, chest, back, abdomen,both legs, genitalia and/or groin buttocks, digits and/or nails, was examined.    - Multiple regular brown pigmented macules and papules are identified on the trunk and extremities.   - Scattered brown macules on sun exposed areas.  - Dome shaped bright red papules on the trunk.   - Tan to brown waxy stuck on papules with surrounding erythema on the central chest (x 7) and left inguinal fold (x 1)   - Waxy stuck on tan to brown papules on the trunk and extremities.  - No active eruption to the lower extremities  - Atrophic white and pink plaques noted to the bilateral labia majora with loss of the clitoral mishra, no significant erythema noted  -No other lesions of concern on areas examined.       Impression/Plan:  1. Multiple clinically benign nevi on the trunk and extremities with history of atypical nevus.     ABCDs of melanoma were discussed and self skin checks were advised.     Recommend sunscreens SPF #30 or greater, protective clothing and avoidance of tanning beds.    2. Solar lentigines, sun exposed skin    Benign nature reassured    3. Cherry angiomas, trunk    Benign nature reassured    4. Inflamed seborrheic keratosis, central chest (x 7), left inguinal fold (x 1)    Cryotherapy procedure note: After verbal consent and discussion of risks and benefits including but no  limited to dyspigmentation/scar, blister, and pain, 8 were treated with 1-2mm freeze border for 2 cycles with liquid nitrogen. Post cryotherapy instructions were provided.     5. Seborrheic keratosis, non irritated, trunk and extremities    Benign nature reassured, no further intervention required at this time.     6. History of lichen planus    No signs on examination at today's visit    Pt will return to clinic if noticing eruptions returning.     7. Lichen Sclerosis, vaginal area    No concerning features on examination but has anatomy changes from her chronic disease.     Continue Clobetasol 0.05% ointment BID as needed    Follow-up in 6 months, earlier for new or changing lesions.       Staff Involved:  Staff/Scribe    Scribe Disclosure:  IWalker am serving as a scribe to document services personally performed by Ivette Amin PA-C based on data collection and the provider's statements to me.     Provider Disclosure:   The documentation recorded by the scribe accurately reflects the services I personally performed and the decisions made by me.    All risks, benefits and alternatives were discussed with patient.  Patient is in agreement and understands the assessment and plan.  All questions were answered.  Sun Screen Education was given.   Return to Clinic in 6 months or sooner as needed.   Ivette Amin PA-C   [facility]

## 2019-11-11 NOTE — LETTER
11/11/2019       RE: Shahana Donaldson  [address] [facility] [address]     Dear Colleague,    Thank you for referring your patient, Shahana Donaldson, to the [facility] DERMATOLOGY at [facility]. Please see a copy of my visit note below.    [facility] Dermatology Note      Dermatology Problem List:  1. Lichen planus, using clobetasol ointment p.r.n.  2. Biopsy-proven lichen sclerosis of the vaginal area,   -clobetasol 0.05% ointment BID prn  3. 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  4. Contact dermatitis due to  on hands in 2014.  5. ISK's  - s/p cryo  6. Skin cancer screening 11/11/19    Encounter Date: Nov 11, 2019    CC:  Chief Complaint   Patient presents with     Derm Problem     Rash, Pat states she needs a new dermatologist .         History of Present Illness:  Ms. Shahana Donaldson is a 79 year old female with a history of atypical junctional melanocytic proliferation on the left upper posterior arm who presents in self referral as a new patient in the dermatology clinic for a skin cancer screening. She would to transition her care as her dermatology provider has left her previous practice.     She reports that there are red and bumpy lesions on her chest that she would like to have evaluated. These lesions are bothersome to her and she scratches at them at times.     She reports that her lichen sclerosus in the vaginal area is well controlled with application of clobetasol prn. The clobetasol helps significantly with itching and irritation. Her lichen planus is also well controlled, and is currently non-bothersome. Denies painful, bleeding, or non-healing lesions. This has been biopsied in the past.     Denies a family history of skin diseases or skin cancers.    Admits to frequent sun exposure in her youth. 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      Otherwise she is feeling well without additional skin concerns at this time.    Past Medical History:   Patient Active Problem List   Diagnosis     Essential hypertension, benign     Mixed hyperlipidemia     Symptomatic menopausal or female climacteric states     Diverticulosis of large intestine     Hyperlipidemia     Coronary atherosclerosis     Esophageal reflux     vulvar cyst     Hiatal hernia     S/P Nissen fundoplication (without gastrostomy tube) procedure     History of tubal ligation     History of dilatation and curettage     History of hysterectomy     Lichen sclerosus     Left displaced femoral neck fracture (H)     Senile osteoporosis     Lichen planus     Seborrheic dermatitis     Past Medical History:   Diagnosis Date     Diverticulosis of colon (without mention of hemorrhage)      Essential hypertension, benign      Gastro-oesophageal reflux disease     nissen     Hemorrhoid      Nonsenile cataract      Osteoporosis      Other and unspecified hyperlipidemia      Rectocele      Symptomatic menopausal or female climacteric states      Past Surgical History:   Procedure Laterality Date     ARTHROPLASTY HIP Left 10/23/2018    Procedure: LEFT HIP COLEMAN- ARTHROPLASTY ;  Surgeon: Araceli Gutierrez MD;  Location:  OR      NONSPECIFIC PROCEDURE      Bilateral Tubal Ligation     C NONSPECIFIC PROCEDURE      D&C secondary to menorrhagia     C NONSPECIFIC PROCEDURE      child birth x 2     CATARACT IOL, RT/LT Left 07/15/2019     COLONOSCOPY  5/24/2011    Procedure:COLONOSCOPY; Surgeon:HALINA SCOTT; Location: GI     COLONOSCOPY Left 11/10/2015    Procedure: COLONOSCOPY;  Surgeon: Raheem Colunga MD;  Location:  GI     GYN SURGERY      hysterectomy/oopherectomy; done for prolapse     LAPAROSCOPIC NISSEN FUNDOPLICATION  1/7/2013    Procedure: LAPAROSCOPIC NISSEN FUNDOPLICATION;  Laparoscopic Repair of Hiatel Hernia, NISSEN, Esophagoscopy, Gastroscopy And Duodenoscopy ;  Surgeon: Leonard  Leonidas Pinzon MD;  Location:  OR     PHACOEMULSIFICATION WITH STANDARD INTRAOCULAR LENS IMPLANT Left 7/15/2019    Procedure: Left Eye Phacoemulsification with Intraocular Lens;  Surgeon: Josue Trujillo MD;  Location:  OR       Social History:  Admits to frequent sun exposure in her youth. 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    reports that she has never smoked. She has never used smokeless tobacco. She reports that she does not drink alcohol or use drugs.    Family History:  Denies a family history of skin diseases or skin cancers.  Family History   Problem Relation Age of Onset     Hypertension Father      C.A.D. Father      Hypertension Mother      Breast Cancer Sister      Osteoporosis Sister      EYE* Brother         Keratoconus     Glaucoma No family hx of      Macular Degeneration No family hx of        Medications:  Current Outpatient Medications   Medication Sig Dispense Refill     Acetaminophen (TYLENOL PO) Take 1,000 mg by mouth every 6 hours as needed for mild pain or fever       amLODIPine (NORVASC) 5 MG tablet Take 0.5 tablets (2.5 mg) by mouth daily 90 tablet 3     aspirin (ASA) 81 MG chewable tablet Take 81 mg by mouth daily       atorvastatin (LIPITOR) 10 MG tablet TAKE ONE TABLET BY MOUTH ONE TIME DAILY  90 tablet 2     calcium carbonate 500 mg, elemental, (OSCAL;OYSTER SHELL CALCIUM) 500 MG tablet Take 3 tablets (1,500 mg) by mouth 2 times daily (with meals) 60 tablet 0     cholecalciferol (VITAMIN D3) 1000 UNIT tablet Take 2 tablets (2,000 Units) by mouth daily 30 tablet 0     clobetasol (TEMOVATE) 0.05 % ointment Apply sparingly to affected area 2 x daily for14 days. Then, apply small amount to affected area twice weekly for maintenance. (Patient taking differently: Apply sparingly to affected area 2 x daily for14 days as needed for rash. Then, apply small amount to affected area twice weekly for maintenance.) 30 g 2     diltiazem (CARTIA XT)  300 MG 24 hr capsule Take 1 capsule (300 mg) by mouth daily 90 capsule 0     diltiazem ER COATED BEADS (CARTIA XT) 300 MG 24 hr capsule Take 1 capsule (300 mg) by mouth daily 90 capsule 3     docusate sodium (COLACE) 100 MG capsule Take 1 capsule (100 mg) by mouth 2 times daily (Patient taking differently: Take 100 mg by mouth 2 times daily as needed ) 60 capsule 0       Allergies   Allergen Reactions     Dilaudid [Hydromorphone]      dizziness     Senna Hives       Review of Systems:  -As per HPI  -Constitutional: The patient denies fatigue, fevers, chills, unintended weight loss, and night sweats.  -HEENT: Patient denies nonhealing oral sores.  -Skin: As above in HPI. No additional skin concerns.    Physical exam:  Vitals: There were no vitals taken for this visit.  GEN: This is a well developed, well-nourished female in no acute distress, in a pleasant mood.    SKIN: Full skin, which includes the head/face, both arms, chest, back, abdomen,both legs, genitalia and/or groin buttocks, digits and/or nails, was examined.    - Multiple regular brown pigmented macules and papules are identified on the trunk and extremities.   - Scattered brown macules on sun exposed areas.  - Dome shaped bright red papules on the trunk.   - Tan to brown waxy stuck on papules with surrounding erythema on the central chest (x 7) and left inguinal fold (x 1)   - Waxy stuck on tan to brown papules on the trunk and extremities.  - No active eruption to the lower extremities  - Atrophic white and pink plaques noted to the bilateral labia majora with loss of the clitoral mishra, no significant erythema noted  -No other lesions of concern on areas examined.       Impression/Plan:  1. Multiple clinically benign nevi on the trunk and extremities with history of atypical nevus.     ABCDs of melanoma were discussed and self skin checks were advised.     Recommend sunscreens SPF #30 or greater, protective clothing and avoidance of tanning  beds.    2. Solar lentigines, sun exposed skin    Benign nature reassured    3. Cherry angiomas, trunk    Benign nature reassured    4. Inflamed seborrheic keratosis, central chest (x 7), left inguinal fold (x 1)    Cryotherapy procedure note: After verbal consent and discussion of risks and benefits including but no limited to dyspigmentation/scar, blister, and pain, 8 were treated with 1-2mm freeze border for 2 cycles with liquid nitrogen. Post cryotherapy instructions were provided.     5. Seborrheic keratosis, non irritated, trunk and extremities    Benign nature reassured, no further intervention required at this time.     6. History of lichen planus    No signs on examination at today's visit    Pt will return to clinic if noticing eruptions returning.     7. Lichen Sclerosis, vaginal area    No concerning features on examination but has anatomy changes from her chronic disease.     Continue Clobetasol 0.05% ointment BID as needed    Follow-up in 6 months, earlier for new or changing lesions.       Staff Involved:  Staff/Scribe    Scribe Disclosure:  Walker FORD am serving as a scribe to document services personally performed by Ivette Amin PA-C based on data collection and the provider's statements to me.     Provider Disclosure:   The documentation recorded by the scribe accurately reflects the services I personally performed and the decisions made by me.    All risks, benefits and alternatives were discussed with patient.  Patient is in agreement and understands the assessment and plan.  All questions were answered.  Sun Screen Education was given.   Return to Clinic in 6 months or sooner as needed.   Ivette Amin PA-C   [facility]

## 2019-11-15 DIAGNOSIS — I10 ESSENTIAL HYPERTENSION, BENIGN: ICD-10-CM

## 2019-11-15 RX ORDER — AMLODIPINE BESYLATE 2.5 MG/1
2.5 TABLET ORAL DAILY
Qty: 90 TABLET | Refills: 0 | Status: SHIPPED | OUTPATIENT
Start: 2019-11-15 | End: 2020-02-18

## 2019-11-20 ENCOUNTER — OFFICE VISIT (OUTPATIENT)
Dept: PODIATRY | Facility: CLINIC | Age: 79
End: 2019-11-20
Payer: MEDICARE

## 2019-11-20 VITALS
HEART RATE: 64 BPM | HEIGHT: 70 IN | WEIGHT: 149.8 LBS | DIASTOLIC BLOOD PRESSURE: 91 MMHG | SYSTOLIC BLOOD PRESSURE: 141 MMHG | BODY MASS INDEX: 21.45 KG/M2

## 2019-11-20 DIAGNOSIS — B35.1 ONYCHOMYCOSIS: Primary | ICD-10-CM

## 2019-11-20 PROCEDURE — 99203 OFFICE O/P NEW LOW 30 MIN: CPT | Performed by: PODIATRIST

## 2019-11-20 ASSESSMENT — MIFFLIN-ST. JEOR: SCORE: 1226.8

## 2019-11-20 NOTE — LETTER
11/20/2019         RE: Shahana Donaldson  [address] [facility] [address]        Dear Colleague,    Thank you for referring your patient, Shahana Donaldson, to the [facility]. Please see a copy of my visit note below.    PATIENT HISTORY:  Shahana Donaldson is a 79 year old female who presents to clinic for b/l 1st toenail discoloration.  Present for months.  Pressure causes pain at times.  No injury.  Pt has tried an otc topical, unsure which one, w/o improvement.  No other treatments.    Review of Systems:  Patient denies fever, chills, rash, wound, stiffness, limping, numbness, weakness, heart burn, blood in stool, chest pain with activity, calf pain when walking, shortness of breath with activity, chronic cough, easy bleeding/bruising, swelling of ankles, excessive thirst, fatigue, depression, anxiety.       PAST MEDICAL HISTORY:   Past Medical History:   Diagnosis Date     Diverticulosis of colon (without mention of hemorrhage)      Essential hypertension, benign      Gastro-oesophageal reflux disease     nissen     Hemorrhoid      Nonsenile cataract      Osteoporosis      Other and unspecified hyperlipidemia      Rectocele      Symptomatic menopausal or female climacteric states         PAST SURGICAL HISTORY:   Past Surgical History:   Procedure Laterality Date     ARTHROPLASTY HIP Left 10/23/2018    Procedure: LEFT HIP COLEMAN- ARTHROPLASTY ;  Surgeon: Araceli Gutierrez MD;  Location:  OR      NONSPECIFIC PROCEDURE      Bilateral Tubal Ligation     C NONSPECIFIC PROCEDURE      D&C secondary to menorrhagia     C NONSPECIFIC PROCEDURE      child birth x 2     CATARACT IOL, RT/LT Left 07/15/2019     COLONOSCOPY  5/24/2011    Procedure:COLONOSCOPY; Surgeon:HALINA SCOTT; [facility]     COLONOSCOPY Left 11/10/2015    Procedure: COLONOSCOPY;  Surgeon: Raheem Colunga MD;  Location:  GI     GYN SURGERY      hysterectomy/oopherectomy; done for prolapse      LAPAROSCOPIC NISSEN FUNDOPLICATION  1/7/2013    Procedure: LAPAROSCOPIC NISSEN FUNDOPLICATION;  Laparoscopic Repair of Hiatel Hernia, NISSEN, Esophagoscopy, Gastroscopy And Duodenoscopy ;  Surgeon: Leonidas Valle MD;  [facility]     PHACOEMULSIFICATION WITH STANDARD INTRAOCULAR LENS IMPLANT Left 7/15/2019    Procedure: Left Eye Phacoemulsification with Intraocular Lens;  Surgeon: Josue Trujillo MD;  [facility]        MEDICATIONS:   Current Outpatient Medications:      Acetaminophen (TYLENOL PO), Take 1,000 mg by mouth every 6 hours as needed for mild pain or fever, Disp: , Rfl:      amLODIPine (NORVASC) 2.5 MG tablet, Take 1 tablet (2.5 mg) by mouth daily, Disp: 90 tablet, Rfl: 0     aspirin (ASA) 81 MG chewable tablet, Take 81 mg by mouth daily, Disp: , Rfl:      atorvastatin (LIPITOR) 10 MG tablet, TAKE ONE TABLET BY MOUTH ONE TIME DAILY , Disp: 90 tablet, Rfl: 2     calcium carbonate 500 mg, elemental, (OSCAL;OYSTER SHELL CALCIUM) 500 MG tablet, Take 3 tablets (1,500 mg) by mouth 2 times daily (with meals), Disp: 60 tablet, Rfl: 0     cholecalciferol (VITAMIN D3) 1000 UNIT tablet, Take 2 tablets (2,000 Units) by mouth daily, Disp: 30 tablet, Rfl: 0     clobetasol (TEMOVATE) 0.05 % external ointment, Apply topically 2 times daily Until symptoms have resolved to the lichen sclerosis., Disp: 60 g, Rfl: 3     clobetasol (TEMOVATE) 0.05 % ointment, Apply sparingly to affected area 2 x daily for14 days. Then, apply small amount to affected area twice weekly for maintenance. (Patient taking differently: Apply sparingly to affected area 2 x daily for14 days as needed for rash. Then, apply small amount to affected area twice weekly for maintenance.), Disp: 30 g, Rfl: 2     diltiazem (CARTIA XT) 300 MG 24 hr capsule, Take 1 capsule (300 mg) by mouth daily, Disp: 90 capsule, Rfl: 0     diltiazem ER COATED BEADS (CARTIA XT) 300 MG 24 hr capsule, Take 1 capsule (300 mg) by mouth daily, Disp: 90  capsule, Rfl: 3     docusate sodium (COLACE) 100 MG capsule, Take 1 capsule (100 mg) by mouth 2 times daily (Patient taking differently: Take 100 mg by mouth 2 times daily as needed ), Disp: 60 capsule, Rfl: 0     ALLERGIES:    Allergies   Allergen Reactions     Dilaudid [Hydromorphone]      dizziness     Senna Hives        SOCIAL HISTORY:   Social History     Socioeconomic History     Marital status:      Spouse name: Not on file     Number of children: Not on file     Years of education: Not on file     Highest education level: Not on file   Occupational History     Employer: [employer]   Social Needs     Financial resource strain: Not on file     Food insecurity:     Worry: Not on file     Inability: Not on file     Transportation needs:     Medical: Not on file     Non-medical: Not on file   Tobacco Use     Smoking status: Never Smoker     Smokeless tobacco: Never Used   Substance and Sexual Activity     Alcohol use: No     Drug use: No     Sexual activity: Yes     Partners: Male     Birth control/protection: Post-menopausal   Lifestyle     Physical activity:     Days per week: Not on file     Minutes per session: Not on file     Stress: Not on file   Relationships     Social connections:     Talks on phone: Not on file     Gets together: Not on file     Attends Hoahaoism service: Not on file     Active member of club or organization: Not on file     Attends meetings of clubs or organizations: Not on file     Relationship status: Not on file     Intimate partner violence:     Fear of current or ex partner: Not on file     Emotionally abused: Not on file     Physically abused: Not on file     Forced sexual activity: Not on file   Other Topics Concern     Parent/sibling w/ CABG, MI or angioplasty before 65F 55M? Not Asked   Social History Narrative    The patient has a 0 pk yr tobacco hx.  She has no active use.  Alcohol use is 0 alcoholic drinks per week.  She denies use of recreational drugs.    "       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        FAMILY HISTORY:   Family History   Problem Relation Age of Onset     Hypertension Father      C.A.D. Father      Hypertension Mother      Breast Cancer Sister      Osteoporosis Sister      EYE* Brother         Keratoconus     Glaucoma No family hx of      Macular Degeneration No family hx of         EXAM:Vitals: BP (!) 141/91   Pulse 64   Ht 1.765 m (5' 9.5\")   Wt 67.9 kg (149 lb 12.8 oz)   BMI 21.80 kg/m     BMI= Body mass index is 21.8 kg/m .    General appearance: Patient is alert and fully cooperative with history & exam.  No sign of distress is noted during the visit.     Psychiatric: Affect is pleasant & appropriate.  Patient appears motivated to improve health.     Respiratory: Breathing is regular & unlabored while sitting.     HEENT: Hearing is intact to spoken word.  Speech is clear.  No gross evidence of visual impairment that would impact ambulation.     Dermatologic: b/l toenails discolored, dystrophic, thickened, moreso at hallux nails which have some incurvation.  I did not appreciate toenail pain today.  Skin is intact to both lower extremities without significant lesions, rash or abrasion.  No paronychia or evidence of soft tissue infection is noted.     Vascular: DP & PT pulses are intact & regular bilaterally.  No significant edema.  Varicosities noted.  CFT and skin temperature are normal to both lower extremities.     Neurologic: Lower extremity sensation is intact to light touch.  No evidence of weakness or contracture in the lower extremities.  No evidence of neuropathy.     Musculoskeletal: Patient is ambulatory without assistive device or brace.  No gross ankle deformity noted.  No foot or ankle joint effusion is noted.     ASSESSMENT: b/l onychomycosis     PLAN:  Reviewed patient's chart in epic.  Discussed condition and treatment options including pros and cons.    Discussed causes of nail fungus.  " Discussed treatment options with patient and explained that there isn't one treatment that is 100% effective.  Debridement is an option.  Discussed oral lamisil which is the most effective at about 70% but can have liver effects.  Discussed over the counter antifungal creams.  Explained that these are less effective and need to be applied twice a day for about 3-4 months.  Also talked about prescription topicals, which have similar efficacy.  Also discussed that if there was damage to the nail and the nail is now dystrophic that none of the above is going to change the nail.  Discussed that if it becomes painful, we can remove the nail in clinic.  The patient elected to consider alternate otc topical.      List of routine foot care resources given for nail care.    F/u prn.    Pipo Mccoy DPM, FACFAS    Weight management plan Patient was referred to their PCP to discuss a diet and exercise plan.  Pat to follow up with Primary Care provider regarding elevated blood pressure.      Again, thank you for allowing me to participate in the care of your patient.        Sincerely,        Pipo Mccoy DPM

## 2019-11-20 NOTE — PROGRESS NOTES
PATIENT HISTORY:  Shahana Donaldson is a 79 year old female who presents to clinic for b/l 1st toenail discoloration.  Present for months.  Pressure causes pain at times.  No injury.  Pt has tried an otc topical, unsure which one, w/o improvement.  No other treatments.    Review of Systems:  Patient denies fever, chills, rash, wound, stiffness, limping, numbness, weakness, heart burn, blood in stool, chest pain with activity, calf pain when walking, shortness of breath with activity, chronic cough, easy bleeding/bruising, swelling of ankles, excessive thirst, fatigue, depression, anxiety.       PAST MEDICAL HISTORY:   Past Medical History:   Diagnosis Date     Diverticulosis of colon (without mention of hemorrhage)      Essential hypertension, benign      Gastro-oesophageal reflux disease     nissen     Hemorrhoid      Nonsenile cataract      Osteoporosis      Other and unspecified hyperlipidemia      Rectocele      Symptomatic menopausal or female climacteric states         PAST SURGICAL HISTORY:   Past Surgical History:   Procedure Laterality Date     ARTHROPLASTY HIP Left 10/23/2018    Procedure: LEFT HIP COLEMAN- ARTHROPLASTY ;  Surgeon: Araceli Gutierrez MD;  Location: [facility] NONSPECIFIC PROCEDURE      Bilateral Tubal Ligation     C NONSPECIFIC PROCEDURE      D&C secondary to menorrhagia     C NONSPECIFIC PROCEDURE      child birth x 2     CATARACT IOL, RT/LT Left 07/15/2019     COLONOSCOPY  5/24/2011    Procedure:COLONOSCOPY; Surgeon:HALINA SCOTT; Location: GI     COLONOSCOPY Left 11/10/2015    Procedure: COLONOSCOPY;  Surgeon: Raheem Colunag MD;  Location:  GI     GYN SURGERY      hysterectomy/oopherectomy; done for prolapse     LAPAROSCOPIC NISSEN FUNDOPLICATION  1/7/2013    Procedure: LAPAROSCOPIC NISSEN FUNDOPLICATION;  Laparoscopic Repair of Hiatel Hernia, NISSEN, Esophagoscopy, Gastroscopy And Duodenoscopy ;  Surgeon: Leonidas Valle MD;  Location:  OR      PHACOEMULSIFICATION WITH STANDARD INTRAOCULAR LENS IMPLANT Left 7/15/2019    Procedure: Left Eye Phacoemulsification with Intraocular Lens;  Surgeon: Josue Trujillo MD;  Location:  OR        MEDICATIONS:   Current Outpatient Medications:      Acetaminophen (TYLENOL PO), Take 1,000 mg by mouth every 6 hours as needed for mild pain or fever, Disp: , Rfl:      amLODIPine (NORVASC) 2.5 MG tablet, Take 1 tablet (2.5 mg) by mouth daily, Disp: 90 tablet, Rfl: 0     aspirin (ASA) 81 MG chewable tablet, Take 81 mg by mouth daily, Disp: , Rfl:      atorvastatin (LIPITOR) 10 MG tablet, TAKE ONE TABLET BY MOUTH ONE TIME DAILY , Disp: 90 tablet, Rfl: 2     calcium carbonate 500 mg, elemental, (OSCAL;OYSTER SHELL CALCIUM) 500 MG tablet, Take 3 tablets (1,500 mg) by mouth 2 times daily (with meals), Disp: 60 tablet, Rfl: 0     cholecalciferol (VITAMIN D3) 1000 UNIT tablet, Take 2 tablets (2,000 Units) by mouth daily, Disp: 30 tablet, Rfl: 0     clobetasol (TEMOVATE) 0.05 % external ointment, Apply topically 2 times daily Until symptoms have resolved to the lichen sclerosis., Disp: 60 g, Rfl: 3     clobetasol (TEMOVATE) 0.05 % ointment, Apply sparingly to affected area 2 x daily for14 days. Then, apply small amount to affected area twice weekly for maintenance. (Patient taking differently: Apply sparingly to affected area 2 x daily for14 days as needed for rash. Then, apply small amount to affected area twice weekly for maintenance.), Disp: 30 g, Rfl: 2     diltiazem (CARTIA XT) 300 MG 24 hr capsule, Take 1 capsule (300 mg) by mouth daily, Disp: 90 capsule, Rfl: 0     diltiazem ER COATED BEADS (CARTIA XT) 300 MG 24 hr capsule, Take 1 capsule (300 mg) by mouth daily, Disp: 90 capsule, Rfl: 3     docusate sodium (COLACE) 100 MG capsule, Take 1 capsule (100 mg) by mouth 2 times daily (Patient taking differently: Take 100 mg by mouth 2 times daily as needed ), Disp: 60 capsule, Rfl: 0     ALLERGIES:    Allergies   Allergen  Reactions     Dilaudid [Hydromorphone]      dizziness     Senna Hives        SOCIAL HISTORY:   Social History     Socioeconomic History     Marital status:      Spouse name: Not on file     Number of children: Not on file     Years of education: Not on file     Highest education level: Not on file   Occupational History     Employer: [employer]   Social Needs     Financial resource strain: Not on file     Food insecurity:     Worry: Not on file     Inability: Not on file     Transportation needs:     Medical: Not on file     Non-medical: Not on file   Tobacco Use     Smoking status: Never Smoker     Smokeless tobacco: Never Used   Substance and Sexual Activity     Alcohol use: No     Drug use: No     Sexual activity: Yes     Partners: Male     Birth control/protection: Post-menopausal   Lifestyle     Physical activity:     Days per week: Not on file     Minutes per session: Not on file     Stress: Not on file   Relationships     Social connections:     Talks on phone: Not on file     Gets together: Not on file     Attends Synagogue service: Not on file     Active member of club or organization: Not on file     Attends meetings of clubs or organizations: Not on file     Relationship status: Not on file     Intimate partner violence:     Fear of current or ex partner: Not on file     Emotionally abused: Not on file     Physically abused: Not on file     Forced sexual activity: Not on file   Other Topics Concern     Parent/sibling w/ CABG, MI or angioplasty before 65F 55M? Not Asked   Social History Narrative    The patient has a 0 pk yr tobacco hx.  She has no active use.  Alcohol use is 0 alcoholic drinks per week.  She denies use of recreational drugs.          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        FAMILY HISTORY:   Family History   Problem Relation Age of Onset     Hypertension Father      C.A.D. Father      Hypertension Mother      Breast Cancer Sister       "Osteoporosis Sister      EYE* Brother         Keratoconus     Glaucoma No family hx of      Macular Degeneration No family hx of         EXAM:Vitals: BP (!) 141/91   Pulse 64   Ht 1.765 m (5' 9.5\")   Wt 67.9 kg (149 lb 12.8 oz)   BMI 21.80 kg/m    BMI= Body mass index is 21.8 kg/m .    General appearance: Patient is alert and fully cooperative with history & exam.  No sign of distress is noted during the visit.     Psychiatric: Affect is pleasant & appropriate.  Patient appears motivated to improve health.     Respiratory: Breathing is regular & unlabored while sitting.     HEENT: Hearing is intact to spoken word.  Speech is clear.  No gross evidence of visual impairment that would impact ambulation.     Dermatologic: b/l toenails discolored, dystrophic, thickened, moreso at hallux nails which have some incurvation.  I did not appreciate toenail pain today.  Skin is intact to both lower extremities without significant lesions, rash or abrasion.  No paronychia or evidence of soft tissue infection is noted.     Vascular: DP & PT pulses are intact & regular bilaterally.  No significant edema.  Varicosities noted.  CFT and skin temperature are normal to both lower extremities.     Neurologic: Lower extremity sensation is intact to light touch.  No evidence of weakness or contracture in the lower extremities.  No evidence of neuropathy.     Musculoskeletal: Patient is ambulatory without assistive device or brace.  No gross ankle deformity noted.  No foot or ankle joint effusion is noted.     ASSESSMENT: b/l onychomycosis     PLAN:  Reviewed patient's chart in epic.  Discussed condition and treatment options including pros and cons.    Discussed causes of nail fungus.  Discussed treatment options with patient and explained that there isn't one treatment that is 100% effective.  Debridement is an option.  Discussed oral lamisil which is the most effective at about 70% but can have liver effects.  Discussed over the " counter antifungal creams.  Explained that these are less effective and need to be applied twice a day for about 3-4 months.  Also talked about prescription topicals, which have similar efficacy.  Also discussed that if there was damage to the nail and the nail is now dystrophic that none of the above is going to change the nail.  Discussed that if it becomes painful, we can remove the nail in clinic.  The patient elected to consider alternate otc topical.      List of routine foot care resources given for nail care.    F/u prn.    Pipo Mccoy DPM, FACFAS    Weight management plan Patient was referred to their PCP to discuss a diet and exercise plan.  Pat to follow up with Primary Care provider regarding elevated blood pressure.

## 2019-11-20 NOTE — PATIENT INSTRUCTIONS
Thank you for choosing [facility]!    Follow up as needed    DR. VALENTINO'S CLINIC LOCATIONS     MONDAY  [place] TUESDAY & FRIDAY AM  [place]   [address] [facility]   [address]   [place] [address] [place] [address] [phone]  [phone]  [phone]       WEDNESDAY  [facility] SCHEDULE SURGERY: [phone]   [address] APPOINTMENTS: [phone]   [place] [address] BILLING QUESTIONS: [phone]   [phone]       ROUTINE FOOT CARE    [employer]   $55 nails - $10 calluses  [phone]  (Travels to your home) [employer] - $40  [phone]  [email or web address] for locations or they can come to your home   [employer]  [phone]  [place] [facility] [place] [facility] [employer]  [phone]  (Travels to your home)   Katheryn Giraldo, DPM  [address], [place] [address] [phone] Andry Molina, DPM  [address] [place] [address] [phone]   [facility]  [address] [place] [address] [phone] [facility]   [phone]  [facility]   [facility]  [phone]  [facility]  (does not take BCBS) [facility]   [phone]     NAIL FUNGUS / ONYCHOMYCOSIS   Nail fungus is not a hygiene problem and will not likely lead to significant medical   problems. The nails may get thick causing pain and possibly local skin infection.   Treatments include debridement (trimming), oral antifungals, topical antifungals and complete removal of the nail. Most fungal nails are not treated.   Topicals such as tea tree oil can be helpful for surface fungus and may, at best, limit   progression. Over the counter creams (such as Lamisil) can also be used however, their effectiveness is also quite low.  Topical treatment with Pen lac is expensive and often not covered by insurance. Pen lac has an approximate  8% success rate. Topical therapy recommendations is to apply twice a day for at least 3-4 months as it takes 9 months for new nail to grow out.    Experts suggest soaking your feet for 15 to 20 minutes in a mixture of 1 cup vinegar to 4 cups warm water. Be sure to rinse well and pat your feet dry when you're done. You can soak your feet like this daily. But if your skin becomes irritated, try soaking only two to three times a week. Vicks VapoRub, as with vinegar, there have been no controlled clinical trials to assess the effectiveness of Vicks VapoRub on nail fungus, but there have been numerous anecdotal reports that it works. There's no consensus on how often to apply this product, so check with your doctor before using it on your nails.     Oral therapies include Sporanox and Lamisil. Oral therapies are also expensive and not very effective. Side effects such as liver disease are the main concern. Return of fungus is common even if the treatment worked.     Other Tips:  - Penlac nail medication apply daily x 4 months; remove old polish first day of each week  - Antifungal cream/powder (Zeasorb) - apply daily to feet and shoes x 2 months  - Clean shoes with Lysol or in washing machine every few weeks  - Rotate shoe gear; give them 24 hours to dry out between days wearing them  - Clean pair of socks in morning, clean pair in afternoon if your feet sweat  - Shower shoes used in public showers/pools      Pat to follow up with Primary Care provider regarding elevated blood pressure.      BODY WEIGHT AND YOUR FEET  The following information is included in the after visit summary for all patients. Body weight can be a sensitive issue to discuss in clinic, but we think the following information is very important. Although we focus on the feet and ankles, we do support the overall health of our patients.     Many things can cause foot and ankle problems. Foot structure, activity level, foot mechanics and injuries are  common causes of pain. One very important issue that often goes unmentioned, is body weight. Extra weight can cause increased stress on muscles, ligaments, bones and tendons. Sometimes just a few extra pounds is all it takes to put one over her/his threshold. Without reducing that stress, it can be difficult to alleviate pain. As Foot & Ankle specialists, our job is addressing the lower extremity problem and possible causes. Regarding extra body weight, we encourage patients to discuss diet and weight management plans with their primary care doctors. It is this team approach that gives you the best opportunity for pain relief and getting you back on your feet.      [facility] has a Comprehensive Weight Management Program. This program includes counseling, education, non-surgical and surgical approaches to weight loss. If you are interested in learning more either talk to you primary care provider or call [phone].

## 2019-11-21 ENCOUNTER — TELEPHONE (OUTPATIENT)
Dept: CARDIOLOGY | Facility: CLINIC | Age: 79
End: 2019-11-21

## 2019-11-21 NOTE — TELEPHONE ENCOUNTER
----- Message from Ortega Francisco MD sent at 11/20/2019  8:55 AM CST -----  Regarding: RE: Blood Pressure Readings  Thanks Portia,    We'll just leave her on 2.5mg until I see her in January.  [facility]  ----- Message -----  From: Portia Aldana LPN  Sent: 11/15/2019   5:01 PM CST  To: Ortega Francisco MD, #  Subject: RE: Blood Pressure Readings                      Leeroy Vivas,    I called and spoke with her.  She noted her home SBPs are in the teen and 20s.  As far as the SOB, she really couldn't give me much.  She stated she thought it was helping, but still had about 3 times where she noted in her BP log she had SOB.  She hasn't really resumed her normal activity from what she said in our conversation.     I told her to continue as is.  Let me know if you want something different.  She also wanted 2.5 mg tabs as cutting them in half makes them crumble. I will send that in.    Thanks!    Portia   ----- Message -----  From: Ortega Briseno MD  Sent: 11/15/2019   9:26 AM CST  To: Michael Stuart LPN  Subject: RE: Blood Pressure Readings                      Can you see if her breathing is any better? If so, I would favor staying at 2.5mg until I see her in January.   [facility]  ----- Message -----  From: Michael Stuart LPN  Sent: 11/12/2019   3:08 PM CST  To: Ortega Francisco MD  Subject: Blood Pressure Readings                          Patient came in on 11/11/19 and had her blood pressure checked in clinic      Left Arm:  BP: 136/80  HR: 73    Right Arm:   BP: 134/79  HR: 74      BP from 10/25/19:  BP: 159/83  HR: 73      It looks like the Systolic has gone down some,      Would you like to increase her Amlodipine since its not at goal?     Patient currently on Amlodipine 2.5 mg daily.       Thank you    Krysten

## 2019-11-21 NOTE — TELEPHONE ENCOUNTER
Spoke with patient and relayed message that Dr. Jad Francisco recommends she stay on amlodipine 2.5 mg daily until her follow up in January to reassess. Patient states understanding and agrees to call with any questions or concerns.

## 2019-11-21 NOTE — TELEPHONE ENCOUNTER
----- Message from Ortega Francisco MD sent at 11/20/2019  8:55 AM CST -----  Regarding: RE: Blood Pressure Readings  Thanks Portia,    We'll just leave her on 2.5mg until I see her in January.  [facility]  ----- Message -----  From: Portia Aldana LPN  Sent: 11/15/2019   5:01 PM CST  To: Ortega Francisco MD, #  Subject: RE: Blood Pressure Readings                      Leeroy Vivas,    I called and spoke with her.  She noted her home SBPs are in the teen and 20s.  As far as the SOB, she really couldn't give me much.  She stated she thought it was helping, but still had about 3 times where she noted in her BP log she had SOB.  She hasn't really resumed her normal activity from what she said in our conversation.     I told her to continue as is.  Let me know if you want something different.  She also wanted 2.5 mg tabs as cutting them in half makes them crumble. I will send that in.    Thanks!    Portia   ----- Message -----  From: Ortega Briseno MD  Sent: 11/15/2019   9:26 AM CST  To: Michael Stuart LPN  Subject: RE: Blood Pressure Readings                      Can you see if her breathing is any better? If so, I would favor staying at 2.5mg until I see her in January.   [facility]  ----- Message -----  From: Michael Stuart LPN  Sent: 11/12/2019   3:08 PM CST  To: Ortega Francisco MD  Subject: Blood Pressure Readings                          Patient came in on 11/11/19 and had her blood pressure checked in clinic      Left Arm:  BP: 136/80  HR: 73    Right Arm:   BP: 134/79  HR: 74      BP from 10/25/19:  BP: 159/83  HR: 73      It looks like the Systolic has gone down some,      Would you like to increase her Amlodipine since its not at goal?     Patient currently on Amlodipine 2.5 mg daily.       Thank you    Krysten

## 2019-11-25 ENCOUNTER — OFFICE VISIT (OUTPATIENT)
Dept: FAMILY MEDICINE | Facility: CLINIC | Age: 79
End: 2019-11-25
Payer: MEDICARE

## 2019-11-25 VITALS
TEMPERATURE: 98.1 F | WEIGHT: 151 LBS | BODY MASS INDEX: 21.62 KG/M2 | SYSTOLIC BLOOD PRESSURE: 142 MMHG | HEIGHT: 70 IN | HEART RATE: 80 BPM | OXYGEN SATURATION: 96 % | DIASTOLIC BLOOD PRESSURE: 82 MMHG

## 2019-11-25 DIAGNOSIS — R19.7 DIARRHEA OF PRESUMED INFECTIOUS ORIGIN: Primary | ICD-10-CM

## 2019-11-25 PROCEDURE — 99214 OFFICE O/P EST MOD 30 MIN: CPT | Performed by: FAMILY MEDICINE

## 2019-11-25 PROCEDURE — 87506 IADNA-DNA/RNA PROBE TQ 6-11: CPT | Performed by: FAMILY MEDICINE

## 2019-11-25 PROCEDURE — 80048 BASIC METABOLIC PNL TOTAL CA: CPT | Performed by: FAMILY MEDICINE

## 2019-11-25 PROCEDURE — 87209 SMEAR COMPLEX STAIN: CPT | Performed by: FAMILY MEDICINE

## 2019-11-25 PROCEDURE — 87493 C DIFF AMPLIFIED PROBE: CPT | Mod: 59 | Performed by: FAMILY MEDICINE

## 2019-11-25 PROCEDURE — 87177 OVA AND PARASITES SMEARS: CPT | Performed by: FAMILY MEDICINE

## 2019-11-25 PROCEDURE — 36415 COLL VENOUS BLD VENIPUNCTURE: CPT | Performed by: FAMILY MEDICINE

## 2019-11-25 ASSESSMENT — MIFFLIN-ST. JEOR: SCORE: 1232.24

## 2019-11-25 NOTE — PROGRESS NOTES
"Subjective     Shahana Donaldson is a 79 year old female who presents to clinic today for the following health issues:    HPI     Patient reports she had a recent urine infection and completed a 5 day course of Keflex.  Patient brought a stool sample into clinic today.   In the past she developed C-Diff after UTI treatment. December 2005 Cipro from 7 days. Then in January/February 2006 she had C-diff.  She was treated with flaygl and vanco then flagyl again at that time.    Diarrhea  Onset: 6+ days of more frequent BM's    Description:   Consistency of stool: currently not watery, but more frequent, lighter color to stool also   Blood in stool: no   Number of loose stools in past 24 hours: 4 is a guess for her,      Progression of Symptoms:  frequency is the issue    Accompanying Signs & Symptoms:  Fever: no   Nausea or vomiting; no   Abdominal pain: no   Episodes of constipation: YES- one suppository due to some painful BM's  Weight loss: no     History:   Ill contacts: no   Recent use of antibiotics: YES, completed Keflex  Recent travels: no          Recent medication-new or changes(Rx or OTC): no       BP Readings from Last 3 Encounters:   11/25/19 (!) 142/82   11/20/19 (!) 141/91   10/25/19 (!) 159/83    Wt Readings from Last 3 Encounters:   11/25/19 68.5 kg (151 lb)   11/20/19 67.9 kg (149 lb 12.8 oz)   10/25/19 68.5 kg (151 lb)                    Reviewed and updated as needed this visit by Provider         Review of Systems   ROS COMP: Constitutional, HEENT, cardiovascular, pulmonary, gi and gu systems are negative, except as otherwise noted.      Objective    BP (!) 142/82   Pulse 80   Temp 98.1  F (36.7  C) (Oral)   Ht 1.765 m (5' 9.5\")   Wt 68.5 kg (151 lb)   SpO2 96%   BMI 21.98 kg/m    Body mass index is 21.98 kg/m .  Physical Exam   GENERAL: alert, no distress and fatigued  HENT: normal cephalic/atraumatic, oropharynx clear and oral mucous membranes moist  NECK: no adenopathy, no asymmetry, " masses, or scars and thyroid normal to palpation  RESP: lungs clear to auscultation - no rales, rhonchi or wheezes  CV: regular rate and rhythm, normal S1 S2, no S3 or S4, no murmur, click or rub, no peripheral edema and peripheral pulses strong  ABDOMEN: tenderness RLQ and LLQ and bowel sounds normal  PSYCH: mentation appears normal and fatigued    Diagnostic Test Results:  Labs reviewed in Epic        Assessment & Plan       ICD-10-CM    1. Diarrhea of presumed infectious origin R19.7 Basic metabolic panel     C difficile toxin A and B ([facility])     Enteric Bacteria and Virus Panel by JORDANA Stool     Ova and Parasite Exam Routine     This patient is high risk for recurrent C. difficile.  Per the lab we are unable to use the stool sample she brought in today in a jar.  She was sent home with a sterile cup and she will return another stool sample.  We will check her potassium and kidney function and a BMP due to frequent stooling.    No follow-ups on file.    Kimberlee Aguirre DO  [facility]

## 2019-11-25 NOTE — Clinical Note
I saw your patient today.  She is concerned about a repeat infection of C. difficile since she is just coming off a course of Keflex.  She was sent home to collect a sampleKimberlee Aguirre DO

## 2019-11-26 LAB
ANION GAP SERPL CALCULATED.3IONS-SCNC: 4 MMOL/L (ref 3–14)
BUN SERPL-MCNC: 24 MG/DL (ref 7–30)
C COLI+JEJUNI+LARI FUSA STL QL NAA+PROBE: NOT DETECTED
C DIFF TOX B STL QL: NEGATIVE
CALCIUM SERPL-MCNC: 9.1 MG/DL (ref 8.5–10.1)
CHLORIDE SERPL-SCNC: 109 MMOL/L (ref 94–109)
CO2 SERPL-SCNC: 26 MMOL/L (ref 20–32)
CREAT SERPL-MCNC: 0.76 MG/DL (ref 0.52–1.04)
EC STX1 GENE STL QL NAA+PROBE: NOT DETECTED
EC STX2 GENE STL QL NAA+PROBE: NOT DETECTED
ENTERIC PATHOGEN COMMENT: NORMAL
GFR SERPL CREATININE-BSD FRML MDRD: 75 ML/MIN/{1.73_M2}
GLUCOSE SERPL-MCNC: 92 MG/DL (ref 70–99)
NOROV GI+II ORF1-ORF2 JNC STL QL NAA+PR: NOT DETECTED
POTASSIUM SERPL-SCNC: 4 MMOL/L (ref 3.4–5.3)
RVA NSP5 STL QL NAA+PROBE: NOT DETECTED
SALMONELLA SP RPOD STL QL NAA+PROBE: NOT DETECTED
SHIGELLA SP+EIEC IPAH STL QL NAA+PROBE: NOT DETECTED
SODIUM SERPL-SCNC: 139 MMOL/L (ref 133–144)
SPECIMEN SOURCE: NORMAL
V CHOL+PARA RFBL+TRKH+TNAA STL QL NAA+PR: NOT DETECTED
Y ENTERO RECN STL QL NAA+PROBE: NOT DETECTED

## 2019-11-27 LAB
O+P STL MICRO: NORMAL
O+P STL MICRO: NORMAL
SPECIMEN SOURCE: NORMAL

## 2019-12-31 ENCOUNTER — OFFICE VISIT (OUTPATIENT)
Dept: DERMATOLOGY | Facility: CLINIC | Age: 79
End: 2019-12-31
Payer: MEDICARE

## 2019-12-31 DIAGNOSIS — L23.9 CONTACT ALLERGIC REACTION: Primary | ICD-10-CM

## 2019-12-31 DIAGNOSIS — L85.3 XEROSIS OF SKIN: ICD-10-CM

## 2019-12-31 RX ORDER — TRIAMCINOLONE ACETONIDE 1 MG/G
OINTMENT TOPICAL 2 TIMES DAILY
Qty: 80 G | Refills: 0 | Status: SHIPPED | OUTPATIENT
Start: 2019-12-31 | End: 2020-03-17

## 2019-12-31 RX ORDER — AMMONIUM LACTATE 12 G/100G
LOTION TOPICAL 2 TIMES DAILY
Qty: 225 G | Refills: 11 | Status: SHIPPED | OUTPATIENT
Start: 2019-12-31

## 2019-12-31 ASSESSMENT — PAIN SCALES - GENERAL: PAINLEVEL: NO PAIN (0)

## 2019-12-31 NOTE — PROGRESS NOTES
[facility] Dermatology Note      Dermatology Problem List:  1. 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  2. Lichen planus, using clobetasol ointment p.r.n.  3. Biopsy-proven lichen sclerosis of the vaginal area  -clobetasol 0.05% ointment BID prn  4. Contact dermatitis due to  on hands in 2014 (presumed, no prior patch testing).  5. Skin cancer screening 11/11/19    Encounter Date: Dec 31, 2019    CC:  Chief Complaint   Patient presents with     Derm Problem     Shahana is here for a pruiritic rash that is persistent throughout her body          History of Present Illness:  Ms. Shahana Donaldson is a 79 year old female with a history of atypical junctional melanocytic proliferation on the left upper posterior arm who presents for a 3 day rash on the bilateral upper and lower legs, up to the shins and the forearms. She used a new compression stocking 3 days ago without washing it, for 1 day, and developed the rash. She used her forearms to help put on the stockings. She applied clobetasol once. The rash was initially pruritic but has since stopped itching and appears to be improving.     She reports that her lichen sclerosus in the vaginal area is well controlled with application of clobetasol prn. The clobetasol helps significantly with itching and irritation. Her labia were itching a couple of days ago so she began a 5 day course of clobetasol.     Her lichen planus continues to be well controlled, and is currently non-bothersome. This has been biopsied in the past.     Otherwise feeling well, no additional skin concerns.     Per prior note-  Denies a family history of skin diseases or skin cancers.  Admits to frequent sun exposure in her youth. 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     Past Medical History:   Patient Active Problem List   Diagnosis     Essential hypertension, benign     Mixed hyperlipidemia     Symptomatic  menopausal or female climacteric states     Diverticulosis of large intestine     Hyperlipidemia     Coronary atherosclerosis     Esophageal reflux     vulvar cyst     Hiatal hernia     S/P Nissen fundoplication (without gastrostomy tube) procedure     History of tubal ligation     History of dilatation and curettage     History of hysterectomy     Lichen sclerosus     Left displaced femoral neck fracture (H)     Senile osteoporosis     Lichen planus     Seborrheic dermatitis     Past Medical History:   Diagnosis Date     Diverticulosis of colon (without mention of hemorrhage)      Essential hypertension, benign      Gastro-oesophageal reflux disease     nissen     Hemorrhoid      Nonsenile cataract      Osteoporosis      Other and unspecified hyperlipidemia      Rectocele      Symptomatic menopausal or female climacteric states      Past Surgical History:   Procedure Laterality Date     ARTHROPLASTY HIP Left 10/23/2018    Procedure: LEFT HIP COLEMAN- ARTHROPLASTY ;  Surgeon: Araceli Gutierrez MD;  Location: [facility] OR     C NONSPECIFIC PROCEDURE      Bilateral Tubal Ligation     C NONSPECIFIC PROCEDURE      D&C secondary to menorrhagia     C NONSPECIFIC PROCEDURE      child birth x 2     CATARACT IOL, RT/LT Left 07/15/2019     COLONOSCOPY  5/24/2011    Procedure:COLONOSCOPY; Surgeon:HALINA SCOTT; Location: GI     COLONOSCOPY Left 11/10/2015    Procedure: COLONOSCOPY;  Surgeon: Raheem Colunga MD;  [facility]: [facility]     GYN SURGERY      hysterectomy/oopherectomy; done for prolapse     LAPAROSCOPIC NISSEN FUNDOPLICATION  1/7/2013    Procedure: LAPAROSCOPIC NISSEN FUNDOPLICATION;  Laparoscopic Repair of Hiatel Hernia, NISSEN, Esophagoscopy, Gastroscopy And Duodenoscopy ;  Surgeon: Leonidas Valle MD;  Location: [facility] OR     PHACOEMULSIFICATION WITH STANDARD INTRAOCULAR LENS IMPLANT Left 7/15/2019    Procedure: Left Eye Phacoemulsification with Intraocular Lens;  Surgeon: Josue Trujillo MD;   Location: [facility] OR       Social History:  From prior note - Admits to frequent sun exposure in her youth. 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   Patient reports that she has never smoked. She has never used smokeless tobacco. She reports that she does not drink alcohol or use drugs.    Family History:  Denies a family history of skin diseases or skin cancers.  Family History   Problem Relation Age of Onset     Hypertension Father      C.A.D. Father      Hypertension Mother      Breast Cancer Sister      Osteoporosis Sister      EYE* Brother         Keratoconus     Glaucoma No family hx of      Macular Degeneration No family hx of        Medications:  Current Outpatient Medications   Medication Sig Dispense Refill     Acetaminophen (TYLENOL PO) Take 1,000 mg by mouth every 6 hours as needed for mild pain or fever       amLODIPine (NORVASC) 2.5 MG tablet Take 1 tablet (2.5 mg) by mouth daily 90 tablet 0     ammonium lactate (LAC-HYDRIN) 12 % external lotion Apply topically 2 times daily To the feet 225 g 11     aspirin (ASA) 81 MG chewable tablet Take 81 mg by mouth daily       atorvastatin (LIPITOR) 10 MG tablet TAKE ONE TABLET BY MOUTH ONE TIME DAILY  90 tablet 2     calcium carbonate 500 mg, elemental, (OSCAL;OYSTER SHELL CALCIUM) 500 MG tablet Take 3 tablets (1,500 mg) by mouth 2 times daily (with meals) 60 tablet 0     cholecalciferol (VITAMIN D3) 1000 UNIT tablet Take 2 tablets (2,000 Units) by mouth daily 30 tablet 0     clobetasol (TEMOVATE) 0.05 % external ointment Apply topically 2 times daily Until symptoms have resolved to the lichen sclerosis. 60 g 3     clobetasol (TEMOVATE) 0.05 % ointment Apply sparingly to affected area 2 x daily for14 days. Then, apply small amount to affected area twice weekly for maintenance. (Patient taking differently: Apply sparingly to affected area 2 x daily for14 days as needed for rash. Then, apply small amount to affected area twice weekly  for maintenance.) 30 g 2     diltiazem (CARTIA XT) 300 MG 24 hr capsule Take 1 capsule (300 mg) by mouth daily 90 capsule 0     diltiazem ER COATED BEADS (CARTIA XT) 300 MG 24 hr capsule Take 1 capsule (300 mg) by mouth daily 90 capsule 3     docusate sodium (COLACE) 100 MG capsule Take 1 capsule (100 mg) by mouth 2 times daily (Patient taking differently: Take 100 mg by mouth 2 times daily as needed ) 60 capsule 0     triamcinolone (KENALOG) 0.1 % external ointment Apply topically 2 times daily To the areas of bumps on the arms and legs 80 g 0       Allergies   Allergen Reactions     Dilaudid [Hydromorphone]      dizziness     Senna Hives       Review of Systems:  -Constitutional: The patient denies fatigue, fevers, chills, unintended weight loss, and night sweats.  -Skin: As above in HPI. No additional skin concerns.    Physical exam:  GEN: This is a well developed, well-nourished female in no acute distress, in a pleasant mood.    SKIN: Full skin, which includes the head/face, both arms, chest, back, abdomen,both legs, genitalia and/or groin buttocks, digits and/or nails, was examined.  - Numerous erythematous blanching 2-3 mm papules in a stocking-glove distribution with moderately well demarcated border on the shins at the level of the stockings although one above the right knee.  - Multiple regular brown pigmented macules and papules are identified on the trunk and extremities.   - Atrophic white and pink plaques noted to the bilateral labia majora with loss of the clitoral mishra, no significant erythema noted (patient has been using clobetasol for last 3 days for pruritus)  - Xerosis of skin.  -No other lesions of concern on areas examined.     Impression/Plan:  1. Possible allergic contact dermatitis due to nylon stockings (unwashed). The clinical history, distribution, and pruritis are suggestive of ACD, particularly given improvement when she stopped using the stockings. We recommend not using the stockings  again until the rash is resolved with the use of triamcinolone ointment, and to wash the stockings ahead of time for a retrial. If she reacts then, try different stockings. Differential diagnosis does include patient's known lichen planus, although we would not expect such rapid resolution, as well as drug rash to new medication amlodipine, although that rash tends to be more central.   - Start triamcinolone 0.1% ointment BID for 2 weeks  - Empiric retrial of stockings once skin is back to normal  - Patient will let us know if flares - if occurring without use of stockings, would reconsider ddx  - Can consider patch testing if felt to be ongoing ACD    2. History of lichen planus, noted.  - No signs on examination at today's visit (unless eruption above does represent LP)    3. Lichen sclerosis, vaginal area, recently addressed at annual exam. Noted to have chronic scarring. Had recent flare a few days ago that responded to clobetasol.  - Continue clobetasol 0.05% ointment BID as needed    4. Xerosis.  - Start Amlactin daily    Follow-up in ~May 2020 as planned, earlier for new or changing lesions.       Staff Involved:  Windy/Natalie Davis MD  Medicine/Dermatology PGY-5  p [phone]    Staff Physician Comments:   I saw and evaluated the patient with the resident and I agree with the assessment and plan.  I was present for the examination.    Rosamaria Estrada MD    Department of Dermatology  [facility] [place]: Phone: [phone], Fax: [phone]

## 2019-12-31 NOTE — NURSING NOTE
Dermatology Rooming Note    Shahana Donaldson's goals for this visit include:   Chief Complaint   Patient presents with     Derm Problem     Shahana is here for a pruiritic rash that is persistent throughout her body      Magda Castillo, EMT

## 2019-12-31 NOTE — PATIENT INSTRUCTIONS
-Use triamcinolone ointment to the areas of rash on the arms and legs for 2 weeks  -Try stockings again after washing once skin is back to normal  -Use amlactin lotion on the feet twice a day (forever)    -If the rash keeps recurring we could consider allergy testing  -If the rash flares up or doesn't go away, call us again. This could be your lichen planus or a drug rash to amlodipine, but this is less likely.

## 2019-12-31 NOTE — LETTER
12/31/2019       RE: Shahana Donaldson  [address] [facility] [address]     Dear Colleague,    Thank you for referring your patient, Shahana Donaldson, to the [facility] DERMATOLOGY at [facility]. Please see a copy of my visit note below.    [facility] Dermatology Note      Dermatology Problem List:  1. 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  2. Lichen planus, using clobetasol ointment p.r.n.  3. Biopsy-proven lichen sclerosis of the vaginal area  -clobetasol 0.05% ointment BID prn  4. Contact dermatitis due to  on hands in 2014 (presumed, no prior patch testing).  5. Skin cancer screening 11/11/19    Encounter Date: Dec 31, 2019    CC:  Chief Complaint   Patient presents with     Derm Problem     Shahana is here for a pruiritic rash that is persistent throughout her body          History of Present Illness:  Ms. Shahana Donaldson is a 79 year old female with a history of atypical junctional melanocytic proliferation on the left upper posterior arm who presents for a 3 day rash on the bilateral upper and lower legs, up to the shins and the forearms. She used a new compression stocking 3 days ago without washing it, for 1 day, and developed the rash. She used her forearms to help put on the stockings. She applied clobetasol once. The rash was initially pruritic but has since stopped itching and appears to be improving.     She reports that her lichen sclerosus in the vaginal area is well controlled with application of clobetasol prn. The clobetasol helps significantly with itching and irritation. Her labia were itching a couple of days ago so she began a 5 day course of clobetasol.     Her lichen planus continues to be well controlled, and is currently non-bothersome. This has been biopsied in the past.     Otherwise feeling well, no additional skin concerns.     Per prior  note-  Denies a family history of skin diseases or skin cancers.  Admits to frequent sun exposure in her youth. 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     Past Medical History:   Patient Active Problem List   Diagnosis     Essential hypertension, benign     Mixed hyperlipidemia     Symptomatic menopausal or female climacteric states     Diverticulosis of large intestine     Hyperlipidemia     Coronary atherosclerosis     Esophageal reflux     vulvar cyst     Hiatal hernia     S/P Nissen fundoplication (without gastrostomy tube) procedure     History of tubal ligation     History of dilatation and curettage     History of hysterectomy     Lichen sclerosus     Left displaced femoral neck fracture (H)     Senile osteoporosis     Lichen planus     Seborrheic dermatitis     Past Medical History:   Diagnosis Date     Diverticulosis of colon (without mention of hemorrhage)      Essential hypertension, benign      Gastro-oesophageal reflux disease     nissen     Hemorrhoid      Nonsenile cataract      Osteoporosis      Other and unspecified hyperlipidemia      Rectocele      Symptomatic menopausal or female climacteric states      Past Surgical History:   Procedure Laterality Date     ARTHROPLASTY HIP Left 10/23/2018    Procedure: LEFT HIP COLEMAN- ARTHROPLASTY ;  Surgeon: Araceli Gutierrez MD;  Location: [facility] OR     C NONSPECIFIC PROCEDURE      Bilateral Tubal Ligation     C NONSPECIFIC PROCEDURE      D&C secondary to menorrhagia     C NONSPECIFIC PROCEDURE      child birth x 2     CATARACT IOL, RT/LT Left 07/15/2019     COLONOSCOPY  5/24/2011    Procedure:COLONOSCOPY; Surgeon:HALINA SCOTT; Location: GI     COLONOSCOPY Left 11/10/2015    Procedure: COLONOSCOPY;  Surgeon: Raheem Colunga MD;  Location:  GI     GYN SURGERY      hysterectomy/oopherectomy; done for prolapse     LAPAROSCOPIC NISSEN FUNDOPLICATION  1/7/2013    Procedure: LAPAROSCOPIC NISSEN FUNDOPLICATION;  Laparoscopic Repair of Hiatel Hernia,  NISSEN, Esophagoscopy, Gastroscopy And Duodenoscopy ;  Surgeon: Leonidas Valle MD;  Location: [facility] OR     PHACOEMULSIFICATION WITH STANDARD INTRAOCULAR LENS IMPLANT Left 7/15/2019    Procedure: Left Eye Phacoemulsification with Intraocular Lens;  Surgeon: Josue Trujillo MD;  Location:  OR       Social History:  From prior note - Admits to frequent sun exposure in her youth. 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   Patient reports that she has never smoked. She has never used smokeless tobacco. She reports that she does not drink alcohol or use drugs.    Family History:  Denies a family history of skin diseases or skin cancers.  Family History   Problem Relation Age of Onset     Hypertension Father      C.A.D. Father      Hypertension Mother      Breast Cancer Sister      Osteoporosis Sister      EYE* Brother         Keratoconus     Glaucoma No family hx of      Macular Degeneration No family hx of        Medications:  Current Outpatient Medications   Medication Sig Dispense Refill     Acetaminophen (TYLENOL PO) Take 1,000 mg by mouth every 6 hours as needed for mild pain or fever       amLODIPine (NORVASC) 2.5 MG tablet Take 1 tablet (2.5 mg) by mouth daily 90 tablet 0     ammonium lactate (LAC-HYDRIN) 12 % external lotion Apply topically 2 times daily To the feet 225 g 11     aspirin (ASA) 81 MG chewable tablet Take 81 mg by mouth daily       atorvastatin (LIPITOR) 10 MG tablet TAKE ONE TABLET BY MOUTH ONE TIME DAILY  90 tablet 2     calcium carbonate 500 mg, elemental, (OSCAL;OYSTER SHELL CALCIUM) 500 MG tablet Take 3 tablets (1,500 mg) by mouth 2 times daily (with meals) 60 tablet 0     cholecalciferol (VITAMIN D3) 1000 UNIT tablet Take 2 tablets (2,000 Units) by mouth daily 30 tablet 0     clobetasol (TEMOVATE) 0.05 % external ointment Apply topically 2 times daily Until symptoms have resolved to the lichen sclerosis. 60 g 3     clobetasol (TEMOVATE) 0.05 %  ointment Apply sparingly to affected area 2 x daily for14 days. Then, apply small amount to affected area twice weekly for maintenance. (Patient taking differently: Apply sparingly to affected area 2 x daily for14 days as needed for rash. Then, apply small amount to affected area twice weekly for maintenance.) 30 g 2     diltiazem (CARTIA XT) 300 MG 24 hr capsule Take 1 capsule (300 mg) by mouth daily 90 capsule 0     diltiazem ER COATED BEADS (CARTIA XT) 300 MG 24 hr capsule Take 1 capsule (300 mg) by mouth daily 90 capsule 3     docusate sodium (COLACE) 100 MG capsule Take 1 capsule (100 mg) by mouth 2 times daily (Patient taking differently: Take 100 mg by mouth 2 times daily as needed ) 60 capsule 0     triamcinolone (KENALOG) 0.1 % external ointment Apply topically 2 times daily To the areas of bumps on the arms and legs 80 g 0       Allergies   Allergen Reactions     Dilaudid [Hydromorphone]      dizziness     Senna Hives       Review of Systems:  -Constitutional: The patient denies fatigue, fevers, chills, unintended weight loss, and night sweats.  -Skin: As above in HPI. No additional skin concerns.    Physical exam:  GEN: This is a well developed, well-nourished female in no acute distress, in a pleasant mood.    SKIN: Full skin, which includes the head/face, both arms, chest, back, abdomen,both legs, genitalia and/or groin buttocks, digits and/or nails, was examined.  - Numerous erythematous blanching 2-3 mm papules in a stocking-glove distribution with moderately well demarcated border on the shins at the level of the stockings although one above the right knee.  - Multiple regular brown pigmented macules and papules are identified on the trunk and extremities.   - Atrophic white and pink plaques noted to the bilateral labia majora with loss of the clitoral mishra, no significant erythema noted (patient has been using clobetasol for last 3 days for pruritus)  - Xerosis of skin.  -No other lesions of concern  on areas examined.     Impression/Plan:  1. Possible allergic contact dermatitis due to nylon stockings (unwashed). The clinical history, distribution, and pruritis are suggestive of ACD, particularly given improvement when she stopped using the stockings. We recommend not using the stockings again until the rash is resolved with the use of triamcinolone ointment, and to wash the stockings ahead of time for a retrial. If she reacts then, try different stockings. Differential diagnosis does include patient's known lichen planus, although we would not expect such rapid resolution, as well as drug rash to new medication amlodipine, although that rash tends to be more central.   - Start triamcinolone 0.1% ointment BID for 2 weeks  - Empiric retrial of stockings once skin is back to normal  - Patient will let us know if flares - if occurring without use of stockings, would reconsider ddx  - Can consider patch testing if felt to be ongoing ACD    2. History of lichen planus, noted.  - No signs on examination at today's visit (unless eruption above does represent LP)    3. Lichen sclerosis, vaginal area, recently addressed at annual exam. Noted to have chronic scarring. Had recent flare a few days ago that responded to clobetasol.  - Continue clobetasol 0.05% ointment BID as needed    4. Xerosis.  - Start Amlactin daily    Follow-up in ~May 2020 as planned, earlier for new or changing lesions.       Staff Involved:  Windy/Natalie Davis MD  Medicine/Dermatology PGY-5  p [phone]    Staff Physician Comments:   I saw and evaluated the patient with the resident and I agree with the assessment and plan.  I was present for the examination.    Rosamaria Estrada MD    Department of Dermatology  [facility]: Phone: [phone], Fax: [phone]

## 2019-12-31 NOTE — PROGRESS NOTES
CARDIOLOGY CLINIC INITIAL CONSULTATION    HPI:  Ms. Alicea is a 78 yo female who receives primary care from Dr. Jeramie Curran. She is here for follow up of HTN and dyspnea.     Ms. Alicea is a very pleasant women with history of HTN and dyslipidemia. There was also concern for microvascular coronary disease in the past.     Over the past 2-3 years Ms. Donaldson has noticed progressive dyspnea on exertion and in the last year she has had to slow down her walking. She walks with her  nearly every day and does a 6 block loop when it's warm. She also goes to the [facility] regularly for resistance exercises.     Interval History  Ms. Donaldson is here today with her  Jake. She is feeling better. She brought along her blood pressure log which shows BPS in the 110s/60-70s. She also documented symptoms and her shortness of breath has improved since starting amlodipine. She has started going back to the [facility] to walk and lift weights but hasn't gone back to doing the classes.     No orthopnea, PND, chest pain, palpitations, lightheadedness. Her BLE is unchanged after starting amlodipine. She is not wearing her compression stockings right now due to a skin rash.    ASSESSMENT AND PLAN  Ms. Donaldson is a 78 yo female with history of HTN, dyslipidemia, microvascular disease, HFpEF who I saw for progressive dyspnea on exertion and hypertension. After starting low dose amlodipine her recorded blood pressures are great. She also has noted an improvement in her breathing although she has not returned to her normal exercise routine yet.     Plan  - no change in medications todays  - I asked her to start slowing increasing the intensity/duration of her workout to see if she can get back to her regular workout.  - follow up in 6 months    Thank you for the opportunity to participate in the care of Ms. Kamini Donaldson. Please feel free to contact me with any additional questions or concerns.    Ortega Dowell'sandra Francisco,  MD    Preventive Cardiology  Pager: [phone]    PAST MEDICAL HISTORY:  Patient Active Problem List   Diagnosis     Essential hypertension, benign     Mixed hyperlipidemia     Symptomatic menopausal or female climacteric states     Diverticulosis of large intestine     Hyperlipidemia     Coronary atherosclerosis     Esophageal reflux     vulvar cyst     Hiatal hernia     S/P Nissen fundoplication (without gastrostomy tube) procedure     History of tubal ligation     History of dilatation and curettage     History of hysterectomy     Lichen sclerosus     Left displaced femoral neck fracture (H)     Senile osteoporosis     Lichen planus     Seborrheic dermatitis     Past Medical History:   Diagnosis Date     Diverticulosis of colon (without mention of hemorrhage)      Essential hypertension, benign      Gastro-oesophageal reflux disease     nissen     Hemorrhoid      Nonsenile cataract      Osteoporosis      Other and unspecified hyperlipidemia      Rectocele      Symptomatic menopausal or female climacteric states        CURRENT MEDICATIONS:  Current Outpatient Medications   Medication Sig Dispense Refill     Acetaminophen (TYLENOL PO) Take 1,000 mg by mouth every 6 hours as needed for mild pain or fever       amLODIPine (NORVASC) 2.5 MG tablet Take 1 tablet (2.5 mg) by mouth daily 90 tablet 0     ammonium lactate (LAC-HYDRIN) 12 % external lotion Apply topically 2 times daily To the feet 225 g 11     aspirin (ASA) 81 MG chewable tablet Take 81 mg by mouth daily       atorvastatin (LIPITOR) 10 MG tablet TAKE ONE TABLET BY MOUTH ONE TIME DAILY  90 tablet 2     calcium carbonate 500 mg, elemental, (OSCAL;OYSTER SHELL CALCIUM) 500 MG tablet Take 3 tablets (1,500 mg) by mouth 2 times daily (with meals) 60 tablet 0     cholecalciferol (VITAMIN D3) 1000 UNIT tablet Take 2 tablets (2,000 Units) by mouth daily 30 tablet 0     clobetasol (TEMOVATE) 0.05 % external ointment Apply topically 2 times daily  Until symptoms have resolved to the lichen sclerosis. 60 g 3     clobetasol (TEMOVATE) 0.05 % ointment Apply sparingly to affected area 2 x daily for14 days. Then, apply small amount to affected area twice weekly for maintenance. (Patient taking differently: Apply sparingly to affected area 2 x daily for14 days as needed for rash. Then, apply small amount to affected area twice weekly for maintenance.) 30 g 2     diltiazem (CARTIA XT) 300 MG 24 hr capsule Take 1 capsule (300 mg) by mouth daily 90 capsule 0     diltiazem ER COATED BEADS (CARTIA XT) 300 MG 24 hr capsule Take 1 capsule (300 mg) by mouth daily 90 capsule 3     docusate sodium (COLACE) 100 MG capsule Take 1 capsule (100 mg) by mouth 2 times daily (Patient taking differently: Take 100 mg by mouth 2 times daily as needed ) 60 capsule 0     triamcinolone (KENALOG) 0.1 % external ointment Apply topically 2 times daily To the areas of bumps on the arms and legs 80 g 0       PAST SURGICAL HISTORY:  Past Surgical History:   Procedure Laterality Date     ARTHROPLASTY HIP Left 10/23/2018    Procedure: LEFT HIP COLEMAN- ARTHROPLASTY ;  Surgeon: Araceli Gutierrez MD;  Location:  OR     C NONSPECIFIC PROCEDURE      Bilateral Tubal Ligation     C NONSPECIFIC PROCEDURE      D&C secondary to menorrhagia     C NONSPECIFIC PROCEDURE      child birth x 2     CATARACT IOL, RT/LT Left 07/15/2019     COLONOSCOPY  5/24/2011    Procedure:COLONOSCOPY; Surgeon:HALINA SCOTT; Location: GI     COLONOSCOPY Left 11/10/2015    Procedure: COLONOSCOPY;  Surgeon: Raheem Colunga MD;  Location:  GI     GYN SURGERY      hysterectomy/oopherectomy; done for prolapse     LAPAROSCOPIC NISSEN FUNDOPLICATION  1/7/2013    Procedure: LAPAROSCOPIC NISSEN FUNDOPLICATION;  Laparoscopic Repair of Hiatel Hernia, NISSEN, Esophagoscopy, Gastroscopy And Duodenoscopy ;  Surgeon: Leonidas Valle MD;  Location:  OR     PHACOEMULSIFICATION WITH STANDARD INTRAOCULAR LENS IMPLANT  "Left 7/15/2019    Procedure: Left Eye Phacoemulsification with Intraocular Lens;  Surgeon: Josue Trujillo MD;  Location: [facility] OR       ALLERGIES  Dilaudid [hydromorphone] and Senna    FAMILY HX:  Family History   Problem Relation Age of Onset     Hypertension Father      C.A.D. Father      Hypertension Mother      Breast Cancer Sister      Osteoporosis Sister      EYE* Brother         Keratoconus     Glaucoma No family hx of      Macular Degeneration No family hx of        SOCIAL HX:  Social History     Tobacco Use     Smoking status: Never Smoker     Smokeless tobacco: Never Used   Substance Use Topics     Alcohol use: No     Drug use: No        ROS:  Comprehensive ROS is negative except as noted in HPI.    VITAL SIGNS:  BP (!) 148/78 (BP Location: Right arm, Patient Position: Chair, Cuff Size: Adult Regular)   Pulse 66   Ht 1.753 m (5' 9\")   Wt 69.3 kg (152 lb 11.2 oz)   SpO2 96%   BMI 22.55 kg/m    Body mass index is 22.55 kg/m .  Wt Readings from Last 2 Encounters:   01/03/20 69.3 kg (152 lb 11.2 oz)   11/25/19 68.5 kg (151 lb)       PHYSICAL EXAM  Gen: pleasant womaen sitting comfortably in NAD  Head: nc/at  Eyes: EOMI  Neck: supple, no LAD  CV: nml s1/s2, no 2/6 CONNIE at apex; no JVD  Chest: clear lungs  Abd: soft, NT, NABS  Ext: warm, bilateral ankle edema  Skin: mild erythema over left lower leg  Neuro: awake, alert, oriented, nml speech and gait    LABS: personally reviewed  CMP  Recent Labs   Lab Test 11/25/19  0956 08/09/19  1154 08/09/19  1147 07/03/19  0742 04/19/19  1516 02/08/19  1336 11/21/18  0740  10/23/18  0658  04/17/18  0937  01/08/13  0700     --  140 140  --   --  142   < >  --    < > 140   < > 136   POTASSIUM 4.0  --  4.0 3.8  --   --  3.6   < >  --    < > 3.8   < > 4.2   CHLORIDE 109  --  108 106  --   --  108   < >  --    < > 108   < > 103   CO2 26  --  25 29  --   --  28   < >  --    < > 25   < > 27   ANIONGAP 4  --  7 4  --   --  6   < >  --    < > 6   < > 6   GLC 92  --  92 " 83  --   --  92   < >  --    < > 94   < > 104*   BUN 24  --  17 23  --   --  23   < >  --    < > 19   < > 12   CR 0.76  --  0.70 0.77  --  0.71 0.72   < >  --    < > 0.73   < > 0.62   GFRESTIMATED 75 >90 82 73  --  82 78   < >  --    < > 77   < > >90   GFRESTBLACK 87 >90 >90 85  --  >90 >90   < >  --    < > >90   < > >90   MARCK 9.1  --  8.9 8.8 9.1 8.2* 8.6   < >  --    < > 8.8   < > 8.1*   MAG  --   --   --   --   --   --   --   --  2.0  --   --   --  1.9   PHOS  --   --   --   --   --   --   --   --  3.1  --   --   --  3.3   PROTTOTAL  --   --  8.3 7.6  --   --  7.6  --   --   --  7.9   < >  --    ALBUMIN  --   --  4.1 3.9  --  3.9 3.9  --   --   --  4.2   < >  --    BILITOTAL  --   --  0.7 0.6  --   --  0.5  --   --   --  0.8   < >  --    ALKPHOS  --   --  96 97  --   --  144  --   --   --  124   < >  --    AST  --   --  20 21  --   --  23  --   --   --  21   < >  --    ALT  --   --  17 18  --   --  25  --   --   --  18   < >  --     < > = values in this interval not displayed.     CBC  Recent Labs   Lab Test 08/09/19  1147 07/03/19  0742 11/21/18  0740 10/25/18  0711   WBC 6.3 4.2 4.6 10.1   RBC 4.59 4.29 4.08 3.41*   HGB 14.3 13.5 12.7 10.4*   HCT 44.8 42.4 40.4 32.6*   MCV 98 99 99 96   MCH 31.2 31.5 31.1 30.5   MCHC 31.9 31.8 31.4* 31.9   RDW 12.6 12.7 13.4 12.9    230 279 162     INR  Recent Labs   Lab Test 10/22/18  1141 01/07/13  0640   INR 0.98 0.97     Recent Labs   Lab Test 11/21/18  0740 11/11/16  0802 10/15/15  0855 09/02/14  1040   CHOL 180 172 169 188   HDL 92 100 105 114   LDL 64 47 49 58   TRIG 126 126 73 82   CHOLHDLRATIO  --   --  1.6 1.6       Most recent EKG:  10/2/19: NSR no ischemic changes; normal ecg    Most recent ECHO:   7/31/18 TTE  Left ventricular function, chamber size, wall motion, and wall thickness are  normal.The EF is 60-65%. No regional wall motion abnormalities are seen.  Right ventricular function, chamber size, wall motion, and thickness are normal.  Trace to mild  mitral insufficiency is present. Mild tricuspid insufficiency is present.  The inferior vena cava was normal in size with preserved respiratory variability. No pericardial effusion is present.     Previous study not available for comparison.    Most recent STRESS TEST:    9/10/19 Exercise echo  Interpretation Summary  Exercise stress echocardiogram with no inducible ischemia in the setting of  very limited exercise tolerance.     Target heart rate achieved. Hypertensive blood pressure response to exercise.  No angina symptoms with exercise.  No ECG evidence of ischemia.  Normal segmental and global LV function with EF of approximately 55-60% at  rest; with exercise left ventricular cavity size and LVEF did not change  significantly.  No stress induced regional wall motion abnormalities.  Less than average functional capacity for age.     Normal aortic root and no significant valvular dysfunction noted on screening  2D and Doppler examination. PA systolic pressure is mildly elevated.    Most recent CARDIAC CATH:   Coronary angiogram (01/05/2000): per Dr. Jensen's note  60 yo female with chronic stable angina with a positive stress echo undergoes evaluation. Coronary angiography reveals widely patent arteries with no flow limiting lesions. LV gram shows normal function with no RWMA or MR. Coronary flow reserve in the LAD was 1.4 and in the Cx was 1.1.The patient has microvascular angina

## 2019-12-31 NOTE — PROGRESS NOTES
[facility] Dermatology Note      Dermatology Problem List:  1. Lichen planus, using clobetasol ointment p.r.n.  2. Biopsy-proven lichen sclerosis of the vaginal area,   -clobetasol 0.05% ointment BID prn  3. 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  4. Contact dermatitis due to  on hands in 2014.  5. ISK's  - s/p cryo  6. Skin cancer screening 11/11/19    Encounter Date: Dec 31, 2019    CC:  Chief Complaint   Patient presents with     Derm Problem     Shahana is here for a pruiritic rash that is persistent throughout her body          History of Present Illness:  Ms. Shahana Donaldson is a 79 year old female with a history of atypical junctional melanocytic proliferation on the left upper posterior arm who presents today for evaluation of a rash on ***. She was last seen by Ivette Amin PA-C on 11/11/2019 where received a FBSE and received cryotherapy for several ISKs. She also continued Clobetasol 0.05% ointment BID prn. Today***    Past Medical History:   Patient Active Problem List   Diagnosis     Essential hypertension, benign     Mixed hyperlipidemia     Symptomatic menopausal or female climacteric states     Diverticulosis of large intestine     Hyperlipidemia     Coronary atherosclerosis     Esophageal reflux     vulvar cyst     Hiatal hernia     S/P Nissen fundoplication (without gastrostomy tube) procedure     History of tubal ligation     History of dilatation and curettage     History of hysterectomy     Lichen sclerosus     Left displaced femoral neck fracture (H)     Senile osteoporosis     Lichen planus     Seborrheic dermatitis     Past Medical History:   Diagnosis Date     Diverticulosis of colon (without mention of hemorrhage)      Essential hypertension, benign      Gastro-oesophageal reflux disease     nissen     Hemorrhoid      Nonsenile cataract      Osteoporosis      Other and unspecified  hyperlipidemia      Rectocele      Symptomatic menopausal or female climacteric states      Past Surgical History:   Procedure Laterality Date     ARTHROPLASTY HIP Left 10/23/2018    Procedure: LEFT HIP COLEMAN- ARTHROPLASTY ;  Surgeon: Araceli Gutierrez MD;  Location: [facility] OR     C NONSPECIFIC PROCEDURE      Bilateral Tubal Ligation     C NONSPECIFIC PROCEDURE      D&C secondary to menorrhagia     C NONSPECIFIC PROCEDURE      child birth x 2     CATARACT IOL, RT/LT Left 07/15/2019     COLONOSCOPY  5/24/2011    Procedure:COLONOSCOPY; Surgeon:HALINA SCOTT; Location: GI     COLONOSCOPY Left 11/10/2015    Procedure: COLONOSCOPY;  Surgeon: Raheem Colunga MD;  Location:  GI     GYN SURGERY      hysterectomy/oopherectomy; done for prolapse     LAPAROSCOPIC NISSEN FUNDOPLICATION  1/7/2013    Procedure: LAPAROSCOPIC NISSEN FUNDOPLICATION;  Laparoscopic Repair of Hiatel Hernia, NISSEN, Esophagoscopy, Gastroscopy And Duodenoscopy ;  Surgeon: Leonidas Valle MD;  Location:  OR     PHACOEMULSIFICATION WITH STANDARD INTRAOCULAR LENS IMPLANT Left 7/15/2019    Procedure: Left Eye Phacoemulsification with Intraocular Lens;  Surgeon: Josue Trujillo MD;  Location:  OR       Social History:  Patient reports that she has never smoked. She has never used smokeless tobacco. She reports that she does not drink alcohol or use drugs.    Family History:  Family History   Problem Relation Age of Onset     Hypertension Father      C.A.D. Father      Hypertension Mother      Breast Cancer Sister      Osteoporosis Sister      EYE* Brother         Keratoconus     Glaucoma No family hx of      Macular Degeneration No family hx of        Medications:  Current Outpatient Medications   Medication Sig Dispense Refill     Acetaminophen (TYLENOL PO) Take 1,000 mg by mouth every 6 hours as needed for mild pain or fever       amLODIPine (NORVASC) 2.5 MG tablet Take 1 tablet (2.5 mg) by mouth daily 90 tablet 0     aspirin  (ASA) 81 MG chewable tablet Take 81 mg by mouth daily       atorvastatin (LIPITOR) 10 MG tablet TAKE ONE TABLET BY MOUTH ONE TIME DAILY  90 tablet 2     calcium carbonate 500 mg, elemental, (OSCAL;OYSTER SHELL CALCIUM) 500 MG tablet Take 3 tablets (1,500 mg) by mouth 2 times daily (with meals) 60 tablet 0     cholecalciferol (VITAMIN D3) 1000 UNIT tablet Take 2 tablets (2,000 Units) by mouth daily 30 tablet 0     clobetasol (TEMOVATE) 0.05 % external ointment Apply topically 2 times daily Until symptoms have resolved to the lichen sclerosis. 60 g 3     clobetasol (TEMOVATE) 0.05 % ointment Apply sparingly to affected area 2 x daily for14 days. Then, apply small amount to affected area twice weekly for maintenance. (Patient taking differently: Apply sparingly to affected area 2 x daily for14 days as needed for rash. Then, apply small amount to affected area twice weekly for maintenance.) 30 g 2     diltiazem (CARTIA XT) 300 MG 24 hr capsule Take 1 capsule (300 mg) by mouth daily 90 capsule 0     diltiazem ER COATED BEADS (CARTIA XT) 300 MG 24 hr capsule Take 1 capsule (300 mg) by mouth daily 90 capsule 3     docusate sodium (COLACE) 100 MG capsule Take 1 capsule (100 mg) by mouth 2 times daily (Patient taking differently: Take 100 mg by mouth 2 times daily as needed ) 60 capsule 0       Allergies   Allergen Reactions     Dilaudid [Hydromorphone]      dizziness     Senna Hives       Review of Systems:  -Skin Establ Pt: The patient denies any new rash, pruritus, or lesions that are symptomatic, changing or bleeding, except as per HPI.  -Constitutional: The patient is feeling generally well.    Physical exam:  Vitals: There were no vitals taken for this visit.  GEN: {RFGeneralAppearance:608712}   SKIN: {Skin Exam:395132}  - ***  - {Skin Exam Derm:238172}  - {Skin Exam Derm:989160}  - {Skin Exam Derm:203098}  - No other lesions of concern on areas examined.       Impression/Plan:  1. {Diagnosesderm:092544}  -  {rfplan:546817}    2.{Diagnosesderm:638425}  - {rfplan:902330}    3. {Diagnosesderm:534035}  - {rfplan:801082}    4. {Diagnosesderm:598516}  - {rfplan:239229}    Follow-up in ***, earlier for new or changing lesions.       Staff Involved:    Scribe Disclosure  I, Alejandra Marcus, am serving as a scribe to document services personally performed by Dr. Rosamaria Estrada MD, based on data collection and the provider's statements to me.     ***

## 2020-01-03 ENCOUNTER — OFFICE VISIT (OUTPATIENT)
Dept: CARDIOLOGY | Facility: CLINIC | Age: 80
End: 2020-01-03
Attending: INTERNAL MEDICINE
Payer: MEDICARE

## 2020-01-03 ENCOUNTER — ANCILLARY PROCEDURE (OUTPATIENT)
Dept: MAMMOGRAPHY | Facility: CLINIC | Age: 80
End: 2020-01-03
Attending: INTERNAL MEDICINE
Payer: MEDICARE

## 2020-01-03 VITALS
BODY MASS INDEX: 22.62 KG/M2 | HEIGHT: 69 IN | WEIGHT: 152.7 LBS | SYSTOLIC BLOOD PRESSURE: 148 MMHG | OXYGEN SATURATION: 96 % | HEART RATE: 66 BPM | DIASTOLIC BLOOD PRESSURE: 78 MMHG

## 2020-01-03 DIAGNOSIS — I10 ESSENTIAL HYPERTENSION, BENIGN: ICD-10-CM

## 2020-01-03 DIAGNOSIS — I50.30 HEART FAILURE WITH PRESERVED EJECTION FRACTION, NYHA CLASS II (H): ICD-10-CM

## 2020-01-03 DIAGNOSIS — Z12.31 SCREENING MAMMOGRAM FOR HIGH-RISK PATIENT: ICD-10-CM

## 2020-01-03 PROCEDURE — 99214 OFFICE O/P EST MOD 30 MIN: CPT | Mod: ZP | Performed by: INTERNAL MEDICINE

## 2020-01-03 PROCEDURE — G0463 HOSPITAL OUTPT CLINIC VISIT: HCPCS | Mod: ZF

## 2020-01-03 ASSESSMENT — PAIN SCALES - GENERAL: PAINLEVEL: NO PAIN (0)

## 2020-01-03 ASSESSMENT — MIFFLIN-ST. JEOR: SCORE: 1232.02

## 2020-01-03 NOTE — NURSING NOTE
Chief Complaint   Patient presents with     Follow Up     present for 3 month follow up for blood pressure and medication     Vitals were taken and medications were reconciled.   Bibi Muñoz  10:56 AM

## 2020-01-03 NOTE — PATIENT INSTRUCTIONS
You were seen today in the Cardiovascular Clinic at the [facility].     Cardiology Providers you saw during your visit: Dr. Jad Francisco     Diagnosis:   Encounter Diagnoses   Name Primary?     Heart failure with preserved ejection fraction, NYHA class II (H)      Essential hypertension, benign         Results: Discussed with you today blood pressure      Orders:   Orders Placed This Encounter   Procedures     Follow-Up with Cardiologist       Current Medication List  Current Outpatient Medications   Medication Sig Dispense Refill     Acetaminophen (TYLENOL PO) Take 1,000 mg by mouth every 6 hours as needed for mild pain or fever       amLODIPine (NORVASC) 2.5 MG tablet Take 1 tablet (2.5 mg) by mouth daily 90 tablet 0     ammonium lactate (LAC-HYDRIN) 12 % external lotion Apply topically 2 times daily To the feet 225 g 11     atorvastatin (LIPITOR) 10 MG tablet TAKE ONE TABLET BY MOUTH ONE TIME DAILY  90 tablet 2     calcium carbonate 500 mg, elemental, (OSCAL;OYSTER SHELL CALCIUM) 500 MG tablet Take 3 tablets (1,500 mg) by mouth 2 times daily (with meals) 60 tablet 0     cholecalciferol (VITAMIN D3) 1000 UNIT tablet Take 2 tablets (2,000 Units) by mouth daily 30 tablet 0     clobetasol (TEMOVATE) 0.05 % external ointment Apply topically 2 times daily Until symptoms have resolved to the lichen sclerosis. 60 g 3     clobetasol (TEMOVATE) 0.05 % ointment Apply sparingly to affected area 2 x daily for14 days. Then, apply small amount to affected area twice weekly for maintenance. (Patient taking differently: Apply sparingly to affected area 2 x daily for14 days as needed for rash. Then, apply small amount to affected area twice weekly for maintenance.) 30 g 2     diltiazem (CARTIA XT) 300 MG 24 hr capsule Take 1 capsule (300 mg) by mouth daily 90 capsule 0     diltiazem ER COATED BEADS (CARTIA XT) 300 MG 24 hr capsule Take 1 capsule (300 mg) by mouth daily 90 capsule 3     docusate sodium (COLACE) 100 MG  "capsule Take 1 capsule (100 mg) by mouth 2 times daily (Patient taking differently: Take 100 mg by mouth 2 times daily as needed ) 60 capsule 0     triamcinolone (KENALOG) 0.1 % external ointment Apply topically 2 times daily To the areas of bumps on the arms and legs 80 g 0         Medications Discontinued:  Medications Discontinued During This Encounter   Medication Reason     aspirin (ASA) 81 MG chewable tablet Medication Reconciliation Clean Up         Recommendations:   1. No change in medications.  2. Keep slowing increasing the intensity of your workout.  3. Follow up with Dr. Jad Francisco in 6 months.       Please feel free to call me with any questions or concerns.       Michael Stuart LPN     Questions: [phone].   First press #1 for the [employer] and then press #4 for \"Medical Questions\" to reach us Cardiology Nurses.     [phone].   First press #1 for the [employer] and then press #1     On Call Cardiologist for after hours or on weekends: [phone]   option #4 and ask to speak to the on-call Cardiologist.          If you need a medication refill please contact your pharmacy.  Please allow 3 business days for your refill to be completed.  ________________________________________________________________________________________________________________________________  "

## 2020-01-03 NOTE — LETTER
1/3/2020    RE: Shahana Donaldson  [address] [facility] [address]     Dear Colleague,    Thank you for the opportunity to participate in the care of your patient, Shahana Donaldson, at the [facility]. Please see a copy of my visit note below.    CARDIOLOGY CLINIC INITIAL CONSULTATION    HPI:  Ms. Alicea is a 78 yo female who receives primary care from Dr. Jeramie Curran. She is here for follow up of HTN and dyspnea.     Ms. Alicea is a very pleasant women with history of HTN and dyslipidemia. There was also concern for microvascular coronary disease in the past.     Over the past 2-3 years Ms. Donaldson has noticed progressive dyspnea on exertion and in the last year she has had to slow down her walking. She walks with her  nearly every day and does a 6 block loop when it's warm. She also goes to the [facility] regularly for resistance exercises.     Interval History  Ms. Donaldson is here today with her  Jake. She is feeling better. She brought along her blood pressure log which shows BPS in the 110s/60-70s. She also documented symptoms and her shortness of breath has improved since starting amlodipine. She has started going back to the [facility] to walk and lift weights but hasn't gone back to doing the classes.     No orthopnea, PND, chest pain, palpitations, lightheadedness. Her BLE is unchanged after starting amlodipine. She is not wearing her compression stockings right now due to a skin rash.    ASSESSMENT AND PLAN  Ms. Doanldson is a 78 yo female with history of HTN, dyslipidemia, microvascular disease, HFpEF who I saw for progressive dyspnea on exertion and hypertension. After starting low dose amlodipine her recorded blood pressures are great. She also has noted an improvement in her breathing although she has not returned to her normal exercise routine yet.     Plan  - no change in medications todays  - I asked  her to start slowing increasing the intensity/duration of her workout to see if she can get back to her regular workout.  - follow up in 6 months    Thank you for the opportunity to participate in the care of Ms. Kamini Donaldson. Please feel free to contact me with any additional questions or concerns.    Ortega Francisco MD    Preventive Cardiology  Pager: [phone]    PAST MEDICAL HISTORY:  Patient Active Problem List   Diagnosis     Essential hypertension, benign     Mixed hyperlipidemia     Symptomatic menopausal or female climacteric states     Diverticulosis of large intestine     Hyperlipidemia     Coronary atherosclerosis     Esophageal reflux     vulvar cyst     Hiatal hernia     S/P Nissen fundoplication (without gastrostomy tube) procedure     History of tubal ligation     History of dilatation and curettage     History of hysterectomy     Lichen sclerosus     Left displaced femoral neck fracture (H)     Senile osteoporosis     Lichen planus     Seborrheic dermatitis     Past Medical History:   Diagnosis Date     Diverticulosis of colon (without mention of hemorrhage)      Essential hypertension, benign      Gastro-oesophageal reflux disease     nissen     Hemorrhoid      Nonsenile cataract      Osteoporosis      Other and unspecified hyperlipidemia      Rectocele      Symptomatic menopausal or female climacteric states        CURRENT MEDICATIONS:  Current Outpatient Medications   Medication Sig Dispense Refill     Acetaminophen (TYLENOL PO) Take 1,000 mg by mouth every 6 hours as needed for mild pain or fever       amLODIPine (NORVASC) 2.5 MG tablet Take 1 tablet (2.5 mg) by mouth daily 90 tablet 0     ammonium lactate (LAC-HYDRIN) 12 % external lotion Apply topically 2 times daily To the feet 225 g 11     aspirin (ASA) 81 MG chewable tablet Take 81 mg by mouth daily       atorvastatin (LIPITOR) 10 MG tablet TAKE ONE TABLET BY MOUTH ONE TIME DAILY  90 tablet 2     calcium carbonate  500 mg, elemental, (OSCAL;OYSTER SHELL CALCIUM) 500 MG tablet Take 3 tablets (1,500 mg) by mouth 2 times daily (with meals) 60 tablet 0     cholecalciferol (VITAMIN D3) 1000 UNIT tablet Take 2 tablets (2,000 Units) by mouth daily 30 tablet 0     clobetasol (TEMOVATE) 0.05 % external ointment Apply topically 2 times daily Until symptoms have resolved to the lichen sclerosis. 60 g 3     clobetasol (TEMOVATE) 0.05 % ointment Apply sparingly to affected area 2 x daily for14 days. Then, apply small amount to affected area twice weekly for maintenance. (Patient taking differently: Apply sparingly to affected area 2 x daily for14 days as needed for rash. Then, apply small amount to affected area twice weekly for maintenance.) 30 g 2     diltiazem (CARTIA XT) 300 MG 24 hr capsule Take 1 capsule (300 mg) by mouth daily 90 capsule 0     diltiazem ER COATED BEADS (CARTIA XT) 300 MG 24 hr capsule Take 1 capsule (300 mg) by mouth daily 90 capsule 3     docusate sodium (COLACE) 100 MG capsule Take 1 capsule (100 mg) by mouth 2 times daily (Patient taking differently: Take 100 mg by mouth 2 times daily as needed ) 60 capsule 0     triamcinolone (KENALOG) 0.1 % external ointment Apply topically 2 times daily To the areas of bumps on the arms and legs 80 g 0       PAST SURGICAL HISTORY:  Past Surgical History:   Procedure Laterality Date     ARTHROPLASTY HIP Left 10/23/2018    Procedure: LEFT HIP COLEMAN- ARTHROPLASTY ;  Surgeon: Araceli Gutierrez MD;  Location:  OR     C NONSPECIFIC PROCEDURE      Bilateral Tubal Ligation     C NONSPECIFIC PROCEDURE      D&C secondary to menorrhagia     C NONSPECIFIC PROCEDURE      child birth x 2     CATARACT IOL, RT/LT Left 07/15/2019     COLONOSCOPY  5/24/2011    Procedure:COLONOSCOPY; Surgeon:HALINA SCOTT; Location: GI     COLONOSCOPY Left 11/10/2015    Procedure: COLONOSCOPY;  Surgeon: Raheem Colunga MD;  Location:  GI     GYN SURGERY      hysterectomy/oopherectomy; done for  "prolapse     LAPAROSCOPIC NISSEN FUNDOPLICATION  1/7/2013    Procedure: LAPAROSCOPIC NISSEN FUNDOPLICATION;  Laparoscopic Repair of Hiatel Hernia, NISSEN, Esophagoscopy, Gastroscopy And Duodenoscopy ;  Surgeon: Leonidas Valle MD;  Location: [facility] OR     PHACOEMULSIFICATION WITH STANDARD INTRAOCULAR LENS IMPLANT Left 7/15/2019    Procedure: Left Eye Phacoemulsification with Intraocular Lens;  Surgeon: Josue Trujillo MD;  Location: [facility] OR       ALLERGIES  Dilaudid [hydromorphone] and Senna    FAMILY HX:  Family History   Problem Relation Age of Onset     Hypertension Father      C.A.D. Father      Hypertension Mother      Breast Cancer Sister      Osteoporosis Sister      EYE* Brother         Keratoconus     Glaucoma No family hx of      Macular Degeneration No family hx of        SOCIAL HX:  Social History     Tobacco Use     Smoking status: Never Smoker     Smokeless tobacco: Never Used   Substance Use Topics     Alcohol use: No     Drug use: No        ROS:  Comprehensive ROS is negative except as noted in HPI.    VITAL SIGNS:  BP (!) 148/78 (BP Location: Right arm, Patient Position: Chair, Cuff Size: Adult Regular)   Pulse 66   Ht 1.753 m (5' 9\")   Wt 69.3 kg (152 lb 11.2 oz)   SpO2 96%   BMI 22.55 kg/m     Body mass index is 22.55 kg/m .  Wt Readings from Last 2 Encounters:   01/03/20 69.3 kg (152 lb 11.2 oz)   11/25/19 68.5 kg (151 lb)       PHYSICAL EXAM  Gen: pleasant womaen sitting comfortably in NAD  Head: nc/at  Eyes: EOMI  Neck: supple, no LAD  CV: nml s1/s2, no 2/6 CONNIE at apex; no JVD  Chest: clear lungs  Abd: soft, NT, NABS  Ext: warm, bilateral ankle edema  Skin: mild erythema over left lower leg  Neuro: awake, alert, oriented, nml speech and gait    LABS: personally reviewed  CMP  Recent Labs   Lab Test 11/25/19  0956 08/09/19  1154 08/09/19  1147 07/03/19  0742 04/19/19  1516 02/08/19  1336 11/21/18  0740  10/23/18  0658  04/17/18  0937  01/08/13  0700     --  140 140  --   --  " 142   < >  --    < > 140   < > 136   POTASSIUM 4.0  --  4.0 3.8  --   --  3.6   < >  --    < > 3.8   < > 4.2   CHLORIDE 109  --  108 106  --   --  108   < >  --    < > 108   < > 103   CO2 26  --  25 29  --   --  28   < >  --    < > 25   < > 27   ANIONGAP 4  --  7 4  --   --  6   < >  --    < > 6   < > 6   GLC 92  --  92 83  --   --  92   < >  --    < > 94   < > 104*   BUN 24  --  17 23  --   --  23   < >  --    < > 19   < > 12   CR 0.76  --  0.70 0.77  --  0.71 0.72   < >  --    < > 0.73   < > 0.62   GFRESTIMATED 75 >90 82 73  --  82 78   < >  --    < > 77   < > >90   GFRESTBLACK 87 >90 >90 85  --  >90 >90   < >  --    < > >90   < > >90   MARCK 9.1  --  8.9 8.8 9.1 8.2* 8.6   < >  --    < > 8.8   < > 8.1*   MAG  --   --   --   --   --   --   --   --  2.0  --   --   --  1.9   PHOS  --   --   --   --   --   --   --   --  3.1  --   --   --  3.3   PROTTOTAL  --   --  8.3 7.6  --   --  7.6  --   --   --  7.9   < >  --    ALBUMIN  --   --  4.1 3.9  --  3.9 3.9  --   --   --  4.2   < >  --    BILITOTAL  --   --  0.7 0.6  --   --  0.5  --   --   --  0.8   < >  --    ALKPHOS  --   --  96 97  --   --  144  --   --   --  124   < >  --    AST  --   --  20 21  --   --  23  --   --   --  21   < >  --    ALT  --   --  17 18  --   --  25  --   --   --  18   < >  --     < > = values in this interval not displayed.     CBC  Recent Labs   Lab Test 08/09/19  1147 07/03/19  0742 11/21/18  0740 10/25/18  0711   WBC 6.3 4.2 4.6 10.1   RBC 4.59 4.29 4.08 3.41*   HGB 14.3 13.5 12.7 10.4*   HCT 44.8 42.4 40.4 32.6*   MCV 98 99 99 96   MCH 31.2 31.5 31.1 30.5   MCHC 31.9 31.8 31.4* 31.9   RDW 12.6 12.7 13.4 12.9    230 279 162     INR  Recent Labs   Lab Test 10/22/18  1141 01/07/13  0640   INR 0.98 0.97     Recent Labs   Lab Test 11/21/18  0740 11/11/16  0802 10/15/15  0855 09/02/14  1040   CHOL 180 172 169 188   HDL 92 100 105 114   LDL 64 47 49 58   TRIG 126 126 73 82   CHOLHDLRATIO  --   --  1.6 1.6     Most recent EKG:  10/2/19: NSR  no ischemic changes; normal ecg    Most recent ECHO:   7/31/18 TTE  Left ventricular function, chamber size, wall motion, and wall thickness are  normal.The EF is 60-65%. No regional wall motion abnormalities are seen.  Right ventricular function, chamber size, wall motion, and thickness are normal.  Trace to mild mitral insufficiency is present. Mild tricuspid insufficiency is present.  The inferior vena cava was normal in size with preserved respiratory variability. No pericardial effusion is present.     Previous study not available for comparison.    Most recent STRESS TEST:    9/10/19 Exercise echo  Interpretation Summary  Exercise stress echocardiogram with no inducible ischemia in the setting of  very limited exercise tolerance.     Target heart rate achieved. Hypertensive blood pressure response to exercise.  No angina symptoms with exercise.  No ECG evidence of ischemia.  Normal segmental and global LV function with EF of approximately 55-60% at  rest; with exercise left ventricular cavity size and LVEF did not change  significantly.  No stress induced regional wall motion abnormalities.  Less than average functional capacity for age.     Normal aortic root and no significant valvular dysfunction noted on screening  2D and Doppler examination. PA systolic pressure is mildly elevated.    Most recent CARDIAC CATH:   Coronary angiogram (01/05/2000): per Dr. Jensen's note  60 yo female with chronic stable angina with a positive stress echo undergoes evaluation. Coronary angiography reveals widely patent arteries with no flow limiting lesions. LV gram shows normal function with no RWMA or MR. Coronary flow reserve in the LAD was 1.4 and in the Cx was 1.1.The patient has microvascular angina    Please do not hesitate to contact me if you have any questions/concerns.     Sincerely,     Ortega Francisco MD

## 2020-02-18 DIAGNOSIS — I10 ESSENTIAL HYPERTENSION, BENIGN: ICD-10-CM

## 2020-02-18 RX ORDER — AMLODIPINE BESYLATE 2.5 MG/1
2.5 TABLET ORAL DAILY
Qty: 90 TABLET | Refills: 1 | Status: SHIPPED | OUTPATIENT
Start: 2020-02-18 | End: 2020-07-03

## 2020-03-10 DIAGNOSIS — L23.9 CONTACT ALLERGIC REACTION: ICD-10-CM

## 2020-03-16 NOTE — TELEPHONE ENCOUNTER
triamcinolone (KENALOG) 0.1 % OINT    Last Written Prescription Date:  12/31/19  Last Fill Quantity: 80 G,   # refills: 0  Last Office Visit : 12/3/19  Future Office visit:  NONE   RTC   Return in about 5 months (around 5/31/2020).    Routing refill request to provider for review/approval because: Use triamcinolone ointment to the areas of rash on the arms and legs for 2 weeks   CONTINUE MED?

## 2020-03-17 RX ORDER — TRIAMCINOLONE ACETONIDE 1 MG/G
OINTMENT TOPICAL 2 TIMES DAILY
Qty: 60 G | Refills: 5 | Status: SHIPPED | OUTPATIENT
Start: 2020-03-17

## 2020-03-17 NOTE — TELEPHONE ENCOUNTER
Received refill request as [id number]. Patient chart reviewed. Refill accepted.    Leeann Braun MD  Dermatology Resident  [facility]

## 2020-03-22 ENCOUNTER — HEALTH MAINTENANCE LETTER (OUTPATIENT)
Age: 80
End: 2020-03-22

## 2020-07-02 NOTE — PROGRESS NOTES
"Shahana Donaldson is a 80 year old female who is being evaluated via a billable video visit.      The patient has been notified of following:     \"This video visit will be conducted via a call between you and your physician/provider. We have found that certain health care needs can be provided without the need for an in-person physical exam.  This service lets us provide the care you need with a video conversation.  If a prescription is necessary we can send it directly to your pharmacy.  If lab work is needed we can place an order for that and you can then stop by our lab to have the test done at a later time.    Video visits are billed at different rates depending on your insurance coverage.  Please reach out to your insurance provider with any questions.    If during the course of the call the physician/provider feels a video visit is not appropriate, you will not be charged for this service.\"    Patient has given verbal consent for Video visit? Yes  How would you like to obtain your AVS? [facility]  Patient would like the video invitation sent by: Send to e-mail  Will anyone else be joining your video visit?     Video-Visit Details    Type of service:  Video Visit    Video Start Time: 11:31  Video End Time: 11:52 AM    Originating Location (pt. Location): Home    Distant Location (provider location):  [facility]     Platform used for Video Visit: [facility]      CARDIOLOGY CLINIC Follow up  HPI:  Ms. Alicea is a 81 yo female who receives primary care from Dr. Jeramie Curran. She is here for follow up of HTN and dyspnea.     Ms. Alicea is a very pleasant women with history of HTN and dyslipidemia. There was also concern for microvascular coronary disease in the past.     Over the past 2-3 years Ms. Donaldson has noticed progressive dyspnea on exertion and in the last year she has had to slow down her walking. She walks with her  nearly every day and does a 6 block loop when it's warm. " She also goes to the [facility] regularly for resistance exercises.       Interval History  Ms Wilson is feeling well. She continues to walk her 6 block routes. It takes 20-25 minutes. She is limited by fatigue but denies chest pain. No orthopnea, PND, palpitations. LE edema is mild and stable. She and her  are doing a strict quaratine. Groceries are delivered and the only place they have gone is to their cabin in [place].    Blood pressures have been well controlled. No lightheadedness.  Home BP: yesterday morning, 119/84, HR 71; yesterday evening 113/72, HR 71    ASSESSMENT AND PLAN  Ms. Donaldson is a 79 yo female with history of HTN, dyslipidemia, microvascular disease, HFpEF who I saw for progressive dyspnea on exertion and hypertension. After starting low dose amlodipine her recorded blood pressures are great. She continues to walk daily and her fatigue/SOB is stable. Her  would like to walk faster and asked if there is anything we can do to help her walk faster/further. I suspect part of the problem is frailty. She may have some component of microvascular disease and/or HFpEF but on our last in-person visit she appeared euvolemic. I offered a trial of imdur, but Kamini is content with her activity level at this time.    Plan  - no change in medications   - refills provided  - follow up in 1 year    Thank you for the opportunity to participate in the care of Ms. Kamini Donaldson. Please feel free to contact me with any additional questions or concerns.    Ortega Francisco MD    Preventive Cardiology  Pager: [phone]    PAST MEDICAL HISTORY:  Patient Active Problem List   Diagnosis     Essential hypertension, benign     Mixed hyperlipidemia     Symptomatic menopausal or female climacteric states     Diverticulosis of large intestine     Hyperlipidemia     Coronary atherosclerosis     Esophageal reflux     vulvar cyst     Hiatal hernia     S/P Nissen fundoplication  (without gastrostomy tube) procedure     History of tubal ligation     History of dilatation and curettage     History of hysterectomy     Lichen sclerosus     Left displaced femoral neck fracture (H)     Senile osteoporosis     Lichen planus     Seborrheic dermatitis     Past Medical History:   Diagnosis Date     Diverticulosis of colon (without mention of hemorrhage)      Essential hypertension, benign      Gastro-oesophageal reflux disease     nissen     Hemorrhoid      Nonsenile cataract      Osteoporosis      Other and unspecified hyperlipidemia      Rectocele      Symptomatic menopausal or female climacteric states        CURRENT MEDICATIONS:  Current Outpatient Medications   Medication Sig Dispense Refill     Acetaminophen (TYLENOL PO) Take 1,000 mg by mouth every 6 hours as needed for mild pain or fever       amLODIPine 2.5 MG PO tablet Take 1 tablet (2.5 mg) by mouth daily 90 tablet 1     ammonium lactate (LAC-HYDRIN) 12 % external lotion Apply topically 2 times daily To the feet 225 g 11     atorvastatin (LIPITOR) 10 MG tablet Take 1 tablet (10 mg) by mouth once daily 90 tablet 3     calcium carbonate 500 mg, elemental, (OSCAL;OYSTER SHELL CALCIUM) 500 MG tablet Take 3 tablets (1,500 mg) by mouth 2 times daily (with meals) 60 tablet 0     clobetasol (TEMOVATE) 0.05 % external ointment Apply topically 2 times daily Until symptoms have resolved to the lichen sclerosis. 60 g 3     clobetasol (TEMOVATE) 0.05 % ointment Apply sparingly to affected area 2 x daily for14 days. Then, apply small amount to affected area twice weekly for maintenance. (Patient taking differently: Apply sparingly to affected area 2 x daily for14 days as needed for rash. Then, apply small amount to affected area twice weekly for maintenance.) 30 g 2     diltiazem ER COATED BEADS (CARTIA XT) 300 MG 24 hr capsule Take 1 capsule (300 mg) by mouth daily 90 capsule 3     docusate sodium (COLACE) 100 MG capsule Take 1 capsule (100 mg) by  mouth 2 times daily (Patient taking differently: Take 100 mg by mouth 2 times daily as needed ) 60 capsule 0     triamcinolone (KENALOG) 0.1 % external ointment Apply topically 2 times daily To the areas of bumps on the arms and legs 60 g 5     cholecalciferol (VITAMIN D3) 1000 UNIT tablet Take 2 tablets (2,000 Units) by mouth daily (Patient not taking: Reported on 7/3/2020) 30 tablet 0     diltiazem (CARTIA XT) 300 MG 24 hr capsule Take 1 capsule (300 mg) by mouth daily (Patient not taking: Reported on 7/3/2020) 90 capsule 0       PAST SURGICAL HISTORY:  Past Surgical History:   Procedure Laterality Date     ARTHROPLASTY HIP Left 10/23/2018    Procedure: LEFT HIP COLEMAN- ARTHROPLASTY ;  Surgeon: Araceli Gutierrez MD;  Location: [facility] OR     C NONSPECIFIC PROCEDURE      Bilateral Tubal Ligation     C NONSPECIFIC PROCEDURE      D&C secondary to menorrhagia     C NONSPECIFIC PROCEDURE      child birth x 2     CATARACT IOL, RT/LT Left 07/15/2019     COLONOSCOPY  5/24/2011    Procedure:COLONOSCOPY; Surgeon:HALINA SCOTT; Location:[facility]     COLONOSCOPY Left 11/10/2015    Procedure: COLONOSCOPY;  Surgeon: Raheem Colunga MD;  [facility]     GYN SURGERY      hysterectomy/oopherectomy; done for prolapse     LAPAROSCOPIC NISSEN FUNDOPLICATION  1/7/2013    Procedure: LAPAROSCOPIC NISSEN FUNDOPLICATION;  Laparoscopic Repair of Hiatel Hernia, NISSEN, Esophagoscopy, Gastroscopy And Duodenoscopy ;  Surgeon: Leonidas Valle MD;  Location: [facility] OR     PHACOEMULSIFICATION WITH STANDARD INTRAOCULAR LENS IMPLANT Left 7/15/2019    Procedure: Left Eye Phacoemulsification with Intraocular Lens;  Surgeon: Josue Trujillo MD;  Location: [facility] OR       ALLERGIES  Dilaudid [hydromorphone] and Senna    FAMILY HX:  Family History   Problem Relation Age of Onset     Hypertension Father      C.A.D. Father      Hypertension Mother      Breast Cancer Sister      Osteoporosis Sister      EYE* Brother         Keratoconus      Glaucoma No family hx of      Macular Degeneration No family hx of        SOCIAL HX:  Social History     Tobacco Use     Smoking status: Never Smoker     Smokeless tobacco: Never Used   Substance Use Topics     Alcohol use: No     Drug use: No        ROS:  Comprehensive ROS is negative except as noted in HPI.    VITAL SIGNS:  There were no vitals taken for this visit.  There is no height or weight on file to calculate BMI.  Wt Readings from Last 2 Encounters:   01/03/20 69.3 kg (152 lb 11.2 oz)   11/25/19 68.5 kg (151 lb)       PHYSICAL EXAM  Gen: pleasant womaen sitting comfortably in NAD  Head: nc/at  Eyes: EOMI  Resp: breathing comfortably on room air  Skin: no rash on limited exam  Neuro: awake, alert, oriented, nml speech    The rest of a comprehensive physical examination is deferred due to PHE (public health emergency) video visit restrictions.    LABS: personally reviewed  CMP  Recent Labs   Lab Test 11/25/19  0956 08/09/19  1154 08/09/19  1147 07/03/19  0742 04/19/19  1516 02/08/19  1336 11/21/18  0740  10/23/18  0658  04/17/18  0937  01/08/13  0700     --  140 140  --   --  142   < >  --    < > 140   < > 136   POTASSIUM 4.0  --  4.0 3.8  --   --  3.6   < >  --    < > 3.8   < > 4.2   CHLORIDE 109  --  108 106  --   --  108   < >  --    < > 108   < > 103   CO2 26  --  25 29  --   --  28   < >  --    < > 25   < > 27   ANIONGAP 4  --  7 4  --   --  6   < >  --    < > 6   < > 6   GLC 92  --  92 83  --   --  92   < >  --    < > 94   < > 104*   BUN 24  --  17 23  --   --  23   < >  --    < > 19   < > 12   CR 0.76  --  0.70 0.77  --  0.71 0.72   < >  --    < > 0.73   < > 0.62   GFRESTIMATED 75 >90 82 73  --  82 78   < >  --    < > 77   < > >90   GFRESTBLACK 87 >90 >90 85  --  >90 >90   < >  --    < > >90   < > >90   MARCK 9.1  --  8.9 8.8 9.1 8.2* 8.6   < >  --    < > 8.8   < > 8.1*   MAG  --   --   --   --   --   --   --   --  2.0  --   --   --  1.9   PHOS  --   --   --   --   --   --   --   --  3.1  --   --    --  3.3   PROTTOTAL  --   --  8.3 7.6  --   --  7.6  --   --   --  7.9   < >  --    ALBUMIN  --   --  4.1 3.9  --  3.9 3.9  --   --   --  4.2   < >  --    BILITOTAL  --   --  0.7 0.6  --   --  0.5  --   --   --  0.8   < >  --    ALKPHOS  --   --  96 97  --   --  144  --   --   --  124   < >  --    AST  --   --  20 21  --   --  23  --   --   --  21   < >  --    ALT  --   --  17 18  --   --  25  --   --   --  18   < >  --     < > = values in this interval not displayed.     CBC  Recent Labs   Lab Test 08/09/19  1147 07/03/19  0742 11/21/18  0740 10/25/18  0711   WBC 6.3 4.2 4.6 10.1   RBC 4.59 4.29 4.08 3.41*   HGB 14.3 13.5 12.7 10.4*   HCT 44.8 42.4 40.4 32.6*   MCV 98 99 99 96   MCH 31.2 31.5 31.1 30.5   MCHC 31.9 31.8 31.4* 31.9   RDW 12.6 12.7 13.4 12.9    230 279 162     INR  Recent Labs   Lab Test 10/22/18  1141 01/07/13  0640   INR 0.98 0.97     Recent Labs   Lab Test 11/21/18  0740 11/11/16  0802 10/15/15  0855 09/02/14  1040   CHOL 180 172 169 188   HDL 92 100 105 114   LDL 64 47 49 58   TRIG 126 126 73 82   CHOLHDLRATIO  --   --  1.6 1.6       Most recent EKG:  10/2/19: NSR no ischemic changes; normal ecg    Most recent ECHO:   7/31/18 TTE  Left ventricular function, chamber size, wall motion, and wall thickness are  normal.The EF is 60-65%. No regional wall motion abnormalities are seen.  Right ventricular function, chamber size, wall motion, and thickness are normal.  Trace to mild mitral insufficiency is present. Mild tricuspid insufficiency is present.  The inferior vena cava was normal in size with preserved respiratory variability. No pericardial effusion is present.     Previous study not available for comparison.    Most recent STRESS TEST:    9/10/19 Exercise echo  Interpretation Summary  Exercise stress echocardiogram with no inducible ischemia in the setting of  very limited exercise tolerance.     Target heart rate achieved. Hypertensive blood pressure response to exercise.  No angina  symptoms with exercise.  No ECG evidence of ischemia.  Normal segmental and global LV function with EF of approximately 55-60% at  rest; with exercise left ventricular cavity size and LVEF did not change  significantly.  No stress induced regional wall motion abnormalities.  Less than average functional capacity for age.     Normal aortic root and no significant valvular dysfunction noted on screening  2D and Doppler examination. PA systolic pressure is mildly elevated.    Most recent CARDIAC CATH:   Coronary angiogram (01/05/2000): per Dr. Jensen's note  58 yo female with chronic stable angina with a positive stress echo undergoes evaluation. Coronary angiography reveals widely patent arteries with no flow limiting lesions. LV gram shows normal function with no RWMA or MR. Coronary flow reserve in the LAD was 1.4 and in the Cx was 1.1.The patient has microvascular angina

## 2020-07-03 ENCOUNTER — VIRTUAL VISIT (OUTPATIENT)
Dept: CARDIOLOGY | Facility: CLINIC | Age: 80
End: 2020-07-03
Attending: INTERNAL MEDICINE
Payer: MEDICARE

## 2020-07-03 DIAGNOSIS — I10 ESSENTIAL HYPERTENSION, BENIGN: ICD-10-CM

## 2020-07-03 DIAGNOSIS — I50.30 HEART FAILURE WITH PRESERVED EJECTION FRACTION, NYHA CLASS II (H): ICD-10-CM

## 2020-07-03 PROCEDURE — 99214 OFFICE O/P EST MOD 30 MIN: CPT | Mod: 95 | Performed by: INTERNAL MEDICINE

## 2020-07-03 RX ORDER — AMLODIPINE BESYLATE 2.5 MG/1
2.5 TABLET ORAL DAILY
Qty: 90 TABLET | Refills: 3 | Status: SHIPPED | OUTPATIENT
Start: 2020-07-03 | End: 2021-06-14

## 2020-07-03 NOTE — LETTER
7/3/2020      RE: Shahana Donaldson  [address] [facility] [address]       Dear Colleague,    Thank you for the opportunity to participate in the care of your patient, Shahana Donaldson, at the [facility]. Please see a copy of my visit note below.    Shahana Donaldson is a 80 year old female who is being evaluated via a billable video visit.        CARDIOLOGY CLINIC Follow up  HPI:  Ms. Alicea is a 79 yo female who receives primary care from Dr. Jeramie Curran. She is here for follow up of HTN and dyspnea.     Ms. Alicea is a very pleasant women with history of HTN and dyslipidemia. There was also concern for microvascular coronary disease in the past.     Over the past 2-3 years Ms. Donaldson has noticed progressive dyspnea on exertion and in the last year she has had to slow down her walking. She walks with her  nearly every day and does a 6 block loop when it's warm. She also goes to the [facility] regularly for resistance exercises.       Interval History  Ms iWlson is feeling well. She continues to walk her 6 block routes. It takes 20-25 minutes. She is limited by fatigue but denies chest pain. No orthopnea, PND, palpitations. LE edema is mild and stable. She and her  are doing a strict quaratine. Groceries are delivered and the only place they have gone is to their cabin in [place] [facility] [place] [facility].    Blood pressures have been well controlled. No lightheadedness.  Home BP: yesterday morning, 119/84, HR 71; yesterday evening 113/72, HR 71    ASSESSMENT AND PLAN  Ms. Donaldson is a 79 yo female with history of HTN, dyslipidemia, microvascular disease, HFpEF who I saw for progressive dyspnea on exertion and hypertension. After starting low dose amlodipine her recorded blood pressures are great. She continues to walk daily and her fatigue/SOB is stable. Her  would like to walk faster and asked if there is  anything we can do to help her walk faster/further. I suspect part of the problem is frailty. She may have some component of microvascular disease and/or HFpEF but on our last in-person visit she appeared euvolemic. I offered a trial of imdur, but Kamini is content with her activity level at this time.    Plan  - no change in medications   - refills provided  - follow up in 1 year    Thank you for the opportunity to participate in the care of Ms. Kamini Donaldson. Please feel free to contact me with any additional questions or concerns.    Ortega Francisco MD    Preventive Cardiology  Pager: [phone]    PAST MEDICAL HISTORY:  Patient Active Problem List   Diagnosis     Essential hypertension, benign     Mixed hyperlipidemia     Symptomatic menopausal or female climacteric states     Diverticulosis of large intestine     Hyperlipidemia     Coronary atherosclerosis     Esophageal reflux     vulvar cyst     Hiatal hernia     S/P Nissen fundoplication (without gastrostomy tube) procedure     History of tubal ligation     History of dilatation and curettage     History of hysterectomy     Lichen sclerosus     Left displaced femoral neck fracture (H)     Senile osteoporosis     Lichen planus     Seborrheic dermatitis     Past Medical History:   Diagnosis Date     Diverticulosis of colon (without mention of hemorrhage)      Essential hypertension, benign      Gastro-oesophageal reflux disease     nissen     Hemorrhoid      Nonsenile cataract      Osteoporosis      Other and unspecified hyperlipidemia      Rectocele      Symptomatic menopausal or female climacteric states        CURRENT MEDICATIONS:  Current Outpatient Medications   Medication Sig Dispense Refill     Acetaminophen (TYLENOL PO) Take 1,000 mg by mouth every 6 hours as needed for mild pain or fever       amLODIPine 2.5 MG PO tablet Take 1 tablet (2.5 mg) by mouth daily 90 tablet 1     ammonium lactate (LAC-HYDRIN) 12 % external lotion Apply  topically 2 times daily To the feet 225 g 11     atorvastatin (LIPITOR) 10 MG tablet Take 1 tablet (10 mg) by mouth once daily 90 tablet 3     calcium carbonate 500 mg, elemental, (OSCAL;OYSTER SHELL CALCIUM) 500 MG tablet Take 3 tablets (1,500 mg) by mouth 2 times daily (with meals) 60 tablet 0     clobetasol (TEMOVATE) 0.05 % external ointment Apply topically 2 times daily Until symptoms have resolved to the lichen sclerosis. 60 g 3     clobetasol (TEMOVATE) 0.05 % ointment Apply sparingly to affected area 2 x daily for14 days. Then, apply small amount to affected area twice weekly for maintenance. (Patient taking differently: Apply sparingly to affected area 2 x daily for14 days as needed for rash. Then, apply small amount to affected area twice weekly for maintenance.) 30 g 2     diltiazem ER COATED BEADS (CARTIA XT) 300 MG 24 hr capsule Take 1 capsule (300 mg) by mouth daily 90 capsule 3     docusate sodium (COLACE) 100 MG capsule Take 1 capsule (100 mg) by mouth 2 times daily (Patient taking differently: Take 100 mg by mouth 2 times daily as needed ) 60 capsule 0     triamcinolone (KENALOG) 0.1 % external ointment Apply topically 2 times daily To the areas of bumps on the arms and legs 60 g 5     cholecalciferol (VITAMIN D3) 1000 UNIT tablet Take 2 tablets (2,000 Units) by mouth daily (Patient not taking: Reported on 7/3/2020) 30 tablet 0     diltiazem (CARTIA XT) 300 MG 24 hr capsule Take 1 capsule (300 mg) by mouth daily (Patient not taking: Reported on 7/3/2020) 90 capsule 0       PAST SURGICAL HISTORY:  Past Surgical History:   Procedure Laterality Date     ARTHROPLASTY HIP Left 10/23/2018    Procedure: LEFT HIP COLEMAN- ARTHROPLASTY ;  Surgeon: Araceli Gutierrez MD;  Location: [facility] OR     C NONSPECIFIC PROCEDURE      Bilateral Tubal Ligation     C NONSPECIFIC PROCEDURE      D&C secondary to menorrhagia     C NONSPECIFIC PROCEDURE      child birth x 2     CATARACT IOL, RT/LT Left 07/15/2019      COLONOSCOPY  5/24/2011    Procedure:COLONOSCOPY; Surgeon:HALINA SCOTT; Location: GI     COLONOSCOPY Left 11/10/2015    Procedure: COLONOSCOPY;  Surgeon: Raheem Colunga MD;  Location:  GI     GYN SURGERY      hysterectomy/oopherectomy; done for prolapse     LAPAROSCOPIC NISSEN FUNDOPLICATION  1/7/2013    Procedure: LAPAROSCOPIC NISSEN FUNDOPLICATION;  Laparoscopic Repair of Hiatel Hernia, NISSEN, Esophagoscopy, Gastroscopy And Duodenoscopy ;  Surgeon: Leonidas Valle MD;  Location:  OR     PHACOEMULSIFICATION WITH STANDARD INTRAOCULAR LENS IMPLANT Left 7/15/2019    Procedure: Left Eye Phacoemulsification with Intraocular Lens;  Surgeon: Josue Trujillo MD;  Location: [facility] OR       ALLERGIES  Dilaudid [hydromorphone] and Senna    FAMILY HX:  Family History   Problem Relation Age of Onset     Hypertension Father      C.A.D. Father      Hypertension Mother      Breast Cancer Sister      Osteoporosis Sister      EYE* Brother         Keratoconus     Glaucoma No family hx of      Macular Degeneration No family hx of        SOCIAL HX:  Social History     Tobacco Use     Smoking status: Never Smoker     Smokeless tobacco: Never Used   Substance Use Topics     Alcohol use: No     Drug use: No        ROS:  Comprehensive ROS is negative except as noted in HPI.    VITAL SIGNS:  There were no vitals taken for this visit.  There is no height or weight on file to calculate BMI.  Wt Readings from Last 2 Encounters:   01/03/20 69.3 kg (152 lb 11.2 oz)   11/25/19 68.5 kg (151 lb)       PHYSICAL EXAM  Gen: pleasant womaen sitting comfortably in NAD  Head: nc/at  Eyes: EOMI  Resp: breathing comfortably on room air  Skin: no rash on limited exam  Neuro: awake, alert, oriented, nml speech    The rest of a comprehensive physical examination is deferred due to PHE (public health emergency) video visit restrictions.    LABS: personally reviewed  CMP  Recent Labs   Lab Test 11/25/19  0956 08/09/19  1152  08/09/19  1147 07/03/19  0742 04/19/19  1516 02/08/19  1336 11/21/18  0740  10/23/18  0658  04/17/18  0937  01/08/13  0700     --  140 140  --   --  142   < >  --    < > 140   < > 136   POTASSIUM 4.0  --  4.0 3.8  --   --  3.6   < >  --    < > 3.8   < > 4.2   CHLORIDE 109  --  108 106  --   --  108   < >  --    < > 108   < > 103   CO2 26  --  25 29  --   --  28   < >  --    < > 25   < > 27   ANIONGAP 4  --  7 4  --   --  6   < >  --    < > 6   < > 6   GLC 92  --  92 83  --   --  92   < >  --    < > 94   < > 104*   BUN 24  --  17 23  --   --  23   < >  --    < > 19   < > 12   CR 0.76  --  0.70 0.77  --  0.71 0.72   < >  --    < > 0.73   < > 0.62   GFRESTIMATED 75 >90 82 73  --  82 78   < >  --    < > 77   < > >90   GFRESTBLACK 87 >90 >90 85  --  >90 >90   < >  --    < > >90   < > >90   MARCK 9.1  --  8.9 8.8 9.1 8.2* 8.6   < >  --    < > 8.8   < > 8.1*   MAG  --   --   --   --   --   --   --   --  2.0  --   --   --  1.9   PHOS  --   --   --   --   --   --   --   --  3.1  --   --   --  3.3   PROTTOTAL  --   --  8.3 7.6  --   --  7.6  --   --   --  7.9   < >  --    ALBUMIN  --   --  4.1 3.9  --  3.9 3.9  --   --   --  4.2   < >  --    BILITOTAL  --   --  0.7 0.6  --   --  0.5  --   --   --  0.8   < >  --    ALKPHOS  --   --  96 97  --   --  144  --   --   --  124   < >  --    AST  --   --  20 21  --   --  23  --   --   --  21   < >  --    ALT  --   --  17 18  --   --  25  --   --   --  18   < >  --     < > = values in this interval not displayed.     CBC  Recent Labs   Lab Test 08/09/19  1147 07/03/19  0742 11/21/18  0740 10/25/18  0711   WBC 6.3 4.2 4.6 10.1   RBC 4.59 4.29 4.08 3.41*   HGB 14.3 13.5 12.7 10.4*   HCT 44.8 42.4 40.4 32.6*   MCV 98 99 99 96   MCH 31.2 31.5 31.1 30.5   MCHC 31.9 31.8 31.4* 31.9   RDW 12.6 12.7 13.4 12.9    230 279 162     INR  Recent Labs   Lab Test 10/22/18  1141 01/07/13  0640   INR 0.98 0.97     Recent Labs   Lab Test 11/21/18  0740 11/11/16  0802 10/15/15  0855  09/02/14  1040   CHOL 180 172 169 188   HDL 92 100 105 114   LDL 64 47 49 58   TRIG 126 126 73 82   CHOLHDLRATIO  --   --  1.6 1.6       Most recent EKG:  10/2/19: NSR no ischemic changes; normal ecg    Most recent ECHO:   7/31/18 TTE  Left ventricular function, chamber size, wall motion, and wall thickness are  normal.The EF is 60-65%. No regional wall motion abnormalities are seen.  Right ventricular function, chamber size, wall motion, and thickness are normal.  Trace to mild mitral insufficiency is present. Mild tricuspid insufficiency is present.  The inferior vena cava was normal in size with preserved respiratory variability. No pericardial effusion is present.     Previous study not available for comparison.    Most recent STRESS TEST:    9/10/19 Exercise echo  Interpretation Summary  Exercise stress echocardiogram with no inducible ischemia in the setting of  very limited exercise tolerance.     Target heart rate achieved. Hypertensive blood pressure response to exercise.  No angina symptoms with exercise.  No ECG evidence of ischemia.  Normal segmental and global LV function with EF of approximately 55-60% at  rest; with exercise left ventricular cavity size and LVEF did not change  significantly.  No stress induced regional wall motion abnormalities.  Less than average functional capacity for age.     Normal aortic root and no significant valvular dysfunction noted on screening  2D and Doppler examination. PA systolic pressure is mildly elevated.    Most recent CARDIAC CATH:   Coronary angiogram (01/05/2000): per Dr. Jensen's note  60 yo female with chronic stable angina with a positive stress echo undergoes evaluation. Coronary angiography reveals widely patent arteries with no flow limiting lesions. LV gram shows normal function with no RWMA or MR. Coronary flow reserve in the LAD was 1.4 and in the Cx was 1.1.The patient has microvascular angina      Please do not hesitate to contact me if you have any  questions/concerns.     Sincerely,     Ortega Francisco MD

## 2020-07-03 NOTE — PATIENT INSTRUCTIONS
"You were seen today in the Cardiovascular Clinic at the [facility].     Cardiology Providers you saw during your visit: Dr. Ortega Francisco    Diagnosis:   Encounter Diagnoses   Name Primary?     Heart failure with preserved ejection fraction, NYHA class II (H)      Essential hypertension, benign           Orders:   Orders Placed This Encounter   Procedures     Follow-Up with Cardiologist       Recommendations:   1. Continue amlodipine for blood pressure control.  2. Follow up with Dr. Jad Francisco in 1 year. Please call if there are any concerns about blood pressure or shortness of breath before that time.        Please feel free to call me with any questions or concerns.       Portia LUU LPN     Questions: [phone]  First press #1 for [facility] for \"Medical Questions\" to reach us Cardiology Nurses.   [phone]   First press #1 for the [employer] and then press #1     On Call Cardiologist for after hours or on weekends: [phone]   option #4 and ask to speak to the on-call Cardiologist.          If you need a medication refill please contact your pharmacy.  Please allow 3 business days for your refill to be completed.  --------------------------------------------------------------    "

## 2020-09-01 DIAGNOSIS — L90.0 LICHEN SCLEROSUS: ICD-10-CM

## 2020-09-01 NOTE — TELEPHONE ENCOUNTER
[facility] Call Center    Phone Message    May a detailed message be left on voicemail: yes     Reason for Call: Medication Refill Request    Has the patient contacted the pharmacy for the refill? Yes   Name of medication being requested: clobetasol (TEMOVATE) 0.05 % external ointment   Provider who prescribed the medication: JUS Amin  Pharmacy: [facility] on [address]  Date medication is needed: now-pt is out. Please call th pt when this is done. Thanks.         Action Taken: Message routed to:  [facility] ([facility]    Travel Screening: Not Applicable

## 2020-09-05 RX ORDER — CLOBETASOL PROPIONATE 0.5 MG/G
OINTMENT TOPICAL 2 TIMES DAILY
Qty: 60 G | Refills: 0 | Status: SHIPPED | OUTPATIENT
Start: 2020-09-05 | End: 2021-04-07

## 2020-09-05 NOTE — TELEPHONE ENCOUNTER
"  clobetasol (TEMOVATE) 0.05 % external ointment    Last Written Prescription Date:  11/11/19  Last Fill Quantity: 60g,   # refills: 3  Last Office Visit : 12/31/19  Future Office visit: none    Process 3  Needs appt.  Scheduling has been notified to contact the pt for appointment.      \" Follow-up in ~May 2020 as planned\"   \" Had recent flare a few days ago that responded to clobetasol.  - Continue clobetasol 0.05% ointment BID as needed\".     "

## 2020-09-08 ENCOUNTER — TELEPHONE (OUTPATIENT)
Dept: DERMATOLOGY | Facility: CLINIC | Age: 80
End: 2020-09-08

## 2020-09-08 NOTE — TELEPHONE ENCOUNTER
----- Message from Claire Luke RN sent at 9/5/2020  3:14 PM CDT -----  Pt  DIMITRI PHIPPS [[phone]]    Please call to schedule.  Rosamaria Dawson MD past due for  appt.    Thanks    I do not need any follow up on the scheduling of this appointment unless the patient will no longer be receiving care in our clinic.

## 2020-09-08 NOTE — TELEPHONE ENCOUNTER
Central Prior Authorization Team   Phone: [phone]      PA Initiation    Medication: clobetasol (TEMOVATE) 0.05 % external ointment  Insurance Company: [employer] [place] - Phone [phone] Fax [phone]  Pharmacy Filling the Rx: [facility] [address] [place], [place] - [address] [place] [address]  Filling Pharmacy Phone: [phone]  Filling Pharmacy Fax:    Start Date: 9/8/2020

## 2020-09-08 NOTE — TELEPHONE ENCOUNTER
Pharmacy has started a Prior Authorization request via Cover My Meds for - Clobetasol Propionate 0.05% ointment, Key: [id number]

## 2020-09-08 NOTE — TELEPHONE ENCOUNTER
Prior Authorization Approval    Authorization Effective Date: 6/10/2020  Authorization Expiration Date: 9/8/2021  Medication: clobetasol (TEMOVATE) 0.05 % external ointment   Approved Dose/Quantity:   Reference #:     Insurance Company: [employer] - Phone [phone] Fax [phone]  Which Pharmacy is filling the prescription (Not needed for infusion/clinic administered): [facility] [address] [place] [address]  Pharmacy Notified:  Yes -  Via voicemail  Patient Notified:

## 2020-09-11 ENCOUNTER — TELEPHONE (OUTPATIENT)
Dept: DERMATOLOGY | Facility: CLINIC | Age: 80
End: 2020-09-11

## 2020-09-11 NOTE — TELEPHONE ENCOUNTER
Pt called back, not able to come in until covid vaccine is available due to cardiac issues. Pt states medication prescribed by Dr Fry is working. Offered pt telephone/video appt pt refuse due to location of issue (vaginal area).

## 2020-09-11 NOTE — TELEPHONE ENCOUNTER
----- Message from Claire Luke RN sent at 9/5/2020  3:14 PM CDT -----  Pt  DIMITRI PHIPPS [[phone]]    Please call to schedule.  Rosamaria Dawson MD past due for  appt.    Thanks    I do not need any follow up on the scheduling of this appointment unless the patient will no longer be receiving care in our clinic.

## 2020-12-10 DIAGNOSIS — I10 BENIGN ESSENTIAL HYPERTENSION: ICD-10-CM

## 2020-12-14 NOTE — TELEPHONE ENCOUNTER
diltiazem ER COATED BEADS (CARTIA XT) 300 MG 24 hr capsule    Take 1 capsule (300 mg) by mouth daily     Last Written Prescription Date:  10/28/19  Last Fill Quantity: 90,   # refills: 3  Last Office Visit : 10/8/19  Future Office visit:  none    Routing refill request to provider for review/approval because:  Overdue visit/BP and labs - ALT and creatinine. Clinic notified.   Inconsistent medication /directions ER coated beads and requests for diltiazem (CARTIA XT) 300 MG 24 hr capsule. (Patient not taking: Reported on 7/3/2020)    Scheduling has been notified to contact the pt for appointment.

## 2020-12-15 RX ORDER — DILTIAZEM HYDROCHLORIDE 300 MG/1
CAPSULE, EXTENDED RELEASE ORAL
Qty: 90 CAPSULE | Refills: 0 | Status: SHIPPED | OUTPATIENT
Start: 2020-12-15 | End: 2021-04-05

## 2021-01-15 ENCOUNTER — HEALTH MAINTENANCE LETTER (OUTPATIENT)
Age: 81
End: 2021-01-15

## 2021-02-10 ENCOUNTER — IMMUNIZATION (OUTPATIENT)
Dept: NURSING | Facility: CLINIC | Age: 81
End: 2021-02-10
Payer: MEDICARE

## 2021-02-10 PROCEDURE — 91300 PR COVID VAC PFIZER DIL RECON 30 MCG/0.3 ML IM: CPT

## 2021-02-10 PROCEDURE — 0001A PR COVID VAC PFIZER DIL RECON 30 MCG/0.3 ML IM: CPT

## 2021-03-03 ENCOUNTER — IMMUNIZATION (OUTPATIENT)
Dept: NURSING | Facility: CLINIC | Age: 81
End: 2021-03-03
Attending: FAMILY MEDICINE
Payer: MEDICARE

## 2021-03-03 PROCEDURE — 91300 PR COVID VAC PFIZER DIL RECON 30 MCG/0.3 ML IM: CPT

## 2021-03-03 PROCEDURE — 0002A PR COVID VAC PFIZER DIL RECON 30 MCG/0.3 ML IM: CPT

## 2021-03-20 ENCOUNTER — HEALTH MAINTENANCE LETTER (OUTPATIENT)
Age: 81
End: 2021-03-20

## 2021-03-26 ENCOUNTER — DOCUMENTATION ONLY (OUTPATIENT)
Dept: CARE COORDINATION | Facility: CLINIC | Age: 81
End: 2021-03-26

## 2021-04-01 DIAGNOSIS — I10 BENIGN ESSENTIAL HYPERTENSION: Primary | ICD-10-CM

## 2021-04-05 RX ORDER — DILTIAZEM HYDROCHLORIDE 300 MG/1
300 CAPSULE, COATED, EXTENDED RELEASE ORAL DAILY
Qty: 90 CAPSULE | Refills: 0 | Status: SHIPPED | OUTPATIENT
Start: 2021-04-05 | End: 2021-06-21

## 2021-04-06 ENCOUNTER — OFFICE VISIT (OUTPATIENT)
Dept: OPHTHALMOLOGY | Facility: CLINIC | Age: 81
End: 2021-04-06
Attending: OPHTHALMOLOGY
Payer: MEDICARE

## 2021-04-06 DIAGNOSIS — Z96.1 PSEUDOPHAKIA, LEFT EYE: Primary | ICD-10-CM

## 2021-04-06 DIAGNOSIS — H26.492 PCO (POSTERIOR CAPSULAR OPACIFICATION), LEFT: ICD-10-CM

## 2021-04-06 PROCEDURE — G0463 HOSPITAL OUTPT CLINIC VISIT: HCPCS

## 2021-04-06 PROCEDURE — 92014 COMPRE OPH EXAM EST PT 1/>: CPT | Mod: GC | Performed by: OPHTHALMOLOGY

## 2021-04-06 PROCEDURE — 92015 DETERMINE REFRACTIVE STATE: CPT | Mod: GY

## 2021-04-06 ASSESSMENT — VISUAL ACUITY
OS_SC+: -3
CORRECTION_TYPE: GLASSES
METHOD: SNELLEN - LINEAR
OS_SC: 20/20
OD_SC: 2'/200 E

## 2021-04-06 ASSESSMENT — CONF VISUAL FIELD
OD_SUPERIOR_TEMPORAL_RESTRICTION: 1
OD_INFERIOR_TEMPORAL_RESTRICTION: 1
METHOD: COUNTING FINGERS
OD_INFERIOR_NASAL_RESTRICTION: 3
OD_SUPERIOR_NASAL_RESTRICTION: 3
OS_NORMAL: 1

## 2021-04-06 ASSESSMENT — REFRACTION_MANIFEST
OS_SPHERE: -0.50
OS_AXIS: 154
OD_SPHERE: BALANCE
OS_ADD: +2.75
OS_CYLINDER: +0.25

## 2021-04-06 ASSESSMENT — TONOMETRY
OS_IOP_MMHG: 11
IOP_METHOD: TONOPEN
OD_IOP_MMHG: 13

## 2021-04-06 ASSESSMENT — SLIT LAMP EXAM - LIDS
COMMENTS: MGD, BLEPHARITIS
COMMENTS: MGD, BLEPHARITIS

## 2021-04-06 ASSESSMENT — EXTERNAL EXAM - RIGHT EYE: OD_EXAM: NORMAL

## 2021-04-06 ASSESSMENT — EXTERNAL EXAM - LEFT EYE: OS_EXAM: NORMAL

## 2021-04-06 ASSESSMENT — CUP TO DISC RATIO: OS_RATIO: 0.3

## 2021-04-06 NOTE — PROGRESS NOTES
Chief Complaint(s) and History of Present Illness(es)     Annual Eye Exam     Laterality: both eyes    Course: stable    Associated symptoms: Negative for floaters, flashes and eye pain    Pain scale: 0/10              Comments     Pt denies any significant vision changes in either eye since last visit.  Denies any flashes, floaters, pain, or irritation.  Ocular meds: None    Sandra Rogers OT 9:05 AM April 6, 2021         LCV 2019. The patient endorses stable vision since then in current glasses. She denies eye pain or discomfort. She also denies flashes/change in floaters.     Review of systems for the eyes was negative other than the pertinent positives/negatives listed in the HPI.      Assessment & Plan      Shahana Donaldson is a 80 year old female with the following diagnoses:   1. Pseudophakia, left eye    2. PCO (posterior capsular opacification), left    3. Staphyloma of right eye         Long-standing amblyopia right eye. No previous treatments. Stable vision, per patient.  Cataract likely visually significant, but not functionally bothersome at this time  Continue to monitor    Mild posterior capsular opacity (PCO) left eye  Not visually significant   Refractive options reviewed  Refraction given   Monocular precaution discussed    Patient disposition:   Return in about 1 year (around 4/6/2022) for DFE.    Meghan Lopez MD  Ophthalmology Resident, PGY-2    Attending Physician Attestation:  Complete documentation of historical and exam elements from today's encounter can be found in the full encounter summary report (not reduplicated in this progress note).  I personally obtained the chief complaint(s) and history of present illness.  I confirmed and edited as necessary the review of systems, past medical/surgical history, family history, social history, and examination findings as documented by others; and I examined the patient myself.  I personally reviewed the relevant tests, images, and reports  as documented above.  I formulated and edited as necessary the assessment and plan and discussed the findings and management plan with the patient and family. . - Josue Trujillo MD

## 2021-04-06 NOTE — NURSING NOTE
Chief Complaints and History of Present Illnesses   Patient presents with     Annual Eye Exam     Chief Complaint(s) and History of Present Illness(es)     Annual Eye Exam     Laterality: both eyes    Course: stable    Associated symptoms: Negative for floaters, flashes and eye pain    Pain scale: 0/10              Comments     Pt denies any significant vision changes in either eye since last visit.  Denies any flashes, floaters, pain, or irritation.  Ocular meds: None    Sandra Rogers OT 9:05 AM April 6, 2021

## 2021-04-07 ENCOUNTER — OFFICE VISIT (OUTPATIENT)
Dept: DERMATOLOGY | Facility: CLINIC | Age: 81
End: 2021-04-07
Payer: MEDICARE

## 2021-04-07 ENCOUNTER — ANCILLARY PROCEDURE (OUTPATIENT)
Dept: MAMMOGRAPHY | Facility: CLINIC | Age: 81
End: 2021-04-07
Payer: MEDICARE

## 2021-04-07 ENCOUNTER — ANCILLARY PROCEDURE (OUTPATIENT)
Dept: MAMMOGRAPHY | Facility: CLINIC | Age: 81
End: 2021-04-07
Attending: INTERNAL MEDICINE
Payer: MEDICARE

## 2021-04-07 DIAGNOSIS — L90.0 LICHEN SCLEROSUS: ICD-10-CM

## 2021-04-07 DIAGNOSIS — D22.9 MULTIPLE BENIGN NEVI: Primary | ICD-10-CM

## 2021-04-07 DIAGNOSIS — N63.22 BREAST LUMP ON LEFT SIDE AT 10 O'CLOCK POSITION: ICD-10-CM

## 2021-04-07 DIAGNOSIS — D48.9 NEOPLASM OF UNCERTAIN BEHAVIOR: ICD-10-CM

## 2021-04-07 DIAGNOSIS — L82.0 SEBORRHEIC KERATOSIS, INFLAMED: ICD-10-CM

## 2021-04-07 DIAGNOSIS — N63.24 BREAST LUMP ON LEFT SIDE AT 8 O'CLOCK POSITION: ICD-10-CM

## 2021-04-07 DIAGNOSIS — Z12.31 VISIT FOR SCREENING MAMMOGRAM: ICD-10-CM

## 2021-04-07 DIAGNOSIS — N64.4 BREAST PAIN, LEFT: ICD-10-CM

## 2021-04-07 PROCEDURE — 99213 OFFICE O/P EST LOW 20 MIN: CPT | Mod: 25 | Performed by: DERMATOLOGY

## 2021-04-07 PROCEDURE — 88305 TISSUE EXAM BY PATHOLOGIST: CPT | Performed by: DERMATOLOGY

## 2021-04-07 PROCEDURE — 77066 DX MAMMO INCL CAD BI: CPT | Performed by: STUDENT IN AN ORGANIZED HEALTH CARE EDUCATION/TRAINING PROGRAM

## 2021-04-07 PROCEDURE — 76642 ULTRASOUND BREAST LIMITED: CPT | Mod: LT | Performed by: STUDENT IN AN ORGANIZED HEALTH CARE EDUCATION/TRAINING PROGRAM

## 2021-04-07 PROCEDURE — G0279 TOMOSYNTHESIS, MAMMO: HCPCS | Performed by: STUDENT IN AN ORGANIZED HEALTH CARE EDUCATION/TRAINING PROGRAM

## 2021-04-07 PROCEDURE — 11102 TANGNTL BX SKIN SINGLE LES: CPT | Mod: XS | Performed by: DERMATOLOGY

## 2021-04-07 PROCEDURE — 17110 DESTRUCTION B9 LES UP TO 14: CPT | Performed by: DERMATOLOGY

## 2021-04-07 RX ORDER — CLOBETASOL PROPIONATE 0.5 MG/G
OINTMENT TOPICAL
Qty: 60 G | Refills: 3 | Status: SHIPPED | OUTPATIENT
Start: 2021-04-07 | End: 2024-02-16

## 2021-04-07 ASSESSMENT — PAIN SCALES - GENERAL: PAINLEVEL: NO PAIN (0)

## 2021-04-07 NOTE — NURSING NOTE
Dermatology Rooming Note    Shahana Donaldson's goals for this visit include:   Chief Complaint   Patient presents with     Skin Check     pat is coming in today for a skin check, states that is is also needing a refill on the medication Dr. Ontiveros gave her     Twila Allison, CMA on 4/7/2021 at 12:48 PM

## 2021-04-07 NOTE — LETTER
4/7/2021       RE: Shahana Donaldson  [address]  [facility] [address]     Dear Colleague,    Thank you for referring your patient, Shahana Donaldson, to the [facility] [place] at [facility]. Please see a copy of my visit note below.    [facility] Dermatology Note  Encounter Date: Apr 7, 2021  Office Visit     Dermatology Problem List:  1. 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  2. NMSC:  - nBCC, R dorsal wrist, s/p shave bx 4/7/2021 - pending excision  3. Lichen planus, not active.  - Prior rx: clobetasol ointment  4. Biopsy-proven lichen sclerosis of the vaginal area  - clobetasol 0.05% ointment BID prn  5. Contact dermatitis due to  on hands in 2014 (presumed, no prior patch testing).  ____________________________________________    Assessment & Plan:     # Neoplasm of uncertain behavior, R dorsal wrist. Ddx BCC v melanoma v SK.  - See shave bx note below - this returned as pigmented BCC, nodular type - plan for excision    # Lichen sclerosus, genital. Persistent but well-controlled on topicals for flares. Discussed need to use clobetasol for maintenance to prevent progression to SCC.  - Advised at least 2x weekly maintenance use of clobetasol, can increase to BID for flares when needed and then taper slowly back to 2x weekly     # History of lichen planus. None noted on exam today.    # Seborrheic keratoses, symptomatic. Benign nature discussed. Given symptomatic nature on chest/neck, will treat with cryo today.  - See cryo note    # History of atypical junctional melanocytic proliferation. No evidence of recurrent disease.  - Continue photoprotection  - Continue yearly skin exams  - Advised to monitor for changing, non-healing, bleeding, painful, changing, or otherwise symptomatic lesions      Procedures Performed:   - Shave biopsy  procedure note, location(s): see above. After discussion of benefits and risks including but not limited to bleeding, infection, scar, incomplete removal, recurrence, and non-diagnostic biopsy, written consent and photographs were obtained. The area was cleaned with isopropyl alcohol. 0.5mL of 1% lidocaine with epinephrine was injected to obtain adequate anesthesia of lesion(s). Shave biopsy at site(s) performed. Hemostasis was achieved with aluminium chloride. Petrolatum ointment and a sterile dressing were applied. The patient tolerated the procedure and no complications were noted. The patient was provided with verbal and written post care instructions.  - CRYOTHERAPY PROCEDURE NOTE: 3 lesions in the above locations were treated with liquid nitrogen utilizing a 5-10sec thaw time. Patient was advised that the treated areas will become red, swollen, may develop a blister and then should crust and peal off in the next 1-2 weeks. Post-procedure instructions were provided.    Follow-up: 1 year, sooner if concerns.     Staff:     Rosamaria Estrada MD    Department of Dermatology  [facility]: Phone: [phone], Fax: [phone]  4/12/2021    ____________________________________________    CC: Skin Check (nicholas is coming in today for a skin check, states that is is also needing a refill on the medication Dr. Ontiveros gave her)    HPI:  Ms. Shahana Donaldson is a(n) 80 year old female who presents today as a return patient for skin check and LS.    We last saw her in 12/2019. At that time she had a rash on her legs of unknown etiology. It resolved with Amlactin. Now using Eucerin.    Her LS is well-controlled. Uses clobetasol for 5 days in a row about every 1.5 months. Skin still looks white, and feels itching at times.    Patient is otherwise feeling well, without additional skin concerns.     Labs  Reviewed:  N/A    Physical Exam:  Vitals: There were no vitals taken for this visit.  SKIN: Full skin, which includes the head/face, both arms, chest, back, abdomen,both legs, genitalia and/or groin buttocks, digits and/or nails, was examined.  - right dorsal wrist bluish gray papule.  - vulva with adhesion of labia and white patches.  - There are waxy stuck on tan to brown papules on the trunk and extremities.   - No other lesions of concern on areas examined.     Medications:  Current Outpatient Medications   Medication     Acetaminophen (TYLENOL PO)     amLODIPine (NORVASC) 2.5 MG tablet     ammonium lactate (LAC-HYDRIN) 12 % external lotion     atorvastatin (LIPITOR) 10 MG tablet     calcium carbonate 500 mg, elemental, (OSCAL;OYSTER SHELL CALCIUM) 500 MG tablet     clobetasol (TEMOVATE) 0.05 % external ointment     diltiazem (CARTIA XT) 300 MG 24 hr capsule     docusate sodium (COLACE) 100 MG capsule     cholecalciferol (VITAMIN D3) 1000 UNIT tablet     clobetasol (TEMOVATE) 0.05 % ointment     diltiazem ER COATED BEADS (CARTIA XT) 300 MG 24 hr capsule     triamcinolone (KENALOG) 0.1 % external ointment     No current facility-administered medications for this visit.       Past Medical History:   Patient Active Problem List   Diagnosis     Essential hypertension, benign     Mixed hyperlipidemia     Symptomatic menopausal or female climacteric states     Diverticulosis of large intestine     Hyperlipidemia     Coronary atherosclerosis     Esophageal reflux     vulvar cyst     Hiatal hernia     S/P Nissen fundoplication (without gastrostomy tube) procedure     History of tubal ligation     History of dilatation and curettage     History of hysterectomy     Lichen sclerosus     Left displaced femoral neck fracture (H)     Senile osteoporosis     Lichen planus     Seborrheic dermatitis     Past Medical History:   Diagnosis Date     Diverticulosis of colon (without mention of hemorrhage)      Essential hypertension,  benign      Gastro-oesophageal reflux disease     nissen     Hemorrhoid      Nonsenile cataract      Osteoporosis      Other and unspecified hyperlipidemia      Rectocele      Symptomatic menopausal or female climacteric states        CC Referred Self, MD  No address on file on close of this encounter.

## 2021-04-07 NOTE — PATIENT INSTRUCTIONS
We will check one spot today.  We will freeze the irritated thickenings (seborrheic keratoses).  Skin otherwise looks good!    For lichen sclerosus:  - Continue clobetasol as maintenance 2x weekly, can increase for flares as you are doing    Return in 1 year, sooner if concerns.     Wound Care After a Biopsy    What is a skin biopsy?  A skin biopsy allows the doctor to examine a very small piece of tissue under the microscope to determine the diagnosis and the best treatment for the skin condition. A local anesthetic (numbing medicine)  is injected with a very small needle into the skin area to be tested. A small piece of skin is taken from the area. Sometimes a suture (stitch) is used.     What are the risks of a skin biopsy?  I will experience scar, bleeding, swelling, pain, crusting and redness. I may experience incomplete removal or recurrence. Risks of this procedure are excessive bleeding, bruising, infection, nerve damage, numbness, thick (hypertrophic or keloidal) scar and non-diagnostic biopsy.    How should I care for my wound for the first 24 hours?    Keep the wound dry and covered for 24 hours    If it bleeds, hold direct pressure on the area for 15 minutes. If bleeding does not stop then go to the emergency room    Avoid strenuous exercise the first 1-2 days or as your doctor instructs you    How should I care for the wound after 24 hours?    After 24 hours, remove the bandage    You may bathe or shower as normal    If you had a scalp biopsy, you can shampoo as usual and can use shower water to clean the biopsy site daily    Clean the wound twice a day with gentle soap and water    Do not scrub, be gentle    Apply white petroleum/Vaseline after cleaning the wound with a cotton swab or a clean finger, and keep the site covered with a Bandaid /bandage. Bandages are not necessary with a scalp biopsy    If you are unable to cover the site with a Bandaid /bandage, re-apply ointment 2-3 times a day to keep  the site moist. Moisture will help with healing    Avoid strenuous activity for first 1-2 days    Avoid lakes, rivers, pools, and oceans until the stitches are removed or the site is healed    How do I clean my wound?    Wash hands thoroughly with soap or use hand  before all wound care    Clean the wound with gentle soap and water    Apply white petroleum/Vaseline  to wound after it is clean    Replace the Bandaid /bandage to keep the wound covered for the first few days or as instructed by your doctor    If you had a scalp biopsy, warm shower water to the area on a daily basis should suffice    What should I use to clean my wound?     Cotton-tipped applicators (Qtips )    White petroleum jelly (Vaseline ). Use a clean new container and use Q-tips to apply.    Bandaids   as needed    Gentle soap     How should I care for my wound long term?    Do not get your wound dirty    Keep up with wound care for one week or until the area is healed.    A small scab will form and fall off by itself when the area is completely healed. The area will be red and will become pink in color as it heals. Sun protection is very important for how your scar will turn out. Sunscreen with an SPF 30 or greater is recommended once the area is healed.    If you have stitches, stitches need to be removed in 14 days. You may return to our clinic for this or you may have it done locally at your doctor s office.    You should have some soreness but it should be mild and slowly go away over several days. Talk to your doctor about using tylenol for pain,    When should I call my doctor?  If you have increased:     Pain or swelling    Pus or drainage (clear or slightly yellow drainage is ok)    Temperature over 100F    Spreading redness or warmth around wound    When will I hear about my results?  The biopsy results can take 2-3 weeks to come back. The clinic will call you with the results, send you a [employer] message, or have you schedule a  follow-up clinic or phone time to discuss the results. Contact our clinics if you do not hear from us in 3 weeks.     Who should I call with questions?    [facility]: [phone]     [facility]: [phone]    For urgent needs outside of business hours call the [facility] at [phone] and ask for the dermatology resident on call    Cryotherapy    What is it?    Use of a very cold liquid, such as liquid nitrogen, to freeze and destroy abnormal skin cells that need to be removed    What should I expect?    Tenderness and redness    A small blister that might grow and fill with dark purple blood. There may be crusting.    More than one treatment may be needed if the lesions do not go away.    How do I care for the treated area?    Gently wash the area with your hands when bathing.    Use a thin layer of Vaseline to help with healing. You may use a Band-Aid.     The area should heal within 7-10 days and may leave behind a pink or lighter color.     Do not use an antibiotic or Neosporin ointment.     You may take acetaminophen (Tylenol) for pain.     Call your Doctor if you have:    Severe pain    Signs of infection (warmth, redness, cloudy yellow drainage, and or a bad smell)    Questions or concerns    Who should I call with questions?       [facility]: [phone]       [facility]: [phone]       For urgent needs outside of business hours call the [facility] at [phone]        and ask for the dermatology resident on call    Patient Education     Checking for Skin Cancer  You can find cancer early by checking your skin each month. There are 3 kinds of skin cancer. They are melanoma, basal cell carcinoma, and squamous cell carcinoma. Doing monthly skin checks is the best way to find new marks or skin changes. Follow the instructions below for checking your skin.    The ABCDEs of checking moles for melanoma   Check your moles or growths for signs of melanoma using ABCDE:     Asymmetry: the sides of the mole or growth don t match    Border: the edges are ragged, notched, or blurred    Color: the color within the mole or growth varies    Diameter: the mole or growth is larger than 6 mm (size of a pencil eraser)    Evolving: the size, shape, or color of the mole or growth is changing (evolving is not shown in the images below)    Checking for other types of skin cancer  Basal cell carcinoma or squamous cell carcinoma have symptoms such as:       A spot or mole that looks different from all other marks on your skin    Changes in how an area feels, such as itching, tenderness, or pain    Changes in the skin's surface, such as oozing, bleeding, or scaliness    A sore that does not heal    New swelling or redness beyond the border of a mole    Who s at risk?  Anyone can get skin cancer. But you are at greater risk if you have:     Fair skin, light-colored hair, or light-colored eyes    Many moles or abnormal moles on your skin    A history of sunburns from sunlight or tanning beds    A family history of skin cancer    A history of exposure to radiation or chemicals    A weakened immune system  If you have had skin cancer in the past, you are at risk for recurring skin cancer.   How to check your skin  Do your monthly skin checkups in front of a full-length mirror. Check all parts of your body, including your:     Head (ears, face, neck, and scalp)    Torso (front, back, and sides)    Arms (tops, undersides, upper, and lower armpits)    Hands (palms, backs, and fingers, including under the nails)    Buttocks and genitals    Legs (front, back, and sides)    Feet (tops, soles, toes, including under the nails, and between toes)  If you have a lot of moles, take digital photos of them each month. Make sure to take photos both up close and from a distance. These can help you see if any  moles change over time.   Most skin changes are not cancer. But if you see any changes in your skin, call your doctor right away. Only he or she can diagnose a problem. If you have skin cancer, seeing your doctor can be the first step toward getting the treatment that could save your life.   Bo last reviewed this educational content on 4/1/2019 2000-2020 [employer], "[employer]. [address] [place] [address], [place] [address]. All rights reserved. This information is not intended as a substitute for professional medical care. Always follow your healthcare professional's instructions.       When should I call my doctor?    If you are worsening or not improving, please, contact us or seek urgent care as noted below.     Who should I call with questions (adults)?    [facility] (adult and pediatric): [phone]     [facility] (adult): [phone]    For urgent needs outside of business hours call the [facility] at [phone] and ask for the dermatology resident on call    If this is a medical emergency and you are unable to reach an ER, Call [phone]      Who should I call with questions (pediatric)?  [facility]- Pediatric Dermatology  Dr. Anabela Isabel, Dr. Santo Wolff, Dr. Conchis Elias, WILLIAM [address]ins [address] Dr. Cari Ontiveros & Dr. Jeramie Richards  Non Urgent  Nurse Triage Line; [phone]- Patricia and Annmarie PAPPAS Care Coordinators   Diana (/Complex ) [phone]    If you need a prescription refill, please contact your pharmacy. Refills are approved or denied by our Physicians during normal business hours, Monday through Fridays  Per office policy, refills will not be granted if you have not been seen within the past year (or sooner depending on your child's condition)    Scheduling Information:  Pediatric Appointment Scheduling and Call Center [phone]  [phone]  Radiology Scheduling- [phone]  Sedation Unit Scheduling- [phone]  [place] Scheduling- General [phone]; Pediatric Dermatology [phone]  Main  Services: [phone]  Greek: [phone]  Swiss: [phone]  Hmong/Prince/Juan Luis: [phone]  Preadmission Nursing Department Fax Number: [phone] (Fax all pre-operative paperwork to this number)    For urgent matters arising during evenings, weekends, or holidays that cannot wait for normal business hours please call [phone] and ask for the Dermatology Resident On-Call to be paged.

## 2021-04-11 LAB — COPATH REPORT: NORMAL

## 2021-04-12 NOTE — PROGRESS NOTES
[facility] Dermatology Note  Encounter Date: Apr 7, 2021  Office Visit     Dermatology Problem List:  1. 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  2. NMSC:  - nBCC, R dorsal wrist, s/p shave bx 4/7/2021 - pending excision  3. Lichen planus, not active.  - Prior rx: clobetasol ointment  4. Biopsy-proven lichen sclerosis of the vaginal area  - clobetasol 0.05% ointment BID prn  5. Contact dermatitis due to  on hands in 2014 (presumed, no prior patch testing).  ____________________________________________    Assessment & Plan:     # Neoplasm of uncertain behavior, R dorsal wrist. Ddx BCC v melanoma v SK.  - See shave bx note below - this returned as pigmented BCC, nodular type - plan for excision    # Lichen sclerosus, genital. Persistent but well-controlled on topicals for flares. Discussed need to use clobetasol for maintenance to prevent progression to SCC.  - Advised at least 2x weekly maintenance use of clobetasol, can increase to BID for flares when needed and then taper slowly back to 2x weekly     # History of lichen planus. None noted on exam today.    # Seborrheic keratoses, symptomatic. Benign nature discussed. Given symptomatic nature on chest/neck, will treat with cryo today.  - See cryo note    # History of atypical junctional melanocytic proliferation. No evidence of recurrent disease.  - Continue photoprotection  - Continue yearly skin exams  - Advised to monitor for changing, non-healing, bleeding, painful, changing, or otherwise symptomatic lesions      Procedures Performed:   - Shave biopsy procedure note, location(s): see above. After discussion of benefits and risks including but not limited to bleeding, infection, scar, incomplete removal, recurrence, and non-diagnostic biopsy, written consent and photographs were obtained. The area was cleaned with isopropyl alcohol. 0.5mL of 1% lidocaine with epinephrine was  injected to obtain adequate anesthesia of lesion(s). Shave biopsy at site(s) performed. Hemostasis was achieved with aluminium chloride. Petrolatum ointment and a sterile dressing were applied. The patient tolerated the procedure and no complications were noted. The patient was provided with verbal and written post care instructions.  - CRYOTHERAPY PROCEDURE NOTE: 3 lesions in the above locations were treated with liquid nitrogen utilizing a 5-10sec thaw time. Patient was advised that the treated areas will become red, swollen, may develop a blister and then should crust and peal off in the next 1-2 weeks. Post-procedure instructions were provided.    Follow-up: 1 year, sooner if concerns.     Staff:     Rosamaria Estrada MD    Department of Dermatology  [facility]: Phone: [phone], Fax: [phone]  4/12/2021    ____________________________________________    CC: Skin Check (nicholas is coming in today for a skin check, states that is is also needing a refill on the medication Dr. Ontiveros gave her)    HPI:  Ms. Shahana Donaldson is a(n) 80 year old female who presents today as a return patient for skin check and LS.    We last saw her in 12/2019. At that time she had a rash on her legs of unknown etiology. It resolved with Amlactin. Now using Eucerin.    Her LS is well-controlled. Uses clobetasol for 5 days in a row about every 1.5 months. Skin still looks white, and feels itching at times.    Patient is otherwise feeling well, without additional skin concerns.     Labs Reviewed:  N/A    Physical Exam:  Vitals: There were no vitals taken for this visit.  SKIN: Full skin, which includes the head/face, both arms, chest, back, abdomen,both legs, genitalia and/or groin buttocks, digits and/or nails, was examined.  - right dorsal wrist bluish gray papule.  - vulva with adhesion of labia and white patches.  - There are waxy  stuck on tan to brown papules on the trunk and extremities.   - No other lesions of concern on areas examined.     Medications:  Current Outpatient Medications   Medication     Acetaminophen (TYLENOL PO)     amLODIPine (NORVASC) 2.5 MG tablet     ammonium lactate (LAC-HYDRIN) 12 % external lotion     atorvastatin (LIPITOR) 10 MG tablet     calcium carbonate 500 mg, elemental, (OSCAL;OYSTER SHELL CALCIUM) 500 MG tablet     clobetasol (TEMOVATE) 0.05 % external ointment     diltiazem (CARTIA XT) 300 MG 24 hr capsule     docusate sodium (COLACE) 100 MG capsule     cholecalciferol (VITAMIN D3) 1000 UNIT tablet     clobetasol (TEMOVATE) 0.05 % ointment     diltiazem ER COATED BEADS (CARTIA XT) 300 MG 24 hr capsule     triamcinolone (KENALOG) 0.1 % external ointment     No current facility-administered medications for this visit.       Past Medical History:   Patient Active Problem List   Diagnosis     Essential hypertension, benign     Mixed hyperlipidemia     Symptomatic menopausal or female climacteric states     Diverticulosis of large intestine     Hyperlipidemia     Coronary atherosclerosis     Esophageal reflux     vulvar cyst     Hiatal hernia     S/P Nissen fundoplication (without gastrostomy tube) procedure     History of tubal ligation     History of dilatation and curettage     History of hysterectomy     Lichen sclerosus     Left displaced femoral neck fracture (H)     Senile osteoporosis     Lichen planus     Seborrheic dermatitis     Past Medical History:   Diagnosis Date     Diverticulosis of colon (without mention of hemorrhage)      Essential hypertension, benign      Gastro-oesophageal reflux disease     nissen     Hemorrhoid      Nonsenile cataract      Osteoporosis      Other and unspecified hyperlipidemia      Rectocele      Symptomatic menopausal or female climacteric states        CC Referred Self, MD  No address on file on close of this encounter.

## 2021-04-20 ENCOUNTER — TELEPHONE (OUTPATIENT)
Dept: DERMATOLOGY | Facility: CLINIC | Age: 81
End: 2021-04-20

## 2021-04-20 DIAGNOSIS — D48.9 NEOPLASM OF UNCERTAIN BEHAVIOR: Primary | ICD-10-CM

## 2021-04-20 NOTE — TELEPHONE ENCOUNTER
Rosamaria Estrada MD  P [facility]   Cc: P Derm Surg Triage-[facility]; Twila Cooper, TATO             please schedule pt for excision   You may also need to let her know bx results, I can't remember if she uses mychart or not so she might not see my message            Per chart review, patient viewed results via MyChart. Referral placed for derm surg.    Robyn Kat RN

## 2021-04-28 DIAGNOSIS — E78.2 MIXED HYPERLIPIDEMIA: ICD-10-CM

## 2021-04-29 RX ORDER — ATORVASTATIN CALCIUM 10 MG/1
10 TABLET, FILM COATED ORAL DAILY
Qty: 30 TABLET | Refills: 0 | Status: SHIPPED | OUTPATIENT
Start: 2021-04-29 | End: 2021-05-12

## 2021-04-29 NOTE — TELEPHONE ENCOUNTER
Atorvastatin Calcium Oral Tablet 10 MG    Last Written Prescription Date:  6/10/2020  Last Fill Quantity: 90,   # refills: 3  Last Office Visit : 10/8/2019  Future Office visit:  7/13/2021  Routing refill request to provider for review/approval because:  Over due office visit Oct 2019??   Is Pt still seeing Provider in our clinics?  Refer to clinic for review       So Huff RN  Central Triage Red Flags/Med Refills

## 2021-05-03 ENCOUNTER — TELEPHONE (OUTPATIENT)
Dept: DERMATOLOGY | Facility: CLINIC | Age: 81
End: 2021-05-03

## 2021-05-03 NOTE — TELEPHONE ENCOUNTER
Called patient to schedule procedure with Dr. Castro, there was no answer.  Left message with my direct line [phone].

## 2021-05-04 NOTE — TELEPHONE ENCOUNTER
FUTURE VISIT INFORMATION      FUTURE VISIT INFORMATION:    Date: 5.18.21    Time: 3:15    Location: Telephone  REFERRAL INFORMATION:    Referring provider:  Dr. Rosamaria Estrada    Referring providers clinic:  [facility]    Reason for visit/diagnosis  BCC Right arm, bellow wrist     RECORDS REQUESTED FROM:       Clinic name Comments Records Status Photos Status   [facility] 4.7.21  Path # [id number] [facility] Epic

## 2021-05-10 DIAGNOSIS — E78.2 MIXED HYPERLIPIDEMIA: ICD-10-CM

## 2021-05-12 RX ORDER — ATORVASTATIN CALCIUM 10 MG/1
10 TABLET, FILM COATED ORAL DAILY
Qty: 65 TABLET | Refills: 0 | Status: SHIPPED | OUTPATIENT
Start: 2021-05-12 | End: 2021-06-14

## 2021-05-12 NOTE — TELEPHONE ENCOUNTER
Atorvastatin Calcium Oral Tablet 10 MG   Last Written Prescription Date:  4/29/2021  Last Fill Quantity: 30,   # refills: 0  Last Office Visit : 10/8/2019  Future Office visit:  7/13/2021  65 Tabs, sent to pharm 5/12/2021    So Huff RN  Central Triage Red Flags/Med Refills

## 2021-05-15 ENCOUNTER — HEALTH MAINTENANCE LETTER (OUTPATIENT)
Age: 81
End: 2021-05-15

## 2021-05-18 ENCOUNTER — PRE VISIT (OUTPATIENT)
Dept: DERMATOLOGY | Facility: CLINIC | Age: 81
End: 2021-05-18

## 2021-05-18 ENCOUNTER — VIRTUAL VISIT (OUTPATIENT)
Dept: DERMATOLOGY | Facility: CLINIC | Age: 81
End: 2021-05-18
Payer: MEDICARE

## 2021-05-18 DIAGNOSIS — C44.612 BASAL CELL CARCINOMA (BCC) OF SKIN OF RIGHT WRIST: Primary | ICD-10-CM

## 2021-05-18 PROCEDURE — 99441 PR PHYSICIAN TELEPHONE EVALUATION 5-10 MIN: CPT | Mod: 95 | Performed by: DERMATOLOGY

## 2021-05-18 ASSESSMENT — PAIN SCALES - GENERAL: PAINLEVEL: NO PAIN (0)

## 2021-05-18 NOTE — PATIENT INSTRUCTIONS
[facility] Dermatology Visit    Thank you for allowing us to participate in your care. Your findings, instructions and follow-up plan are as follows:         When should I call my doctor?    If you are worsening or not improving, please, contact us or seek urgent care as noted below.     Who should I call with questions (adults)?    [facility] (adult and pediatric): [phone]     [facility] (adult): [phone]    For urgent needs outside of business hours call the [facility] at [phone] and ask for the dermatology resident on call    If this is a medical emergency and you are unable to reach an ER, Call 911      Who should I call with questions (pediatric)?  [facility]- Pediatric Dermatology  Dr. Anabela Isabel, Dr. Santo Wolff, Dr. Conchis Elias, Ivette Amin, PA  Dr. Allegra Bravo, Dr. Cari Ontiveros & Dr. Jeramie Richards  Non Urgent  Nurse Triage Line; [phone]- Patricia and Annmarie RN Care Coordinators   [place]iana (/Complex ) [phone]    If you need a prescription refill, please contact your pharmacy. Refills are approved or denied by our Physicians during normal business hours, Monday through Fridays  Per office policy, refills will not be granted if you have not been seen within the past year (or sooner depending on your child's condition)    Scheduling Information:  Pediatric Appointment Scheduling and Call Center [phone]  Radiology Scheduling- [phone]  Sedation Unit Scheduling- [phone]  [place] Scheduling- General [phone]; Pediatric Dermatology [phone]  Main  Services: [phone]  Danish: [phone]  Guatemalan: [phone]  Hmong/Romanian/Greenlandic: [phone]  Preadmission Nursing Department Fax Number: [phone] (Fax all pre-operative paperwork to this number)    For urgent matters arising during evenings,  weekends, or holidays that cannot wait for normal business hours please call [phone] and ask for the Dermatology Resident On-Call to be paged.

## 2021-05-18 NOTE — NURSING NOTE
Chief Complaint   Patient presents with     Derm Problem     Pat having consult for BCC on right arm     Mary Blanco LPN

## 2021-05-18 NOTE — PROGRESS NOTES
[facility] Dermatology Note  Encounter Date: May 18, 2021  Store-and-Forward and Telephone ([phone]). Location of teledermatologist: [facility] DERMATOLOGIC SURGERY CLINIC [place].  Start time: 2. End time: 2:10.    Dermatology Problem List:  1. BCC R wrist    ____________________________________________    Assessment & Plan:     BCC R wrist -- ~ 1 cm tumor in M risk area meets AUC.  Anticipate CLC.  Discussed R/B of Mohs.  Surgery is already scheduled.          Follow-up: for surgery    Staff:     Arnaud Castro MD    ____________________________________________    CC: Derm Problem (Pat having consult for BCC on right arm)    HPI:  Ms. Shahana Donaldson is a(n) 80 year old female who presents today as a new patient for BCC on R wrist    Patient is otherwise feeling well, without additional skin concerns.    Labs Reviewed:  Dermpath 4/7/21 - pigmented BCC    Hx of Skin Cancer: No  Hx of Mohs Surgery: No  Transplant: No  Immunocompromised: No  Current Anticoagulant(s): None  Bleeding Disorder(s): No  Stent: No  Pacemaker: No  Defibrillator: No  Brain/Nerve Stimulator: No  Endocarditis/Rheumatic Fever Hx: No  Vascular graft: No  Prophylactic Antibiotic Needed: No  Congenital heart defect: No  Prosthetic Heart Valve: No  Lesion on Leg/Groin: No  Prosthetic Joint : Yes (2019 )  Diabetic: No  HIV/AIDS: No  Hepatitis: No  Prior Problem with Local Anesthesia: No  Patient wears CPAP mask: No  Current Tobacco Use: No  Current Alcohol Use: No  Extended Care Facility: No  Occupation: Retired  Do you have mobility issues?: No  Do you use any assistive devices/DME?: No  Do you have any issues with lying for long periods of time?: No      Medications:  Current Outpatient Medications   Medication     Acetaminophen (TYLENOL PO)     amLODIPine (NORVASC) 2.5 MG tablet     ammonium lactate (LAC-HYDRIN) 12 % external lotion     atorvastatin (LIPITOR) 10 MG tablet     calcium carbonate 500 mg,  elemental, (OSCAL;OYSTER SHELL CALCIUM) 500 MG tablet     clobetasol (TEMOVATE) 0.05 % external ointment     diltiazem (CARTIA XT) 300 MG 24 hr capsule     docusate sodium (COLACE) 100 MG capsule     cholecalciferol (VITAMIN D3) 1000 UNIT tablet     clobetasol (TEMOVATE) 0.05 % ointment     diltiazem ER COATED BEADS (CARTIA XT) 300 MG 24 hr capsule     triamcinolone (KENALOG) 0.1 % external ointment     No current facility-administered medications for this visit.       Past Medical/Surgical History:   Patient Active Problem List   Diagnosis     Essential hypertension, benign     Mixed hyperlipidemia     Symptomatic menopausal or female climacteric states     Diverticulosis of large intestine     Hyperlipidemia     Coronary atherosclerosis     Esophageal reflux     vulvar cyst     Hiatal hernia     S/P Nissen fundoplication (without gastrostomy tube) procedure     History of tubal ligation     History of dilatation and curettage     History of hysterectomy     Lichen sclerosus     Left displaced femoral neck fracture (H)     Senile osteoporosis     Lichen planus     Seborrheic dermatitis     Past Medical History:   Diagnosis Date     Diverticulosis of colon (without mention of hemorrhage)      Essential hypertension, benign      Gastro-oesophageal reflux disease     nissen     Hemorrhoid      Nonsenile cataract      Osteoporosis      Other and unspecified hyperlipidemia      Rectocele      Symptomatic menopausal or female climacteric states        CC Rosamaria Estrada MD   DERMATOLOGY  [address] [place], [place] [address] [place] [address] on close of this encounter.

## 2021-05-24 ENCOUNTER — OFFICE VISIT (OUTPATIENT)
Dept: DERMATOLOGY | Facility: CLINIC | Age: 81
End: 2021-05-24
Payer: MEDICARE

## 2021-05-24 VITALS — DIASTOLIC BLOOD PRESSURE: 90 MMHG | SYSTOLIC BLOOD PRESSURE: 151 MMHG | HEART RATE: 78 BPM

## 2021-05-24 DIAGNOSIS — D48.9 NEOPLASM OF UNCERTAIN BEHAVIOR: ICD-10-CM

## 2021-05-24 DIAGNOSIS — C44.612: Primary | ICD-10-CM

## 2021-05-24 PROCEDURE — 17313 MOHS 1 STAGE T/A/L: CPT | Mod: GC | Performed by: DERMATOLOGY

## 2021-05-24 PROCEDURE — 12032 INTMD RPR S/A/T/EXT 2.6-7.5: CPT | Mod: GC | Performed by: DERMATOLOGY

## 2021-05-24 ASSESSMENT — PAIN SCALES - GENERAL: PAINLEVEL: NO PAIN (0)

## 2021-05-24 NOTE — LETTER
5/24/2021       RE: Shahana Donaldson  [address]  [facility] [address]     Dear Colleague,    Thank you for referring your patient, Shahana Donaldson, to the [facility] DERMATOLOGIC SURGERY CLINIC [place] at [facility]. Please see a copy of my visit note below.    [facility] Procedure Note      Date of Service:  May 24, 2021  Surgery: Mohs micrographic surgery    Case 1  Repair Type: intermediate linear repair  Repair Size: 4.0 cm  Suture Material: 4-0 Monocryl and 5-0 Fast absorbing gut  Tumor Type: BCC  Location: right arm/wrist  Derm-Path Accession #: [id number]   PreOp Size: 0.5 x 0.5 cm  PostOp Size: 1.3 x 1.0 cm  Mohs Accession #:   Level of Defect: Fat      Procedure:  We discussed the principles of treatment and most likely complications including scarring, bleeding, infection, swelling, pain, crusting, nerve damage, large wound,  incomplete excision, wound dehiscence,  nerve damage, recurrence, and a second procedure may be recommended to obtain the best cosmetic or functional result.    Informed consent was obtained and the patient underwent the procedure as follows:  The patient was placed supine on the operating table.  The cancer was identified, outlined with a marker, and verified by the patient.  The entire surgical field was prepped with chlorhexidine.  The surgical site was anesthetized using 1% lidocaine with epinephrine.    The area of clinically apparent tumor was debulked. The layer of tissue was then surgically excised using a #15 blade and was then transferred onto a specimen sheet maintaining the orientation of the specimen. Hemostasis was obtained using electrocoagulation. The wound site was then covered with a dressing while the tissue samples were processed for examination.    The excised tissue was transported to the Mohs histology laboratory maintaining the  tissue orientation.  The tissue specimen was relaxed so that the entire surgical margin was in a a single horizontal plane for sectioning and inked for precise mapping.  A precise reference map was drawn to reflect the sectioning of the specimen, colored inking of the margins, and orientation on the patient. The tissue was processed using horizontal sectioning of the base and continuous peripheral margins.  The histopathologic sections were reviewed in conjunction with the reference map.    Total blocks: 1    Total slides:  2    There were no cancer cells visualized on examination, therefore Mohs surgery was complete.    REPAIR: An intermediate layered linear closure was selected as the procedure which would maximally preserve both function and cosmesis.    After the excision of the tumor, the area was carefully undermined. Hemostasis was obtained with electrocoagulation.  Closure was oriented so that the wound was in the patient's natural skin tension lines.     The subcutaneous and dermal layers were then closed with 4-0 Monocryl sutures. The epidermis was then carefully approximated along the length of the wound using 5-0 Fast absorbing gut simple running sutures.     Estimated blood loss was less than 10 ml for all surgical sites. A sterile pressure dressing was applied and wound care instructions, with a written handout, were given. The patient was discharged from the Dermatologic Surgery Center alert and ambulatory.    Follow-up in 2 weeks for virtual follow up.     Dr. Cisneros performed the micrographic surgery and Dr. Castro performed the reconstruction.     Eladio Cisneros MD  PGY-6    Micrographic Surgery and Dermatologic Oncology Fellow  May 24, 2021              Attestation signed by Arnaud Castro MD at 6/1/2021  3:39 PM:    Attending attestation:  I was present for key elements of the procedure and immediately available for all other portions of the procedure.  I have reviewed the note and edited it as  necessary.    Arnaud Castro M.D.  Professor  Director of Dermatologic Surgery  Department of Dermatology  [facility]    Dermatology Surgery Clinic  [facility] [place] [id number]

## 2021-05-24 NOTE — PATIENT INSTRUCTIONS
Wound Care Instructions  I will experience scar, altered skin color, bleeding, swelling, pain, crusting and redness. I may experience altered sensation. Risks are excessive bleeding, infection, muscle weakness, thick (hypertrophic or keloidal) scar, and recurrence,. A second procedure may be recommended to obtain the best cosmetic or functional result.  Possible complications of any surgical procedure are bleeding, infection, scarring, alteration in skin color and sensation, muscle weakness in the area, wound dehiscence or seperation, or recurrence of the lesion or disease. On occasion, after healing, a secondary procedure or revision may be recommended in order to obtain the best cosmetic or functional result.   After your surgery, a pressure bandage will be placed over the area that has sutures. This will help prevent bleeding.   For the First 48 hours After Surgery:  1. Leave the pressure bandage on and keep it dry. If it should come loose, you may retape it, but do not take it off.  2. Relax and take it easy. Do not do any vigorous exercise, heavy lifting, or bending forward. This could cause the wound to bleed.  3. Post-operative pain is usually mild. You may take plain or extra strength Tylenol every 4 hours as needed (do not take more than 4,000mg in one day). Do not take any medicine that contains aspirin, ibuprofen or motrin unless you have been recommended these by a doctor.  Avoid alcohol and vitamin E as these may increase your tendency to bleed.  4. You may put an ice pack around the bandaged area for 20 minutes every 2-3 hours. This may help reduce swelling, bruising, and pain. Make sure the ice pack is waterproof so that the pressure bandage does not get wet.   5. You may see a small amount of drainage or blood on your pressure bandage. This is normal. However, if drainage or bleeding continues or saturates the bandage, you will need to apply firm pressure over the bandage with a washcloth for 15  minutes. If bleeding continues after applying pressure for 15 minutes then go to the nearest emergency room.  48 Hours After Surgery  Carefully remove the bandage and start daily wound care and dressing changes. You may also now shower and get the wound wet. Wash wound with a mild soap and water.  Use caution when washing the wound. Be gentle and do not let the forceful shower stream hit the wound directly.  PAT dry.  Daily Wound Care:  1. Wash wound with a mild soap and water.  Use caution when washing the wound, be gentle and do not let the forceful shower stream hit the wound directly.  2. PAT DRY.  3. Apply Vaseline (from a new container or tube) over the suture line with a Q-tip. It is very important to keep the wound continuously moist, as wounds heal best in a moist environment.  4.  Keep the site covered until sutures are removed, you can cover it with a Telfa (non-stick) dressing and tape or a band-aid.    5. If you are unable to keep wound covered, you must apply Vaseline every 2 - 3 hours (while awake) to ensure it is being kept moist for optimal healing. A dressing overnight is recommended to keep the area moist.   Call Us If:  1. You have pain that is not controlled with Tylenol.  2. You have signs or symptoms of an infection, such as: fever over 100 degrees F, redness, warmth, or foul-smelling or yellow/creamy drainage from the wound.  Who should I call with questions?    [facility]: [phone]     [facility]: [phone]    For urgent needs outside of business hours call the [facility] at [phone] and ask for the dermatology resident on call

## 2021-05-24 NOTE — NURSING NOTE
Chief Complaint   Patient presents with     Derm Problem     Patient is here today for mohs on right arm      Sharon GRACE CMA

## 2021-05-24 NOTE — PROGRESS NOTES
[facility] Dermatologic Surgery Clinic [place] Procedure Note      Date of Service:  May 24, 2021  Surgery: Mohs micrographic surgery    Case 1  Repair Type: intermediate linear repair  Repair Size: 4.0 cm  Suture Material: 4-0 Monocryl and 5-0 Fast absorbing gut  Tumor Type: BCC  Location: right arm/wrist  Derm-Path Accession #: [id number]   PreOp Size: 0.5 x 0.5 cm  PostOp Size: 1.3 x 1.0 cm  Mohs Accession #:   Level of Defect: Fat      Procedure:  We discussed the principles of treatment and most likely complications including scarring, bleeding, infection, swelling, pain, crusting, nerve damage, large wound,  incomplete excision, wound dehiscence,  nerve damage, recurrence, and a second procedure may be recommended to obtain the best cosmetic or functional result.    Informed consent was obtained and the patient underwent the procedure as follows:  The patient was placed supine on the operating table.  The cancer was identified, outlined with a marker, and verified by the patient.  The entire surgical field was prepped with chlorhexidine.  The surgical site was anesthetized using 1% lidocaine with epinephrine.    The area of clinically apparent tumor was debulked. The layer of tissue was then surgically excised using a #15 blade and was then transferred onto a specimen sheet maintaining the orientation of the specimen. Hemostasis was obtained using electrocoagulation. The wound site was then covered with a dressing while the tissue samples were processed for examination.    The excised tissue was transported to the Mohs histology laboratory maintaining the tissue orientation.  The tissue specimen was relaxed so that the entire surgical margin was in a a single horizontal plane for sectioning and inked for precise mapping.  A precise reference map was drawn to reflect the sectioning of the specimen, colored inking of the margins, and orientation on the patient. The tissue was processed using  horizontal sectioning of the base and continuous peripheral margins.  The histopathologic sections were reviewed in conjunction with the reference map.    Total blocks: 1    Total slides:  2    There were no cancer cells visualized on examination, therefore Mohs surgery was complete.    REPAIR: An intermediate layered linear closure was selected as the procedure which would maximally preserve both function and cosmesis.    After the excision of the tumor, the area was carefully undermined. Hemostasis was obtained with electrocoagulation.  Closure was oriented so that the wound was in the patient's natural skin tension lines.     The subcutaneous and dermal layers were then closed with 4-0 Monocryl sutures. The epidermis was then carefully approximated along the length of the wound using 5-0 Fast absorbing gut simple running sutures.     Estimated blood loss was less than 10 ml for all surgical sites. A sterile pressure dressing was applied and wound care instructions, with a written handout, were given. The patient was discharged from the Dermatologic Surgery Center alert and ambulatory.    Follow-up in 2 weeks for virtual follow up.     Dr. Cisneros performed the micrographic surgery and Dr. Castro performed the reconstruction.     Eladio Cisneros MD  PGY-6    Micrographic Surgery and Dermatologic Oncology Fellow  May 24, 2021

## 2021-06-02 VITALS — BODY MASS INDEX: 21.93 KG/M2 | WEIGHT: 148.5 LBS

## 2021-06-07 ENCOUNTER — OFFICE VISIT (OUTPATIENT)
Dept: DERMATOLOGY | Facility: CLINIC | Age: 81
End: 2021-06-07
Payer: MEDICARE

## 2021-06-07 DIAGNOSIS — Z51.89 VISIT FOR WOUND CHECK: Primary | ICD-10-CM

## 2021-06-07 PROCEDURE — 99024 POSTOP FOLLOW-UP VISIT: CPT | Mod: GC | Performed by: DERMATOLOGY

## 2021-06-07 ASSESSMENT — PAIN SCALES - GENERAL: PAINLEVEL: NO PAIN (0)

## 2021-06-07 NOTE — NURSING NOTE
Chief Complaint   Patient presents with     Derm Problem     2 months post op, concerned about how long to bandage     Portia Gordon EMT

## 2021-06-07 NOTE — PROGRESS NOTES
Dermatologic Surgery Note    Dermatology Surgery Clinic  [facility]  [address] [place] [address]    Dermatology Problem List  1. BCC R wrist, s/p MMS 5/24/2021    Subjective: Ms. 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     She is healing well overall. She wonders when she can stop wound care and when the incision will appear more normal.     Overall, the patient is feeling well. She also wonders if treatment of some of the brown spots on the back of her hand would be helpful in preventing additional skin cancers.     Objective:   Gen: This is a well appearing woman in no acute distress. Patient is alert and oriented x 3.  An exam of the right dorsal hand, wrist and forearm was performed today   - Over the right distal dorsal forearm is a well healing linear incision line with expected post operative erythema and edema.   - Relatively well demarcated tan to brown macules over the bilateral dorsal hands    Assessment and Plan:   (1) History of BCC of the right wrist s/p MMS 5/24/2021  - Discussed the nature of the diagnosis/condition  - Healing very well, no need for further wound care. Discussed normal healing process and scar maturation.   - Skin exam every 6 months.     Patient was discussed with and evaluated by attending physician Dr. Gloria Cisneros MD  PGY-6    Micrographic Surgery and Dermatologic Oncology Fellow  June 7, 2021

## 2021-06-07 NOTE — LETTER
6/7/2021       RE: Shahana Donaldson  [address] [facility] [address]     Dear Colleague,    Thank you for referring your patient, Shahana Donaldson, to the [facility] DERMATOLOGIC SURGERY CLINIC [place] at [facility]. Please see a copy of my visit note below.    Dermatologic Surgery Note    Dermatology Surgery Clinic  [facility]  [address] [place] [address]    Dermatology Problem List  1. BCC R wrist, s/p MMS 5/24/2021    Subjective: Ms. 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     She is healing well overall. She wonders when she can stop wound care and when the incision will appear more normal.     Overall, the patient is feeling well. She also wonders if treatment of some of the brown spots on the back of her hand would be helpful in preventing additional skin cancers.     Objective:   Gen: This is a well appearing woman in no acute distress. Patient is alert and oriented x 3.  An exam of the right dorsal hand, wrist and forearm was performed today   - Over the right distal dorsal forearm is a well healing linear incision line with expected post operative erythema and edema.   - Relatively well demarcated tan to brown macules over the bilateral dorsal hands    Assessment and Plan:   (1) History of BCC of the right wrist s/p MMS 5/24/2021  - Discussed the nature of the diagnosis/condition  - Healing very well, no need for further wound care. Discussed normal healing process and scar maturation.   - Skin exam every 6 months.     Patient was discussed with and evaluated by attending physician Dr. Gloria Cisneros MD  PGY-6    Micrographic Surgery and Dermatologic Oncology Fellow  June 7, 2021                Attestation signed by  Arnaud Castro MD at 6/15/2021 12:23 PM:  Attending Attestation  I attest that the Fellow recorded the interview and exam that I personally performed.  I have reviewed the note and edited it as necessary.    Arnaud Castro M.D.  Professor  Director of Dermatologic Surgery  Department of Dermatology  [facility]

## 2021-06-09 DIAGNOSIS — E78.2 MIXED HYPERLIPIDEMIA: ICD-10-CM

## 2021-06-10 DIAGNOSIS — I10 ESSENTIAL HYPERTENSION, BENIGN: ICD-10-CM

## 2021-06-14 RX ORDER — AMLODIPINE BESYLATE 2.5 MG/1
2.5 TABLET ORAL DAILY
Qty: 90 TABLET | Refills: 0 | Status: SHIPPED | OUTPATIENT
Start: 2021-06-14 | End: 2024-01-10

## 2021-06-14 RX ORDER — ATORVASTATIN CALCIUM 10 MG/1
10 TABLET, FILM COATED ORAL DAILY
Qty: 30 TABLET | Refills: 0 | Status: SHIPPED | OUTPATIENT
Start: 2021-06-14 | End: 2021-07-29

## 2021-06-14 NOTE — TELEPHONE ENCOUNTER
Last Clinic Visit:  7/3/20   [place]: 7/9/21  RTC  1 YEAR  CARD FYI  : 90 DAY RF SENT BP > 140/90  & OVER DUE CR

## 2021-06-14 NOTE — TELEPHONE ENCOUNTER
Last Clinic Visit: 10/8/2019  [facility]  Next appointment 7- with labs ordered for that visit.

## 2021-06-18 DIAGNOSIS — I10 BENIGN ESSENTIAL HYPERTENSION: ICD-10-CM

## 2021-06-21 NOTE — PROGRESS NOTES
[facility]    NAME:  Shahana Donaldson             :  1940  MRN: [id number]  CODE STATUS:  FULL CODE    FACILITY:  [facility] [[id number]]       ROOM:   130    CHIEF COMPLAINT/REASON FOR VISIT:  Chief Complaint   Patient presents with     Problem Visit     Left hip fracture sustained after a fall     HISTORY OF PRESENT ILLNESS: Shahana Donaldson is a 78 y.o. female who presented on 10/22/18 with evaluation following a fall. She states that she was in the process of moving things to her cabin and she fell down 2-3 carpeted stairs, landing on her left hip. She denied any LOC, head strike, lightheadedness, or dizziness with this fall. She was brought to [facility] for evaluation and and found to have left femoral neck fracture. She was seen by orthopedics and admitted to the Trauma service. Her hospital course is as listed below by problem.     Ground level fall. The patient sustained the above injury as a result of mechanical fall. The patient underwent tertiary examination to evaluate for additional injuries. The systematic review did not find any other injuries. She was seen by therapies for her fall.     Left femoral neck fracture s/p Left hemiarthroplasty 10/23/18. Osteoporosis. Ms. Donaldson was evaluated by Orthopedics and underwent left hip jorge alberto-arthroplasty on 10/23/15 by Dr. Gutierrez. Please see operative note by Dr Gutierrez for details regarding the procedure. She will need Lovenox for DVT prophylaxis for 2 weeks followed by full strength aspirin for 6 weeks. She can be weight bearing as tolerated. She should follow up in the Orthopedic Clinic in 2 weeks. She was evaluated by physical and occupational therapies during her hospitalization. Please see summary of most current therapy notes below.     She will continue on calcium and vitamin D for supplementation for her osteoporosis.     Acute pain. Ms. Donaldson's pain was controlled with acetaminophen alone.  She had a single injection of Exparel by anesthesia on the day of surgery. She never required narcotic pain medication post-operatively.     Hypertension and hyperlipidemia. Her home medications (diltiazem and atorvastatin) were resume at prior to admission doses, no changes were made to her regimen.     Patient was stabilized and transferred to TCU for continued rehabilitation.    Past Medical History:   Diagnosis Date     Diverticulosis of colon      GERD (gastroesophageal reflux disease)      Hemorrhoid      Hiatal hernia 2013    s/p lap nissen     HLD (hyperlipidemia)      HTN (hypertension)      Left displaced femoral neck fracture (H)      Leukocytosis      Nonsenile cataract      Osteoporosis      Rectocele      Symptomatic menopausal or female climacteric states      Past Surgical History:   Procedure Laterality Date     COLONOSCOPY  5/24/11, 11/10/2015     DILATION AND CURETTAGE OF UTERUS      secondary to menorrhagia     HEMIARTHROPLASTY HIP Left 10/23/2018     HYSTERECTOMY      hysterectomy/oopherectomy; done for prolapse     LAPAROSCOPIC NISSEN FUNDOPLICATION  01/07/2013     TUBAL LIGATION Bilateral      Family History   Problem Relation Age of Onset     Hypertension Mother      Coronary artery disease Father      Hypertension Father      Breast cancer Sister      Keratoconus Brother      Social History     Social History     Marital status:      Spouse name: N/A     Number of children: N/A     Years of education: N/A     Occupational History     Not on file.     Social History Main Topics     Smoking status: Never Smoker     Smokeless tobacco: Never Used     Alcohol use No     Drug use: No     Sexual activity: Yes     Partners: Male     Birth control/ protection: Post-menopausal     Other Topics Concern     Not on file     Social History Narrative     No narrative on file     Allergies   Allergen Reactions     Dilaudid [Hydromorphone] Dizziness     Senna Hives     Current Outpatient Prescriptions    Medication Sig Dispense Refill     acetaminophen (TYLENOL) 325 MG tablet Take 975 mg by mouth every 6 (six) hours.       [START ON 11/8/2018] aspirin 325 MG EC tablet Take 325 mg by mouth daily. Start after lovenox course       atorvastatin (LIPITOR) 10 MG tablet Take 10 mg by mouth at bedtime.       calcium, as carbonate, (OS-MARCK) 500 mg calcium (1,250 mg) tablet Take 3 tablets by mouth daily.       cholecalciferol, vitamin D3, (VITAMIN D3) 2,000 unit Tab Take 2,000 Units by mouth daily.       clobetasol (TEMOVATE) 0.05 % ointment Apply 1 application topically 2 (two) times a week. Small amount on Mon, Thur       clobetasol (TEMOVATE) 0.05 % ointment Apply 1 application topically 2 (two) times a day.       diltiazem (CARDIZEM CD) 300 MG 24 hr capsule Take 300 mg by mouth daily.       docusate sodium (COLACE) 100 MG capsule Take 100 mg by mouth 2 (two) times a day.       enoxaparin (LOVENOX) 40 mg/0.4 mL syringe Inject 40 mg under the skin daily.       methocarbamol (ROBAXIN) 750 MG tablet Take 750 mg by mouth every 6 (six) hours as needed.       No current facility-administered medications for this visit.        REVIEW OF SYSTEMS:    Currently, no fever, chills, or rigors. Does not have any visual or hearing problems. Denies any chest pain, headaches, palpitations, lightheadedness, dizziness, shortness of breath, or cough. Appetite is good. Denies any GERD symptoms. Denies any difficulty with swallowing, nausea, or vomiting.  Denies any abdominal pain, diarrhea or constipation. Denies any urinary symptoms. No insomnia. No active bleeding. No rash.       PHYSICAL EXAMINATION:  Vitals:    10/31/18 2253   BP: (!) 153/95   Pulse: 86   Resp: 20   Temp: 97.8  F (36.6  C)   SpO2: 95%   Weight: 148 lb 8 oz (67.4 kg)       GENERAL: Awake, Alert, oriented x3, not in any form of acute distress, answers questions appropriately, follows simple commands, conversant  HEENT: Head is normocephalic with normal hair distribution.  No evidence of trauma. Ears: No acute purulent discharge. Eyes: Conjunctivae pink with no scleral jaundice. Nose: Normal mucosa and septum. NECK: Supple with no cervical or supraclavicular lymphadenopathy. Trachea is midline.   CHEST: No tenderness or deformity, no crepitus  LUNG: Clear to auscultation with good chest expansion. There are no crackles or wheezes, normal AP diameter.  BACK: No kyphosis of the thoracic spine. Symmetric, no curvature, ROM normal, no CVA tenderness, no spinal tenderness   CVS: There is good S1  S2, there are no murmurs, rubs, gallops, or heaves, rhythm is regular,  2+ pulses symmetric in all extremities.  ABDOMEN: Globular and soft, nontender to palpation, non distended, no masses, no organomegaly, good bowel sounds, no rebound or guarding, no peritoneal signs.   EXTREMITIES:  Full range of motion on both upper and lower extremities, there is no tenderness to palpation, no pedal edema, no cyanosis or clubbing, no calf tenderness.  Pulses equal in all extremities, normal cap refill, no joint swelling.  SKIN: Warm and dry, no erythema noted.  Skin color, texture, no rashes or lesions.  NEUROLOGICAL: The patient is oriented to person, place and time. Strength and sensation are grossly intact. Face is symmetric.    LABS:  All labs reviewed in the nursing home record.    ASSESSMENT/PLAN:    1. Femoral neck fracture (H) - s/p hemiarthroplasty, continue PT/OT, Robaxin and Tylenol.  Followed by Orthopedics.  Anticoagulated with Lovenox   2. S/P hip hemiarthroplasty - See above   3. HTN (hypertension) - Blood pressures are within target range, will continue Diltiazem   4. Osteoporosis - Continue Calcium + Vitamin D   5. Pain management - See above         Electronically signed by:  Candis Carr CNP    35 minutes TT of which 50% was spent in counseling and coordination of care of the above plan.    Time spent in interview and examination of patient, review of available records, and  discussion with nursing staff. Continue care plan, efforts at therapy, and monitor nutritional status.

## 2021-06-21 NOTE — TELEPHONE ENCOUNTER
Cartia XT Oral Capsule Extended Release 24 Hour 300 MG      Last Written Prescription Date:  4/5/21 as diltiazem ER  Last Fill Quantity: 90,   # refills: 0  Last Office Visit : 10/8/19  Future Office visit:  6/30/21    Routing refill request to provider for review/approval because:  Blood pressure out of range   BP Readings from Last 3 Encounters:   05/24/21 (!) 151/90   01/03/20 (!) 148/78   11/25/19 (!) 142/82     >18 month since last visit  Overdue for labs  Lab Test 08/09/19  1147   ALT 17     Lab Test 11/25/19  0956 08/09/19  1154   CR 0.76  --    CREAT  --  0.6     Nothing more recent in encounters, media nor care everywhere  No future orders in que

## 2021-06-21 NOTE — PROGRESS NOTES
[facility]      Facility:    [facility] [[id number]]  Code Status: FULL CODE      Chief Complaint/Reason for Visit:  Chief Complaint   Patient presents with     H & P       HPI:   Shahana is a 78 y.o. female who presented to the hospital with left femoral neck fracture which was displaced.  She underwent left hip hemiarthroplasty.  The original fall occurred when she was moving things at her cabin and fell down 2-3 carpeted stairs landing on her left hip.  She did not have loss of consciousness nor did she hit her head and she had no problem with lightheadedness or dizziness that led to the fall.  She has underlying medical conditions of hypertension and hyperlipidemia.    Upon current review of systems, she had a question about the muscle relaxant medicine, and I explained that this was not a pain medicine but rather that indirectly it may help for pain.  Muscles have problems and she states that she is not currently and she has been able to manage her pain with the Tylenol, but she is interested if there is something else that can be used if more severe pain does occur, such as a type of pain that would keep her awake at night or limit her activities at therapy.  She is not having fevers or chills.  She does not have sore throat or nasal congestion.  She does not have chest pain or palpitations of the heart.  She does not have cough or shortness of breath.  She does not have abdominal pain or nausea.  She does not have dysuria.    Past Medical History:  Past Medical History:   Diagnosis Date     Diverticulosis of colon      GERD (gastroesophageal reflux disease)      Hemorrhoid      Hiatal hernia 2013    s/p lap nissen     HLD (hyperlipidemia)      HTN (hypertension)      Left displaced femoral neck fracture (H)      Leukocytosis      Nonsenile cataract      Osteoporosis      Rectocele      Symptomatic menopausal or female climacteric states            Surgical History:  Past  Surgical History:   Procedure Laterality Date     COLONOSCOPY  5/24/11, 11/10/2015     DILATION AND CURETTAGE OF UTERUS      secondary to menorrhagia     HEMIARTHROPLASTY HIP Left 10/23/2018     HYSTERECTOMY      hysterectomy/oopherectomy; done for prolapse     LAPAROSCOPIC NISSEN FUNDOPLICATION  01/07/2013     TUBAL LIGATION Bilateral        Family History:   Family History   Problem Relation Age of Onset     Hypertension Mother      Coronary artery disease Father      Hypertension Father      Breast cancer Sister      Keratoconus Brother        Social History:    Social History     Social History     Marital status:      Spouse name: N/A     Number of children: N/A     Years of education: N/A     Social History Main Topics     Smoking status: Never Smoker     Smokeless tobacco: Never Used     Alcohol use No     Drug use: No     Sexual activity: Yes     Partners: Male     Birth control/ protection: Post-menopausal     Other Topics Concern     Not on file     Social History Narrative     No narrative on file          Review of Systems   All other systems reviewed and are negative.      Vitals:    10/31/18 1445   BP: (!) 153/95   Pulse: 86   Resp: 20   Temp: 97.8  F (36.6  C)   SpO2: 95%       Physical Exam   Constitutional: No distress.   HENT:   Mouth/Throat: Oropharynx is clear and moist.   Eyes: Right eye exhibits no discharge. Left eye exhibits no discharge.   Neck: No JVD present. No thyromegaly present.   Cardiovascular: Normal rate, regular rhythm and normal heart sounds.    Pulmonary/Chest: Breath sounds normal. No respiratory distress.   Abdominal: Soft. Bowel sounds are normal. She exhibits no distension. There is no tenderness.   Musculoskeletal: She exhibits no edema or tenderness.   Lymphadenopathy:     She has no cervical adenopathy.   Neurological: She is alert.   Skin: Skin is warm and dry.   Psychiatric: She has a normal mood and affect.   Nursing note and vitals reviewed.      Medication  List:  Current Outpatient Prescriptions   Medication Sig     [START ON 11/8/2018] aspirin 325 MG EC tablet Take 325 mg by mouth daily. Start after lovenox course     atorvastatin (LIPITOR) 10 MG tablet Take 10 mg by mouth at bedtime.     calcium, as carbonate, (OS-MARCK) 500 mg calcium (1,250 mg) tablet Take 3 tablets by mouth daily.     cholecalciferol, vitamin D3, (VITAMIN D3) 2,000 unit Tab Take 2,000 Units by mouth daily.     clobetasol (TEMOVATE) 0.05 % ointment Apply 1 application topically 2 (two) times a week. Small amount on Mon, Thur     clobetasol (TEMOVATE) 0.05 % ointment Apply 1 application topically 2 (two) times a day.     diltiazem (CARDIZEM CD) 300 MG 24 hr capsule Take 300 mg by mouth daily.     docusate sodium (COLACE) 100 MG capsule Take 100 mg by mouth 2 (two) times a day.     enoxaparin (LOVENOX) 40 mg/0.4 mL syringe Inject 40 mg under the skin daily.     methocarbamol (ROBAXIN) 750 MG tablet Take 750 mg by mouth every 6 (six) hours as needed.       Labs:  No new laboratory testing    Assessment:    ICD-10-CM    1. Closed fracture of neck of left femur with routine healing, subsequent encounter S72.002D    2. S/P hip hemiarthroplasty Z96.649    3. Pain management R52    4. Essential hypertension I10        Plan:  I talked about tramadol and ordered this for her, but the pharmacist cautioned that she does have a history of allergic reaction to Dilaudid.  I reviewed this and went back and talked with her about it and her only reaction was dizziness with Dilaudid.  However she states that she really does not need the tramadol.  If she felt she needed to, I think it is safe to proceed and I mentioned this to her, but for now the order was discontinued.      Electronically signed by: Berhane Tompkins MD

## 2021-06-21 NOTE — PROGRESS NOTES
[facility]    Facility:   [facility] [[id number]]   Code Status: FULL CODE      CHIEF COMPLAINT/REASON FOR VISIT:  Chief Complaint   Patient presents with     Problem Visit     Pain management, Left hip fracture, s/p left hip arthroplasty       HISTORY:      HPI: Shahana is a 78 y.o. female who presented on 10/22/18 with evaluation following a fall. She states that she was in the process of moving things to her cabin and she fell down 2-3 carpeted stairs, landing on her left hip. She denied any LOC, head strike, lightheadedness, or dizziness with this fall. She was brought to [facility] for evaluation and and found to have left femoral neck fracture. She was seen by orthopedics and admitted to the Trauma service. Her hospital course is as listed below by problem.     Ground level fall. The patient sustained the above injury as a result of mechanical fall. The patient underwent tertiary examination to evaluate for additional injuries. The systematic review did not find any other injuries. She was seen by therapies for her fall.   Left femoral neck fracture s/p Left hemiarthroplasty 10/23/18. Osteoporosis. Ms. Donaldson was evaluated by Orthopedics and underwent left hip jorge alberto-arthroplasty on 10/23/15 by Dr. Gutierrez. Please see operative note by Dr Gutierrez for details regarding the procedure. She will need Lovenox for DVT prophylaxis for 2 weeks followed by full strength aspirin for 6 weeks. She can be weight bearing as tolerated. She should follow up in the Orthopedic Clinic in 2 weeks. She was evaluated by physical and occupational therapies during her hospitalization. Please see summary of most current therapy notes below.   She will continue on calcium and vitamin D for supplementation for her osteoporosis.   Acute pain. Ms. Donaldson's pain was controlled with acetaminophen alone. She had a single injection of Exparel by anesthesia on the day of surgery. She never required  narcotic pain medication post-operatively.   Hypertension and hyperlipidemia. Her home medications (diltiazem and atorvastatin) were resume at prior to admission doses, no changes were made to her regimen.   Patient was stabilized and transferred to TCU for continued rehabilitation.    Today, patient was seen in her room. At this time her left hip pain was a 2/10. She described it as an achy pain without radiation. She feels the tylenol and robaxin help with her pain, however she feels that at night she has some more problems with her pain compared to other times of the day. Her pain is improving. She denies fever, chills, light headedness, dizziness, chest pain, shortness of breath, leg swelling. She denies abdominal pain, N/V, constipation, diarrhea. Her sleep is a little affected by her pain. She has a good appetite.    Past Medical History:   Diagnosis Date     Diverticulosis of colon      GERD (gastroesophageal reflux disease)      Hemorrhoid      Hiatal hernia 2013    s/p lap nissen     HLD (hyperlipidemia)      HTN (hypertension)      Left displaced femoral neck fracture (H)      Leukocytosis      Nonsenile cataract      Osteoporosis      Rectocele      Symptomatic menopausal or female climacteric states              Family History   Problem Relation Age of Onset     Hypertension Mother      Coronary artery disease Father      Hypertension Father      Breast cancer Sister      Keratoconus Brother      Social History     Social History     Marital status:      Spouse name: N/A     Number of children: N/A     Years of education: N/A     Social History Main Topics     Smoking status: Never Smoker     Smokeless tobacco: Never Used     Alcohol use No     Drug use: No     Sexual activity: Yes     Partners: Male     Birth control/ protection: Post-menopausal     Other Topics Concern     Not on file     Social History Narrative     No narrative on file         Review of Systems   Constitutional: Negative for  chills, diaphoresis and fever.   HENT: Negative for congestion, rhinorrhea and sore throat.    Respiratory: Negative for chest tightness and shortness of breath.    Cardiovascular: Negative for chest pain, palpitations and leg swelling.   Gastrointestinal: Negative for abdominal pain, constipation, diarrhea, nausea and vomiting.   Musculoskeletal:        Left hip pain   Neurological: Negative for dizziness, light-headedness, numbness and headaches.   Psychiatric/Behavioral: Positive for sleep disturbance.        Pain affects sleep.       .  Vitals:    11/05/18 0825   BP: 142/85   Pulse: 77   Resp: 16   Temp: 97.7  F (36.5  C)   SpO2: 97%       Physical Exam   Constitutional: She appears well-developed and well-nourished. No distress.   HENT:   Head: Normocephalic.   Neck: Normal range of motion. Neck supple. No JVD present. No tracheal deviation present.   Cardiovascular: Normal rate, normal heart sounds and intact distal pulses.  Exam reveals no gallop and no friction rub.    No murmur heard.  Pulmonary/Chest: Effort normal and breath sounds normal. No respiratory distress. She has no wheezes.   Abdominal: Soft. Bowel sounds are normal. She exhibits no distension. There is no tenderness. There is no rebound.   Musculoskeletal: Normal range of motion. She exhibits no edema, tenderness or deformity.   Neurological: She is alert.   Skin: Skin is dry and intact. She is not diaphoretic.   Psychiatric: She has a normal mood and affect. Cognition and memory are normal.         LABS:   No new laboratory results.      ASSESSMENT/PLAN:      ICD-10-CM    1. Pain management R52 To help with her pain at night, acetaminophen schedule was changed to 0800, 1400, 2000, 0200. Will monitor for effectiveness and continue robaxin.    2. Closed fracture of neck of left femur with routine healing, subsequent encounter S72.002D S/p left hip hemiarthroplasty. [facility]ed [facility]. F/U appt scheduled for 11/7. See above for pain  management. DVT prophylaxis with enoxaparin. Continue PT/OT   3. S/P hip hemiarthroplasty Z96.649 See above.   4. Essential hypertension I10 A few slightly elevated BPs, however may be due to pain at those times. Will continue Diltiazem and continue to monitor. Ordered BMP for 11/8.    5. Osteoporosis M81.0 Continue calcium/vitamin D supplement.    6. Acute blood loss anemia D62 Last hemoglobin during hospitalization was 10.4 on 10/25. Ordered hemoglobin level for 11/8.       Rukhsana FORD, [employer] Student, am scribing in the presence of, Candis Carr CNP  .    Candis FORD CNP , personally performed the services described in this documentation, as scribed by Rukhsana Pang in my presence, and it is both accurate and complete.          Electronically signed by: Rukhsana Pang     Total of greater than 35 minutes of which 50% was spent in counseling and coordination of care of the above plan.    Time spent in interview and examination of patient, review of available records, and discussion with nursing staff. Continue care plan, efforts at therapy, and monitor nutritional status.

## 2021-06-21 NOTE — PROGRESS NOTES
[facility]    Facility:   [facility] [[id number]]   Code Status: FULL CODE  PCP: Jeramie Curran MD   Phone: [phone]   Fax: [phone]      CHIEF COMPLAINT/REASON FOR VISIT:  Chief Complaint   Patient presents with     Discharge Summary       HISTORY COURSE:    Shahana is a 78 y.o. female who presented to the hospital with left femoral neck fracture which was displaced.  She underwent left hip hemiarthroplasty.  The original fall occurred when she was moving things at her cabin and fell down 2-3 carpeted stairs landing on her left hip.  She did not have loss of consciousness nor did she hit her head and she had no problem with lightheadedness or dizziness that led to the fall.  She has underlying medical conditions of hypertension and hyperlipidemia.    During her time at transitional care, she had steady progress and recovery from the surgery.  She had no new complications such as fevers or chills or cough or shortness of breath or chest pain or palpitations of the heart or abdominal pain or nausea or dysuria.  She did not have problems with muscle spasms so the muscle relaxant was discontinued.    Review of Systems    Vitals:    11/07/18 1701   BP: 144/88   Pulse: 87   Resp: 16   Temp: 97.3  F (36.3  C)   SpO2: 94%       Physical Exam   Constitutional: No distress.   Eyes: Right eye exhibits no discharge. Left eye exhibits no discharge.   Neck: No JVD present.   Cardiovascular: Normal heart sounds.    Pulmonary/Chest: Breath sounds normal. No respiratory distress.   Musculoskeletal: She exhibits no edema or tenderness.   Neurological: She is alert.   Psychiatric: She has a normal mood and affect.   Nursing note and vitals reviewed.      MEDICATION LIST:  Current Outpatient Prescriptions   Medication Sig     acetaminophen (TYLENOL) 325 MG tablet Take 975 mg by mouth every 6 (six) hours.     aspirin 325 MG EC tablet Take 325 mg by mouth daily. Start after lovenox course      atorvastatin (LIPITOR) 10 MG tablet Take 10 mg by mouth at bedtime.     calcium, as carbonate, (OS-MARCK) 500 mg calcium (1,250 mg) tablet Take 3 tablets by mouth daily.     cholecalciferol, vitamin D3, (VITAMIN D3) 2,000 unit Tab Take 2,000 Units by mouth daily.     clobetasol (TEMOVATE) 0.05 % ointment Apply 1 application topically 2 (two) times a week. Small amount on Mon, Thur     diltiazem (CARDIZEM CD) 300 MG 24 hr capsule Take 300 mg by mouth daily.     docusate sodium (COLACE) 100 MG capsule Take 100 mg by mouth 2 (two) times a day.     enoxaparin (LOVENOX) 40 mg/0.4 mL syringe Inject 40 mg under the skin daily.     methocarbamol (ROBAXIN) 750 MG tablet Take 750 mg by mouth every 6 (six) hours as needed.       DISCHARGE DIAGNOSIS:    ICD-10-CM    1. Closed fracture of neck of left femur with routine healing, subsequent encounter S72.002D    2. S/P hip hemiarthroplasty Z96.649        MEDICAL EQUIPMENT NEEDS:  No new durable medical equipment required    DISCHARGE PLAN/FACE TO FACE:  I certify that services are/were furnished while this patient was under the care of a physician and that a physician or an allowed non-physician practitioner (NPP), had a face-to-face encounter that meets the physician face-to-face encounter requirements. The encounter was in whole, or in part, related to the primary reason for home health. The patient is confined to his/her home and needs intermittent skilled nursing, physical therapy, speech-language pathology, or the continued need for occupational therapy. A plan of care has been established by a physician and is periodically reviewed by a physician.  Date of Face-to-Face Encounter: 11/8/18    I certify that, based on my findings, the following services are medically necessary home health services: Home physical therapy, home occupational therapy, skilled nursing, and home health aide.    My clinical findings support the need for the above skilled services because: (Please write  a brief narrative summary that describes what the RN, PT, SLP, or other services will be doing in the home. A list of diagnoses in this section does not meet the CMS requirements.)  Home physical therapy is to evaluate and treat for strengthening, balance, endurance, and safety with mobility and ambulation.  Home occupational therapy is to evaluate and treat for strengthening, ADL needs, adaptive equipment and safety.  Skilled nursing visits are for medication set up and teaching, symptom and disease process monitoring and education.  Home health aide services are for bathing and ADL needs.      This patient is homebound because: (Please write a brief narrative summary describing the functional limitations as to why this patient is homebound and specifically what makes this patient homebound.)  She still continues to be weakened and unsteady with gait.    The patient is, or has been, under my care and I have initiated the establishment of the plan of care. This patient will be followed by a physician who will periodically review the plan of care.    Schedule follow up visit with primary care provider within 7 days to reestablish care.    Electronically signed by: Berhane Tompkins MD

## 2021-06-22 RX ORDER — DILTIAZEM HYDROCHLORIDE 300 MG/1
300 CAPSULE, COATED, EXTENDED RELEASE ORAL DAILY
Qty: 30 CAPSULE | Refills: 0 | Status: SHIPPED | OUTPATIENT
Start: 2021-06-22 | End: 2021-07-29

## 2021-06-30 ENCOUNTER — OFFICE VISIT (OUTPATIENT)
Dept: INTERNAL MEDICINE | Facility: CLINIC | Age: 81
End: 2021-06-30
Payer: MEDICARE

## 2021-06-30 VITALS
OXYGEN SATURATION: 96 % | HEART RATE: 67 BPM | WEIGHT: 156.2 LBS | DIASTOLIC BLOOD PRESSURE: 79 MMHG | BODY MASS INDEX: 23.07 KG/M2 | SYSTOLIC BLOOD PRESSURE: 157 MMHG

## 2021-06-30 DIAGNOSIS — R42 DIZZINESS: Primary | ICD-10-CM

## 2021-06-30 LAB — INTERPRETATION ECG - MUSE: NORMAL

## 2021-06-30 PROCEDURE — 93000 ELECTROCARDIOGRAM COMPLETE: CPT | Performed by: PEDIATRICS

## 2021-06-30 PROCEDURE — 99215 OFFICE O/P EST HI 40 MIN: CPT | Mod: 25 | Performed by: PEDIATRICS

## 2021-06-30 NOTE — NURSING NOTE
Chief Complaint   Patient presents with     Dizziness     persistent room spinning dizziness and tinnitus       AMANDA Rdz at 10:45 AM on 6/30/2021

## 2021-06-30 NOTE — NURSING NOTE
Orthostatic blood pressures were performed.     Supine: 148/83 BP, 63 pulse  Sittin/94 BP, 73 pulse  Standin/87 BP, 77 pulse    Vitals recorded in chart. Patient tolerated orthostatics with some dizziness.     AMANDA Rdz at 11:59 AM on 2021

## 2021-06-30 NOTE — PROGRESS NOTES
"Dear patient. Thank you for visiting with me. I want you to feel respected, understood, and empowered. \"Respect\" is valuing you as much as I would a close family member. \"Empowerment\" happens when you are fully informed, and can make the best possible decision for you.  Please ask me questions!  Challenge anything that is not clear.    Medical records are primarily used as memory aids for me and my colleagues. Things to know about my documentation style:  - The 'problem list' includes current symptoms or diagnoses, and some problems that are resolved but may return. I use the past medical history for problems not expected to return.  - I use single quotation marks for things that you or I said, when I want to clarify who was speaking.  - I use double quotation marks when copying a term from elsewhere in your records. Italics (besides here) may also denote a quotation.  If you have questions or concerns, please contact me; I will reply as soon as time allows.      Shahana Donaldson is a 81 year old woman, here with her , with concerns including:  Chief Complaint   Patient presents with     Dizziness     persistent room spinning dizziness and tinnitus     PCP: Jeramie Curran   Visit type: problem-oriented    Assessment & Plan     Positional lightheadedness.  Seems consistent with orthostatic hypotension or pulse change.   History and exam seem inconsistent with benign positional vertigo. DDx BP or med effects seem more likely. Orthostatic SBP loss was modest. HR pause upon standing was notable, but no apparent clinical significance. She is not on a beta-blocker - the diltiazem has a bigger risk for orthostatic hypotension than the amlodipine, but not as much as a non-dihydropyridine CCB.  Could consider transitioning entirely to amlodipine to see if problem improves, although current medication seems more beneficial than risky ... provided she can keep steady (counseled about this, jittering her legs, " avoiding difficult situations.    They will ask at their upcoming cardiology appointment, and I will send a message.      Subjective     Room-spinning vertigo when she gets up from a lying or sitting position. She can adjust eventually once up. This has happened to her before, 'quite a while' ago, care was at the .     No recent medication change. She takes the diltiazem in the morning, and amlodipine in evening (this was added by Dr. Jad Francisco). BP is usually higher at the office than when she has checked at home.    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    Has had hearing loss and tinnitus at times. In the past she has had to have her ears cleared out.    Review of Systems    Objective     BP (!) 157/79   Pulse 67   Wt 70.9 kg (156 lb 3.2 oz)   SpO2 96%   Breastfeeding No   BMI 23.07 kg/m          Physical Exam  Constitutional:       General: She is not in acute distress.     Appearance: Normal appearance. She is not ill-appearing.   HENT:      Head: Normocephalic.      Right Ear: Tympanic membrane, ear canal and external ear normal. There is no impacted cerumen.      Left Ear: Tympanic membrane, ear canal and external ear normal. There is no impacted cerumen.      Nose: Nose normal.   Eyes:      General: No scleral icterus.        Right eye: No discharge.         Left eye: No discharge.      Extraocular Movements: Extraocular movements intact.      Comments: No nystagmus (including vertical)   Cardiovascular:      Comments: Pulse is steady at 60, but paused for 3 seconds upon arising from sitting to standing (asymptomatic), then sped up again.   Pulmonary:      Effort: Pulmonary effort is normal. No respiratory distress.   Neurological:      Mental Status: She is alert.   Psychiatric:         Mood and Affect: Mood normal.         Behavior: Behavior normal.        ECG RSR, QTC 442ms, no apparent change      About this visit:  Time note (e5, 40'): The total time (on the date of  service) for this service was 40 minutes, including discussion/face-to-face, chart review, interpretation not otherwise reported, documentation, and updating of the computerized record.  Comment about data reviewed: I personally reviewed, interpreted, and/or confirmed interpretation of old ECG and several notes from PCP and cardiologist

## 2021-06-30 NOTE — Clinical Note
Surendra, I saw Ms. Donaldson while covering for PCP. She complains of some positional lightheadedness on current regimen, but she is still functional. I considered consolidating from diltiazem and amlodipine to just-amlodipine, but decided to defer to you (I'm not used to dual CCB treatment). Thanks!

## 2021-07-07 NOTE — PROGRESS NOTES
CARDIOLOGY CLINIC Follow up  HPI:  Ms. Alicea is a 82 yo female who receives primary care from Dr. Jeramie Curran. She is here for follow up of HTN and dyspnea.     10/25/19  Ms. Alicea is a very pleasant women with history of HTN and dyslipidemia. There was also concern for microvascular coronary disease in the past.     Over the past 2-3 years Ms. Donaldson has noticed progressive dyspnea on exertion and in the last year she has had to slow down her walking. She walks with her  nearly every day and does a 6 block loop when it's warm. She also goes to the [facility] regularly for resistance exercises.     7/3/20  Kamini is feeling well. She continues to walk her 6 block routes. It takes 20-25 minutes. She is limited by fatigue but denies chest pain. LE edema is mild and stable.   Blood pressures have been well controlled. No lightheadedness.  Home BP: yesterday morning, 119/84, HR 71; yesterday evening 113/72, HR 71  We made no changes to her medications    7/9/21 Interval History  Since the last visit, Kamini's main complaint is lightheadedness dizziness when she goes from lying or sitting to standing.  She describes a sensation of the room is spinning around her, but also feels unsteady like she is going to pass out.  She denies syncope and has not fallen.  Due to concern for hypotension, she stopped amlodipine about 1 week ago and her lightheadedness has improved significantly.  She brought in her home BP logs which show systolic blood pressures in the 110s.  She denies chest pain.    Kamini continues to do her 6 block walking route most days unless she is working in the garden.  It still takes about 20-25 minutes and she has to stop about 2 times to catch her breath.  She and her  have not noticed much of a change compared to her exercise tolerance 1 year ago.  She has noticed more lower extremity edema in the past couple of weeks.  This is a chronic problem and usually she wears compression stockings.   She plans to try this again.    Orthostatic vitals were done in clinic.  SBP dropped 10-15 points, so not diagnostic for orthostatic hypotension.    ASSESSMENT AND PLAN  Ms. Donaldson is a 80 yo female with history of HTN, dyslipidemia, microvascular disease, HFpEF who I initially saw for progressive dyspnea on exertion and hypertension.     Kamini reports what sounds like symptomatic orthostatic hypotension.  After stopping her amlodipine about 1 week ago her symptoms have improved greatly.  Her home blood pressures look great over the past week, although the impact of stopping amlodipine may not be completely realized yet.  Her blood pressure in clinic is quite high and is typically high when she comes in.  She denies any increased dyspnea or chest pain since stopping amlodipine.  Exercise tolerance is stable over the past year.    Since Kamini is feeling better, she would like to continue holding amlodipine.  I think this is a fine plan, but if her blood pressures increase or she starts experiencing angina or dyspnea, I would reconsider adding back amlodipine 2.5 mg in the evening  If this causes orthostatic hypotension, I recommend decreasing diltiazem from 300 to 240 mg daily.    She has a systolic ejection murmur on exam.  Her aortic valve is calcified on prior echoes.  Last one was done about 2 years ago, and we will repeat to ensure no progression towards aortic stenosis.    Plan  -Continue with amlodipine  -Continue tracking blood pressures 1-2 times per day.  Send to me or Dr. Curran in 4 weeks to ensure normotension.  -If blood pressure is elevated, would restart amlodipine 2.5 mg in the evening and decrease diltiazem to 240 mg daily  -Echocardiogram to evaluate murmur  -Follow-up in 1 year    Thank you for the opportunity to participate in the care of Ms. Kamini Donaldson. Please feel free to contact me with any additional questions or concerns.    Ortega Francisco MD    Preventive  Cardiology  Pager: [phone]    The total time of this encounter amounted to 30 minutes. This time included time spent with the patient, reviewing records, ordering tests, and performing post visit documentation.     PAST MEDICAL HISTORY:  Patient Active Problem List   Diagnosis     Essential hypertension, benign     Mixed hyperlipidemia     Symptomatic menopausal or female climacteric states     Diverticulosis of large intestine     Hyperlipidemia     Coronary atherosclerosis     Esophageal reflux     vulvar cyst     Hiatal hernia     S/P Nissen fundoplication (without gastrostomy tube) procedure     History of tubal ligation     History of dilatation and curettage     History of hysterectomy     Lichen sclerosus     Left displaced femoral neck fracture (H)     Senile osteoporosis     Lichen planus     Seborrheic dermatitis     Heart failure with preserved ejection fraction, NYHA class II (H)     Past Medical History:   Diagnosis Date     Diverticulosis of colon (without mention of hemorrhage)      Essential hypertension, benign      Gastro-oesophageal reflux disease     nissen     Hemorrhoid      Nonsenile cataract      Osteoporosis      Other and unspecified hyperlipidemia      Rectocele      Symptomatic menopausal or female climacteric states        CURRENT MEDICATIONS:  Current Outpatient Medications   Medication Sig Dispense Refill     Acetaminophen (TYLENOL PO) Take 1,000 mg by mouth every 6 hours as needed for mild pain or fever       ammonium lactate (LAC-HYDRIN) 12 % external lotion Apply topically 2 times daily To the feet 225 g 11     atorvastatin (LIPITOR) 10 MG tablet Take 1 tablet (10 mg) by mouth daily Next refill after seen on 7- 30 tablet 0     calcium carbonate 500 mg, elemental, (OSCAL;OYSTER SHELL CALCIUM) 500 MG tablet Take 3 tablets (1,500 mg) by mouth 2 times daily (with meals) 60 tablet 0     cholecalciferol (VITAMIN D3) 1000 UNIT tablet Take 2 tablets (2,000 Units) by mouth daily  30 tablet 0     clobetasol (TEMOVATE) 0.05 % external ointment Apply twice weekly to lichen sclerosus, increase to twice daily as needed for flares 60 g 3     diltiazem ER COATED BEADS (CARTIA XT) 300 MG 24 hr capsule Take 1 capsule (300 mg) by mouth daily 30 capsule 0     docusate sodium (COLACE) 100 MG capsule Take 1 capsule (100 mg) by mouth 2 times daily (Patient taking differently: Take 100 mg by mouth 2 times daily as needed ) 60 capsule 0     amLODIPine (NORVASC) 2.5 MG tablet Take 1 tablet (2.5 mg) by mouth daily (Patient not taking: Reported on 7/9/2021) 90 tablet 0     clobetasol (TEMOVATE) 0.05 % ointment Apply sparingly to affected area 2 x daily for14 days. Then, apply small amount to affected area twice weekly for maintenance. (Patient not taking: Reported on 7/9/2021) 30 g 2     diltiazem (CARTIA XT) 300 MG 24 hr capsule Take 1 capsule (300 mg) by mouth daily (Patient not taking: Reported on 7/9/2021) 90 capsule 0     triamcinolone (KENALOG) 0.1 % external ointment Apply topically 2 times daily To the areas of bumps on the arms and legs (Patient not taking: Reported on 7/9/2021) 60 g 5       PAST SURGICAL HISTORY:  Past Surgical History:   Procedure Laterality Date     ARTHROPLASTY HIP Left 10/23/2018    Procedure: LEFT HIP COLEMAN- ARTHROPLASTY ;  Surgeon: Araceli Gutierrez MD;  Location: [facility] OR     CATARACT IOL, RT/LT Left 07/15/2019     COLONOSCOPY  5/24/2011    Procedure:COLONOSCOPY; Surgeon:HALINA SCOTT; Location:[facility] GI     COLONOSCOPY Left 11/10/2015    Procedure: COLONOSCOPY;  Surgeon: Raheem Colunga MD;  Location:  GI     COLONOSCOPY  5/24/11, 11/10/2015     DILATION AND CURETTAGE      secondary to menorrhagia     GYN SURGERY      hysterectomy/oopherectomy; done for prolapse     HEMIARTHROPLASTY HIP Left 10/23/2018     HYSTERECTOMY      hysterectomy/oopherectomy; done for prolapse     LAPAROSCOPIC NISSEN FUNDOPLICATION  1/7/2013    Procedure: LAPAROSCOPIC NISSEN FUNDOPLICATION;   "Laparoscopic Repair of Hiatel Hernia, NISSEN, Esophagoscopy, Gastroscopy And Duodenoscopy ;  Surgeon: Leonidas Valle MD;  Location: [facility] OR     LAPAROSCOPIC NISSEN FUNDOPLICATION  01/07/2013     PHACOEMULSIFICATION WITH STANDARD INTRAOCULAR LENS IMPLANT Left 7/15/2019    Procedure: Left Eye Phacoemulsification with Intraocular Lens;  Surgeon: Josue Trujillo MD;  Location: [facility] OR     TUBAL LIGATION Bilateral      ZZC NONSPECIFIC PROCEDURE      Bilateral Tubal Ligation     ZZC NONSPECIFIC PROCEDURE      D&C secondary to menorrhagia     ZZC NONSPECIFIC PROCEDURE      child birth x 2       ALLERGIES  Dilaudid [hydromorphone] and Senna    FAMILY HX:  Family History   Problem Relation Age of Onset     Hypertension Father      C.A.D. Father      Hypertension Mother      Breast Cancer Sister      Osteoporosis Sister      EYE* Brother         Keratoconus     Glaucoma No family hx of      Macular Degeneration No family hx of        SOCIAL HX:  Social History     Tobacco Use     Smoking status: Never Smoker     Smokeless tobacco: Never Used   Substance Use Topics     Alcohol use: No     Drug use: No        ROS:  Comprehensive ROS is negative except as noted in HPI.    VITAL SIGNS:  BP (!) 169/94 (BP Location: Left arm, Patient Position: Chair, Cuff Size: Adult Regular)   Pulse 68   Ht 1.753 m (5' 9\")   Wt 69.8 kg (153 lb 14.4 oz)   SpO2 97%   BMI 22.73 kg/m    Body mass index is 22.73 kg/m .  Wt Readings from Last 2 Encounters:   07/09/21 69.8 kg (153 lb 14.4 oz)   06/30/21 70.9 kg (156 lb 3.2 oz)       PHYSICAL EXAM  Gen: pleasant womaen sitting comfortably in NAD  Head: nc/at  Eyes: EOMI  Chest: Lungs are clear  CV: Normal S1/S2, 2/6 midsystolic ejection murmur loudest at left sternal border; no JVD  Skin: no rash on limited exam  Ext: 1+ BLE edema R >L  Neuro: awake, alert, oriented, nml speech and gait    LABS: personally reviewed  CMP  Recent Labs   Lab Test 11/25/19  0956 08/09/19  1154 08/09/19  1147 " 19  0742 19  1516 19  1336 18  0740 10/23/18  0658 10/23/18  0658 18  0937 18  0937     --  140 140  --   --  142   < >  --    < > 140   POTASSIUM 4.0  --  4.0 3.8  --   --  3.6   < >  --    < > 3.8   CHLORIDE 109  --  108 106  --   --  108   < >  --    < > 108   CO2 26  --  25 29  --   --  28   < >  --    < > 25   ANIONGAP 4  --  7 4  --   --  6   < >  --    < > 6   GLC 92  --  92 83  --   --  92   < >  --    < > 94   BUN 24  --  17 23  --   --  23   < >  --    < > 19   CR 0.76  --  0.70 0.77  --  0.71 0.72   < >  --    < > 0.73   GFRESTIMATED 75 >90 82 73  --  82 78   < >  --    < > 77   GFRESTBLACK 87 >90 >90 85  --  >90 >90   < >  --    < > >90   MARCK 9.1  --  8.9 8.8 9.1 8.2* 8.6   < >  --    < > 8.8   MAG  --   --   --   --   --   --   --   --  2.0  --   --    PHOS  --   --   --   --   --   --   --   --  3.1  --   --    PROTTOTAL  --   --  8.3 7.6  --   --  7.6  --   --   --  7.9   ALBUMIN  --   --  4.1 3.9  --  3.9 3.9  --   --   --  4.2   BILITOTAL  --   --  0.7 0.6  --   --  0.5  --   --   --  0.8   ALKPHOS  --   --  96 97  --   --  144  --   --   --  124   AST  --   --  20 21  --   --  23  --   --   --  21   ALT  --   --  17 18  --   --  25  --   --   --  18    < > = values in this interval not displayed.     CBC  Recent Labs   Lab Test 19  1147 19  0742 18  0740 10/25/18  0711   WBC 6.3 4.2 4.6 10.1   RBC 4.59 4.29 4.08 3.41*   HGB 14.3 13.5 12.7 10.4*   HCT 44.8 42.4 40.4 32.6*   MCV 98 99 99 96   MCH 31.2 31.5 31.1 30.5   MCHC 31.9 31.8 31.4* 31.9   RDW 12.6 12.7 13.4 12.9    230 279 162     INR  Recent Labs   Lab Test 10/22/18  1141   INR 0.98     Recent Labs   Lab Test 18  0740 16  0802 10/15/15  0855 14  1040   CHOL 180 172 169 188   HDL 92 100 105 114   LDL 64 47 49 58   TRIG 126 126 73 82   CHOLHDLRATIO  --   --  1.6 1.6       Most recent EK2021: NSR, HR 62 bpm    Most recent ECHO: Reviewed and discussed  with patient  7/31/18 TTE  Left ventricular function, chamber size, wall motion, and wall thickness are  normal.The EF is 60-65%. No regional wall motion abnormalities are seen.  Right ventricular function, chamber size, wall motion, and thickness are normal.  Trace to mild mitral insufficiency is present. Mild tricuspid insufficiency is present.  The inferior vena cava was normal in size with preserved respiratory variability. No pericardial effusion is present.     Previous study not available for comparison.    Most recent STRESS TEST:    9/10/19 Exercise echo  Interpretation Summary  Exercise stress echocardiogram with no inducible ischemia in the setting of  very limited exercise tolerance.     Target heart rate achieved. Hypertensive blood pressure response to exercise.  No angina symptoms with exercise.  No ECG evidence of ischemia.  Normal segmental and global LV function with EF of approximately 55-60% at  rest; with exercise left ventricular cavity size and LVEF did not change  significantly.  No stress induced regional wall motion abnormalities.  Less than average functional capacity for age.     Normal aortic root and no significant valvular dysfunction noted on screening  2D and Doppler examination. PA systolic pressure is mildly elevated.    Most recent CARDIAC CATH:   Coronary angiogram (01/05/2000): per Dr. Jensen's note  58 yo female with chronic stable angina with a positive stress echo undergoes evaluation. Coronary angiography reveals widely patent arteries with no flow limiting lesions. LV gram shows normal function with no RWMA or MR. Coronary flow reserve in the LAD was 1.4 and in the Cx was 1.1.The patient has microvascular angina

## 2021-07-09 ENCOUNTER — OFFICE VISIT (OUTPATIENT)
Dept: CARDIOLOGY | Facility: CLINIC | Age: 81
End: 2021-07-09
Attending: INTERNAL MEDICINE
Payer: MEDICARE

## 2021-07-09 VITALS
BODY MASS INDEX: 22.8 KG/M2 | WEIGHT: 153.9 LBS | OXYGEN SATURATION: 97 % | HEIGHT: 69 IN | HEART RATE: 68 BPM | DIASTOLIC BLOOD PRESSURE: 94 MMHG | SYSTOLIC BLOOD PRESSURE: 169 MMHG

## 2021-07-09 DIAGNOSIS — I10 BENIGN ESSENTIAL HYPERTENSION: ICD-10-CM

## 2021-07-09 DIAGNOSIS — I50.30 HEART FAILURE WITH PRESERVED EJECTION FRACTION, NYHA CLASS II (H): Primary | ICD-10-CM

## 2021-07-09 DIAGNOSIS — R01.1 HEART MURMUR: ICD-10-CM

## 2021-07-09 PROCEDURE — 99214 OFFICE O/P EST MOD 30 MIN: CPT | Performed by: INTERNAL MEDICINE

## 2021-07-09 PROCEDURE — G0463 HOSPITAL OUTPT CLINIC VISIT: HCPCS

## 2021-07-09 ASSESSMENT — MIFFLIN-ST. JEOR: SCORE: 1227.47

## 2021-07-09 ASSESSMENT — PAIN SCALES - GENERAL: PAINLEVEL: NO PAIN (0)

## 2021-07-09 NOTE — LETTER
7/9/2021      RE: Shahana Donaldson  [address]  [facility] [id number]       Dear Colleague,    Thank you for the opportunity to participate in the care of your patient, Shahana Donaldson, at the [facility] [place] [facility]. Please see a copy of my visit note below.    CARDIOLOGY CLINIC Follow up  HPI:  Ms. Alicea is a 82 yo female who receives primary care from Dr. Jeramie Curran. She is here for follow up of HTN and dyspnea.     10/25/19  Ms. Ailcea is a very pleasant women with history of HTN and dyslipidemia. There was also concern for microvascular coronary disease in the past.     Over the past 2-3 years Ms. Donaldson has noticed progressive dyspnea on exertion and in the last year she has had to slow down her walking. She walks with her  nearly every day and does a 6 block loop when it's warm. She also goes to the [facility] regularly for resistance exercises.     7/3/20  Kamini is feeling well. She continues to walk her 6 block routes. It takes 20-25 minutes. She is limited by fatigue but denies chest pain. LE edema is mild and stable.   Blood pressures have been well controlled. No lightheadedness.  Home BP: yesterday morning, 119/84, HR 71; yesterday evening 113/72, HR 71  We made no changes to her medications    7/9/21 Interval History  Since the last visit, Kamini's main complaint is lightheadedness dizziness when she goes from lying or sitting to standing.  She describes a sensation of the room is spinning around her, but also feels unsteady like she is going to pass out.  She denies syncope and has not fallen.  Due to concern for hypotension, she stopped amlodipine about 1 week ago and her lightheadedness has improved significantly.  She brought in her home BP logs which show systolic blood pressures in the 110s.  She denies chest pain.    Kamini continues to do her 6 block walking route most days unless she is  working in the garden.  It still takes about 20-25 minutes and she has to stop about 2 times to catch her breath.  She and her  have not noticed much of a change compared to her exercise tolerance 1 year ago.  She has noticed more lower extremity edema in the past couple of weeks.  This is a chronic problem and usually she wears compression stockings.  She plans to try this again.    Orthostatic vitals were done in clinic.  SBP dropped 10-15 points, so not diagnostic for orthostatic hypotension.    ASSESSMENT AND PLAN  Ms. Donaldson is a 82 yo female with history of HTN, dyslipidemia, microvascular disease, HFpEF who I initially saw for progressive dyspnea on exertion and hypertension.     Kamini reports what sounds like symptomatic orthostatic hypotension.  After stopping her amlodipine about 1 week ago her symptoms have improved greatly.  Her home blood pressures look great over the past week, although the impact of stopping amlodipine may not be completely realized yet.  Her blood pressure in clinic is quite high and is typically high when she comes in.  She denies any increased dyspnea or chest pain since stopping amlodipine.  Exercise tolerance is stable over the past year.    Since Kamini is feeling better, she would like to continue holding amlodipine.  I think this is a fine plan, but if her blood pressures increase or she starts experiencing angina or dyspnea, I would reconsider adding back amlodipine 2.5 mg in the evening  If this causes orthostatic hypotension, I recommend decreasing diltiazem from 300 to 240 mg daily.    She has a systolic ejection murmur on exam.  Her aortic valve is calcified on prior echoes.  Last one was done about 2 years ago, and we will repeat to ensure no progression towards aortic stenosis.    Plan  -Continue with amlodipine  -Continue tracking blood pressures 1-2 times per day.  Send to me or Dr. Curran in 4 weeks to ensure normotension.  -If blood pressure is elevated,  would restart amlodipine 2.5 mg in the evening and decrease diltiazem to 240 mg daily  -Echocardiogram to evaluate murmur  -Follow-up in 1 year    Thank you for the opportunity to participate in the care of Ms. Kamini Donaldson. Please feel free to contact me with any additional questions or concerns.    Ortega Francisco MD    Preventive Cardiology  Pager: [phone]    The total time of this encounter amounted to 30 minutes. This time included time spent with the patient, reviewing records, ordering tests, and performing post visit documentation.     PAST MEDICAL HISTORY:  Patient Active Problem List   Diagnosis     Essential hypertension, benign     Mixed hyperlipidemia     Symptomatic menopausal or female climacteric states     Diverticulosis of large intestine     Hyperlipidemia     Coronary atherosclerosis     Esophageal reflux     vulvar cyst     Hiatal hernia     S/P Nissen fundoplication (without gastrostomy tube) procedure     History of tubal ligation     History of dilatation and curettage     History of hysterectomy     Lichen sclerosus     Left displaced femoral neck fracture (H)     Senile osteoporosis     Lichen planus     Seborrheic dermatitis     Heart failure with preserved ejection fraction, NYHA class II (H)     Past Medical History:   Diagnosis Date     Diverticulosis of colon (without mention of hemorrhage)      Essential hypertension, benign      Gastro-oesophageal reflux disease     nissen     Hemorrhoid      Nonsenile cataract      Osteoporosis      Other and unspecified hyperlipidemia      Rectocele      Symptomatic menopausal or female climacteric states        CURRENT MEDICATIONS:  Current Outpatient Medications   Medication Sig Dispense Refill     Acetaminophen (TYLENOL PO) Take 1,000 mg by mouth every 6 hours as needed for mild pain or fever       ammonium lactate (LAC-HYDRIN) 12 % external lotion Apply topically 2 times daily To the feet 225 g 11     atorvastatin  (LIPITOR) 10 MG tablet Take 1 tablet (10 mg) by mouth daily Next refill after seen on 7- 30 tablet 0     calcium carbonate 500 mg, elemental, (OSCAL;OYSTER SHELL CALCIUM) 500 MG tablet Take 3 tablets (1,500 mg) by mouth 2 times daily (with meals) 60 tablet 0     cholecalciferol (VITAMIN D3) 1000 UNIT tablet Take 2 tablets (2,000 Units) by mouth daily 30 tablet 0     clobetasol (TEMOVATE) 0.05 % external ointment Apply twice weekly to lichen sclerosus, increase to twice daily as needed for flares 60 g 3     diltiazem ER COATED BEADS (CARTIA XT) 300 MG 24 hr capsule Take 1 capsule (300 mg) by mouth daily 30 capsule 0     docusate sodium (COLACE) 100 MG capsule Take 1 capsule (100 mg) by mouth 2 times daily (Patient taking differently: Take 100 mg by mouth 2 times daily as needed ) 60 capsule 0     amLODIPine (NORVASC) 2.5 MG tablet Take 1 tablet (2.5 mg) by mouth daily (Patient not taking: Reported on 7/9/2021) 90 tablet 0     clobetasol (TEMOVATE) 0.05 % ointment Apply sparingly to affected area 2 x daily for14 days. Then, apply small amount to affected area twice weekly for maintenance. (Patient not taking: Reported on 7/9/2021) 30 g 2     diltiazem (CARTIA XT) 300 MG 24 hr capsule Take 1 capsule (300 mg) by mouth daily (Patient not taking: Reported on 7/9/2021) 90 capsule 0     triamcinolone (KENALOG) 0.1 % external ointment Apply topically 2 times daily To the areas of bumps on the arms and legs (Patient not taking: Reported on 7/9/2021) 60 g 5       PAST SURGICAL HISTORY:  Past Surgical History:   Procedure Laterality Date     ARTHROPLASTY HIP Left 10/23/2018    Procedure: LEFT HIP COLEMAN- ARTHROPLASTY ;  Surgeon: Araceli Gutierrez MD;  Location: [facility] OR     CATARACT IOL, RT/LT Left 07/15/2019     COLONOSCOPY  5/24/2011    Procedure:COLONOSCOPY; Surgeon:HALINA SCOTT; Location:[facility]     COLONOSCOPY Left 11/10/2015    Procedure: COLONOSCOPY;  Surgeon: Raheem Colunga MD;  Location: [facility] GI      "COLONOSCOPY  5/24/11, 11/10/2015     DILATION AND CURETTAGE      secondary to menorrhagia     GYN SURGERY      hysterectomy/oopherectomy; done for prolapse     HEMIARTHROPLASTY HIP Left 10/23/2018     HYSTERECTOMY      hysterectomy/oopherectomy; done for prolapse     LAPAROSCOPIC NISSEN FUNDOPLICATION  1/7/2013    Procedure: LAPAROSCOPIC NISSEN FUNDOPLICATION;  Laparoscopic Repair of Hiatel Hernia, NISSEN, Esophagoscopy, Gastroscopy And Duodenoscopy ;  Surgeon: Leonidas Valle MD;  Location: [facility] OR     LAPAROSCOPIC NISSEN FUNDOPLICATION  01/07/2013     PHACOEMULSIFICATION WITH STANDARD INTRAOCULAR LENS IMPLANT Left 7/15/2019    Procedure: Left Eye Phacoemulsification with Intraocular Lens;  Surgeon: Josue Trujillo MD;  Location: [facility]     TUBAL LIGATION Bilateral      ZZC NONSPECIFIC PROCEDURE      Bilateral Tubal Ligation     ZZC NONSPECIFIC PROCEDURE      D&C secondary to menorrhagia     ZZC NONSPECIFIC PROCEDURE      child birth x 2       ALLERGIES  Dilaudid [hydromorphone] and Senna    FAMILY HX:  Family History   Problem Relation Age of Onset     Hypertension Father      C.A.D. Father      Hypertension Mother      Breast Cancer Sister      Osteoporosis Sister      EYE* Brother         Keratoconus     Glaucoma No family hx of      Macular Degeneration No family hx of        SOCIAL HX:  Social History     Tobacco Use     Smoking status: Never Smoker     Smokeless tobacco: Never Used   Substance Use Topics     Alcohol use: No     Drug use: No        ROS:  Comprehensive ROS is negative except as noted in HPI.    VITAL SIGNS:  BP (!) 169/94 (BP Location: Left arm, Patient Position: Chair, Cuff Size: Adult Regular)   Pulse 68   Ht 1.753 m (5' 9\")   Wt 69.8 kg (153 lb 14.4 oz)   SpO2 97%   BMI 22.73 kg/m    Body mass index is 22.73 kg/m .  Wt Readings from Last 2 Encounters:   07/09/21 69.8 kg (153 lb 14.4 oz)   06/30/21 70.9 kg (156 lb 3.2 oz)       PHYSICAL EXAM  Gen: pleasant womaen sitting " comfortably in NAD  Head: nc/at  Eyes: EOMI  Chest: Lungs are clear  CV: Normal S1/S2, 2/6 midsystolic ejection murmur loudest at left sternal border; no JVD  Skin: no rash on limited exam  Ext: 1+ BLE edema R >L  Neuro: awake, alert, oriented, nml speech and gait    LABS: personally reviewed  CMP  Recent Labs   Lab Test 11/25/19  0956 08/09/19  1154 08/09/19  1147 07/03/19  0742 04/19/19  1516 02/08/19  1336 11/21/18  0740 10/23/18  0658 10/23/18  0658 04/17/18  0937 04/17/18  0937     --  140 140  --   --  142   < >  --    < > 140   POTASSIUM 4.0  --  4.0 3.8  --   --  3.6   < >  --    < > 3.8   CHLORIDE 109  --  108 106  --   --  108   < >  --    < > 108   CO2 26  --  25 29  --   --  28   < >  --    < > 25   ANIONGAP 4  --  7 4  --   --  6   < >  --    < > 6   GLC 92  --  92 83  --   --  92   < >  --    < > 94   BUN 24  --  17 23  --   --  23   < >  --    < > 19   CR 0.76  --  0.70 0.77  --  0.71 0.72   < >  --    < > 0.73   GFRESTIMATED 75 >90 82 73  --  82 78   < >  --    < > 77   GFRESTBLACK 87 >90 >90 85  --  >90 >90   < >  --    < > >90   MARCK 9.1  --  8.9 8.8 9.1 8.2* 8.6   < >  --    < > 8.8   MAG  --   --   --   --   --   --   --   --  2.0  --   --    PHOS  --   --   --   --   --   --   --   --  3.1  --   --    PROTTOTAL  --   --  8.3 7.6  --   --  7.6  --   --   --  7.9   ALBUMIN  --   --  4.1 3.9  --  3.9 3.9  --   --   --  4.2   BILITOTAL  --   --  0.7 0.6  --   --  0.5  --   --   --  0.8   ALKPHOS  --   --  96 97  --   --  144  --   --   --  124   AST  --   --  20 21  --   --  23  --   --   --  21   ALT  --   --  17 18  --   --  25  --   --   --  18    < > = values in this interval not displayed.     CBC  Recent Labs   Lab Test 08/09/19  1147 07/03/19  0742 11/21/18  0740 10/25/18  0711   WBC 6.3 4.2 4.6 10.1   RBC 4.59 4.29 4.08 3.41*   HGB 14.3 13.5 12.7 10.4*   HCT 44.8 42.4 40.4 32.6*   MCV 98 99 99 96   MCH 31.2 31.5 31.1 30.5   MCHC 31.9 31.8 31.4* 31.9   RDW 12.6 12.7 13.4 12.9     230 279 162     INR  Recent Labs   Lab Test 10/22/18  1141   INR 0.98     Recent Labs   Lab Test 18  0740 16  0802 10/15/15  0855 14  1040   CHOL 180 172 169 188   HDL 92 100 105 114   LDL 64 47 49 58   TRIG 126 126 73 82   CHOLHDLRATIO  --   --  1.6 1.6       Most recent EK2021: NSR, HR 62 bpm    Most recent ECHO: Reviewed and discussed with patient  18 TTE  Left ventricular function, chamber size, wall motion, and wall thickness are  normal.The EF is 60-65%. No regional wall motion abnormalities are seen.  Right ventricular function, chamber size, wall motion, and thickness are normal.  Trace to mild mitral insufficiency is present. Mild tricuspid insufficiency is present.  The inferior vena cava was normal in size with preserved respiratory variability. No pericardial effusion is present.     Previous study not available for comparison.    Most recent STRESS TEST:    9/10/19 Exercise echo  Interpretation Summary  Exercise stress echocardiogram with no inducible ischemia in the setting of  very limited exercise tolerance.     Target heart rate achieved. Hypertensive blood pressure response to exercise.  No angina symptoms with exercise.  No ECG evidence of ischemia.  Normal segmental and global LV function with EF of approximately 55-60% at  rest; with exercise left ventricular cavity size and LVEF did not change  significantly.  No stress induced regional wall motion abnormalities.  Less than average functional capacity for age.     Normal aortic root and no significant valvular dysfunction noted on screening  2D and Doppler examination. PA systolic pressure is mildly elevated.    Most recent CARDIAC CATH:   Coronary angiogram (2000): per Dr. Jensen's note  58 yo female with chronic stable angina with a positive stress echo undergoes evaluation. Coronary angiography reveals widely patent arteries with no flow limiting lesions. LV gram shows normal function with no RWMA or MR.  Coronary flow reserve in the LAD was 1.4 and in the Cx was 1.1.The patient has microvascular angina      Please do not hesitate to contact me if you have any questions/concerns.     Sincerely,     Ortega Francisco MD

## 2021-07-09 NOTE — NURSING NOTE
Chief Complaint   Patient presents with     Follow Up      1 Yr F/U   Vitals were taken and medication reconciled.    Ya/ANTHONY Ojeda  10:03 AM

## 2021-07-12 NOTE — PROGRESS NOTES
HPI:    Pat comes in for follow up today. She has a hard time tolerating Amlodipine BP medication. She has some dizziness. She has discontinued this and remains only on Marian. She feels better off the Amlodipine. Otherwise, no additional HEENT, cardiopulmonary, abdominal, , GYN, neurological, systemic, psychiatric, lymphatic, endocrine, vascular complaints.     Past Medical History:   Diagnosis Date     Diverticulosis of colon (without mention of hemorrhage)      Essential hypertension, benign      Gastro-oesophageal reflux disease     nissen     Hemorrhoid      Nonsenile cataract      Osteoporosis      Other and unspecified hyperlipidemia      Rectocele      Symptomatic menopausal or female climacteric states      Past Surgical History:   Procedure Laterality Date     ARTHROPLASTY HIP Left 10/23/2018    Procedure: LEFT HIP COLEMAN- ARTHROPLASTY ;  Surgeon: Araceli Gutierrez MD;  Location: [facility] OR     CATARACT IOL, RT/LT Left 07/15/2019     COLONOSCOPY  5/24/2011    Procedure:COLONOSCOPY; Surgeon:HALINA SCOTT; Location:[facility]     COLONOSCOPY Left 11/10/2015    Procedure: COLONOSCOPY;  Surgeon: Raheem Colunga MD;  Location: [facility]     COLONOSCOPY  5/24/11, 11/10/2015     DILATION AND CURETTAGE      secondary to menorrhagia     GYN SURGERY      hysterectomy/oopherectomy; done for prolapse     HEMIARTHROPLASTY HIP Left 10/23/2018     HYSTERECTOMY      hysterectomy/oopherectomy; done for prolapse     LAPAROSCOPIC NISSEN FUNDOPLICATION  1/7/2013    Procedure: LAPAROSCOPIC NISSEN FUNDOPLICATION;  Laparoscopic Repair of Hiatel Hernia, NISSEN, Esophagoscopy, Gastroscopy And Duodenoscopy ;  Surgeon: Leonidas Valle MD;  Location: [facility] OR     LAPAROSCOPIC NISSEN FUNDOPLICATION  01/07/2013     PHACOEMULSIFICATION WITH STANDARD INTRAOCULAR LENS IMPLANT Left 7/15/2019    Procedure: Left Eye Phacoemulsification with Intraocular Lens;  Surgeon: Josue Trujillo MD;  Location: [facility]     TUBAL LIGATION  Bilateral      ZZC NONSPECIFIC PROCEDURE      Bilateral Tubal Ligation     ZZC NONSPECIFIC PROCEDURE      D&C secondary to menorrhagia     ZZC NONSPECIFIC PROCEDURE      child birth x 2     PE:    Vitals noted, gen, nad, cooperative, alert, neck supple nl rom, lungs with good air movement, RRR, S1, S2, no MRG, abdomen, no acute findings. Grossly normal neurological exam.     A/P:    1. Dermatology appt. 12/3/2021 for skin check. She had a dermatology appt. 6/7/2021.   2. She has follow up resting echo scheduled for 7/22/2021  3. HTN; She was seen in Cardiology 7/9/2021 by Dr. Wanda Francisco follow up HTN and dyspnea. Her plan is to remain on Cartia 300 mg and stay off Amlodipine because of dizziness.   4. Immunizations; she has completed the Pfizer COVID vaccination series  5. Breast imaging 4/7/2021  6. Increased lipids on Atorvastatin and ordered labs today    30 minutes spent on the date of the encounter doing chart review, history and exam, documentation and further activities as noted above

## 2021-07-13 ENCOUNTER — OFFICE VISIT (OUTPATIENT)
Dept: INTERNAL MEDICINE | Facility: CLINIC | Age: 81
End: 2021-07-13
Payer: MEDICARE

## 2021-07-13 VITALS
HEIGHT: 69 IN | WEIGHT: 157 LBS | DIASTOLIC BLOOD PRESSURE: 80 MMHG | HEART RATE: 74 BPM | OXYGEN SATURATION: 96 % | SYSTOLIC BLOOD PRESSURE: 152 MMHG | BODY MASS INDEX: 23.25 KG/M2

## 2021-07-13 DIAGNOSIS — I10 BENIGN ESSENTIAL HYPERTENSION: Primary | ICD-10-CM

## 2021-07-13 DIAGNOSIS — E78.00 HIGH BLOOD CHOLESTEROL: ICD-10-CM

## 2021-07-13 PROCEDURE — 99214 OFFICE O/P EST MOD 30 MIN: CPT | Performed by: INTERNAL MEDICINE

## 2021-07-13 ASSESSMENT — MIFFLIN-ST. JEOR: SCORE: 1241.53

## 2021-07-13 NOTE — NURSING NOTE
Chief Complaint   Patient presents with     Recheck Medication     check medications discuss specifically amLODIPine       Mary Wallis, EMT at 7:51 AM on 7/13/2021

## 2021-07-22 ENCOUNTER — ANCILLARY PROCEDURE (OUTPATIENT)
Dept: CARDIOLOGY | Facility: CLINIC | Age: 81
End: 2021-07-22
Attending: INTERNAL MEDICINE
Payer: MEDICARE

## 2021-07-22 ENCOUNTER — LAB (OUTPATIENT)
Dept: LAB | Facility: CLINIC | Age: 81
End: 2021-07-22

## 2021-07-22 DIAGNOSIS — E78.00 HIGH BLOOD CHOLESTEROL: ICD-10-CM

## 2021-07-22 DIAGNOSIS — I10 BENIGN ESSENTIAL HYPERTENSION: ICD-10-CM

## 2021-07-22 DIAGNOSIS — R01.1 HEART MURMUR: ICD-10-CM

## 2021-07-22 LAB
ALBUMIN SERPL-MCNC: 4.1 G/DL (ref 3.4–5)
ALP SERPL-CCNC: 98 U/L (ref 40–150)
ALT SERPL W P-5'-P-CCNC: 21 U/L (ref 0–50)
ANION GAP SERPL CALCULATED.3IONS-SCNC: 4 MMOL/L (ref 3–14)
AST SERPL W P-5'-P-CCNC: 23 U/L (ref 0–45)
BASOPHILS # BLD AUTO: 0 10E3/UL (ref 0–0.2)
BASOPHILS NFR BLD AUTO: 1 %
BILIRUB SERPL-MCNC: 0.7 MG/DL (ref 0.2–1.3)
BUN SERPL-MCNC: 20 MG/DL (ref 7–30)
CALCIUM SERPL-MCNC: 9.1 MG/DL (ref 8.5–10.1)
CHLORIDE BLD-SCNC: 110 MMOL/L (ref 94–109)
CHOLEST SERPL-MCNC: 179 MG/DL
CO2 SERPL-SCNC: 29 MMOL/L (ref 20–32)
CREAT SERPL-MCNC: 0.85 MG/DL (ref 0.52–1.04)
EOSINOPHIL # BLD AUTO: 0.1 10E3/UL (ref 0–0.7)
EOSINOPHIL NFR BLD AUTO: 1 %
ERYTHROCYTE [DISTWIDTH] IN BLOOD BY AUTOMATED COUNT: 13 % (ref 10–15)
FASTING STATUS PATIENT QL REPORTED: NO
GFR SERPL CREATININE-BSD FRML MDRD: 64 ML/MIN/1.73M2
GLUCOSE BLD-MCNC: 95 MG/DL (ref 70–99)
HCT VFR BLD AUTO: 42.7 % (ref 35–47)
HDLC SERPL-MCNC: 108 MG/DL
HGB BLD-MCNC: 13.8 G/DL (ref 11.7–15.7)
IMM GRANULOCYTES # BLD: 0 10E3/UL
IMM GRANULOCYTES NFR BLD: 0 %
LDLC SERPL CALC-MCNC: 48 MG/DL
LVEF ECHO: NORMAL
LYMPHOCYTES # BLD AUTO: 1.4 10E3/UL (ref 0.8–5.3)
LYMPHOCYTES NFR BLD AUTO: 28 %
MCH RBC QN AUTO: 31.1 PG (ref 26.5–33)
MCHC RBC AUTO-ENTMCNC: 32.3 G/DL (ref 31.5–36.5)
MCV RBC AUTO: 96 FL (ref 78–100)
MONOCYTES # BLD AUTO: 0.5 10E3/UL (ref 0–1.3)
MONOCYTES NFR BLD AUTO: 10 %
NEUTROPHILS # BLD AUTO: 3 10E3/UL (ref 1.6–8.3)
NEUTROPHILS NFR BLD AUTO: 60 %
NONHDLC SERPL-MCNC: 71 MG/DL
NRBC # BLD AUTO: 0 10E3/UL
NRBC BLD AUTO-RTO: 0 /100
PLATELET # BLD AUTO: 234 10E3/UL (ref 150–450)
POTASSIUM BLD-SCNC: 3.9 MMOL/L (ref 3.4–5.3)
PROT SERPL-MCNC: 7.9 G/DL (ref 6.8–8.8)
RBC # BLD AUTO: 4.44 10E6/UL (ref 3.8–5.2)
SODIUM SERPL-SCNC: 143 MMOL/L (ref 133–144)
TRIGL SERPL-MCNC: 113 MG/DL
WBC # BLD AUTO: 5 10E3/UL (ref 4–11)

## 2021-07-22 PROCEDURE — 36415 COLL VENOUS BLD VENIPUNCTURE: CPT | Performed by: PATHOLOGY

## 2021-07-22 PROCEDURE — 85025 COMPLETE CBC W/AUTO DIFF WBC: CPT | Performed by: PATHOLOGY

## 2021-07-22 PROCEDURE — 80053 COMPREHEN METABOLIC PANEL: CPT | Performed by: PATHOLOGY

## 2021-07-22 PROCEDURE — 93306 TTE W/DOPPLER COMPLETE: CPT | Performed by: INTERNAL MEDICINE

## 2021-07-22 PROCEDURE — 80061 LIPID PANEL: CPT | Performed by: PATHOLOGY

## 2021-07-27 DIAGNOSIS — E78.2 MIXED HYPERLIPIDEMIA: ICD-10-CM

## 2021-07-27 DIAGNOSIS — I10 BENIGN ESSENTIAL HYPERTENSION: ICD-10-CM

## 2021-07-29 ENCOUNTER — MEDICAL CORRESPONDENCE (OUTPATIENT)
Dept: HEALTH INFORMATION MANAGEMENT | Facility: CLINIC | Age: 81
End: 2021-07-29

## 2021-07-29 RX ORDER — DILTIAZEM HYDROCHLORIDE 300 MG/1
CAPSULE, EXTENDED RELEASE ORAL
Qty: 90 CAPSULE | Refills: 3 | Status: SHIPPED | OUTPATIENT
Start: 2021-07-29 | End: 2022-08-02

## 2021-07-29 RX ORDER — ATORVASTATIN CALCIUM 10 MG/1
10 TABLET, FILM COATED ORAL DAILY
Qty: 90 TABLET | Refills: 3 | Status: SHIPPED | OUTPATIENT
Start: 2021-07-29 | End: 2022-09-09

## 2021-07-29 NOTE — CONFIDENTIAL NOTE
"  diltiazem ER COATED BEADS (CARTIA XT) 300 MG 24 hr capsule    Last Written Prescription Date:  6/22/21  Last Fill Quantity: 30,   # refills: 0  Last Office Visit :7/13/21  Future Office visit: 1/13/22      atorvastatin (LIPITOR) 10 MG tablet  Last Written Prescription Date:  6/14/21   Last Fill Quantity: 30,   # refills: 0      bp > 140/90  htn addressed in note.    \"  Her plan is to remain on Cartia 300 mg and stay off Amlodipine because of dizziness\"  \" Increased lipids on Atorvastatin and ordered labs today\"    Routing refill request to provider for review/approval because:  diltiazem  Is cardiology filling this or Bina?  #qty, #rfs? \"  . amlodipine still active on med list?   Continue  atorvastatin (LIPITOR.  "

## 2021-09-04 ENCOUNTER — HEALTH MAINTENANCE LETTER (OUTPATIENT)
Age: 81
End: 2021-09-04

## 2021-12-03 ENCOUNTER — OFFICE VISIT (OUTPATIENT)
Dept: DERMATOLOGY | Facility: CLINIC | Age: 81
End: 2021-12-03
Payer: MEDICARE

## 2021-12-03 DIAGNOSIS — D48.9 NEOPLASM OF UNCERTAIN BEHAVIOR: ICD-10-CM

## 2021-12-03 DIAGNOSIS — Z86.018 HISTORY OF DYSPLASTIC NEVUS: ICD-10-CM

## 2021-12-03 DIAGNOSIS — L57.0 ACTINIC KERATOSIS: Primary | ICD-10-CM

## 2021-12-03 DIAGNOSIS — L82.1 SEBORRHEIC KERATOSIS: ICD-10-CM

## 2021-12-03 DIAGNOSIS — L90.0 LICHEN SCLEROSUS: ICD-10-CM

## 2021-12-03 PROCEDURE — 17003 DESTRUCT PREMALG LES 2-14: CPT | Mod: XS | Performed by: DERMATOLOGY

## 2021-12-03 PROCEDURE — 17000 DESTRUCT PREMALG LESION: CPT | Mod: XS | Performed by: DERMATOLOGY

## 2021-12-03 PROCEDURE — 11102 TANGNTL BX SKIN SINGLE LES: CPT | Performed by: DERMATOLOGY

## 2021-12-03 PROCEDURE — 99213 OFFICE O/P EST LOW 20 MIN: CPT | Mod: 25 | Performed by: DERMATOLOGY

## 2021-12-03 PROCEDURE — 88305 TISSUE EXAM BY PATHOLOGIST: CPT | Mod: 26 | Performed by: PATHOLOGY

## 2021-12-03 PROCEDURE — 88305 TISSUE EXAM BY PATHOLOGIST: CPT | Mod: TC | Performed by: DERMATOLOGY

## 2021-12-03 PROCEDURE — 11103 TANGNTL BX SKIN EA SEP/ADDL: CPT | Mod: XS | Performed by: DERMATOLOGY

## 2021-12-03 ASSESSMENT — PAIN SCALES - GENERAL: PAINLEVEL: NO PAIN (0)

## 2021-12-03 NOTE — LETTER
12/3/2021       RE: Shahana Donaldson  [address]  [facility] [address]     Dear Colleague,    Thank you for referring your patient, Shahana Donaldson, to the [facility] [place] at [facility]. Please see a copy of my visit note below.    [facility] Dermatology Note  Encounter Date: Dec 3, 2021  Office Visit     Dermatology Problem List:  1. 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  2. NMSC:  - BCC, R dorsal wrist, s/p MMS 5/24/21  3. Lichen planus, not active.  - Prior rx: clobetasol ointment  4. Biopsy-proven lichen sclerosis of the vaginal area  - clobetasol 0.05% ointment BID prn  5. Contact dermatitis due to  on hands in 2014 (presumed, no prior patch testing).  # NUBs  - Right lateral foot, ddx melanoma vs lentigo  - Right lower back, ddx BCC vs ISK  - Shave biopsy x2 today, see procedure note below  ____________________________________________    Assessment & Plan:    # NUBs  - Right lateral foot, ddx melanoma vs lentigo  - Right lower back, ddx BCC vs ISK  - Shave biopsy x2 today, see procedure note below    # AKs, nose x2  - Cryo today, see procedure note below    # Lichen sclerosus, genital.   Chronic, active. Well-controlled on topicals for flares. Discussed need to use clobetasol for maintenance to prevent progression to SCC. Given white patches today on exam, would recommend increasing to daily or BID for 1-2 weeks, then taper down gradually back to 2x weekly.  - Advised at least 2x weekly maintenance use of clobetasol, can increase to BID for flares when needed and then taper slowly back to 2x weekly    # Seborrheic keratoses   Discussed the natural history and benign nature of this lesion.  Reassurance provided that no additional treatment is necessary.     # History of atypical junctional melanocytic proliferation. No evidence  of recurrent disease.  - Continue photoprotection  - Continue yearly skin exams  - Advised to monitor for changing, non-healing, bleeding, painful, changing, or otherwise symptomatic lesions    Procedures Performed:   - Cryotherapy procedure note, location(s): See above. After verbal consent and discussion of risks and benefits including, but not limited to, dyspigmentation/scar, blister, and pain, 2 AK(s) was(were) treated with 1-2 mm freeze border for 1-2 cycles with liquid nitrogen. Post cryotherapy instructions were provided.    - Shave biopsy procedure note, location(s): See above. After discussion of benefits and risks including but not limited to bleeding, infection, scar, incomplete removal, recurrence, and non-diagnostic biopsy, written consent and photographs were obtained. The area was cleaned with isopropyl alcohol. 0.5mL of 1% lidocaine with epinephrine was injected to obtain adequate anesthesia of lesion(s). Shave biopsy at site(s) performed. Hemostasis was achieved with aluminium chloride. Petrolatum ointment and a sterile dressing were applied. The patient tolerated the procedure and no complications were noted. The patient was provided with verbal and written post care instructions.     Follow-up: 1 year, sooner if concerns.     Staff and Scribe:     Scribe Disclosure:  I, Pam Culp, am serving as a scribe to document services personally performed by Rosamaria Estrada MD based on data collection and the provider's statements to me.     Provider Disclosure:   The documentation recorded by the scribe accurately reflects the services I personally performed and the decisions made by me.    Rosamaria Estrada MD    Department of Dermatology  [facility]: Phone: [phone], Fax: [phone]  12/8/2021     ____________________________________________    CC: Skin Check (Annual)    HPI:  Ms. Shahana CARTER Anika  is a(n) 81 year old female who presents today as a return patient for FBSE. The patient was last seen in dermatology by Dr. Castro on 6/7/21 at which time she presented for follow up and suture removal after MMS for BCC on the right wrist on 5/24/21.     Today, the patient does not report concern over her excision site. She states her LS has been well-controlled. When prompted, she is unsure as to how long she has had a spot on her right foot. She notes that she has been staying out of the sun.     Patient is otherwise feeling well, without additional skin concerns.    Labs Reviewed:  N/A    Physical Exam:  Vitals: There were no vitals taken for this visit.  SKIN: Full skin, which includes the head/face, both arms, chest, back, abdomen,both legs, genitalia and/or groin buttocks, digits and/or nails, was examined.  - Right lower back pink shiny papule   - Right lateral foot irregular brown 8 mm macule  - Vulva with adhesion of labia and white patches.  - There are erythematous macules with overyling adherent scale on the nose.   - There are waxy stuck on tan to brown papules on the trunk and extremities.  - No other lesions of concern on areas examined.     Medications:  Current Outpatient Medications   Medication     Acetaminophen (TYLENOL PO)     amLODIPine (NORVASC) 2.5 MG tablet     ammonium lactate (LAC-HYDRIN) 12 % external lotion     atorvastatin (LIPITOR) 10 MG tablet     calcium carbonate 500 mg, elemental, (OSCAL;OYSTER SHELL CALCIUM) 500 MG tablet     CARTIA  MG 24 hr capsule     cholecalciferol (VITAMIN D3) 1000 UNIT tablet     clobetasol (TEMOVATE) 0.05 % external ointment     clobetasol (TEMOVATE) 0.05 % ointment     diltiazem (CARTIA XT) 300 MG 24 hr capsule     docusate sodium (COLACE) 100 MG capsule     triamcinolone (KENALOG) 0.1 % external ointment     No current facility-administered medications for this visit.      Past Medical History:   Patient Active Problem List   Diagnosis     Essential  hypertension, benign     Mixed hyperlipidemia     Symptomatic menopausal or female climacteric states     Diverticulosis of large intestine     Hyperlipidemia     Coronary atherosclerosis     Esophageal reflux     vulvar cyst     Hiatal hernia     S/P Nissen fundoplication (without gastrostomy tube) procedure     History of tubal ligation     History of dilatation and curettage     History of hysterectomy     Lichen sclerosus     Left displaced femoral neck fracture (H)     Senile osteoporosis     Lichen planus     Seborrheic dermatitis     Heart failure with preserved ejection fraction, NYHA class II (H)     Past Medical History:   Diagnosis Date     Diverticulosis of colon (without mention of hemorrhage)      Essential hypertension, benign      Gastro-oesophageal reflux disease     nissen     Hemorrhoid      Nonsenile cataract      Osteoporosis      Other and unspecified hyperlipidemia      Rectocele      Symptomatic menopausal or female climacteric states         CC No referring provider defined for this encounter. on close of this encounter.

## 2021-12-03 NOTE — NURSING NOTE
Chief Complaint   Patient presents with     Skin Check     Annual     Pt is here for an annual skin exam. She has no specific areas of concern.  Pam Vela LPN on 12/3/2021 at 3:20 PM

## 2021-12-03 NOTE — PATIENT INSTRUCTIONS
Wound Care After a Biopsy    What is a skin biopsy?  A skin biopsy allows the doctor to examine a very small piece of tissue under the microscope to determine the diagnosis and the best treatment for the skin condition. A local anesthetic (numbing medicine)  is injected with a very small needle into the skin area to be tested. A small piece of skin is taken from the area. Sometimes a suture (stitch) is used.     What are the risks of a skin biopsy?  I will experience scar, bleeding, swelling, pain, crusting and redness. I may experience incomplete removal or recurrence. Risks of this procedure are excessive bleeding, bruising, infection, nerve damage, numbness, thick (hypertrophic or keloidal) scar and non-diagnostic biopsy.    How should I care for my wound for the first 24 hours?    Keep the wound dry and covered for 24 hours    If it bleeds, hold direct pressure on the area for 15 minutes. If bleeding does not stop then go to the emergency room    Avoid strenuous exercise the first 1-2 days or as your doctor instructs you    How should I care for the wound after 24 hours?    After 24 hours, remove the bandage    You may bathe or shower as normal    If you had a scalp biopsy, you can shampoo as usual and can use shower water to clean the biopsy site daily    Clean the wound twice a day with gentle soap and water    Do not scrub, be gentle    Apply white petroleum/Vaseline after cleaning the wound with a cotton swab or a clean finger, and keep the site covered with a Bandaid /bandage. Bandages are not necessary with a scalp biopsy    If you are unable to cover the site with a Bandaid /bandage, re-apply ointment 2-3 times a day to keep the site moist. Moisture will help with healing    Avoid strenuous activity for first 1-2 days    Avoid lakes, rivers, pools, and oceans until the stitches are removed or the site is healed    How do I clean my wound?    Wash hands thoroughly with soap or use hand  before all  wound care    Clean the wound with gentle soap and water    Apply white petroleum/Vaseline  to wound after it is clean    Replace the Bandaid /bandage to keep the wound covered for the first few days or as instructed by your doctor    If you had a scalp biopsy, warm shower water to the area on a daily basis should suffice    What should I use to clean my wound?     Cotton-tipped applicators (Qtips )    White petroleum jelly (Vaseline ). Use a clean new container and use Q-tips to apply.    Bandaids   as needed    Gentle soap     How should I care for my wound long term?    Do not get your wound dirty    Keep up with wound care for one week or until the area is healed.    A small scab will form and fall off by itself when the area is completely healed. The area will be red and will become pink in color as it heals. Sun protection is very important for how your scar will turn out. Sunscreen with an SPF 30 or greater is recommended once the area is healed.    If you have stitches, stitches need to be removed in 14 days. You may return to our clinic for this or you may have it done locally at your doctor s office.    You should have some soreness but it should be mild and slowly go away over several days. Talk to your doctor about using tylenol for pain,    When should I call my doctor?  If you have increased:     Pain or swelling    Pus or drainage (clear or slightly yellow drainage is ok)    Temperature over 100F    Spreading redness or warmth around wound    When will I hear about my results?  The biopsy results can take 2 weeks to come back.  Your results will automatically release to [employer] before your provider has even reviewed them.  The clinic will call you with the results, send you a [facility] message, or have you schedule a follow-up clinic or phone time to discuss the results.  Contact our clinics if you do not hear from us in 2 weeks.    Who should I call with questions?    [facility],  Given: [phone]    [facility]: [phone]    For urgent needs outside of business hours call the [facility] at [phone] and ask for the dermatology resident on call    Cryotherapy    What is it?    Use of a very cold liquid, such as liquid nitrogen, to freeze and destroy abnormal skin cells that need to be removed    What should I expect?    Tenderness and redness    A small blister that might grow and fill with dark purple blood. There may be crusting.    More than one treatment may be needed if the lesions do not go away.    How do I care for the treated area?    Gently wash the area with your hands when bathing.    Use a thin layer of Vaseline to help with healing. You may use a Band-Aid.     The area should heal within 7-10 days and may leave behind a pink or lighter color.     Do not use an antibiotic or Neosporin ointment.     You may take acetaminophen (Tylenol) for pain.     Call your doctor if you have:    Severe pain    Signs of infection (warmth, redness, cloudy yellow drainage, and or a bad smell)    Questions or concerns    Who should I call with questions?       [facility]: [phone]       [facility]: [phone]       For urgent needs outside of business hours call the [facility] at [phone] and ask for the dermatology resident on call

## 2021-12-03 NOTE — PROGRESS NOTES
[facility] Dermatology Note  Encounter Date: Dec 3, 2021  Office Visit     Dermatology Problem List:  1. 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  2. NMSC:  - BCC, R dorsal wrist, s/p MMS 5/24/21  3. Lichen planus, not active.  - Prior rx: clobetasol ointment  4. Biopsy-proven lichen sclerosis of the vaginal area  - clobetasol 0.05% ointment BID prn  5. Contact dermatitis due to  on hands in 2014 (presumed, no prior patch testing).  # NUBs  - Right lateral foot, ddx melanoma vs lentigo  - Right lower back, ddx BCC vs ISK  - Shave biopsy x2 today, see procedure note below  ____________________________________________    Assessment & Plan:    # NUBs  - Right lateral foot, ddx melanoma vs lentigo  - Right lower back, ddx BCC vs ISK  - Shave biopsy x2 today, see procedure note below    # AKs, nose x2  - Cryo today, see procedure note below    # Lichen sclerosus, genital.   Chronic, active. Well-controlled on topicals for flares. Discussed need to use clobetasol for maintenance to prevent progression to SCC. Given white patches today on exam, would recommend increasing to daily or BID for 1-2 weeks, then taper down gradually back to 2x weekly.  - Advised at least 2x weekly maintenance use of clobetasol, can increase to BID for flares when needed and then taper slowly back to 2x weekly    # Seborrheic keratoses   Discussed the natural history and benign nature of this lesion.  Reassurance provided that no additional treatment is necessary.     # History of atypical junctional melanocytic proliferation. No evidence of recurrent disease.  - Continue photoprotection  - Continue yearly skin exams  - Advised to monitor for changing, non-healing, bleeding, painful, changing, or otherwise symptomatic lesions    Procedures Performed:   - Cryotherapy procedure note, location(s): See above. After verbal consent and discussion of risks and benefits  including, but not limited to, dyspigmentation/scar, blister, and pain, 2 AK(s) was(were) treated with 1-2 mm freeze border for 1-2 cycles with liquid nitrogen. Post cryotherapy instructions were provided.    - Shave biopsy procedure note, location(s): See above. After discussion of benefits and risks including but not limited to bleeding, infection, scar, incomplete removal, recurrence, and non-diagnostic biopsy, written consent and photographs were obtained. The area was cleaned with isopropyl alcohol. 0.5mL of 1% lidocaine with epinephrine was injected to obtain adequate anesthesia of lesion(s). Shave biopsy at site(s) performed. Hemostasis was achieved with aluminium chloride. Petrolatum ointment and a sterile dressing were applied. The patient tolerated the procedure and no complications were noted. The patient was provided with verbal and written post care instructions.     Follow-up: 1 year, sooner if concerns.     Staff and Scribe:     Scribe Disclosure:  I, Pam Culp, am serving as a scribe to document services personally performed by Rosamaria Estrada MD based on data collection and the provider's statements to me.     Provider Disclosure:   The documentation recorded by the scribe accurately reflects the services I personally performed and the decisions made by me.    Rosamaria Estrada MD    Department of Dermatology  [facility]: Phone: [phone], Fax: [phone]  12/8/2021     ____________________________________________    CC: Skin Check (Annual)    HPI:  Ms. Shahana Donaldson is a(n) 81 year old female who presents today as a return patient for FBSE. The patient was last seen in dermatology by Dr. Castro on 6/7/21 at which time she presented for follow up and suture removal after MMS for BCC on the right wrist on 5/24/21.     Today, the patient does not report concern over her excision site. She  states her LS has been well-controlled. When prompted, she is unsure as to how long she has had a spot on her right foot. She notes that she has been staying out of the sun.     Patient is otherwise feeling well, without additional skin concerns.    Labs Reviewed:  N/A    Physical Exam:  Vitals: There were no vitals taken for this visit.  SKIN: Full skin, which includes the head/face, both arms, chest, back, abdomen,both legs, genitalia and/or groin buttocks, digits and/or nails, was examined.  - Right lower back pink shiny papule   - Right lateral foot irregular brown 8 mm macule  - Vulva with adhesion of labia and white patches.  - There are erythematous macules with overyling adherent scale on the nose.   - There are waxy stuck on tan to brown papules on the trunk and extremities.  - No other lesions of concern on areas examined.     Medications:  Current Outpatient Medications   Medication     Acetaminophen (TYLENOL PO)     amLODIPine (NORVASC) 2.5 MG tablet     ammonium lactate (LAC-HYDRIN) 12 % external lotion     atorvastatin (LIPITOR) 10 MG tablet     calcium carbonate 500 mg, elemental, (OSCAL;OYSTER SHELL CALCIUM) 500 MG tablet     CARTIA  MG 24 hr capsule     cholecalciferol (VITAMIN D3) 1000 UNIT tablet     clobetasol (TEMOVATE) 0.05 % external ointment     clobetasol (TEMOVATE) 0.05 % ointment     diltiazem (CARTIA XT) 300 MG 24 hr capsule     docusate sodium (COLACE) 100 MG capsule     triamcinolone (KENALOG) 0.1 % external ointment     No current facility-administered medications for this visit.      Past Medical History:   Patient Active Problem List   Diagnosis     Essential hypertension, benign     Mixed hyperlipidemia     Symptomatic menopausal or female climacteric states     Diverticulosis of large intestine     Hyperlipidemia     Coronary atherosclerosis     Esophageal reflux     vulvar cyst     Hiatal hernia     S/P Nissen fundoplication (without gastrostomy tube) procedure     History  of tubal ligation     History of dilatation and curettage     History of hysterectomy     Lichen sclerosus     Left displaced femoral neck fracture (H)     Senile osteoporosis     Lichen planus     Seborrheic dermatitis     Heart failure with preserved ejection fraction, NYHA class II (H)     Past Medical History:   Diagnosis Date     Diverticulosis of colon (without mention of hemorrhage)      Essential hypertension, benign      Gastro-oesophageal reflux disease     nissen     Hemorrhoid      Nonsenile cataract      Osteoporosis      Other and unspecified hyperlipidemia      Rectocele      Symptomatic menopausal or female climacteric states         CC No referring provider defined for this encounter. on close of this encounter.

## 2021-12-03 NOTE — NURSING NOTE
Lidocaine-epinephrine 1-1:057540 % injection   3mL once for one use, starting 12/3/2021 ending 12/3/2021,  2mL disp, R-0, injection  Injected by Twila Cooper CMA

## 2021-12-07 LAB
PATH REPORT.COMMENTS IMP SPEC: NORMAL
PATH REPORT.COMMENTS IMP SPEC: NORMAL
PATH REPORT.FINAL DX SPEC: NORMAL
PATH REPORT.GROSS SPEC: NORMAL
PATH REPORT.MICROSCOPIC SPEC OTHER STN: NORMAL
PATH REPORT.RELEVANT HX SPEC: NORMAL

## 2022-01-07 NOTE — PLAN OF CARE
Left message to call back. Transfer to triage. Problem: Patient Care Overview  Goal: Plan of Care/Patient Progress Review  Physical Therapy Discharge Summary    Reason for therapy discharge:    Discharged to transitional care facility.    Progress towards therapy goal(s). See goals on Care Plan in [facility] electronic health record for goal details.  Goals not met.  Barriers to achieving goals:   discharge from facility.    Therapy recommendation(s):    Continued therapy is recommended.  Rationale/Recommendations:  Progress deficits towards independence with functional mobility.

## 2022-01-12 NOTE — PROGRESS NOTES
HPI:    Last visit with me 7/13/2021 and she comes in for a physical today. Her daughter Sara is present today. No new HEENT, cardiopulmonary, abdominal, , GYN, neurological, systemic, psychiatric, lymphatic, endocrine, vascular complaints. She does have a treadmill at home. She states her BP is lower at home. She remains on her same medications. She does not smoke nor abuse EtOH.     Past Medical History:   Diagnosis Date     Diverticulosis of colon (without mention of hemorrhage)      Essential hypertension, benign      Gastro-oesophageal reflux disease     nissen     Hemorrhoid      Nonsenile cataract      Osteoporosis      Other and unspecified hyperlipidemia      Rectocele      Symptomatic menopausal or female climacteric states      Past Surgical History:   Procedure Laterality Date     ARTHROPLASTY HIP Left 10/23/2018    Procedure: LEFT HIP COLEMAN- ARTHROPLASTY ;  Surgeon: Araceli Gutierrez MD;  Location: [facility] OR     CATARACT IOL, RT/LT Left 07/15/2019     COLONOSCOPY  5/24/2011    Procedure:COLONOSCOPY; Surgeon:HALINA SCOTT; Location:[facility] GI     COLONOSCOPY Left 11/10/2015    Procedure: COLONOSCOPY;  Surgeon: Raheem Colunga MD;  Location: [facility] GI     COLONOSCOPY  5/24/11, 11/10/2015     DILATION AND CURETTAGE      secondary to menorrhagia     GYN SURGERY      hysterectomy/oopherectomy; done for prolapse     HEMIARTHROPLASTY HIP Left 10/23/2018     HYSTERECTOMY      hysterectomy/oopherectomy; done for prolapse     LAPAROSCOPIC NISSEN FUNDOPLICATION  1/7/2013    Procedure: LAPAROSCOPIC NISSEN FUNDOPLICATION;  Laparoscopic Repair of Hiatel Hernia, NISSEN, Esophagoscopy, Gastroscopy And Duodenoscopy ;  Surgeon: Leonidas Valle MD;  Location: [facility] OR     LAPAROSCOPIC NISSEN FUNDOPLICATION  01/07/2013     PHACOEMULSIFICATION WITH STANDARD INTRAOCULAR LENS IMPLANT Left 7/15/2019    Procedure: Left Eye Phacoemulsification with Intraocular Lens;  Surgeon: Josue Trujillo MD;  Location:  OR      TUBAL LIGATION Bilateral      ZZC NONSPECIFIC PROCEDURE      Bilateral Tubal Ligation     ZZC NONSPECIFIC PROCEDURE      D&C secondary to menorrhagia     ZZC NONSPECIFIC PROCEDURE      child birth x 2     PE:    Vitals noted, gen, nad, cooperative, alert, neck no carotid bruits, neck supple nl rom, lungs with good air movement, RRR, S1, S2, no MRG, abdomen, no acute findings. No B LE swelling. Grossly normal neurological exam.     Resting Echo 7/22/2021:  Interpretation Summary  No significant valvular abnormalities present.  Left ventricular size, wall motion and function are normal. The ejection  fraction is 55-60%.  Right ventricular function, chamber size, wall motion, and thickness are  normal.  There has been no change.      A/P:    1. Immunizations. COVID Pfizer vaccine x 3. Tdap 11/11/2016. Influenza vaccine 9/10/2021. Prevnar 13 on 5/18/2015. Pneumococcal 23 on 4/3/2018. One dose of Shingrix?   2. HTN; on Amlodipine (2.5 mg) and Diltazem. Elevated today and she can increase Amlodipine.   3. Increased lipids on Atorvastatin; labs 7/22/2021; TG's 113,  and LDL 48.   4. Seen in Dermatology Dr. Estrada 12/3/2021   5. Breast imaging 4/7/2021  6. Preventive Cardiology appt. With Dr. Wanda Francisco 7/9/2021  7. Colonoscopy 11/10/2015  8. DEXA scan ordered today  and Endocrinology referral

## 2022-01-13 ENCOUNTER — OFFICE VISIT (OUTPATIENT)
Dept: INTERNAL MEDICINE | Facility: CLINIC | Age: 82
End: 2022-01-13
Payer: MEDICARE

## 2022-01-13 VITALS
HEART RATE: 73 BPM | DIASTOLIC BLOOD PRESSURE: 88 MMHG | BODY MASS INDEX: 21.18 KG/M2 | WEIGHT: 143 LBS | HEIGHT: 69 IN | SYSTOLIC BLOOD PRESSURE: 163 MMHG | OXYGEN SATURATION: 96 %

## 2022-01-13 DIAGNOSIS — Z91.89 AT RISK FOR DECREASED BONE DENSITY: ICD-10-CM

## 2022-01-13 DIAGNOSIS — Z12.31 ENCOUNTER FOR SCREENING MAMMOGRAM FOR BREAST CANCER: Primary | ICD-10-CM

## 2022-01-13 DIAGNOSIS — M80.08XA AGE-RELATED OSTEOPOROSIS WITH CURRENT PATHOLOGICAL FRACTURE, VERTEBRA(E), INITIAL ENCOUNTER FOR FRACTURE (H): ICD-10-CM

## 2022-01-13 PROCEDURE — 99397 PER PM REEVAL EST PAT 65+ YR: CPT | Performed by: INTERNAL MEDICINE

## 2022-01-13 ASSESSMENT — MIFFLIN-ST. JEOR: SCORE: 1178.02

## 2022-01-13 ASSESSMENT — PAIN SCALES - GENERAL: PAINLEVEL: NO PAIN (0)

## 2022-01-13 NOTE — TELEPHONE ENCOUNTER
RECORDS RECEIVED FROM: Osteoporosis, referred by Dr. Curran   DATE RECEIVED: 4/13/2022   NOTES (FOR ALL VISITS) STATUS DETAILS   OFFICE NOTES from referring provider [facility]:  1/13/22 - PCC OV with Dr. Curran   OFFICE NOTES from other specialist [facility]:  1/15/19 - ENDO OV with Dr. Cummins   ED NOTES N/A    OPERATIVE REPORT  (thyroid, pituitary, adrenal, parathyroid) N/A    MEDICATION LIST Internal    IMAGING      DEXASCAN [facility]:  (AMANDEEP) 4/11/22 - DEXA   MRI (BRAIN) N/A    XR (Chest) N/A    CT (HEAD/NECK/CHEST/ABDOMEN) [facility]:  8/9/19 - CTA Chest/Abd   NUCLEAR  N/A    ULTRASOUND (HEAD/NECK) N/A    LABS     DIABETES: HBGA1C, CREATININE, FASTING LIPIDS, MICROALBUMIN URINE, POTASSIUM, TSH, T4    THYROID: TSH, T4, CBC, THYRODLONULIN, TOTAL T3, FREE T4, CALCITONIN, CEA [facility]   [facility]:  7/22/21 - Lipid  7/22/21 - CMP  7/22/21 - CBC

## 2022-01-13 NOTE — PROGRESS NOTES
Medicare Wellness Health Risk Assessment Questionnaire      What is your marital status?      Who lives in your household?      Does your home have loose rugs in the hallway:     no    Does your home have grab bars in the bathroom:    Yes     Does your home have handrails on the stairs?  Yes     Does your home have poorly lit areas?    No    How many times have you fallen in the last year?  none    How many times have you been to the Emergency Room in the last year?  none    How many times have you been hospitalized in the last year?  none    Do you feel threatened or controlled by a partner, ex-partner or anyone in your life?   no    Has anyone hurt you physically, for example by pushing, hitting, slapping or kicking you   or forcing you to have sex?   No    Are you sexually active?    Yes     If yes, with men, women, or both?   Men     If yes, do you more than one current partner?   No    If yes, are you using condoms?    No    Have you had any sexually transmitted infections in the last year?   No    Do you have any sexual concerns?    No    Do you need help with the phone, transportation, shopping, preparing meals, housework, laundry, medications or managing money?   no    Have you noticed any hearing difficulties?   No    Do you wear hearing aids?   No    Have you seen a hearing professional such as an audiologist in the last 1 year?   No    Do you have vision difficulty?    No    Do you wear glasses or contacts?   Yes glasses    Have you seen an eye doctor in the last 1 year?   Yes     Women: What year did you stop having periods (approximate age)?  n/a    Women: Any vaginal bleeding in the last year?    No    Women: Have you ever had an abnormal Pap smear?    No    How many servings of fruits and vegetables do you eat a day?  2 cups    How often do you exercise in a week?  Treadmill 2 times a week     What kind of exercise do you do?  Walking 20 min    Do you wear a seatbelt when driving or  riding in a vehicle?     Yes     Do you use tobacco?    No    Do you use e-cigarettes?    No    Do you use any other drugs?   No         Do you drink alcohol?   No      Have you completed an Advance Directives document?  Yes     If yes, have you given a copy to the clinic?   Yes     Do you need information on Advance Directives?   No

## 2022-01-13 NOTE — NURSING NOTE
Chief Complaint   Patient presents with     Wellness Visit     medicare annual wellness       AMANDA Hall at 8:20 AM on 1/13/2022

## 2022-02-15 ENCOUNTER — DOCUMENTATION ONLY (OUTPATIENT)
Dept: OTHER | Facility: CLINIC | Age: 82
End: 2022-02-15
Payer: MEDICARE

## 2022-04-11 ENCOUNTER — ANCILLARY PROCEDURE (OUTPATIENT)
Dept: MAMMOGRAPHY | Facility: CLINIC | Age: 82
End: 2022-04-11
Attending: INTERNAL MEDICINE
Payer: MEDICARE

## 2022-04-11 DIAGNOSIS — Z12.31 VISIT FOR SCREENING MAMMOGRAM: ICD-10-CM

## 2022-04-11 DIAGNOSIS — Z12.31 ENCOUNTER FOR SCREENING MAMMOGRAM FOR BREAST CANCER: ICD-10-CM

## 2022-04-11 PROCEDURE — 77067 SCR MAMMO BI INCL CAD: CPT | Mod: GC | Performed by: STUDENT IN AN ORGANIZED HEALTH CARE EDUCATION/TRAINING PROGRAM

## 2022-04-11 PROCEDURE — 77063 BREAST TOMOSYNTHESIS BI: CPT | Mod: GC | Performed by: STUDENT IN AN ORGANIZED HEALTH CARE EDUCATION/TRAINING PROGRAM

## 2022-04-12 ENCOUNTER — OFFICE VISIT (OUTPATIENT)
Dept: OPHTHALMOLOGY | Facility: CLINIC | Age: 82
End: 2022-04-12
Attending: OPHTHALMOLOGY
Payer: MEDICARE

## 2022-04-12 DIAGNOSIS — H26.492 PCO (POSTERIOR CAPSULAR OPACIFICATION), LEFT: ICD-10-CM

## 2022-04-12 DIAGNOSIS — I10 ESSENTIAL HYPERTENSION, BENIGN: ICD-10-CM

## 2022-04-12 DIAGNOSIS — Z96.1 PSEUDOPHAKIA, LEFT EYE: Primary | ICD-10-CM

## 2022-04-12 PROCEDURE — 92015 DETERMINE REFRACTIVE STATE: CPT | Mod: GY

## 2022-04-12 PROCEDURE — G0463 HOSPITAL OUTPT CLINIC VISIT: HCPCS | Mod: 25

## 2022-04-12 PROCEDURE — 99214 OFFICE O/P EST MOD 30 MIN: CPT | Mod: GC | Performed by: OPHTHALMOLOGY

## 2022-04-12 ASSESSMENT — REFRACTION_WEARINGRX
OS_AXIS: 154
OD_SPHERE: BALANCE
OS_ADD: +2.75
OS_CYLINDER: +0.25
OS_SPHERE: -0.50

## 2022-04-12 ASSESSMENT — VISUAL ACUITY
CORRECTION_TYPE: GLASSES
OD_CC: 1'/200 E CARD
OS_CC: 20/25
METHOD: SNELLEN - LINEAR

## 2022-04-12 ASSESSMENT — TONOMETRY
OD_IOP_MMHG: 13
OS_IOP_MMHG: 13
IOP_METHOD: TONOPEN

## 2022-04-12 ASSESSMENT — SLIT LAMP EXAM - LIDS
COMMENTS: MGD, BLEPHARITIS
COMMENTS: MGD, BLEPHARITIS

## 2022-04-12 ASSESSMENT — REFRACTION_MANIFEST
OD_SPHERE: BALANCE
OS_SPHERE: -0.50
OS_AXIS: 153
OS_CYLINDER: +0.25
OS_ADD: +2.75

## 2022-04-12 ASSESSMENT — CONF VISUAL FIELD
OD_INFERIOR_NASAL_RESTRICTION: 3
OS_SUPERIOR_TEMPORAL_RESTRICTION: 3
OD_INFERIOR_TEMPORAL_RESTRICTION: 1
OD_SUPERIOR_TEMPORAL_RESTRICTION: 1
OD_SUPERIOR_NASAL_RESTRICTION: 3

## 2022-04-12 ASSESSMENT — CUP TO DISC RATIO: OS_RATIO: 0.4

## 2022-04-12 ASSESSMENT — EXTERNAL EXAM - LEFT EYE: OS_EXAM: NORMAL

## 2022-04-12 ASSESSMENT — EXTERNAL EXAM - RIGHT EYE: OD_EXAM: NORMAL

## 2022-04-12 NOTE — NURSING NOTE
"Chief Complaints and History of Present Illnesses   Patient presents with     COMPREHENSIVE EYE EXAM     Patient notes vision is more blurry each eye in the last year. \"It's more difficult to see fine print.\"     FLAKITA Pinedo 9:24 AM 04/12/2022       Chief Complaint(s) and History of Present Illness(es)     COMPREHENSIVE EYE EXAM     Laterality: both eyes    Onset: years ago    Quality: blurred vision    Context: near vision    Associated symptoms: Negative for dryness, eye pain, redness, tearing, headache, flashes, floaters and itching    Treatments tried: glasses    Pain scale: 0/10    Comments: Patient notes vision is more blurry each eye in the last year. \"It's more difficult to see fine print.\"     FLAKITA Pinedo 9:24 AM 04/12/2022                  "

## 2022-04-12 NOTE — PROGRESS NOTES
"Chief Complaint(s) and History of Present Illness(es)     COMPREHENSIVE EYE EXAM     Laterality: both eyes    Onset: years ago    Quality: blurred vision    Context: near vision    Associated symptoms: Negative for dryness, eye pain, redness, tearing,   headache, flashes, floaters and itching    Treatments tried: glasses    Pain scale: 0/10    Comments: Patient notes vision is more blurry each eye in the last year.   \"It's more difficult to see fine print.\"     Gricelda Her, COT 9:24 AM 04/12/2022              Review of systems for the eyes was negative other than the pertinent positives/negatives listed in the HPI.      Assessment & Plan      Shahana Donaldson is a 81 year old female with the following diagnoses:   1. Pseudophakia, left eye    2. PCO (posterior capsular opacification), left    3. Essential hypertension, benign         Long-standing amblyopia right eye. No previous treatments.   Stable vision, per patient.  Cataract visually significant, but continues to do well  Monitor    Mild posterior capsular opacity (PCO) left eye  RBA to YAG reviewed, would monitor for now  Increase artificial tears as needed   Monocular precaution discussed  Refraction given    Patient disposition:   Return in about 1 year (around 4/12/2023) for DFE.           Attending Physician Attestation:  Complete documentation of historical and exam elements from today's encounter can be found in the full encounter summary report (not reduplicated in this progress note).  I personally obtained the chief complaint(s) and history of present illness.  I confirmed and edited as necessary the review of systems, past medical/surgical history, family history, social history, and examination findings as documented by others; and I examined the patient myself.  I personally reviewed the relevant tests, images, and reports as documented above.  I formulated and edited as necessary the assessment and plan and discussed the findings and " management plan with the patient and family. . - Josue Trujillo MD

## 2022-04-13 ENCOUNTER — OFFICE VISIT (OUTPATIENT)
Dept: ENDOCRINOLOGY | Facility: CLINIC | Age: 82
End: 2022-04-13
Attending: INTERNAL MEDICINE
Payer: MEDICARE

## 2022-04-13 ENCOUNTER — PRE VISIT (OUTPATIENT)
Dept: ENDOCRINOLOGY | Facility: CLINIC | Age: 82
End: 2022-04-13

## 2022-04-13 VITALS
DIASTOLIC BLOOD PRESSURE: 91 MMHG | HEIGHT: 69 IN | WEIGHT: 141.7 LBS | SYSTOLIC BLOOD PRESSURE: 161 MMHG | HEART RATE: 71 BPM | BODY MASS INDEX: 20.99 KG/M2

## 2022-04-13 DIAGNOSIS — K21.00 GASTROESOPHAGEAL REFLUX DISEASE WITH ESOPHAGITIS, UNSPECIFIED WHETHER HEMORRHAGE: ICD-10-CM

## 2022-04-13 DIAGNOSIS — M81.0 SENILE OSTEOPOROSIS: Primary | ICD-10-CM

## 2022-04-13 PROCEDURE — 99204 OFFICE O/P NEW MOD 45 MIN: CPT | Performed by: INTERNAL MEDICINE

## 2022-04-13 RX ORDER — EPINEPHRINE 1 MG/ML
0.3 INJECTION, SOLUTION, CONCENTRATE INTRAVENOUS EVERY 5 MIN PRN
Status: CANCELLED | OUTPATIENT
Start: 2022-04-13

## 2022-04-13 RX ORDER — ALBUTEROL SULFATE 90 UG/1
1-2 AEROSOL, METERED RESPIRATORY (INHALATION)
Status: CANCELLED
Start: 2022-04-13

## 2022-04-13 RX ORDER — MEPERIDINE HYDROCHLORIDE 25 MG/ML
25 INJECTION INTRAMUSCULAR; INTRAVENOUS; SUBCUTANEOUS EVERY 30 MIN PRN
Status: CANCELLED | OUTPATIENT
Start: 2022-04-13

## 2022-04-13 RX ORDER — NALOXONE HYDROCHLORIDE 0.4 MG/ML
0.2 INJECTION, SOLUTION INTRAMUSCULAR; INTRAVENOUS; SUBCUTANEOUS
Status: CANCELLED | OUTPATIENT
Start: 2022-04-13

## 2022-04-13 RX ORDER — ALBUTEROL SULFATE 0.83 MG/ML
2.5 SOLUTION RESPIRATORY (INHALATION)
Status: CANCELLED | OUTPATIENT
Start: 2022-04-13

## 2022-04-13 RX ORDER — HEPARIN SODIUM (PORCINE) LOCK FLUSH IV SOLN 100 UNIT/ML 100 UNIT/ML
5 SOLUTION INTRAVENOUS
Status: CANCELLED | OUTPATIENT
Start: 2022-04-13

## 2022-04-13 RX ORDER — METHYLPREDNISOLONE SODIUM SUCCINATE 125 MG/2ML
125 INJECTION, POWDER, LYOPHILIZED, FOR SOLUTION INTRAMUSCULAR; INTRAVENOUS
Status: CANCELLED
Start: 2022-04-13

## 2022-04-13 RX ORDER — HEPARIN SODIUM,PORCINE 10 UNIT/ML
5 VIAL (ML) INTRAVENOUS
Status: CANCELLED | OUTPATIENT
Start: 2022-04-13

## 2022-04-13 RX ORDER — DIPHENHYDRAMINE HYDROCHLORIDE 50 MG/ML
50 INJECTION INTRAMUSCULAR; INTRAVENOUS
Status: CANCELLED
Start: 2022-04-13

## 2022-04-13 RX ORDER — ZOLEDRONIC ACID 5 MG/100ML
5 INJECTION, SOLUTION INTRAVENOUS ONCE
Status: CANCELLED
Start: 2022-04-13 | End: 2022-04-13

## 2022-04-13 ASSESSMENT — PAIN SCALES - GENERAL: PAINLEVEL: NO PAIN (0)

## 2022-04-13 NOTE — PROGRESS NOTES
Osteoporosis  [facility]                                                                               - Endocrinology Initial Consultation -    Reason for visit/consult:  osteoporosis    Primary care provider: Jeramie Curran    HPI: A 82 yo female here for the evaluation of osteoporosis.  Referral from Dr. Curran.   In around 2005 per the record she was taking her Fosamax, in 2018 she had hip injury and fell and she had a left hip fracture at that time bone density was done and osteoporosis.  She received Reclast infusion therapy in 2019 since then she has not received it yet.  She repeated to bone density on April 11, 2022 which shows more advanced osteoporosis with declining T score over 4 years.   Other medical history including remote history of hysterectomy, GERD for which she underwent Nissen procedure in 2013.  Essential hypertension mild dyslipidemia.    Prior fragility fracture: no  Parental history of hip fracture: left hip 2018  Rheumatoid arthritis:no  Secondary cause of osteoporosis:no  Body habitus (BMI less than or equal to 20):no  Family history of osteoporosis: unknown   Sedentary lifestyle: fair  Current tabacco smoking:no  History of thyroid disorder:no  Calcuim and Vitmin D supplements: Nature Ca D  Other supplement or bone treatment: Reclast in 2019  Medication use (PPI, epileptic medications etc): GERD  Early menopause (female): hysterectomy    Past Medical/Surgical History:  Past Medical History:   Diagnosis Date     Diverticulosis of colon (without mention of hemorrhage)      Essential hypertension, benign      Gastro-oesophageal reflux disease     nissen     Hemorrhoid      Nonsenile cataract      Osteoporosis      Other and unspecified hyperlipidemia      Rectocele      Symptomatic menopausal or female climacteric states      Past Surgical History:   Procedure Laterality Date     ARTHROPLASTY HIP Left 10/23/2018    Procedure: LEFT HIP COLEMAN- ARTHROPLASTY ;  Surgeon: Matt  Araceli Wyatt MD;  Location: [facility]     CATARACT IOL, RT/LT Left 07/15/2019     COLONOSCOPY  5/24/2011    Procedure:COLONOSCOPY; Surgeon:HALINA SCOTT; Location:[facility]     COLONOSCOPY Left 11/10/2015    Procedure: COLONOSCOPY;  Surgeon: Raheem Colunga MD;  Location: [facility]     COLONOSCOPY  5/24/11, 11/10/2015     DILATION AND CURETTAGE      secondary to menorrhagia     GYN SURGERY      hysterectomy/oopherectomy; done for prolapse     HEMIARTHROPLASTY HIP Left 10/23/2018     HYSTERECTOMY      hysterectomy/oopherectomy; done for prolapse     LAPAROSCOPIC NISSEN FUNDOPLICATION  1/7/2013    Procedure: LAPAROSCOPIC NISSEN FUNDOPLICATION;  Laparoscopic Repair of Hiatel Hernia, NISSEN, Esophagoscopy, Gastroscopy And Duodenoscopy ;  Surgeon: Leonidas Valle MD;  Location: [facility] OR     LAPAROSCOPIC NISSEN FUNDOPLICATION  01/07/2013     PHACOEMULSIFICATION WITH STANDARD INTRAOCULAR LENS IMPLANT Left 7/15/2019    Procedure: Left Eye Phacoemulsification with Intraocular Lens;  Surgeon: Josue Trujillo MD;  [facility]: [facility]     TUBAL LIGATION Bilateral      ZZC NONSPECIFIC PROCEDURE      Bilateral Tubal Ligation     ZZC NONSPECIFIC PROCEDURE      D&C secondary to menorrhagia     ZZC NONSPECIFIC PROCEDURE      child birth x 2       Allergies:  Allergies   Allergen Reactions     Dilaudid [Hydromorphone]      dizziness     Senna Hives       Current Medications   Current Outpatient Medications   Medication     Acetaminophen (TYLENOL PO)     amLODIPine (NORVASC) 2.5 MG tablet     ammonium lactate (LAC-HYDRIN) 12 % external lotion     atorvastatin (LIPITOR) 10 MG tablet     calcium carbonate 500 mg, elemental, (OSCAL;OYSTER SHELL CALCIUM) 500 MG tablet     CARTIA  MG 24 hr capsule     cholecalciferol (VITAMIN D3) 1000 UNIT tablet     clobetasol (TEMOVATE) 0.05 % external ointment     clobetasol (TEMOVATE) 0.05 % ointment     docusate sodium (COLACE) 100 MG capsule     triamcinolone (KENALOG) 0.1 % external  "ointment     diltiazem (CARTIA XT) 300 MG 24 hr capsule     No current facility-administered medications for this visit.       Family History:  Family History   Problem Relation Age of Onset     Hypertension Father      C.A.D. Father      Hypertension Mother      Breast Cancer Sister      Osteoporosis Sister      EYE* Brother         Keratoconus     Glaucoma No family hx of      Macular Degeneration No family hx of      Coronary Artery Disease Father      Breast Cancer Sister      Keratoconus Brother        Social History:  Social History     Tobacco Use     Smoking status: Never Smoker     Smokeless tobacco: Never Used   Substance Use Topics     Alcohol use: No       ROS:  Full review of systems taken with the help of the intake sheet. Otherwise a complete 14 point review of systems was taken and is negative unless stated in the history above.    Physical Exam:   Vitals: BP (!) 161/91 (BP Location: Left arm, Patient Position: Sitting, Cuff Size: Adult Regular)   Pulse 71   Ht 1.753 m (5' 9\")   Wt 64.3 kg (141 lb 11.2 oz)   BMI 20.93 kg/m    BMI= Body mass index is 20.93 kg/m .   General: well appearing, no acute distress, pleasant and conversant,   Mental Status/neuro: alert and oriented  Face: symmetrical, normal facial color  Eyes: anicteric, no proptosis or lid lag  Bask: kyphosis,   Resp: normally breathing      Labs : I reviewed data from epic and extract and summarize the pertinent data here.   Lab Results   Component Value Date     07/22/2021     11/25/2019      Lab Results   Component Value Date    POTASSIUM 3.9 07/22/2021    POTASSIUM 4.0 11/25/2019     Lab Results   Component Value Date    CHLORIDE 110 07/22/2021    CHLORIDE 109 11/25/2019     Lab Results   Component Value Date    MARCK 9.1 07/22/2021    MARCK 9.1 11/25/2019     Lab Results   Component Value Date    CO2 29 07/22/2021    CO2 26 11/25/2019     Lab Results   Component Value Date    BUN 20 07/22/2021    BUN 24 11/25/2019     Lab " Results   Component Value Date    CR 0.85 07/22/2021    CR 0.76 11/25/2019     Lab Results   Component Value Date    GLC 95 07/22/2021    GLC 92 11/25/2019     Lab Results   Component Value Date    TSH 2.51 04/17/2018     No results found for: T4  No results found for: A1C    No results found for: IGF1  No results found for: LH  No results found for: FSH  No results found for: ESTROGEN  No results found for: PROLACTIN        Bone density 4/11/2022: I personally reviewed the original images and agree with the below reports.   Results   Lumbar spine   T-score -3.5 ., BMD is 0.756 g/cm2.         Right femoral neck  T-score -2.7      Right total femur  T-score -3.1, BMD is 0.622 g/cm2.     Interval change  There was no change at the areas of interest compared to the prior study.     Fracture risk   Fracture risk calculation is not indicated. Ref.4             Assessment and Plan  81 year old female with osteoporosis.    Osteoporosis    - resume Reclast infusion this spring (2022)    - switch for Calcium citrate plus D - 2 tabs daily OTC    - encourage exercise    - repeat DXA in 2 years    RTC with me in 2 years      45 minutes spent on the date of the encounter doing chart review, history and exam, documentation and further activities as noted above.        Tatiana Ochoa MD  Staff Physician  Endocrinology and Metabolism  License: [id number]

## 2022-04-13 NOTE — LETTER
4/13/2022       RE: Shahana Donaldson  [address]  [facility] [address]     Dear Colleague,    Thank you for referring your patient, Shahana Donaldson, to the [facility] [place] at [facility]. Please see a copy of my visit note below.    Osteoporosis  [facility], [facility]                                                                               - Endocrinology Initial Consultation -    Reason for visit/consult:  osteoporosis    Primary care provider: Jeramie Curran    HPI: A 80 yo female here for the evaluation of osteoporosis.  Referral from Dr. Curran.   In around 2005 per the record she was taking her Fosamax, in 2018 she had hip injury and fell and she had a left hip fracture at that time bone density was done and osteoporosis.  She received Reclast infusion therapy in 2019 since then she has not received it yet.  She repeated to bone density on April 11, 2022 which shows more advanced osteoporosis with declining T score over 4 years.   Other medical history including remote history of hysterectomy, GERD for which she underwent Nissen procedure in 2013.  Essential hypertension mild dyslipidemia.    Prior fragility fracture: no  Parental history of hip fracture: left hip 2018  Rheumatoid arthritis:no  Secondary cause of osteoporosis:no  Body habitus (BMI less than or equal to 20):no  Family history of osteoporosis: unknown   Sedentary lifestyle: fair  Current tabacco smoking:no  History of thyroid disorder:no  Calcuim and Vitmin D supplements: Nature Ca D  Other supplement or bone treatment: Reclast in 2019  Medication use (PPI, epileptic medications etc): GERD  Early menopause (female): hysterectomy    Past Medical/Surgical History:  Past Medical History:   Diagnosis Date     Diverticulosis of colon (without mention of hemorrhage)      Essential hypertension, benign      Gastro-oesophageal reflux disease      nissen     Hemorrhoid      Nonsenile cataract      Osteoporosis      Other and unspecified hyperlipidemia      Rectocele      Symptomatic menopausal or female climacteric states      Past Surgical History:   Procedure Laterality Date     ARTHROPLASTY HIP Left 10/23/2018    Procedure: LEFT HIP COLEMAN- ARTHROPLASTY ;  Surgeon: Araceli Gutierrez MD;  Location: [facility]     CATARACT IOL, RT/LT Left 07/15/2019     COLONOSCOPY  5/24/2011    Procedure:COLONOSCOPY; Surgeon:HALINA SCOTT; Location:[facility]     COLONOSCOPY Left 11/10/2015    Procedure: COLONOSCOPY;  Surgeon: Raheem Colunga MD;  Location: [facility]     COLONOSCOPY  5/24/11, 11/10/2015     DILATION AND CURETTAGE      secondary to menorrhagia     GYN SURGERY      hysterectomy/oopherectomy; done for prolapse     HEMIARTHROPLASTY HIP Left 10/23/2018     HYSTERECTOMY      hysterectomy/oopherectomy; done for prolapse     LAPAROSCOPIC NISSEN FUNDOPLICATION  1/7/2013    Procedure: LAPAROSCOPIC NISSEN FUNDOPLICATION;  Laparoscopic Repair of Hiatel Hernia, NISSEN, Esophagoscopy, Gastroscopy And Duodenoscopy ;  Surgeon: Leonidas Valle MD;  Location: [facility]     LAPAROSCOPIC NISSEN FUNDOPLICATION  01/07/2013     PHACOEMULSIFICATION WITH STANDARD INTRAOCULAR LENS IMPLANT Left 7/15/2019    Procedure: Left Eye Phacoemulsification with Intraocular Lens;  Surgeon: Josue Trujillo MD;  Location: [facility]     TUBAL LIGATION Bilateral      ZZC NONSPECIFIC PROCEDURE      Bilateral Tubal Ligation     ZZC NONSPECIFIC PROCEDURE      D&C secondary to menorrhagia     ZZC NONSPECIFIC PROCEDURE      child birth x 2       Allergies:  Allergies   Allergen Reactions     Dilaudid [Hydromorphone]      dizziness     Senna Hives       Current Medications   Current Outpatient Medications   Medication     Acetaminophen (TYLENOL PO)     amLODIPine (NORVASC) 2.5 MG tablet     ammonium lactate (LAC-HYDRIN) 12 % external lotion     atorvastatin (LIPITOR) 10 MG tablet     calcium  "carbonate 500 mg, elemental, (OSCAL;OYSTER SHELL CALCIUM) 500 MG tablet     CARTIA  MG 24 hr capsule     cholecalciferol (VITAMIN D3) 1000 UNIT tablet     clobetasol (TEMOVATE) 0.05 % external ointment     clobetasol (TEMOVATE) 0.05 % ointment     docusate sodium (COLACE) 100 MG capsule     triamcinolone (KENALOG) 0.1 % external ointment     diltiazem (CARTIA XT) 300 MG 24 hr capsule     No current facility-administered medications for this visit.       Family History:  Family History   Problem Relation Age of Onset     Hypertension Father      C.A.D. Father      Hypertension Mother      Breast Cancer Sister      Osteoporosis Sister      EYE* Brother         Keratoconus     Glaucoma No family hx of      Macular Degeneration No family hx of      Coronary Artery Disease Father      Breast Cancer Sister      Keratoconus Brother        Social History:  Social History     Tobacco Use     Smoking status: Never Smoker     Smokeless tobacco: Never Used   Substance Use Topics     Alcohol use: No       ROS:  Full review of systems taken with the help of the intake sheet. Otherwise a complete 14 point review of systems was taken and is negative unless stated in the history above.    Physical Exam:   Vitals: BP (!) 161/91 (BP Location: Left arm, Patient Position: Sitting, Cuff Size: Adult Regular)   Pulse 71   Ht 1.753 m (5' 9\")   Wt 64.3 kg (141 lb 11.2 oz)   BMI 20.93 kg/m    BMI= Body mass index is 20.93 kg/m .   General: well appearing, no acute distress, pleasant and conversant,   Mental Status/neuro: alert and oriented  Face: symmetrical, normal facial color  Eyes: anicteric, no proptosis or lid lag  Bask: kyphosis,   Resp: normally breathing      Labs : I reviewed data from epic and extract and summarize the pertinent data here.   Lab Results   Component Value Date     07/22/2021     11/25/2019      Lab Results   Component Value Date    POTASSIUM 3.9 07/22/2021    POTASSIUM 4.0 11/25/2019     Lab " Results   Component Value Date    CHLORIDE 110 07/22/2021    CHLORIDE 109 11/25/2019     Lab Results   Component Value Date    MARCK 9.1 07/22/2021    MARCK 9.1 11/25/2019     Lab Results   Component Value Date    CO2 29 07/22/2021    CO2 26 11/25/2019     Lab Results   Component Value Date    BUN 20 07/22/2021    BUN 24 11/25/2019     Lab Results   Component Value Date    CR 0.85 07/22/2021    CR 0.76 11/25/2019     Lab Results   Component Value Date    GLC 95 07/22/2021    GLC 92 11/25/2019     Lab Results   Component Value Date    TSH 2.51 04/17/2018     No results found for: T4  No results found for: A1C    No results found for: IGF1  No results found for: LH  No results found for: FSH  No results found for: ESTROGEN  No results found for: PROLACTIN        Bone density 4/11/2022: I personally reviewed the original images and agree with the below reports.   Results   Lumbar spine   T-score -3.5 ., BMD is 0.756 g/cm2.         Right femoral neck  T-score -2.7      Right total femur  T-score -3.1, BMD is 0.622 g/cm2.     Interval change  There was no change at the areas of interest compared to the prior study.     Fracture risk   Fracture risk calculation is not indicated. Ref.4             Assessment and Plan  81 year old female with osteoporosis.    Osteoporosis    - resume Reclast infusion this spring (2022)    - switch for Calcium citrate plus D - 2 tabs daily OTC    - encourage exercise    - repeat DXA in 2 years    RTC with me in 2 years      45 minutes spent on the date of the encounter doing chart review, history and exam, documentation and further activities as noted above.        Tatiana Ochoa MD  Staff Physician  Endocrinology and Metabolism  License: [id number]

## 2022-05-02 ENCOUNTER — MYC MEDICAL ADVICE (OUTPATIENT)
Dept: INTERNAL MEDICINE | Facility: CLINIC | Age: 82
End: 2022-05-02
Payer: MEDICARE

## 2022-05-17 RX ORDER — ALBUTEROL SULFATE 90 UG/1
1-2 AEROSOL, METERED RESPIRATORY (INHALATION)
Status: CANCELLED
Start: 2022-05-17

## 2022-05-17 RX ORDER — ZOLEDRONIC ACID 5 MG/100ML
5 INJECTION, SOLUTION INTRAVENOUS ONCE
Status: CANCELLED
Start: 2022-05-17 | End: 2022-05-17

## 2022-05-17 RX ORDER — METHYLPREDNISOLONE SODIUM SUCCINATE 125 MG/2ML
125 INJECTION, POWDER, LYOPHILIZED, FOR SOLUTION INTRAMUSCULAR; INTRAVENOUS
Status: CANCELLED
Start: 2022-05-17

## 2022-05-17 RX ORDER — ALBUTEROL SULFATE 0.83 MG/ML
2.5 SOLUTION RESPIRATORY (INHALATION)
Status: CANCELLED | OUTPATIENT
Start: 2022-05-17

## 2022-05-17 RX ORDER — NALOXONE HYDROCHLORIDE 0.4 MG/ML
0.2 INJECTION, SOLUTION INTRAMUSCULAR; INTRAVENOUS; SUBCUTANEOUS
Status: CANCELLED | OUTPATIENT
Start: 2022-05-17

## 2022-05-17 RX ORDER — HEPARIN SODIUM,PORCINE 10 UNIT/ML
5 VIAL (ML) INTRAVENOUS
Status: CANCELLED | OUTPATIENT
Start: 2022-05-17

## 2022-05-17 RX ORDER — HEPARIN SODIUM (PORCINE) LOCK FLUSH IV SOLN 100 UNIT/ML 100 UNIT/ML
5 SOLUTION INTRAVENOUS
Status: CANCELLED | OUTPATIENT
Start: 2022-05-17

## 2022-05-17 RX ORDER — DIPHENHYDRAMINE HYDROCHLORIDE 50 MG/ML
50 INJECTION INTRAMUSCULAR; INTRAVENOUS
Status: CANCELLED
Start: 2022-05-17

## 2022-05-17 RX ORDER — MEPERIDINE HYDROCHLORIDE 25 MG/ML
25 INJECTION INTRAMUSCULAR; INTRAVENOUS; SUBCUTANEOUS EVERY 30 MIN PRN
Status: CANCELLED | OUTPATIENT
Start: 2022-05-17

## 2022-05-17 RX ORDER — EPINEPHRINE 1 MG/ML
0.3 INJECTION, SOLUTION INTRAMUSCULAR; SUBCUTANEOUS EVERY 5 MIN PRN
Status: CANCELLED | OUTPATIENT
Start: 2022-05-17

## 2022-05-18 ENCOUNTER — APPOINTMENT (OUTPATIENT)
Dept: LAB | Facility: CLINIC | Age: 82
End: 2022-05-18
Attending: INTERNAL MEDICINE
Payer: MEDICARE

## 2022-05-18 ENCOUNTER — INFUSION THERAPY VISIT (OUTPATIENT)
Dept: INFUSION THERAPY | Facility: CLINIC | Age: 82
End: 2022-05-18
Attending: INTERNAL MEDICINE
Payer: MEDICARE

## 2022-05-18 VITALS
TEMPERATURE: 97.4 F | HEART RATE: 76 BPM | DIASTOLIC BLOOD PRESSURE: 89 MMHG | BODY MASS INDEX: 21.06 KG/M2 | WEIGHT: 142.6 LBS | SYSTOLIC BLOOD PRESSURE: 146 MMHG | OXYGEN SATURATION: 96 % | RESPIRATION RATE: 18 BRPM

## 2022-05-18 DIAGNOSIS — M81.0 SENILE OSTEOPOROSIS: Primary | ICD-10-CM

## 2022-05-18 LAB
CALCIUM SERPL-MCNC: 9 MG/DL (ref 8.5–10.1)
CREAT SERPL-MCNC: 0.75 MG/DL (ref 0.52–1.04)
GFR SERPL CREATININE-BSD FRML MDRD: 80 ML/MIN/1.73M2

## 2022-05-18 PROCEDURE — 96365 THER/PROPH/DIAG IV INF INIT: CPT

## 2022-05-18 PROCEDURE — 82565 ASSAY OF CREATININE: CPT | Performed by: INTERNAL MEDICINE

## 2022-05-18 PROCEDURE — 36415 COLL VENOUS BLD VENIPUNCTURE: CPT | Performed by: INTERNAL MEDICINE

## 2022-05-18 PROCEDURE — 250N000011 HC RX IP 250 OP 636: Performed by: INTERNAL MEDICINE

## 2022-05-18 PROCEDURE — 82310 ASSAY OF CALCIUM: CPT | Performed by: INTERNAL MEDICINE

## 2022-05-18 RX ORDER — ZOLEDRONIC ACID 5 MG/100ML
5 INJECTION, SOLUTION INTRAVENOUS ONCE
Status: COMPLETED | OUTPATIENT
Start: 2022-05-18 | End: 2022-05-18

## 2022-05-18 RX ADMIN — ZOLEDRONIC ACID 5 MG: 0.05 INJECTION, SOLUTION INTRAVENOUS at 10:26

## 2022-05-18 ASSESSMENT — PAIN SCALES - GENERAL
PAINLEVEL: NO PAIN (0)
PAINLEVEL: NO PAIN (0)

## 2022-05-18 NOTE — PATIENT INSTRUCTIONS
Dear Shahana Donaldson    Thank you for choosing [facility] ([facility]) for your infusion.  The following information is a summary of our appointment as well as important reminders.      We look forward in seeing you on your next appointment here at [facility]  Please don t hesitate to call us at [phone] to reschedule any of your appointments or to speak with one of the [facility] registered nurses.  It was a pleasure taking care of you today.    Sincerely,    [facility]  [address] [place] [address]  [address]  Phone:  [phone]

## 2022-05-18 NOTE — PROGRESS NOTES
Infusion Nursing Note:  Shahana Dnoaldson presents today for Reclast.    Patient seen by provider today: No   present during visit today: Not Applicable.    Note:   Reclast given over 30 minutes.    Intravenous Access:  Peripheral IV placed by lab.    Treatment Conditions:  Results reviewed, labs MET treatment parameters, ok to proceed with treatment.    Administrations This Visit     zoledronic Acid (RECLAST) infusion 5 mg     Admin Date  05/18/2022 Action  New Bag Dose  5 mg Rate  200 mL/hr Route  Intravenous Administered By  Elsa Montoya, MIAN              BP (!) 158/88 (BP Location: Left arm, Patient Position: Sitting, Cuff Size: Adult Regular)   Pulse 71   Temp 97.4  F (36.3  C) (Oral)   Resp 18   Wt 64.7 kg (142 lb 9.6 oz)   SpO2 96%   BMI 21.06 kg/m      Post Infusion Assessment:  Patient tolerated infusion without incident.  Site patent and intact, free from redness, edema or discomfort.  No evidence of extravasations.  Access discontinued per protocol.     Discharge Plan:   Discharge instructions reviewed with: Patient.  Patient and/or family verbalized understanding of discharge instructions and all questions answered.  AVS to patient via [employer].   Patient discharged in stable condition accompanied by: self.  Departure Mode: Ambulatory.    Elsa Montoya RN

## 2022-05-18 NOTE — NURSING NOTE
Chief Complaint   Patient presents with     Blood Draw     Vitals, blood drawn and PIV placed by LPN. Pt checked into appt.      DORIS Mohan LPN

## 2022-07-12 NOTE — PROGRESS NOTES
HPI:    Last visit with us 1/13/2022. Her  is present today. He has some minor concerns regarding his wife's memory and hearing. She does not feel that she has problems. Otherwise, no additional HEENT, cardiopulmonary, abdominal, , GYN, neurological, systemic, psychiatric, lymphatic, endocrine, vascular complaints.     Past Medical History:   Diagnosis Date     Diverticulosis of colon (without mention of hemorrhage)      Essential hypertension, benign      Gastro-oesophageal reflux disease     nissen     Hemorrhoid      Nonsenile cataract      Osteoporosis      Other and unspecified hyperlipidemia      Rectocele      Symptomatic menopausal or female climacteric states      Past Surgical History:   Procedure Laterality Date     ARTHROPLASTY HIP Left 10/23/2018    Procedure: LEFT HIP COLEMAN- ARTHROPLASTY ;  Surgeon: Araceli Gutierrez MD;  Location: [facility]     CATARACT IOL, RT/LT Left 07/15/2019     COLONOSCOPY  5/24/2011    Procedure:COLONOSCOPY; Surgeon:HALINA SCOTT; Location:[facility]     COLONOSCOPY Left 11/10/2015    Procedure: COLONOSCOPY;  Surgeon: Raheem Colunga MD;  Location: [facility]     COLONOSCOPY  5/24/11, 11/10/2015     DILATION AND CURETTAGE      secondary to menorrhagia     GYN SURGERY      hysterectomy/oopherectomy; done for prolapse     HEMIARTHROPLASTY HIP Left 10/23/2018     HYSTERECTOMY      hysterectomy/oopherectomy; done for prolapse     LAPAROSCOPIC NISSEN FUNDOPLICATION  1/7/2013    Procedure: LAPAROSCOPIC NISSEN FUNDOPLICATION;  Laparoscopic Repair of Hiatel Hernia, NISSEN, Esophagoscopy, Gastroscopy And Duodenoscopy ;  Surgeon: Leonidas Valle MD;  Location: [facility] OR     LAPAROSCOPIC NISSEN FUNDOPLICATION  01/07/2013     PHACOEMULSIFICATION WITH STANDARD INTRAOCULAR LENS IMPLANT Left 7/15/2019    Procedure: Left Eye Phacoemulsification with Intraocular Lens;  Surgeon: Josue Trujillo MD;  [facility]     TUBAL LIGATION Bilateral      ZZC NONSPECIFIC PROCEDURE    "   Bilateral Tubal Ligation     [facility] NONSPECIFIC PROCEDURE      D&C secondary to menorrhagia     [facility] NONSPECIFIC PROCEDURE      child birth x 2     PE:    Vitals noted, gen, nad, cooperative, alert, neck supple nl rom, no B carotid bruits, lungs with good air movement, RRR, S1, S2, no MRG, abdomen, no acute findings. Grossly normal neurological exam.     A/P:    1. Immunizations. COVID Pfizer vaccine x 4. Tdap 11/11/2016. Prevnar 13 on 5/18/2015. Pneumococcal 23 on 4/3/2018. One dose of Shingrix?   2. HTN; on Amlodipine (2.5 mg) and Diltazem. If elevated contiunes,  she can increase Amlodipine.   3. Increased lipids on Atorvastatin; labs 7/22/2021; TG's 113,  and LDL 48.   4. Seen in Dermatology Dr. Estrada 12/3/2021 and follow up 2/3/2023  5. Breast imaging/mammogram done 4/11/2022  6. Preventive Cardiology appt. With Dr. Wanda Francisco 7/9/2021  7. Colonoscopy 11/10/2015  8. DEXA scan done 4/11/2022; most negative T score -3.5 and she was seen Endocrinology, Dr. Ochoa 4/13/2022. Ca/Vit D and reclast. Repeat DEXA scan in 2 years.   9. Seen Dr. Trujillo, Ophthalmology 4/12/2022 for eye exam and follow up 4/13/2023  10. Offered audiology testing but she wants to wait  11. Offered memory, neuropsychiatric testing, but she wants to defer this.     Follow up with in 6 months     30 minutes spent on the date of the encounter doing chart review, history and exam, documentation and further activities as noted above \"exclusive of procedures and other billable interpretations     "

## 2022-07-13 ENCOUNTER — LAB (OUTPATIENT)
Dept: LAB | Facility: CLINIC | Age: 82
End: 2022-07-13

## 2022-07-13 ENCOUNTER — OFFICE VISIT (OUTPATIENT)
Dept: INTERNAL MEDICINE | Facility: CLINIC | Age: 82
End: 2022-07-13
Payer: MEDICARE

## 2022-07-13 VITALS
BODY MASS INDEX: 21.12 KG/M2 | DIASTOLIC BLOOD PRESSURE: 78 MMHG | HEART RATE: 65 BPM | SYSTOLIC BLOOD PRESSURE: 156 MMHG | WEIGHT: 143 LBS | OXYGEN SATURATION: 95 %

## 2022-07-13 DIAGNOSIS — I10 BENIGN ESSENTIAL HYPERTENSION: ICD-10-CM

## 2022-07-13 DIAGNOSIS — E78.00 HIGH BLOOD CHOLESTEROL: Primary | ICD-10-CM

## 2022-07-13 DIAGNOSIS — E78.00 HIGH BLOOD CHOLESTEROL: ICD-10-CM

## 2022-07-13 LAB
ALBUMIN SERPL-MCNC: 3.7 G/DL (ref 3.4–5)
ALP SERPL-CCNC: 87 U/L (ref 40–150)
ALT SERPL W P-5'-P-CCNC: 22 U/L (ref 0–50)
ANION GAP SERPL CALCULATED.3IONS-SCNC: 7 MMOL/L (ref 3–14)
AST SERPL W P-5'-P-CCNC: 24 U/L (ref 0–45)
BASOPHILS # BLD AUTO: 0 10E3/UL (ref 0–0.2)
BASOPHILS NFR BLD AUTO: 1 %
BILIRUB SERPL-MCNC: 0.6 MG/DL (ref 0.2–1.3)
BUN SERPL-MCNC: 17 MG/DL (ref 7–30)
CALCIUM SERPL-MCNC: 9 MG/DL (ref 8.5–10.1)
CHLORIDE BLD-SCNC: 110 MMOL/L (ref 94–109)
CHOLEST SERPL-MCNC: 168 MG/DL
CO2 SERPL-SCNC: 27 MMOL/L (ref 20–32)
CREAT SERPL-MCNC: 0.77 MG/DL (ref 0.52–1.04)
EOSINOPHIL # BLD AUTO: 0.1 10E3/UL (ref 0–0.7)
EOSINOPHIL NFR BLD AUTO: 1 %
ERYTHROCYTE [DISTWIDTH] IN BLOOD BY AUTOMATED COUNT: 13 % (ref 10–15)
FASTING STATUS PATIENT QL REPORTED: NO
GFR SERPL CREATININE-BSD FRML MDRD: 77 ML/MIN/1.73M2
GLUCOSE BLD-MCNC: 94 MG/DL (ref 70–99)
HCT VFR BLD AUTO: 40.4 % (ref 35–47)
HDLC SERPL-MCNC: 95 MG/DL
HGB BLD-MCNC: 12.8 G/DL (ref 11.7–15.7)
IMM GRANULOCYTES # BLD: 0 10E3/UL
IMM GRANULOCYTES NFR BLD: 0 %
LDLC SERPL CALC-MCNC: 55 MG/DL
LYMPHOCYTES # BLD AUTO: 1.6 10E3/UL (ref 0.8–5.3)
LYMPHOCYTES NFR BLD AUTO: 31 %
MCH RBC QN AUTO: 30.8 PG (ref 26.5–33)
MCHC RBC AUTO-ENTMCNC: 31.7 G/DL (ref 31.5–36.5)
MCV RBC AUTO: 97 FL (ref 78–100)
MONOCYTES # BLD AUTO: 0.5 10E3/UL (ref 0–1.3)
MONOCYTES NFR BLD AUTO: 10 %
NEUTROPHILS # BLD AUTO: 2.9 10E3/UL (ref 1.6–8.3)
NEUTROPHILS NFR BLD AUTO: 57 %
NONHDLC SERPL-MCNC: 73 MG/DL
NRBC # BLD AUTO: 0 10E3/UL
NRBC BLD AUTO-RTO: 0 /100
PLATELET # BLD AUTO: 215 10E3/UL (ref 150–450)
POTASSIUM BLD-SCNC: 4.1 MMOL/L (ref 3.4–5.3)
PROT SERPL-MCNC: 7.4 G/DL (ref 6.8–8.8)
RBC # BLD AUTO: 4.16 10E6/UL (ref 3.8–5.2)
SODIUM SERPL-SCNC: 144 MMOL/L (ref 133–144)
TRIGL SERPL-MCNC: 89 MG/DL
WBC # BLD AUTO: 5.1 10E3/UL (ref 4–11)

## 2022-07-13 PROCEDURE — 80053 COMPREHEN METABOLIC PANEL: CPT | Performed by: PATHOLOGY

## 2022-07-13 PROCEDURE — 85025 COMPLETE CBC W/AUTO DIFF WBC: CPT | Performed by: PATHOLOGY

## 2022-07-13 PROCEDURE — 36415 COLL VENOUS BLD VENIPUNCTURE: CPT | Performed by: PATHOLOGY

## 2022-07-13 PROCEDURE — 80061 LIPID PANEL: CPT | Performed by: PATHOLOGY

## 2022-07-13 PROCEDURE — 99214 OFFICE O/P EST MOD 30 MIN: CPT | Performed by: INTERNAL MEDICINE

## 2022-07-13 ASSESSMENT — PAIN SCALES - GENERAL: PAINLEVEL: NO PAIN (0)

## 2022-07-14 ENCOUNTER — TELEPHONE (OUTPATIENT)
Dept: ENDOCRINOLOGY | Facility: CLINIC | Age: 82
End: 2022-07-14

## 2022-07-31 DIAGNOSIS — L90.0 LICHEN SCLEROSUS: ICD-10-CM

## 2022-07-31 DIAGNOSIS — I10 BENIGN ESSENTIAL HYPERTENSION: ICD-10-CM

## 2022-08-02 RX ORDER — CLOBETASOL PROPIONATE 0.5 MG/G
OINTMENT TOPICAL
Qty: 60 G | Refills: 2 | Status: SHIPPED | OUTPATIENT
Start: 2022-08-02 | End: 2023-04-07

## 2022-08-02 RX ORDER — DILTIAZEM HYDROCHLORIDE 300 MG/1
300 CAPSULE, COATED, EXTENDED RELEASE ORAL DAILY
Qty: 90 CAPSULE | Refills: 0 | Status: SHIPPED | OUTPATIENT
Start: 2022-08-02 | End: 2022-11-01

## 2022-08-02 NOTE — TELEPHONE ENCOUNTER
"clobetasol (TEMOVATE) 0.05 % external ointment      Last Written Prescription Date:  4/7/2021  Last Fill Quantity: 60 g,   # refills: 3  Last Office Visit : 12/3/2021  Future Office visit:  2/3/2022    Routing refill request to provider for review/approval because: last office visit >3 months per Dermatology refill process #3  - plan last visit 12/3/2021 RTC 1 year  - future visit 2/3/2023  - pended Rx instructions per last visit note  \"Advised at least 2x weekly maintenance use of clobetasol, can increase to BID for flares when needed and then taper slowly back to 2x weekly\"       "

## 2022-08-02 NOTE — TELEPHONE ENCOUNTER
Received refill request as RACHEL. Last dermatology clinic note reviewed. Plan was to continue this medication per last clinic note. Limited refill provided to bridge to next clinic visit.     Next clinic visit scheduled for 2/2023    Routed to attending provider as FYI.     Johana Gan MD  Dermatology Resident

## 2022-08-02 NOTE — TELEPHONE ENCOUNTER
Last Clinic Visit: 7/13/2022 [facility] [place]     BP above medication protocol parameters: Refill of Diltiazem was sent for 90 day supply and FYI to provider.    BP Readings from Last 3 Encounters:   07/13/22 (!) 156/78   05/18/22 (!) 146/89   04/13/22 (!) 161/91

## 2022-09-06 DIAGNOSIS — E78.2 MIXED HYPERLIPIDEMIA: ICD-10-CM

## 2022-09-09 RX ORDER — ATORVASTATIN CALCIUM 10 MG/1
10 TABLET, FILM COATED ORAL DAILY
Qty: 90 TABLET | Refills: 3 | Status: SHIPPED | OUTPATIENT
Start: 2022-09-09 | End: 2023-08-29

## 2022-09-09 NOTE — TELEPHONE ENCOUNTER
Atorvastatin Calcium Oral Tablet 10 MG   Last Written Prescription Date:   7/29/2021  Last Fill Quantity: 90,   # refills: 3  Last Office Visit :  7/13/2022  Future Office visit:   1/9/2023  90 Tabs, 3 Refills sent to pharm 9/9/2022      So Huff RN  Central Triage Red Flags/Med Refills

## 2022-10-16 ENCOUNTER — HEALTH MAINTENANCE LETTER (OUTPATIENT)
Age: 82
End: 2022-10-16

## 2022-10-29 DIAGNOSIS — I10 BENIGN ESSENTIAL HYPERTENSION: ICD-10-CM

## 2022-11-01 RX ORDER — DILTIAZEM HYDROCHLORIDE 300 MG/1
300 CAPSULE, COATED, EXTENDED RELEASE ORAL DAILY
Qty: 90 CAPSULE | Refills: 0 | Status: SHIPPED | OUTPATIENT
Start: 2022-11-01 | End: 2023-01-16

## 2022-11-01 NOTE — TELEPHONE ENCOUNTER
dilTIAZem HCl ER Coated Beads Oral Capsule Extended Release 24 Hour 300 MG      Last Written Prescription Date:  8/2/22  Last Fill Quantity: 90,   # refills: 0  Last Office Visit : 7/13/22 recommended 6 month follow up  Future Office visit:  1/9/23    Routing refill request to provider for review/approval because:  Blood pressure out of range   BP Readings from Last 3 Encounters:   07/13/22 (!) 156/78   05/18/22 (!) 146/89   04/13/22 (!) 161/91     Dictation note:  2. HTN; on Amlodipine (2.5 mg) and Diltazem. If elevated contiunes,  she can increase Amlodipine.     Blood pressure since July?

## 2022-12-02 NOTE — PATIENT INSTRUCTIONS
"You were seen today in the Cardiovascular Clinic at the [facility].     Cardiology Providers you saw during your visit: Dr. Ortega Francisco    Diagnosis:   Encounter Diagnoses   Name Primary?     Benign essential hypertension      Heart murmur      Heart failure with preserved ejection fraction, NYHA class II (H) Yes          Orders:   Orders Placed This Encounter   Procedures     Follow-Up with Cardiologist     Echocardiogram Complete       Recommendations:   1. Continue holding amlodipine.  2. Check blood pressure once or twice a day for 4 weeks and send me or Dr. Curran the results.  3. If you blood pressure start to go up, I recommend restarting amlodipine 2.5mg in the evening and decreasing diltiazem (Cardia) from 300mg to 240mg daily.  4. Continue walking or working in the garden daily.  5. Think about restarting Varun Chi.   6. Schedule an echocardiogram (heart ultrasound) whenever it is convenient.   7. Follow up with Dr. Jad Francisco in 1 year.     Please feel free to call me with any questions or concerns.       Portia LUU LPN     Questions: [phone]  First press #1 for [facility] for \"Medical Questions\" to reach us Cardiology Nurses.   [phone]   First press #1 for the [employer] and then press #1     On Call Cardiologist for after hours or on weekends: [phone]   option #4 and ask to speak to the on-call Cardiologist.          If you need a medication refill please contact your pharmacy.  Please allow 3 business days for your refill to be completed.  --------------------------------------------------------------   " 175

## 2023-01-09 ENCOUNTER — OFFICE VISIT (OUTPATIENT)
Dept: INTERNAL MEDICINE | Facility: CLINIC | Age: 83
End: 2023-01-09
Payer: MEDICARE

## 2023-01-09 VITALS
WEIGHT: 147.7 LBS | OXYGEN SATURATION: 97 % | BODY MASS INDEX: 21.88 KG/M2 | DIASTOLIC BLOOD PRESSURE: 89 MMHG | HEART RATE: 69 BPM | HEIGHT: 69 IN | SYSTOLIC BLOOD PRESSURE: 168 MMHG

## 2023-01-09 DIAGNOSIS — M80.08XA AGE-RELATED OSTEOPOROSIS WITH CURRENT PATHOLOGICAL FRACTURE, VERTEBRA(E), INITIAL ENCOUNTER FOR FRACTURE (H): Primary | ICD-10-CM

## 2023-01-09 PROCEDURE — 99214 OFFICE O/P EST MOD 30 MIN: CPT | Performed by: INTERNAL MEDICINE

## 2023-01-09 RX ORDER — IBUPROFEN 200 MG
1 CAPSULE ORAL 2 TIMES DAILY
COMMUNITY

## 2023-01-09 RX ORDER — LOPERAMIDE HCL 2 MG
2 CAPSULE ORAL 4 TIMES DAILY PRN
COMMUNITY

## 2023-01-09 NOTE — NURSING NOTE
Shahana Donaldson is a 82 year old female patient that presents today in clinic for the following:    Chief Complaint   Patient presents with     Follow Up     The patient's allergies and medications were reviewed as noted. A set of vitals were recorded as noted without incident. The patient does not have any other questions for the provider.    Reba Miner, EMT at 8:26 AM on 1/9/2023

## 2023-01-09 NOTE — PROGRESS NOTES
PRIMARY [facility]ARE CENTER     Patient Name: Shahana Donaldson  YOB: 1940  MRN: [id number]    Date of Service: January 9, 2023    Chief Complaint:   Chief Complaint   Patient presents with     Follow Up     Pt states no new health concerns              HISTORY OF PRESENT ILLNESS:    Pt is an 82 year old female, here for 6 month follow up. She currently denies any new complaints or concerns and reports feeling stable.     H/o diverticulosis- last colonoscopy 2015 with no follow up recommended. Reports intermittent diarrhea and constipation historically but states BMs have been regular as of late; has colace and loperamide PRN.      HTN- 168/89 in clinic this morning. Pt brings in home record of BP which shows 130s/80; continue Diltiazem as previously prescribed. No longer taking Amlodipine.      present at appointment reports hearing loss in patient; patient states it is tolerable and she does not desire audiology testing at this time.     Osteoporosis- Endo referral placed today as patient is due for Reclast infusion this May, per last Endo note- next Dexa scan due 2024    HLD- check labs in July; continue Atorvastatin as previously prescribed.     Systolic murmur noted on exam, previous ECHO July '21 +aortic calcification; repeat ECHO this year.             Medications and allergies reviewed by me today.          PAST MEDICAL HISTORY:     Past Medical History:   Diagnosis Date     Diverticulosis of colon (without mention of hemorrhage)      Essential hypertension, benign      Gastro-oesophageal reflux disease     nissen     Hemorrhoid      Nonsenile cataract      Osteoporosis      Other and unspecified hyperlipidemia      Rectocele      Symptomatic menopausal or female climacteric states             REVIEW OF SYSTEMS:     10 point ROS was negative except as noted in HPI         HOME MEDICATIONS:     Current Outpatient Medications   Medication     Acetaminophen (TYLENOL PO)     ammonium lactate  "(LAC-HYDRIN) 12 % external lotion     atorvastatin (LIPITOR) 10 MG tablet     calcium citrate (CITRACAL) 950 (200 Ca) MG tablet     clobetasol (TEMOVATE) 0.05 % external ointment     clobetasol (TEMOVATE) 0.05 % external ointment     clobetasol (TEMOVATE) 0.05 % ointment     diltiazem ER COATED BEADS (CARDIZEM CD/CARTIA XT) 300 MG 24 hr capsule     loperamide (IMODIUM) 2 MG capsule     triamcinolone (KENALOG) 0.1 % external ointment     amLODIPine (NORVASC) 2.5 MG tablet     calcium carbonate 500 mg, elemental, (OSCAL;OYSTER SHELL CALCIUM) 500 MG tablet     cholecalciferol (VITAMIN D3) 1000 UNIT tablet     docusate sodium (COLACE) 100 MG capsule     No current facility-administered medications for this visit.            PHYSICAL EXAM:   Vitals: BP (!) 168/89 (BP Location: Right arm, Patient Position: Sitting, Cuff Size: Adult Regular)   Pulse 69   Ht 1.74 m (5' 8.5\")   Wt 67 kg (147 lb 11.2 oz)   SpO2 97%   BMI 22.13 kg/m       Wt Readings from Last 4 Encounters:   01/09/23 67 kg (147 lb 11.2 oz)   07/13/22 64.9 kg (143 lb)   05/18/22 64.7 kg (142 lb 9.6 oz)   04/13/22 64.3 kg (141 lb 11.2 oz)       GENERAL: Alert, sitting up in chair; spouse present.   HEENT: no new complaints, confirms hearing loss but declines additional assessment at this time  LUNGS: clear to auscultation bilaterally, no crackles or wheezes  HEART: regular rate and rhythm, systolic murmur noted as previously   ABDOMEN: Soft, nontender, nondistended, no rebound or guarding.  EXTREMITIES: No LE edema bilaterally  SKIN: Followed by dermatology; h/o actinic keratosis, genital lichen sclerosus  NEURO: A&O, declining memory per spouse           DATA:     Laboratory Data:  Labs to be completed at follow up in July.   Imaging:  None today.           ASSESSMENT & PLAN:     Shahana was seen today for follow up.    Diagnoses and all orders for this visit:    Age-related osteoporosis with current pathological fracture, vertebra(e), initial encounter " for fracture (H)  -     Adult Endocrinology  Referral; Future        #Healthcare Maintenance:  [facility] Website verified for patient immunization history.    Immunization status: up to date and documented.    Mammogram scheduled in April.     Return to clinic: July 2023    Patient care plan discussed with attending physician, Dr. Curran, who agreed with above.    Isabel Torres NP Student       30 minutes spent on the date of the encounter doing chart review, history and exam, documentation and further activities as noted above exclusive of procedures and other billable interpretations    Staff note; I personally reviewed Ms. Donaldson's past medical records. I interviewed her and conducted a physical examination. She has a soft murmur.  I agree with the above assessment and plan.    EDMOND Curran MD

## 2023-01-13 DIAGNOSIS — I10 BENIGN ESSENTIAL HYPERTENSION: ICD-10-CM

## 2023-01-16 RX ORDER — DILTIAZEM HYDROCHLORIDE 300 MG/1
300 CAPSULE, COATED, EXTENDED RELEASE ORAL DAILY
Qty: 90 CAPSULE | Refills: 0 | Status: SHIPPED | OUTPATIENT
Start: 2023-01-16 | End: 2023-04-03

## 2023-01-16 NOTE — TELEPHONE ENCOUNTER
LCV:1/9/2023  [facility] [place]  BP above protocol, in last clinic note  RF 90 day

## 2023-03-13 ENCOUNTER — PRE VISIT (OUTPATIENT)
Dept: ORTHOPEDICS | Facility: CLINIC | Age: 83
End: 2023-03-13

## 2023-03-13 ENCOUNTER — ANCILLARY PROCEDURE (OUTPATIENT)
Dept: GENERAL RADIOLOGY | Facility: CLINIC | Age: 83
End: 2023-03-13
Attending: FAMILY MEDICINE
Payer: MEDICARE

## 2023-03-13 ENCOUNTER — OFFICE VISIT (OUTPATIENT)
Dept: ORTHOPEDICS | Facility: CLINIC | Age: 83
End: 2023-03-13
Payer: MEDICARE

## 2023-03-13 DIAGNOSIS — M25.561 RIGHT KNEE PAIN, UNSPECIFIED CHRONICITY: Primary | ICD-10-CM

## 2023-03-13 DIAGNOSIS — M17.11 PRIMARY OSTEOARTHRITIS OF RIGHT KNEE: Primary | ICD-10-CM

## 2023-03-13 DIAGNOSIS — M25.561 RIGHT KNEE PAIN, UNSPECIFIED CHRONICITY: ICD-10-CM

## 2023-03-13 PROCEDURE — 73562 X-RAY EXAM OF KNEE 3: CPT | Mod: RT | Performed by: RADIOLOGY

## 2023-03-13 PROCEDURE — 99203 OFFICE O/P NEW LOW 30 MIN: CPT | Performed by: FAMILY MEDICINE

## 2023-03-13 NOTE — TELEPHONE ENCOUNTER
DIAGNOSIS: R knee injury   APPOINTMENT DATE: 3.13.23   NOTES STATUS DETAILS   OFFICE NOTE from other specialist Internal 5.4.18 Jeramie Curran MD  10.13.17     12.5.15 Levar Rivero MD    5.20.16 Jeramie Capone MD  3.15.16    PT in [facility]  More in epic   MEDICATION LIST Internal    XRAYS (IMAGES & REPORTS) Internal 10.13.17 B knee  10.13.17 US-R lower extrem  12.5.15 R knee  12.16.15 R femur

## 2023-03-13 NOTE — LETTER
3/13/2023      RE: Shahana Donaldson  [address]  [facility] [address]     Dear Colleague,    Thank you for referring your patient, Shahana Donaldson, to the [facility] [place]. Please see a copy of my visit note below.    ASSESSMENT/PLAN:    (M17.11) Primary osteoarthritis of right knee  (primary encounter diagnosis)  Comment: exam, imaging consistent w/ flare of knee OA; will have her try a knee brace, topical nsaid, and PT; f/u in 6-8 wks; if no better, then would consider an injection  Plan: Physical Therapy Referral, diclofenac (VOLTAREN) 1 % topical gel, Knee Supplies Order Hinged Knee Brace; Right          Johnnie Harding MD  March 13, 2023  8:20 PM      Pt is a 82 year old female here today for:     Right Knee pain : anterior knee  Duration? Gradual, pain started on 3/12/23   Injury/ Inciting activity? None    Pop? None    Swelling? None    Limited motion? None    Locking/ Catching? None   Giving way/ instability? Yes, unable to weight bear    Imaging? Yes, XR today    Treatment? Compression and ibuprofen        Past Medical History:   Diagnosis Date     Diverticulosis of colon (without mention of hemorrhage)      Essential hypertension, benign      Gastro-oesophageal reflux disease     nissen     Hemorrhoid      Nonsenile cataract      Osteoporosis      Other and unspecified hyperlipidemia      Rectocele      Symptomatic menopausal or female climacteric states       Past Surgical History:   Procedure Laterality Date     ARTHROPLASTY HIP Left 10/23/2018    Procedure: LEFT HIP COLEMAN- ARTHROPLASTY ;  Surgeon: Araceli Gutierrez MD;  Location: [facility] OR     CATARACT IOL, RT/LT Left 07/15/2019     COLONOSCOPY  5/24/2011    Procedure:COLONOSCOPY; Surgeon:HALINA SCOTT; Location:[facility]     COLONOSCOPY Left 11/10/2015    Procedure: COLONOSCOPY;  Surgeon: Raheem Colunga MD;  [facility]     COLONOSCOPY  5/24/11, 11/10/2015     DILATION AND CURETTAGE       secondary to menorrhagia     GYN SURGERY      hysterectomy/oopherectomy; done for prolapse     HEMIARTHROPLASTY HIP Left 10/23/2018     HYSTERECTOMY      hysterectomy/oopherectomy; done for prolapse     LAPAROSCOPIC NISSEN FUNDOPLICATION  1/7/2013    Procedure: LAPAROSCOPIC NISSEN FUNDOPLICATION;  Laparoscopic Repair of Hiatel Hernia, NISSEN, Esophagoscopy, Gastroscopy And Duodenoscopy ;  Surgeon: Leonidas Valle MD;  Location: [facility] OR     LAPAROSCOPIC NISSEN FUNDOPLICATION  01/07/2013     PHACOEMULSIFICATION WITH STANDARD INTRAOCULAR LENS IMPLANT Left 7/15/2019    Procedure: Left Eye Phacoemulsification with Intraocular Lens;  Surgeon: Josue Trujillo MD;  Location: [facility]     TUBAL LIGATION Bilateral      ZZC NONSPECIFIC PROCEDURE      Bilateral Tubal Ligation     ZZC NONSPECIFIC PROCEDURE      D&C secondary to menorrhagia     ZZC NONSPECIFIC PROCEDURE      child birth x 2      Current Outpatient Medications   Medication Sig Dispense Refill     Acetaminophen (TYLENOL PO) Take 1,000 mg by mouth every 6 hours as needed for mild pain or fever       ammonium lactate (LAC-HYDRIN) 12 % external lotion Apply topically 2 times daily To the feet 225 g 11     atorvastatin (LIPITOR) 10 MG tablet Take 1 tablet (10 mg) by mouth daily 90 tablet 3     calcium citrate (CITRACAL) 950 (200 Ca) MG tablet Take 1 tablet by mouth 2 times daily       clobetasol (TEMOVATE) 0.05 % external ointment Apply topically 2x weekly maintenance use of clobetasol *can increase to twice daily for flares when needed and then taper slowly back to 2x weekly. 60 g 2     clobetasol (TEMOVATE) 0.05 % external ointment Apply twice weekly to lichen sclerosus, increase to twice daily as needed for flares 60 g 3     clobetasol (TEMOVATE) 0.05 % ointment Apply sparingly to affected area 2 x daily for14 days. Then, apply small amount to affected area twice weekly for maintenance. 30 g 2     diclofenac (VOLTAREN) 1 % topical gel Apply 2 g topically  4 times daily For R knee 100 g 1     diltiazem ER COATED BEADS (CARDIZEM CD/CARTIA XT) 300 MG 24 hr capsule Take 1 capsule (300 mg) by mouth daily 90 capsule 0     docusate sodium (COLACE) 100 MG capsule Take 1 capsule (100 mg) by mouth 2 times daily 60 capsule 0     loperamide (IMODIUM) 2 MG capsule Take 2 mg by mouth 4 times daily as needed for diarrhea       triamcinolone (KENALOG) 0.1 % external ointment Apply topically 2 times daily To the areas of bumps on the arms and legs 60 g 5     amLODIPine (NORVASC) 2.5 MG tablet Take 1 tablet (2.5 mg) by mouth daily (Patient not taking: Reported on 1/9/2023) 90 tablet 0     calcium carbonate 500 mg, elemental, (OSCAL;OYSTER SHELL CALCIUM) 500 MG tablet Take 3 tablets (1,500 mg) by mouth 2 times daily (with meals) (Patient not taking: Reported on 1/9/2023) 60 tablet 0     cholecalciferol (VITAMIN D3) 1000 UNIT tablet Take 2 tablets (2,000 Units) by mouth daily (Patient not taking: Reported on 1/9/2023) 30 tablet 0      Allergies   Allergen Reactions     Dilaudid [Hydromorphone]      dizziness     Senna Hives      ROS:   Gen- no fevers/chills   Rheum - no morning stiffness   Derm - no rash/ redness   Neuro - no numbness, no tingling   Remainder of ROS negative.     Exam:   There were no vitals taken for this visit.       R Knee:   ROM: 0-100; Crepitus: YES  Effusion: No ; Swelling: YES - soft tissue  Strength: Full in flexion/ extension   Tenderness: Patella - YES Medial joint line - YES; Lateral joint line - No; Quad tendon - No; Patellar tendon- No; Hamstring - No   Meniscus:deferred      Xray of R knee on March 13, 2023 at [facility] location - films personally reviewed with patient at time of visit     My impression: severe patellofemoral and moderate tibiofemoral OA    Official read:  Impression:  1. No acute osseous abnormality.  2. Progression bilateral knee osteoarthrosis particularly prominent  osteophytosis at the intercondylar notch.  3. Right knee lateral patellar  subluxation with associated severe  degenerative change.        Again, thank you for allowing me to participate in the care of your patient.      Sincerely,    Johnnie Harding MD

## 2023-03-13 NOTE — PROGRESS NOTES
ASSESSMENT/PLAN:    (M17.11) Primary osteoarthritis of right knee  (primary encounter diagnosis)  Comment: exam, imaging consistent w/ flare of knee OA; will have her try a knee brace, topical nsaid, and PT; f/u in 6-8 wks; if no better, then would consider an injection  Plan: Physical Therapy Referral, diclofenac (VOLTAREN) 1 % topical gel, Knee Supplies Order Hinged Knee Brace; Right          Johnnie Harding MD  March 13, 2023  8:20 PM      Pt is a 82 year old female here today for:     Right Knee pain : anterior knee  Duration? Gradual, pain started on 3/12/23   Injury/ Inciting activity? None    Pop? None    Swelling? None    Limited motion? None    Locking/ Catching? None   Giving way/ instability? Yes, unable to weight bear    Imaging? Yes, XR today    Treatment? Compression and ibuprofen        Past Medical History:   Diagnosis Date     Diverticulosis of colon (without mention of hemorrhage)      Essential hypertension, benign      Gastro-oesophageal reflux disease     nissen     Hemorrhoid      Nonsenile cataract      Osteoporosis      Other and unspecified hyperlipidemia      Rectocele      Symptomatic menopausal or female climacteric states       Past Surgical History:   Procedure Laterality Date     ARTHROPLASTY HIP Left 10/23/2018    Procedure: LEFT HIP COLEMAN- ARTHROPLASTY ;  Surgeon: Araceli Gutierrez MD;  Location: [facility] OR     CATARACT IOL, RT/LT Left 07/15/2019     COLONOSCOPY  5/24/2011    Procedure:COLONOSCOPY; Surgeon:HALINA SCOTT; Location:[facility]     COLONOSCOPY Left 11/10/2015    Procedure: COLONOSCOPY;  Surgeon: Raheem Colunga MD;  Location: [facility]     COLONOSCOPY  5/24/11, 11/10/2015     DILATION AND CURETTAGE      secondary to menorrhagia     GYN SURGERY      hysterectomy/oopherectomy; done for prolapse     HEMIARTHROPLASTY HIP Left 10/23/2018     HYSTERECTOMY      hysterectomy/oopherectomy; done for prolapse     LAPAROSCOPIC NISSEN FUNDOPLICATION  1/7/2013    Procedure: LAPAROSCOPIC  NISSEN FUNDOPLICATION;  Laparoscopic Repair of Hiatel Hernia, NISSEN, Esophagoscopy, Gastroscopy And Duodenoscopy ;  Surgeon: Leonidas Valle MD;  Location: [facility] OR     LAPAROSCOPIC NISSEN FUNDOPLICATION  01/07/2013     PHACOEMULSIFICATION WITH STANDARD INTRAOCULAR LENS IMPLANT Left 7/15/2019    Procedure: Left Eye Phacoemulsification with Intraocular Lens;  Surgeon: Josue Trujillo MD;  Location: [facility]     TUBAL LIGATION Bilateral      ZZC NONSPECIFIC PROCEDURE      Bilateral Tubal Ligation     ZZC NONSPECIFIC PROCEDURE      D&C secondary to menorrhagia     ZZC NONSPECIFIC PROCEDURE      child birth x 2      Current Outpatient Medications   Medication Sig Dispense Refill     Acetaminophen (TYLENOL PO) Take 1,000 mg by mouth every 6 hours as needed for mild pain or fever       ammonium lactate (LAC-HYDRIN) 12 % external lotion Apply topically 2 times daily To the feet 225 g 11     atorvastatin (LIPITOR) 10 MG tablet Take 1 tablet (10 mg) by mouth daily 90 tablet 3     calcium citrate (CITRACAL) 950 (200 Ca) MG tablet Take 1 tablet by mouth 2 times daily       clobetasol (TEMOVATE) 0.05 % external ointment Apply topically 2x weekly maintenance use of clobetasol *can increase to twice daily for flares when needed and then taper slowly back to 2x weekly. 60 g 2     clobetasol (TEMOVATE) 0.05 % external ointment Apply twice weekly to lichen sclerosus, increase to twice daily as needed for flares 60 g 3     clobetasol (TEMOVATE) 0.05 % ointment Apply sparingly to affected area 2 x daily for14 days. Then, apply small amount to affected area twice weekly for maintenance. 30 g 2     diclofenac (VOLTAREN) 1 % topical gel Apply 2 g topically 4 times daily For R knee 100 g 1     diltiazem ER COATED BEADS (CARDIZEM CD/CARTIA XT) 300 MG 24 hr capsule Take 1 capsule (300 mg) by mouth daily 90 capsule 0     docusate sodium (COLACE) 100 MG capsule Take 1 capsule (100 mg) by mouth 2 times daily 60 capsule 0      loperamide (IMODIUM) 2 MG capsule Take 2 mg by mouth 4 times daily as needed for diarrhea       triamcinolone (KENALOG) 0.1 % external ointment Apply topically 2 times daily To the areas of bumps on the arms and legs 60 g 5     amLODIPine (NORVASC) 2.5 MG tablet Take 1 tablet (2.5 mg) by mouth daily (Patient not taking: Reported on 1/9/2023) 90 tablet 0     calcium carbonate 500 mg, elemental, (OSCAL;OYSTER SHELL CALCIUM) 500 MG tablet Take 3 tablets (1,500 mg) by mouth 2 times daily (with meals) (Patient not taking: Reported on 1/9/2023) 60 tablet 0     cholecalciferol (VITAMIN D3) 1000 UNIT tablet Take 2 tablets (2,000 Units) by mouth daily (Patient not taking: Reported on 1/9/2023) 30 tablet 0      Allergies   Allergen Reactions     Dilaudid [Hydromorphone]      dizziness     Senna Hives      ROS:   Gen- no fevers/chills   Rheum - no morning stiffness   Derm - no rash/ redness   Neuro - no numbness, no tingling   Remainder of ROS negative.     Exam:   There were no vitals taken for this visit.       R Knee:   ROM: 0-100; Crepitus: YES  Effusion: No ; Swelling: YES - soft tissue  Strength: Full in flexion/ extension   Tenderness: Patella - YES Medial joint line - YES; Lateral joint line - No; Quad tendon - No; Patellar tendon- No; Hamstring - No   Meniscus:deferred      Xray of R knee on March 13, 2023 at [facility] location - films personally reviewed with patient at time of visit     My impression: severe patellofemoral and moderate tibiofemoral OA    Official read:  Impression:  1. No acute osseous abnormality.  2. Progression bilateral knee osteoarthrosis particularly prominent  osteophytosis at the intercondylar notch.  3. Right knee lateral patellar subluxation with associated severe  degenerative change.

## 2023-03-29 DIAGNOSIS — I10 BENIGN ESSENTIAL HYPERTENSION: ICD-10-CM

## 2023-04-01 ENCOUNTER — HEALTH MAINTENANCE LETTER (OUTPATIENT)
Age: 83
End: 2023-04-01

## 2023-04-03 ENCOUNTER — THERAPY VISIT (OUTPATIENT)
Dept: PHYSICAL THERAPY | Facility: CLINIC | Age: 83
End: 2023-04-03
Attending: FAMILY MEDICINE
Payer: MEDICARE

## 2023-04-03 DIAGNOSIS — R26.89 ABNORMALITY OF GAIT DUE TO IMPAIRMENT OF BALANCE: ICD-10-CM

## 2023-04-03 DIAGNOSIS — M17.11 PRIMARY OSTEOARTHRITIS OF RIGHT KNEE: Primary | ICD-10-CM

## 2023-04-03 DIAGNOSIS — M25.561 RIGHT KNEE PAIN: ICD-10-CM

## 2023-04-03 PROCEDURE — 97161 PT EVAL LOW COMPLEX 20 MIN: CPT | Mod: GP

## 2023-04-03 PROCEDURE — 97110 THERAPEUTIC EXERCISES: CPT | Mod: GP

## 2023-04-03 PROCEDURE — 97530 THERAPEUTIC ACTIVITIES: CPT | Mod: GP

## 2023-04-03 RX ORDER — DILTIAZEM HYDROCHLORIDE 300 MG/1
300 CAPSULE, COATED, EXTENDED RELEASE ORAL DAILY
Qty: 90 CAPSULE | Refills: 2 | Status: SHIPPED | OUTPATIENT
Start: 2023-04-03 | End: 2024-01-09

## 2023-04-03 ASSESSMENT — ACTIVITIES OF DAILY LIVING (ADL)
SWELLING: THE SYMPTOM AFFECTS MY ACTIVITY SEVERELY
AS_A_RESULT_OF_YOUR_KNEE_INJURY,_HOW_WOULD_YOU_RATE_YOUR_CURRENT_LEVEL_OF_DAILY_ACTIVITY?: SEVERELY ABNORMAL
LIMPING: THE SYMPTOM AFFECTS MY ACTIVITY MODERATELY
GO DOWN STAIRS: ACTIVITY IS FAIRLY DIFFICULT
WALK: ACTIVITY IS VERY DIFFICULT
SQUAT: I AM UNABLE TO DO THE ACTIVITY
GIVING WAY, BUCKLING OR SHIFTING OF KNEE: NOT ANSWERED
KNEE_ACTIVITY_OF_DAILY_LIVING_SCORE: 29.23
KNEEL ON THE FRONT OF YOUR KNEE: I AM UNABLE TO DO THE ACTIVITY
PAIN: THE SYMPTOM AFFECTS MY ACTIVITY SEVERELY
HOW_WOULD_YOU_RATE_THE_OVERALL_FUNCTION_OF_YOUR_KNEE_DURING_YOUR_USUAL_DAILY_ACTIVITIES?: ABNORMAL
STAND: ACTIVITY IS FAIRLY DIFFICULT
RAW_SCORE: 20.46
STIFFNESS: THE SYMPTOM AFFECTS MY ACTIVITY MODERATELY
WEAKNESS: THE SYMPTOM AFFECTS MY ACTIVITY MODERATELY
HOW_WOULD_YOU_RATE_THE_CURRENT_FUNCTION_OF_YOUR_KNEE_DURING_YOUR_USUAL_DAILY_ACTIVITIES_ON_A_SCALE_FROM_0_TO_100_WITH_100_BEING_YOUR_LEVEL_OF_KNEE_FUNCTION_PRIOR_TO_YOUR_INJURY_AND_0_BEING_THE_INABILITY_TO_PERFORM_ANY_OF_YOUR_USUAL_DAILY_ACTIVITIES?: 20
KNEE_ACTIVITY_OF_DAILY_LIVING_SUM: 19
SIT WITH YOUR KNEE BENT: ACTIVITY IS SOMEWHAT DIFFICULT
GO UP STAIRS: ACTIVITY IS FAIRLY DIFFICULT
RISE FROM A CHAIR: ACTIVITY IS VERY DIFFICULT

## 2023-04-03 NOTE — TELEPHONE ENCOUNTER
Last Clinic Visit: 1-9-2023  PER CLINIC NOTE:  HTN- 168/89 in clinic this morning. Pt brings in home record of BP which shows 130s/80; continue Diltiazem as previously prescribed. No longer taking Amlodipine

## 2023-04-03 NOTE — PROGRESS NOTES
[facility]    OUTPATIENT Physical Therapy ORTHOPEDIC EVALUATION  PLAN OF TREATMENT FOR OUTPATIENT REHABILITATION  (COMPLETE FOR INITIAL CLAIMS ONLY)  Patient's Last Name, First Name, M.I.  YOB: 1940  Shahana Donaldson    Provider s Name:  [facility]   Medical Record No.  [id number]   Start of Care Date:  04/03/23   Onset Date:   03/13/23   Treatment Diagnosis:  Right knee pain & impaired balance Medical Diagnosis:     Primary osteoarthritis of right knee  Abnormality of gait due to impairment of balance  Right knee pain       Goals:     04/03/23 0500   Body Part   Goals listed below are for Right Knee   Goal #1   Goal #1 balance   Previous Functional Level Number of near falls or episodes of unsteadiness per day   Performance level several; major fall 5 years ago resulting in a hip fracture   Current Functional Level Timed Up and Go test score   Performance level 1:16 - high risk of falls   STG Target Performance Timed Up and Go test score to   Performance level :50 with proper use of AD & patterning   Rationale for safe household ambulation;for safe community ambulation   Due date 05/15/23   LTG Target Performance Timed Up and Go test score to   Performance Level :40 with proper use of AD & patterning   Rationale for safe household ambulation;for safe community ambulation   Due date 06/12/23   Goal #2   Goal #2 stairs   Previous Functional Level Ascend/descend stairs;with a railing   Current Functional Level Ascend/descend stairs;one step at a time;with a railing   Performance Level heavy reliance on 2 rails with up to 5/10 knee pain   STG Target Performance Ascend/descend stairs;one step at a time;with a railing   Performance Level safely, <5/10 knee pain   Rationale to enter/leave the house safely   Due Date 05/15/23   LTG Target Performance Ascend/descend  stairs;in a normal reciprocal pattern;with railing   Performance Level safely, <3/10 knee pain   Rationale to enter/leave the house safely   Due Date 05/29/23         Therapy Frequency:  1x/week  Predicted Duration of Therapy Intervention:  8 weeks    Elsa Greer, PT                 I CERTIFY THE NEED FOR THESE SERVICES FURNISHED UNDER        THIS PLAN OF TREATMENT AND WHILE UNDER MY CARE     (Physician co-signature of this document indicates review and certification of the therapy plan).                     Certification Date From:  04/03/23   Certification Date To:  06/05/23    Referring Provider:  Johnnie Harding    Initial Assessment        See Epic Evaluation SOC Date: 04/03/23

## 2023-04-03 NOTE — PROGRESS NOTES
"Physical Therapy Initial Evaluation  4/3/2023     KEY PT FINDINGS:  1) High risk of falls per TUG (1:16) & impaired gait  2) Both knees with enlarged joints, poor quality quad set, global edema, lacking terminal extension ROM  3) Distressed about being asked to use an assistive device at home; difficulty with answering subjective questions relies a lot on  for accurate history    Therapist Impression: Shahana is a 82 year old year old female referred to physical therapy by Dr. Harding (sports medicine) for treatment of Right knee OA. Patient presents to physical therapy with c/o right knee pain and balance concerns. Subjective history and objective findings are consistent with knee OA and impaired balance with high fall risk. Due to these impairments, patient is unable to transfer, navigate stairs, or ambulate without assistance. Patient will benefit from skilled PT to address impairments/limitations in order to reach patient's goals, facilitate return to prior level of function, and maximize participation.    Precautions/Restrictions/MD instructions: E&T     Injury/Condition Details:  Presenting Complaint Bilateral knee pain & balance concerns   Onset Timing/Date About 1 month ago   Mechanism Insidious/chronic     Symptom Behavior Details    Primary Symptoms Bilateral knee pain  -difficulty getting up from chairs  -difficulty going up & down stairs  Balance concerns  -general functional mobility - transfers, stairs, walking  -\"I feel like I'm tipping over if my  isn't helping me\"   Worst Pain 5/10   Symptom Provocators Trying to stand up out of chairs; walking; stairs   Best Pain 5/10   Symptom Relievers \"I'm not sure\"  Maybe after walking around a bit   Time of day dependent? No   Recent symptom change? symptoms worsening     Prior Testing/Intervention for current condition:  Prior Tests x-ray - 3/13/23  Impression:  1. No acute osseous abnormality.  2. Progression bilateral knee osteoarthrosis " particularly prominent  osteophytosis at the intercondylar notch.  3. Right knee lateral patellar subluxation with associated severe  degenerative change.   Prior Treatment Voltaren 2-3x/day; treadmill walking; knee brace   -all not very helpful so far     Lifestyle & General Medical History:  Employment Retired   Social/Home Environment Lives with  in a house  -14 stairs to main level, has rails on both side   -grab bars in bathroom & shower at home  -has a treadmill at home  -owns FWW and a cane but does not use consistently   -sings in [employer]    Usual physical activities  (within past year) ADLs; walking in shopping centers or on treadmill  -20-30 minutes of walking      Orthopaedic history Osteoporosis; Left proximal femur fracture (from a fall down stairs, carrying a box) with subsequent jorge alberto-arthroplasty 10/2018  -spent 1-2 weeks at rehab facility   Notable medical history HTN, heart problems, diverticulosis and See Epic Chart   Patient Reported Health good     KNEE OBJECTIVE      Dynamic Movement Screen:  Difficulty rising from waiting room chair requires hand-hold assist from .   2 leg stance/Static Posture: Enlarged fat pads B and Knee valgus B; knees held in slight flexion, crouched  2 leg squat:unable     1 leg stance:   Right: unable  Left: unable    Gait: ambulates with hand-hold assist from . short step length, shuffling, NBOS, foot flat strike. Demo's increased step length and improved heel-toe when ambulating with FWW.     Transfers: heavy reliance on UEs and anterior trunk lean, slow to transfer, requires close CGA - min assist    TU:16 with FWW, close CGA.   (>32.6 sec indicates risk of falling in frail elderly adults).     Lower Extremity Strength & Range of Motion    Knee Joint ROM   Hyperextension Extension Flexion   Right - deg -5 deg 100* deg   Left - deg -5 deg 100 deg               Lower Extremity Muscle Strength (x/5)  -deferred formal testing  -requires [place]  and heavy use of hands for sit>stand transfers    Basic Muscle Activation:    Quadriceps  Quadriceps set Straight Leg Raise   Left Poor NA   Right Poor NA       Observations:    Knee Joint Effusion (Stroke Test Assessment):  Right: trace  Left: trace    Palpation:   Tender to palpation at the following structures: (right) lateral joint line, lateral patellar region, B femoral condyles  NOT tender to palpation at the following structures: medial joint line, patellar tendon    Patellofemoral Joint Examination:  Test Right Left   Patellar Orientation:   90 deg flexion lateral tilt neutral   OKC Patellar Tracking Crepitus Crepitus       ASSESSMENT/PLAN  Patient is a 82 year old female with right side knee & balance complaints.    Patient has the following significant findings with corresponding treatment plan.                Diagnosis 1:  Right knee pain & impaired balance  Pain -  hot/cold therapy, manual therapy, splint/taping/bracing/orthotics, self management, education and home program  Decreased strength - therapeutic exercise, therapeutic activities and home program  Impaired balance - neuro re-education, gait training, therapeutic activities, adaptive equipment/assistive device and home program  Decreased proprioception - neuro re-education, gait training, therapeutic activities and home program  Inflammation - cold therapy and self management/home program  Impaired gait - gait training, assistive devices and home program  Impaired muscle performance - neuro re-education and home program  Decreased function - therapeutic activities and home program    Therapy Evaluation Codes:   1) History comprised of:   Personal factors that impact the plan of care:      Age, Cognition, Coping style and Living environment.    Comorbidity factors that impact the plan of care are:      High blood pressure, Osteoarthritis and Osteoporosis.     Medications impacting care: High blood pressure and Pain.  2) Examination of Body  Systems comprised of:   Body structures and functions that impact the plan of care:      Hip and Knee.   Activity limitations that impact the plan of care are:      Bathing, Driving, Dressing, Sitting, Squatting/kneeling, Stairs, Standing and Walking.  3) Clinical presentation characteristics are:   Stable/Uncomplicated.  4) Decision-Making    Low complexity using standardized patient assessment instrument and/or measureable assessment of functional outcome.  Cumulative Therapy Evaluation is: Low complexity.    Previous and current functional limitations:  (See Goal Flow Sheet for this information)    Short term and Long term goals: (See Goal Flow Sheet for this information)     Communication ability:  Patient is unable to clearly communicate and follow directions.  They will require assistance to perform a home exercise program.  Treatment Explanation - The following has been discussed with the patient & her spouse:   RX ordered/plan of care  Anticipated outcomes  Possible risks and side effects  This patient would benefit from PT intervention to resume normal activities.   Rehab potential is fair.    Frequency:  1 X week, once daily  Duration:  for 8 weeks  Discharge Plan:  Achieve all LTG.  Independent in home treatment program.  Return to previous functional level by discharge.  Reach maximal therapeutic benefit.    Please refer to the daily flowsheet for treatment today, total treatment time and time spent performing 1:1 timed codes.

## 2023-04-04 NOTE — PROGRESS NOTES
[facility] Dermatology Note  Encounter Date: Apr 7, 2023  Office Visit     Dermatology Problem List:  1. 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  2. NMSC:  - BCC, R dorsal wrist, s/p MMS 5/24/21  3. Lichen planus, not active.  - Prior rx: clobetasol ointment  4. Biopsy-proven lichen sclerosis of the vaginal area  - clobetasol 0.05% ointment BID prn  5. Contact dermatitis due to  on hands in 2014 (presumed, no prior patch testing).  6. Benign bx  - Lentigo, Right lateral foot, s/p shave bx 12/3/21  - SK, Right lower back, s/p shave bx 12/3/21  7. AKs, s/p cryotherapy  - Efudex + calcipotriene initiated 4/7/2023 to the nose    # NUB, left proximal thigh, s/p shave bx 4/7/2023  ____________________________________________    Assessment & Plan:    # NUB, left proximal thigh, ddx: dysplastic nevus rule out melanoma   - Shave biopsy today, see procedure note below    # ISK, right mid back  - Cryo today, see procedure note below    # Lesions to monitor, right nasal tip and left supratip, suspect AKs  - Photodocumentation today  - Start Efudex + calcipotriene BID for 4-10 days  - Ensure macule on left supratip resolves at next visit      # Lichen sclerosus, genital. Chronic, active. Some pain with exam but otherwise appears inactive. Given symptoms, advised to start clobetasol burst.  - Start clobetasol BID for 1-2 weeks, then daily for 1-2 weeks, then every other day for 1-2 weeks, then back to 2-3x weekly for maintenance. Refilled today.     # Cherry angiomas  # Seborrheic keratoses  - Reassured of benign nature     # Multiple benign nevi  # Solar lentigines  - No concerning lesions today  - Monitor for ABCDEs of melanoma   - Continue sun protection - recommend SPF 30 or higher with frequent application   - Return sooner if noticing changing or symptomatic lesions      # History of atypical junctional melanocytic proliferation. No evidence  of recurrent disease.  - Continue photoprotection  - Continue yearly skin exams  - Advised to monitor for changing, non-healing, bleeding, painful, changing, or otherwise symptomatic lesions    Procedures Performed:   - Cryotherapy procedure note, location(s): see above. After verbal consent and discussion of risks and benefits including, but not limited to, dyspigmentation/scar, blister, and pain, 1 lesion(s) was(were) treated with 1-2 mm freeze border for 1-2 cycles with liquid nitrogen. Post cryotherapy instructions were provided.   - Shave biopsy procedure note, location(s): see above. After discussion of benefits and risks including but not limited to bleeding, infection, scar, incomplete removal, recurrence, and non-diagnostic biopsy, written consent and photographs were obtained. The area was cleaned with isopropyl alcohol. 0.5mL of 1% lidocaine with epinephrine was injected to obtain adequate anesthesia of lesion(s). Shave biopsy at site(s) performed. Hemostasis was achieved with aluminium chloride. Petrolatum ointment and a sterile dressing were applied. The patient tolerated the procedure and no complications were noted. The patient was provided with verbal and written post care instructions.     Follow-up: 3 months) in-person, recheck nose, or earlier for new or changing lesions    Staff and Scribe:     Scribe Disclosure:  I, JACQUELIN DOMINGUEZ, am serving as a scribe to document services personally performed by Rosamaria Estrada MD based on data collection and the provider's statements to me.     Provider Disclosure:   The documentation recorded by the scribe accurately reflects the services I personally performed and the decisions made by me.    Rosamaria Estrada MD    Department of Dermatology  [facility]: Phone: [phone], Fax: [phone]  4/8/2023     ____________________________________________    CC: Skin  Check (FBSE) and Derm Problem (Patient also has a concern for refill on clobetasol )    HPI:  Ms. Shahana Donaldson is a(n) 82 year old female who presents today as a return patient for FBSE. Last seen by myself on 12/3/21, at which time the patient underwent cryotherapy on the nose for treatment of AKs. Additionally, underwent two shaves for diagnosis of NUBs on the right lateral foot and right lower back which were both benign.    The patient is running low on her ointment and would like a refill. She is using it as needed, maybe three times per week. Notices that if she doesn't use the medicine and has sex she will have a flare.     Additionally, she uses a healing cream on her feet. No specific lesions of concern.     Patient is otherwise feeling well, without additional skin concerns.    Labs Reviewed:  N/A    Physical Exam:  Vitals: There were no vitals taken for this visit.  SKIN: Full skin, which includes the head/face, both arms, chest, back, abdomen,both legs, genitalia and/or groin buttocks, digits and/or nails, was examined.  - Agglutination of the labia. No erythema or erosions.  - Right distal nasal tip with some adherent scale.  - Left supratip, pink macule, nonspecific dermoscopy.  - There are dome shaped bright red papules on the trunk and extremities.   - Multiple regular brown pigmented macules and papules are identified on the trunk and extremities.   - Scattered brown macules on sun exposed areas.  - There are waxy stuck on tan to brown papules on the trunk and extremities.   - Left proximal thigh 2 mm dark brown macule with globular pigment.   - There is a waxy stuck on tan to brown papule on the right mid back.   - No other lesions of concern on areas examined.     Medications:  Current Outpatient Medications   Medication     Acetaminophen (TYLENOL PO)     ammonium lactate (LAC-HYDRIN) 12 % external lotion     atorvastatin (LIPITOR) 10 MG tablet     calcium citrate (CITRACAL) 950 (200 Ca)  MG tablet     clobetasol (TEMOVATE) 0.05 % external ointment     clobetasol (TEMOVATE) 0.05 % external ointment     clobetasol (TEMOVATE) 0.05 % ointment     diclofenac (VOLTAREN) 1 % topical gel     diltiazem ER COATED BEADS (CARDIZEM CD/CARTIA XT) 300 MG 24 hr capsule     docusate sodium (COLACE) 100 MG capsule     loperamide (IMODIUM) 2 MG capsule     triamcinolone (KENALOG) 0.1 % external ointment     amLODIPine (NORVASC) 2.5 MG tablet     calcium carbonate 500 mg, elemental, (OSCAL;OYSTER SHELL CALCIUM) 500 MG tablet     cholecalciferol (VITAMIN D3) 1000 UNIT tablet     No current facility-administered medications for this visit.      Past Medical History:   Patient Active Problem List   Diagnosis     Essential hypertension, benign     Mixed hyperlipidemia     Symptomatic menopausal or female climacteric states     Diverticulosis of large intestine     Hyperlipidemia     Coronary atherosclerosis     Esophageal reflux     vulvar cyst     Hiatal hernia     S/P Nissen fundoplication (without gastrostomy tube) procedure     History of tubal ligation     History of dilatation and curettage     History of hysterectomy     Lichen sclerosus     Left displaced femoral neck fracture (H)     Senile osteoporosis     Lichen planus     Seborrheic dermatitis     Heart failure with preserved ejection fraction, NYHA class II (H)     Past Medical History:   Diagnosis Date     Diverticulosis of colon (without mention of hemorrhage)      Essential hypertension, benign      Gastro-oesophageal reflux disease     nissen     Hemorrhoid      Nonsenile cataract      Osteoporosis      Other and unspecified hyperlipidemia      Rectocele      Symptomatic menopausal or female climacteric states         CC Referred Self, MD  No address on file on close of this encounter.

## 2023-04-07 ENCOUNTER — OFFICE VISIT (OUTPATIENT)
Dept: DERMATOLOGY | Facility: CLINIC | Age: 83
End: 2023-04-07
Payer: MEDICARE

## 2023-04-07 DIAGNOSIS — D18.01 CHERRY ANGIOMA: ICD-10-CM

## 2023-04-07 DIAGNOSIS — L57.0 ACTINIC KERATOSIS: ICD-10-CM

## 2023-04-07 DIAGNOSIS — D22.9 MULTIPLE BENIGN NEVI: ICD-10-CM

## 2023-04-07 DIAGNOSIS — L81.4 SOLAR LENTIGO: ICD-10-CM

## 2023-04-07 DIAGNOSIS — Z86.018 HISTORY OF DYSPLASTIC NEVUS: ICD-10-CM

## 2023-04-07 DIAGNOSIS — L90.0 LICHEN SCLEROSUS: Primary | ICD-10-CM

## 2023-04-07 DIAGNOSIS — L82.1 SEBORRHEIC KERATOSIS: ICD-10-CM

## 2023-04-07 DIAGNOSIS — D49.2 NEOPLASM OF UNSPECIFIED BEHAVIOR OF BONE, SOFT TISSUE, AND SKIN: ICD-10-CM

## 2023-04-07 DIAGNOSIS — L82.0 INFLAMED SEBORRHEIC KERATOSIS: ICD-10-CM

## 2023-04-07 PROCEDURE — 88305 TISSUE EXAM BY PATHOLOGIST: CPT | Mod: TC | Performed by: DERMATOLOGY

## 2023-04-07 PROCEDURE — 17110 DESTRUCTION B9 LES UP TO 14: CPT | Performed by: DERMATOLOGY

## 2023-04-07 PROCEDURE — 88305 TISSUE EXAM BY PATHOLOGIST: CPT | Mod: 26 | Performed by: DERMATOLOGY

## 2023-04-07 PROCEDURE — 99214 OFFICE O/P EST MOD 30 MIN: CPT | Mod: 25 | Performed by: DERMATOLOGY

## 2023-04-07 PROCEDURE — 11102 TANGNTL BX SKIN SINGLE LES: CPT | Mod: XS | Performed by: DERMATOLOGY

## 2023-04-07 RX ORDER — CLOBETASOL PROPIONATE 0.5 MG/G
OINTMENT TOPICAL
Qty: 60 G | Refills: 11 | Status: SHIPPED | OUTPATIENT
Start: 2023-04-07

## 2023-04-07 RX ORDER — FLUOROURACIL 50 MG/G
CREAM TOPICAL
Qty: 40 G | Refills: 0 | Status: SHIPPED | OUTPATIENT
Start: 2023-04-07

## 2023-04-07 RX ORDER — CALCIPOTRIENE 50 UG/G
CREAM TOPICAL
Qty: 60 G | Refills: 1 | Status: SHIPPED | OUTPATIENT
Start: 2023-04-07

## 2023-04-07 NOTE — PATIENT INSTRUCTIONS
For lichen sclerosus:  - Increase clobetasol to twice daily for 1-2 weeks, then once daily for 1-2 weeks, then every other day for 1-2 weeks, then 2x weekly for maintenance    For nose:  - Please start Efudex and calcipotriene mixed equally together. Apply twice daily to above areas for 4-10 days or until skin gets red. Stop applying when skin gets red.  Skin will get red and crusty (like hamburger).  Please keep Efudex away from pets and children. Wash hands thoroughly after application.   See handout below for more information about Efudex.    If medications are more than ~$65, please contact me for a compounded version through [employer].  Alternatively can also use Efudex alone. If you want to use Efudex alone, apply twice daily for 3-4 weeks. Stop when significant irritation occurs.        Efudex Treatment  Today, you are being prescribed Fluorouracil (Efudex) a topical cream used for the treatment of Actinic Keratosis (AKs).  The medication is working to eliminate the unhealthy cells.  This treatment may be unattractive and somewhat uncomfortable.  You may experience some mild discomfort while being treated.  You will want to stop any other creams such as glycolic acid products, retin A, Tazorac, etc. to the area. You may use bland makeup/cover-up as long as it doesn't sting or cause you discomfort.  Apply the cream at night as your physician recommends. Use a cotton-tipped applicator, or use gloves if applying it with your fingertips. If applied with unprotected fingertips, it is important to wash your hands well after you apply this medicine.   Keep this medication away from pets.  We recommend avoiding excessive sun exposure to the treated area  You may use moisturizing creams over bothersome areas such as Vanicream or Cetaphil cream if the reaction becomes too bothersome. Please, call the clinic if this occurs.   Potential Side Effects  Your treated areas may be unsightly during therapy.  This will  improve slowly following the discontinuation of therapy.   During the first week of application, mild inflammation may occur.   During the following weeks, redness, and swelling may occur with some crusting and burning.   Lesions resolve as the skin exfoliates.   Over 1 to 2 weeks, new skin grows into the treatment area.  Keep this medication away from pets  Specific side effects that usually do not require medical attention (report to your doctor or health care professional if they continue or are bothersome) include:  Red or dark-colored skin   Mild erosion (loss of upper layer of skin)   Mild eye irritation including burning, itching, sensitivity, stinging, or watering   Increased sensitivity of the skin to sun and ultraviolet light   Pain and burning of the affected area   Dryness, scaling or swelling of the affected area   Skin rash, itching of the affected area   Tenderness   If you have severe pain, please, call the clinic immediately and indicate that you have pain.  Ask for a call from the RN.     Who should I call with questions?  [facility]: [phone]  [facility]: [phone]  For urgent needs outside of business hours call the [facility] at [phone] and ask for the dermatology resident on call           Patient Education     Checking for Skin Cancer  You can find cancer early by checking your skin each month. There are 3 kinds of skin cancer. They are melanoma, basal cell carcinoma, and squamous cell carcinoma. Doing monthly skin checks is the best way to find new marks or skin changes. Follow the instructions below for checking your skin.   The ABCDEs of checking moles for melanoma   Check your moles or growths for signs of melanoma using ABCDE:   Asymmetry: the sides of the mole or growth don t match  Border: the edges are ragged, notched, or blurred  Color: the color within the mole or growth varies  Diameter: the mole or  growth is larger than 6 mm (size of a pencil eraser)  Evolving: the size, shape, or color of the mole or growth is changing (evolving is not shown in the images below)    Checking for other types of skin cancer  Basal cell carcinoma or squamous cell carcinoma have symptoms such as:     A spot or mole that looks different from all other marks on your skin  Changes in how an area feels, such as itching, tenderness, or pain  Changes in the skin's surface, such as oozing, bleeding, or scaliness  A sore that does not heal  New swelling or redness beyond the border of a mole    Who s at risk?  Anyone can get skin cancer. But you are at greater risk if you have:   Fair skin, light-colored hair, or light-colored eyes  Many moles or abnormal moles on your skin  A history of sunburns from sunlight or tanning beds  A family history of skin cancer  A history of exposure to radiation or chemicals  A weakened immune system  If you have had skin cancer in the past, you are at risk for recurring skin cancer.   How to check your skin  Do your monthly skin checkups in front of a full-length mirror. Check all parts of your body, including your:   Head (ears, face, neck, and scalp)  Torso (front, back, and sides)  Arms (tops, undersides, upper, and lower armpits)  Hands (palms, backs, and fingers, including under the nails)  Buttocks and genitals  Legs (front, back, and sides)  Feet (tops, soles, toes, including under the nails, and between toes)  If you have a lot of moles, take digital photos of them each month. Make sure to take photos both up close and from a distance. These can help you see if any moles change over time.   Most skin changes are not cancer. But if you see any changes in your skin, call your doctor right away. Only he or she can diagnose a problem. If you have skin cancer, seeing your doctor can be the first step toward getting the treatment that could save your life.   Bo last reviewed this educational  content on 4/1/2019 2000-2020 [employer]. [address] [place] [address], [place] [address]. All rights reserved. This information is not intended as a substitute for professional medical care. Always follow your healthcare professional's instructions.       When should I call my doctor?  If you are worsening or not improving, please, contact us or seek urgent care as noted below.     Who should I call with questions (adults)?  [facility] (adult and pediatric): [phone]  [facility] (adult): [phone]  For urgent needs outside of business hours call the [facility] at [phone] and ask for the dermatology resident on call to be paged  If this is a medical emergency and you are unable to reach an ER, Call [phone]    Who should I call with questions (pediatric)?  [facility]- Pediatric Dermatology  Dr. Anabela Isabel, Dr. Santo Wolff, Dr. Conchis Elias, WILLIAM Robins, Dr. Allegra Bravo, Dr. Cari Ontiveros & Dr. Jeramie Richards  Non-urgent nurse triage line; [phone]- Patricia and Annmarie RN Care Coordinators   Diana (/Complex ) [phone]    If you need a prescription refill, please contact your pharmacy. Refills are approved or denied by our Physicians during normal business hours, Monday through Fridays  Per office policy, refills will not be granted if you have not been seen within the past year (or sooner depending on your child's condition)    Scheduling Information:  Pediatric Appointment Scheduling and Call Center [phone]  Radiology Scheduling- [phone]  Sedation Unit Scheduling- [phone]  [facility] [place] Scheduling- General [phone]; Pediatric Dermatology [phone]  Main  Services: [phone]  Mauritian: [phone]  Citizen of [place]: [phone]  Hmong/Belizean/North Korean: [phone]  Preadmission Nursing Department Fax Number: [phone] (Fax all  pre-operative paperwork to this number)    For urgent matters arising during evenings, weekends, or holidays that cannot wait for normal business hours please call [phone] and ask for the dermatology resident on call to be paged. Cryotherapy    What is it?  Use of a very cold liquid, such as liquid nitrogen, to freeze and destroy abnormal skin cells that need to be removed    What should I expect?  Tenderness and redness  A small blister that might grow and fill with dark purple blood. There may be crusting.  More than one treatment may be needed if the lesions do not go away.    How do I care for the treated area?  Gently wash the area with your hands when bathing.  Use a thin layer of Vaseline to help with healing. You may use a Band-Aid.   The area should heal within 7-10 days and may leave behind a pink or lighter color.   Do not use an antibiotic or Neosporin ointment.   You may take acetaminophen (Tylenol) for pain.     Call your doctor if you have:  Severe pain  Signs of infection (warmth, redness, cloudy yellow drainage, and or a bad smell)  Questions or concerns    Who should I call with questions?      [facility]: [phone]      [facility]: [phone]      For urgent needs outside of business hours call the [facility] at [phone] and ask for the dermatology resident on call Wound Care After a Biopsy    What is a skin biopsy?  A skin biopsy allows the doctor to examine a very small piece of tissue under the microscope to determine the diagnosis and the best treatment for the skin condition. A local anesthetic (numbing medicine)  is injected with a very small needle into the skin area to be tested. A small piece of skin is taken from the area. Sometimes a suture (stitch) is used.     What are the risks of a skin biopsy?  I will experience scar, bleeding, swelling, pain, crusting and redness. I may experience incomplete  removal or recurrence. Risks of this procedure are excessive bleeding, bruising, infection, nerve damage, numbness, thick (hypertrophic or keloidal) scar and non-diagnostic biopsy.    How should I care for my wound for the first 24 hours?  Keep the wound dry and covered for 24 hours  If it bleeds, hold direct pressure on the area for 15 minutes. If bleeding does not stop then go to the emergency room  Avoid strenuous exercise the first 1-2 days or as your doctor instructs you    How should I care for the wound after 24 hours?  After 24 hours, remove the bandage  You may bathe or shower as normal  If you had a scalp biopsy, you can shampoo as usual and can use shower water to clean the biopsy site daily  Clean the wound twice a day with gentle soap and water  Do not scrub, be gentle  Apply white petroleum/Vaseline after cleaning the wound with a cotton swab or a clean finger, and keep the site covered with a Bandaid /bandage. Bandages are not necessary with a scalp biopsy  If you are unable to cover the site with a Bandaid /bandage, re-apply ointment 2-3 times a day to keep the site moist. Moisture will help with healing  Avoid strenuous activity for first 1-2 days  Avoid lakes, rivers, pools, and oceans until the stitches are removed or the site is healed    How do I clean my wound?  Wash hands thoroughly with soap or use hand  before all wound care  Clean the wound with gentle soap and water  Apply white petroleum/Vaseline  to wound after it is clean  Replace the Bandaid /bandage to keep the wound covered for the first few days or as instructed by your doctor  If you had a scalp biopsy, warm shower water to the area on a daily basis should suffice    What should I use to clean my wound?   Cotton-tipped applicators (Qtips )  White petroleum jelly (Vaseline ). Use a clean new container and use Q-tips to apply.  Bandaids   as needed  Gentle soap     How should I care for my wound long term?  Do not get your  wound dirty  Keep up with wound care for one week or until the area is healed.  A small scab will form and fall off by itself when the area is completely healed. The area will be red and will become pink in color as it heals. Sun protection is very important for how your scar will turn out. Sunscreen with an SPF 30 or greater is recommended once the area is healed.  If you have stitches, stitches need to be removed in 14 days. You may return to our clinic for this or you may have it done locally at your doctor s office.  You should have some soreness but it should be mild and slowly go away over several days. Talk to your doctor about using tylenol for pain,    When should I call my doctor?  If you have increased:   Pain or swelling  Pus or drainage (clear or slightly yellow drainage is ok)  Temperature over 100F  Spreading redness or warmth around wound    When will I hear about my results?  The biopsy results can take 2 weeks to come back.  Your results will automatically release to [employer] before your provider has even reviewed them.  The clinic will call you with the results, send you a [employer] message, or have you schedule a follow-up clinic or phone time to discuss the results.  Contact our clinics if you do not hear from us in 2 weeks.    Who should I call with questions?  [facility]: [phone]  [facility]: [phone]  For urgent needs outside of business hours call the [facility] at [phone] and ask for the dermatology resident on call

## 2023-04-07 NOTE — LETTER
4/7/2023       RE: Shahana Donaldson  [address]  [facility] [address]     Dear Colleague,    Thank you for referring your patient, Shahana Donaldson, to the [facility] [place] at [facility]. Please see a copy of my visit note below.    [facility] Dermatology Note  Encounter Date: Apr 7, 2023  Office Visit     Dermatology Problem List:  1. 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  2. NMSC:  - BCC, R dorsal wrist, s/p MMS 5/24/21  3. Lichen planus, not active.  - Prior rx: clobetasol ointment  4. Biopsy-proven lichen sclerosis of the vaginal area  - clobetasol 0.05% ointment BID prn  5. Contact dermatitis due to  on hands in 2014 (presumed, no prior patch testing).  6. Benign bx  - Lentigo, Right lateral foot, s/p shave bx 12/3/21  - SK, Right lower back, s/p shave bx 12/3/21  7. AKs, s/p cryotherapy  - Efudex + calcipotriene initiated 4/7/2023 to the nose    # NUB, left proximal thigh, s/p shave bx 4/7/2023  ____________________________________________    Assessment & Plan:    # NUB, left proximal thigh, ddx: dysplastic nevus rule out melanoma   - Shave biopsy today, see procedure note below    # ISK, right mid back  - Cryo today, see procedure note below    # Lesions to monitor, right nasal tip and left supratip, suspect AKs  - Photodocumentation today  - Start Efudex + calcipotriene BID for 4-10 days  - Ensure macule on left supratip resolves at next visit      # Lichen sclerosus, genital. Chronic, active. Some pain with exam but otherwise appears inactive. Given symptoms, advised to start clobetasol burst.  - Start clobetasol BID for 1-2 weeks, then daily for 1-2 weeks, then every other day for 1-2 weeks, then back to 2-3x weekly for maintenance. Refilled today.     # Cherry angiomas  # Seborrheic keratoses  - Reassured of benign nature      # Multiple benign nevi  # Solar lentigines  - No concerning lesions today  - Monitor for ABCDEs of melanoma   - Continue sun protection - recommend SPF 30 or higher with frequent application   - Return sooner if noticing changing or symptomatic lesions      # History of atypical junctional melanocytic proliferation. No evidence of recurrent disease.  - Continue photoprotection  - Continue yearly skin exams  - Advised to monitor for changing, non-healing, bleeding, painful, changing, or otherwise symptomatic lesions    Procedures Performed:   - Cryotherapy procedure note, location(s): see above. After verbal consent and discussion of risks and benefits including, but not limited to, dyspigmentation/scar, blister, and pain, 1 lesion(s) was(were) treated with 1-2 mm freeze border for 1-2 cycles with liquid nitrogen. Post cryotherapy instructions were provided.   - Shave biopsy procedure note, location(s): see above. After discussion of benefits and risks including but not limited to bleeding, infection, scar, incomplete removal, recurrence, and non-diagnostic biopsy, written consent and photographs were obtained. The area was cleaned with isopropyl alcohol. 0.5mL of 1% lidocaine with epinephrine was injected to obtain adequate anesthesia of lesion(s). Shave biopsy at site(s) performed. Hemostasis was achieved with aluminium chloride. Petrolatum ointment and a sterile dressing were applied. The patient tolerated the procedure and no complications were noted. The patient was provided with verbal and written post care instructions.     Follow-up: 3 months) in-person, recheck nose, or earlier for new or changing lesions    Staff and Scribe:     Scribe Disclosure:  JACQUELIN FORD, am serving as a scribe to document services personally performed by Rosamaria Estrada MD based on data collection and the provider's statements to me.     Provider Disclosure:   The documentation recorded by the scribe accurately reflects the  services I personally performed and the decisions made by me.    Rosamaria Estrada MD    Department of Dermatology  [facility]: Phone: [phone], Fax: [phone]  4/8/2023     ____________________________________________    CC: Skin Check (FBSE) and Derm Problem (Patient also has a concern for refill on clobetasol )    HPI:  Ms. Shahana Donaldson is a(n) 82 year old female who presents today as a return patient for FBSE. Last seen by myself on 12/3/21, at which time the patient underwent cryotherapy on the nose for treatment of AKs. Additionally, underwent two shaves for diagnosis of NUBs on the right lateral foot and right lower back which were both benign.    The patient is running low on her ointment and would like a refill. She is using it as needed, maybe three times per week. Notices that if she doesn't use the medicine and has sex she will have a flare.     Additionally, she uses a healing cream on her feet. No specific lesions of concern.     Patient is otherwise feeling well, without additional skin concerns.    Labs Reviewed:  N/A    Physical Exam:  Vitals: There were no vitals taken for this visit.  SKIN: Full skin, which includes the head/face, both arms, chest, back, abdomen,both legs, genitalia and/or groin buttocks, digits and/or nails, was examined.  - Agglutination of the labia. No erythema or erosions.  - Right distal nasal tip with some adherent scale.  - Left supratip, pink macule, nonspecific dermoscopy.  - There are dome shaped bright red papules on the trunk and extremities.   - Multiple regular brown pigmented macules and papules are identified on the trunk and extremities.   - Scattered brown macules on sun exposed areas.  - There are waxy stuck on tan to brown papules on the trunk and extremities.   - Left proximal thigh 2 mm dark brown macule with globular pigment.   - There is a waxy  stuck on tan to brown papule on the right mid back.   - No other lesions of concern on areas examined.     Medications:  Current Outpatient Medications   Medication    Acetaminophen (TYLENOL PO)    ammonium lactate (LAC-HYDRIN) 12 % external lotion    atorvastatin (LIPITOR) 10 MG tablet    calcium citrate (CITRACAL) 950 (200 Ca) MG tablet    clobetasol (TEMOVATE) 0.05 % external ointment    clobetasol (TEMOVATE) 0.05 % external ointment    clobetasol (TEMOVATE) 0.05 % ointment    diclofenac (VOLTAREN) 1 % topical gel    diltiazem ER COATED BEADS (CARDIZEM CD/CARTIA XT) 300 MG 24 hr capsule    docusate sodium (COLACE) 100 MG capsule    loperamide (IMODIUM) 2 MG capsule    triamcinolone (KENALOG) 0.1 % external ointment    amLODIPine (NORVASC) 2.5 MG tablet    calcium carbonate 500 mg, elemental, (OSCAL;OYSTER SHELL CALCIUM) 500 MG tablet    cholecalciferol (VITAMIN D3) 1000 UNIT tablet     No current facility-administered medications for this visit.      Past Medical History:   Patient Active Problem List   Diagnosis    Essential hypertension, benign    Mixed hyperlipidemia    Symptomatic menopausal or female climacteric states    Diverticulosis of large intestine    Hyperlipidemia    Coronary atherosclerosis    Esophageal reflux    vulvar cyst    Hiatal hernia    S/P Nissen fundoplication (without gastrostomy tube) procedure    History of tubal ligation    History of dilatation and curettage    History of hysterectomy    Lichen sclerosus    Left displaced femoral neck fracture (H)    Senile osteoporosis    Lichen planus    Seborrheic dermatitis    Heart failure with preserved ejection fraction, NYHA class II (H)     Past Medical History:   Diagnosis Date    Diverticulosis of colon (without mention of hemorrhage)     Essential hypertension, benign     Gastro-oesophageal reflux disease     nissen    Hemorrhoid     Nonsenile cataract     Osteoporosis     Other and unspecified hyperlipidemia     Rectocele      Symptomatic menopausal or female climacteric states         CC Referred Self, MD  No address on file on close of this encounter.

## 2023-04-07 NOTE — NURSING NOTE
Lidocaine-epinephrine 1-1:253538 % injection   1mL once for one use, starting 4/7/2023 ending 4/7/2023,  2mL disp, R-0, injection  Injected by Anuradha Bassett RN

## 2023-04-07 NOTE — NURSING NOTE
Dermatology Rooming Note    Shahana Donaldson's goals for this visit include:   Chief Complaint   Patient presents with     Skin Check     FBSE     Derm Problem     Patient also has a concern for refill on clobetasol      Chris Bae, EMT-B

## 2023-04-10 ENCOUNTER — THERAPY VISIT (OUTPATIENT)
Dept: PHYSICAL THERAPY | Facility: CLINIC | Age: 83
End: 2023-04-10
Attending: FAMILY MEDICINE
Payer: MEDICARE

## 2023-04-10 DIAGNOSIS — M17.11 PRIMARY OSTEOARTHRITIS OF RIGHT KNEE: Primary | ICD-10-CM

## 2023-04-10 DIAGNOSIS — M25.561 RIGHT KNEE PAIN: ICD-10-CM

## 2023-04-10 DIAGNOSIS — R26.89 ABNORMALITY OF GAIT DUE TO IMPAIRMENT OF BALANCE: ICD-10-CM

## 2023-04-10 PROCEDURE — 97110 THERAPEUTIC EXERCISES: CPT | Mod: GP

## 2023-04-10 PROCEDURE — 97530 THERAPEUTIC ACTIVITIES: CPT | Mod: GP

## 2023-04-10 PROCEDURE — 97112 NEUROMUSCULAR REEDUCATION: CPT | Mod: GP

## 2023-04-17 ENCOUNTER — THERAPY VISIT (OUTPATIENT)
Dept: PHYSICAL THERAPY | Facility: CLINIC | Age: 83
End: 2023-04-17
Attending: FAMILY MEDICINE
Payer: MEDICARE

## 2023-04-17 DIAGNOSIS — M17.11 PRIMARY OSTEOARTHRITIS OF RIGHT KNEE: Primary | ICD-10-CM

## 2023-04-17 DIAGNOSIS — R26.89 ABNORMALITY OF GAIT DUE TO IMPAIRMENT OF BALANCE: ICD-10-CM

## 2023-04-17 PROCEDURE — 97110 THERAPEUTIC EXERCISES: CPT | Mod: GP

## 2023-04-17 PROCEDURE — 97530 THERAPEUTIC ACTIVITIES: CPT | Mod: GP

## 2023-04-17 PROCEDURE — 97112 NEUROMUSCULAR REEDUCATION: CPT | Mod: GP

## 2023-04-18 ENCOUNTER — ANCILLARY PROCEDURE (OUTPATIENT)
Dept: MAMMOGRAPHY | Facility: CLINIC | Age: 83
End: 2023-04-18
Attending: INTERNAL MEDICINE
Payer: MEDICARE

## 2023-04-18 DIAGNOSIS — Z12.31 SCREENING MAMMOGRAM FOR BREAST CANCER: ICD-10-CM

## 2023-04-18 PROCEDURE — 77063 BREAST TOMOSYNTHESIS BI: CPT | Mod: GC | Performed by: STUDENT IN AN ORGANIZED HEALTH CARE EDUCATION/TRAINING PROGRAM

## 2023-04-18 PROCEDURE — 77067 SCR MAMMO BI INCL CAD: CPT | Mod: GC | Performed by: STUDENT IN AN ORGANIZED HEALTH CARE EDUCATION/TRAINING PROGRAM

## 2023-04-20 ENCOUNTER — NURSE TRIAGE (OUTPATIENT)
Dept: NURSING | Facility: CLINIC | Age: 83
End: 2023-04-20
Payer: MEDICARE

## 2023-04-20 NOTE — TELEPHONE ENCOUNTER
Jake and Pat calling concerned Pat has had a painful, red, swollen right index finger for a week. Redness is at the tip and goes to the middle of the finger. She rates the pain 5. Denies drainage. Care advice is to be seen today. States no appointment but agreed to go to Urgent Care.  Sign      Reason for Disposition    Looks infected (e.g., spreading redness, red streak, pus)    Additional Information    Negative: Looks infected (spreading redness, red streak, pus) and severe pain with movement    Negative: Patient sounds very sick or weak to the triager    Negative: SEVERE pain (e.g., excruciating, unable to use hand at all)    Protocols used: FINGER PAIN-A-OH

## 2023-04-24 NOTE — PROGRESS NOTES
"ASSESSMENT/PLAN:    (M17.11) Primary osteoarthritis of right knee  Comment: great progress w/ PT; emphasized home program for strengthening; f/u prn  Plan: diclofenac (VOLTAREN) 1 % topical gel          Johnnie Harding MD  April 25, 2023  8:48 AM      Pt is a 82 year old female last seen on 3/13/23 here for follow up of:     R knee OA - overall pain has improved  Is walking better; not using cane or walker now   notes some weakness/instability but pt denies  Mild swelling  Not needing the knee brace  Uses voltaren gel prn   Has 3 sessions of PT left      Per last PT note on 4/17/23:  She has been working on her exercises and reports feeling better. Not having much pain in the knee now, maybe a little with heavier activity. Going up stairs feels easier.  Able to sit>stand without hands from 22\" table. Able to ambulate without reaching for  for support. Difficulty weight shifting onto single leg (bilateral) without hand support, improves with practice.    Per my last note:  (M17.11) Primary osteoarthritis of right knee  (primary encounter diagnosis)  Comment: exam, imaging consistent w/ flare of knee OA; will have her try a knee brace, topical nsaid, and PT; f/u in 6-8 wks; if no better, then would consider an injection  Plan: Physical Therapy Referral, diclofenac (VOLTAREN) 1 % topical gel, Knee Supplies Order Hinged Knee Brace; Right    Past Medical History:   Diagnosis Date     Diverticulosis of colon (without mention of hemorrhage)      Essential hypertension, benign      Gastro-oesophageal reflux disease     nissen     Hemorrhoid      Nonsenile cataract      Osteoporosis      Other and unspecified hyperlipidemia      Rectocele      Symptomatic menopausal or female climacteric states       Current Outpatient Medications   Medication Sig Dispense Refill     Acetaminophen (TYLENOL PO) Take 1,000 mg by mouth every 6 hours as needed for mild pain or fever       amLODIPine (NORVASC) 2.5 MG tablet Take 1 " tablet (2.5 mg) by mouth daily (Patient not taking: Reported on 1/9/2023) 90 tablet 0     ammonium lactate (LAC-HYDRIN) 12 % external lotion Apply topically 2 times daily To the feet 225 g 11     atorvastatin (LIPITOR) 10 MG tablet Take 1 tablet (10 mg) by mouth daily 90 tablet 3     calcipotriene (DOVONOX) 0.005 % external cream Mix efudex + calcipotriene equally. Apply BID x 4-10 days. Stop when skin gets red. 60 g 1     calcium carbonate 500 mg, elemental, (OSCAL;OYSTER SHELL CALCIUM) 500 MG tablet Take 3 tablets (1,500 mg) by mouth 2 times daily (with meals) (Patient not taking: Reported on 1/9/2023) 60 tablet 0     calcium citrate (CITRACAL) 950 (200 Ca) MG tablet Take 1 tablet by mouth 2 times daily       cholecalciferol (VITAMIN D3) 1000 UNIT tablet Take 2 tablets (2,000 Units) by mouth daily (Patient not taking: Reported on 1/9/2023) 30 tablet 0     clobetasol (TEMOVATE) 0.05 % external ointment Apply topically 2x weekly maintenance use of clobetasol *can increase to twice daily for flares when needed and then taper slowly back to 2x weekly. 60 g 11     clobetasol (TEMOVATE) 0.05 % external ointment Apply twice weekly to lichen sclerosus, increase to twice daily as needed for flares 60 g 3     clobetasol (TEMOVATE) 0.05 % ointment Apply sparingly to affected area 2 x daily for14 days. Then, apply small amount to affected area twice weekly for maintenance. 30 g 2     diclofenac (VOLTAREN) 1 % topical gel Apply 2 g topically 4 times daily For R knee 100 g 1     diltiazem ER COATED BEADS (CARDIZEM CD/CARTIA XT) 300 MG 24 hr capsule Take 1 capsule (300 mg) by mouth daily 90 capsule 2     docusate sodium (COLACE) 100 MG capsule Take 1 capsule (100 mg) by mouth 2 times daily 60 capsule 0     fluorouracil (EFUDEX) 5 % external cream Mix equally with calcipotriene cream. Apply BID x 4-10 days. Stop when skin gets red. 40 g 0     loperamide (IMODIUM) 2 MG capsule Take 2 mg by mouth 4 times daily as needed for  diarrhea       triamcinolone (KENALOG) 0.1 % external ointment Apply topically 2 times daily To the areas of bumps on the arms and legs 60 g 5      Allergies   Allergen Reactions     Dilaudid [Hydromorphone]      dizziness     Senna Hives      ROS:   Gen- no fevers/chills   Rheum - no morning stiffness   Derm - no rash/ redness   Neuro - no numbness, no tingling   Remainder of ROS negative.     Exam:     R knee:  ROM 0-110  +crepitus  Trace effusion  +medial JLT

## 2023-04-25 ENCOUNTER — OFFICE VISIT (OUTPATIENT)
Dept: ORTHOPEDICS | Facility: CLINIC | Age: 83
End: 2023-04-25
Payer: MEDICARE

## 2023-04-25 VITALS — WEIGHT: 147 LBS | HEIGHT: 69 IN | BODY MASS INDEX: 21.77 KG/M2

## 2023-04-25 DIAGNOSIS — M17.11 PRIMARY OSTEOARTHRITIS OF RIGHT KNEE: ICD-10-CM

## 2023-04-25 PROCEDURE — 99213 OFFICE O/P EST LOW 20 MIN: CPT | Performed by: FAMILY MEDICINE

## 2023-04-25 NOTE — LETTER
"  4/25/2023      RE: Shahana Donaldson  [address]  [place] [address]     Dear Colleague,    Thank you for referring your patient, Shahana Donaldson, to the [facility] [place]. Please see a copy of my visit note below.    ASSESSMENT/PLAN:    (M17.11) Primary osteoarthritis of right knee  Comment: great progress w/ PT; emphasized home program for strengthening; f/u prn  Plan: diclofenac (VOLTAREN) 1 % topical gel          Johnnie Harding MD  April 25, 2023  8:48 AM      Pt is a 82 year old female last seen on 3/13/23 here for follow up of:     R knee OA - overall pain has improved  Is walking better; not using cane or walker now   notes some weakness/instability but pt denies  Mild swelling  Not needing the knee brace  Uses voltaren gel prn   Has 3 sessions of PT left      Per last PT note on 4/17/23:  She has been working on her exercises and reports feeling better. Not having much pain in the knee now, maybe a little with heavier activity. Going up stairs feels easier.  Able to sit>stand without hands from 22\" table. Able to ambulate without reaching for  for support. Difficulty weight shifting onto single leg (bilateral) without hand support, improves with practice.    Per my last note:  (M17.11) Primary osteoarthritis of right knee  (primary encounter diagnosis)  Comment: exam, imaging consistent w/ flare of knee OA; will have her try a knee brace, topical nsaid, and PT; f/u in 6-8 wks; if no better, then would consider an injection  Plan: Physical Therapy Referral, diclofenac (VOLTAREN) 1 % topical gel, Knee Supplies Order Hinged Knee Brace; Right    Past Medical History:   Diagnosis Date    Diverticulosis of colon (without mention of hemorrhage)     Essential hypertension, benign     Gastro-oesophageal reflux disease     nissen    Hemorrhoid     Nonsenile cataract     Osteoporosis     Other and unspecified hyperlipidemia     Rectocele     " Symptomatic menopausal or female climacteric states       Current Outpatient Medications   Medication Sig Dispense Refill    Acetaminophen (TYLENOL PO) Take 1,000 mg by mouth every 6 hours as needed for mild pain or fever      amLODIPine (NORVASC) 2.5 MG tablet Take 1 tablet (2.5 mg) by mouth daily (Patient not taking: Reported on 1/9/2023) 90 tablet 0    ammonium lactate (LAC-HYDRIN) 12 % external lotion Apply topically 2 times daily To the feet 225 g 11    atorvastatin (LIPITOR) 10 MG tablet Take 1 tablet (10 mg) by mouth daily 90 tablet 3    calcipotriene (DOVONOX) 0.005 % external cream Mix efudex + calcipotriene equally. Apply BID x 4-10 days. Stop when skin gets red. 60 g 1    calcium carbonate 500 mg, elemental, (OSCAL;OYSTER SHELL CALCIUM) 500 MG tablet Take 3 tablets (1,500 mg) by mouth 2 times daily (with meals) (Patient not taking: Reported on 1/9/2023) 60 tablet 0    calcium citrate (CITRACAL) 950 (200 Ca) MG tablet Take 1 tablet by mouth 2 times daily      cholecalciferol (VITAMIN D3) 1000 UNIT tablet Take 2 tablets (2,000 Units) by mouth daily (Patient not taking: Reported on 1/9/2023) 30 tablet 0    clobetasol (TEMOVATE) 0.05 % external ointment Apply topically 2x weekly maintenance use of clobetasol *can increase to twice daily for flares when needed and then taper slowly back to 2x weekly. 60 g 11    clobetasol (TEMOVATE) 0.05 % external ointment Apply twice weekly to lichen sclerosus, increase to twice daily as needed for flares 60 g 3    clobetasol (TEMOVATE) 0.05 % ointment Apply sparingly to affected area 2 x daily for14 days. Then, apply small amount to affected area twice weekly for maintenance. 30 g 2    diclofenac (VOLTAREN) 1 % topical gel Apply 2 g topically 4 times daily For R knee 100 g 1    diltiazem ER COATED BEADS (CARDIZEM CD/CARTIA XT) 300 MG 24 hr capsule Take 1 capsule (300 mg) by mouth daily 90 capsule 2    docusate sodium (COLACE) 100 MG capsule Take 1 capsule (100 mg) by mouth  2 times daily 60 capsule 0    fluorouracil (EFUDEX) 5 % external cream Mix equally with calcipotriene cream. Apply BID x 4-10 days. Stop when skin gets red. 40 g 0    loperamide (IMODIUM) 2 MG capsule Take 2 mg by mouth 4 times daily as needed for diarrhea      triamcinolone (KENALOG) 0.1 % external ointment Apply topically 2 times daily To the areas of bumps on the arms and legs 60 g 5      Allergies   Allergen Reactions    Dilaudid [Hydromorphone]      dizziness    Senna Hives      ROS:   Gen- no fevers/chills   Rheum - no morning stiffness   Derm - no rash/ redness   Neuro - no numbness, no tingling   Remainder of ROS negative.     Exam:     R knee:  ROM 0-110  +crepitus  Trace effusion  +medial JLT          Again, thank you for allowing me to participate in the care of your patient.      Sincerely,    Johnnie Harding MD

## 2023-05-01 ENCOUNTER — THERAPY VISIT (OUTPATIENT)
Dept: PHYSICAL THERAPY | Facility: CLINIC | Age: 83
End: 2023-05-01
Payer: MEDICARE

## 2023-05-01 DIAGNOSIS — R26.89 ABNORMALITY OF GAIT DUE TO IMPAIRMENT OF BALANCE: ICD-10-CM

## 2023-05-01 DIAGNOSIS — M17.11 PRIMARY OSTEOARTHRITIS OF RIGHT KNEE: Primary | ICD-10-CM

## 2023-05-01 DIAGNOSIS — M25.561 RIGHT KNEE PAIN: ICD-10-CM

## 2023-05-01 PROCEDURE — 97112 NEUROMUSCULAR REEDUCATION: CPT | Mod: GP

## 2023-05-01 PROCEDURE — 97110 THERAPEUTIC EXERCISES: CPT | Mod: GP

## 2023-05-01 PROCEDURE — 97530 THERAPEUTIC ACTIVITIES: CPT | Mod: GP

## 2023-05-02 ENCOUNTER — TELEPHONE (OUTPATIENT)
Dept: DERMATOLOGY | Facility: CLINIC | Age: 83
End: 2023-05-02
Payer: MEDICARE

## 2023-05-02 DIAGNOSIS — L90.0 LICHEN SCLEROSUS: Primary | ICD-10-CM

## 2023-05-02 NOTE — LETTER
[facility] [address] [address]  Phone: [phone]  Fax: [phone]

## 2023-05-02 NOTE — TELEPHONE ENCOUNTER
Prior Authorization Retail Medication Request    Medication/Dose: clobetasol (TEMOVATE) 0.05 % external ointment  ICD code (if different than what is on RX):  Lichen sclerosus [L90.0]  - Primary     Insurance Name:  Medicare  Insurance ID:  [id number]

## 2023-05-05 RX ORDER — BETAMETHASONE DIPROPIONATE 0.5 MG/G
OINTMENT, AUGMENTED TOPICAL 2 TIMES DAILY
Qty: 50 G | Refills: 3 | Status: SHIPPED | OUTPATIENT
Start: 2023-05-05

## 2023-05-05 NOTE — TELEPHONE ENCOUNTER
PA Initiation    Medication: clobetasol (TEMOVATE) 0.05 % external ointment   Insurance Company: [employer] - Phone [phone] Fax [phone]  Pharmacy Filling the Rx: [address]  Filling Pharmacy Phone: [phone]  Filling Pharmacy Fax: [phone]  Start Date: 5/4/2023

## 2023-05-05 NOTE — TELEPHONE ENCOUNTER
PRIOR AUTHORIZATION DENIED    Medication: clobetasol (TEMOVATE) 0.05 % external ointment--DENIED    Denial Date: 5/5/2023    Denial Rational: Patient needs to try and fail 1 more preferred medication:      Appeal Information:

## 2023-05-06 NOTE — TELEPHONE ENCOUNTER
Please let patient know I sent an equally strong steroid to pharmacy called augmented betamethasone   It should be equally as effective as clobetasol but if it is not, I can appeal and we should be able to get clobetasol at that time

## 2023-05-08 ENCOUNTER — THERAPY VISIT (OUTPATIENT)
Dept: PHYSICAL THERAPY | Facility: CLINIC | Age: 83
End: 2023-05-08
Payer: MEDICARE

## 2023-05-08 DIAGNOSIS — M17.11 PRIMARY OSTEOARTHRITIS OF RIGHT KNEE: ICD-10-CM

## 2023-05-08 DIAGNOSIS — R26.89 ABNORMALITY OF GAIT DUE TO IMPAIRMENT OF BALANCE: Primary | ICD-10-CM

## 2023-05-08 PROCEDURE — 97110 THERAPEUTIC EXERCISES: CPT | Mod: GP

## 2023-05-08 PROCEDURE — 97530 THERAPEUTIC ACTIVITIES: CPT | Mod: GP

## 2023-05-08 PROCEDURE — 97112 NEUROMUSCULAR REEDUCATION: CPT | Mod: GP

## 2023-05-08 NOTE — TELEPHONE ENCOUNTER
"Patient answered the phone and stated \"[facility], I dont know. Call me later\" then hung up    Adela EVANS CMA   "

## 2023-05-15 ENCOUNTER — THERAPY VISIT (OUTPATIENT)
Dept: PHYSICAL THERAPY | Facility: CLINIC | Age: 83
End: 2023-05-15
Payer: MEDICARE

## 2023-05-15 DIAGNOSIS — M17.11 PRIMARY OSTEOARTHRITIS OF RIGHT KNEE: Primary | ICD-10-CM

## 2023-05-15 DIAGNOSIS — R26.89 ABNORMALITY OF GAIT DUE TO IMPAIRMENT OF BALANCE: ICD-10-CM

## 2023-05-15 PROCEDURE — 97112 NEUROMUSCULAR REEDUCATION: CPT | Mod: GP

## 2023-05-15 PROCEDURE — 97530 THERAPEUTIC ACTIVITIES: CPT | Mod: GP

## 2023-05-15 PROCEDURE — 97110 THERAPEUTIC EXERCISES: CPT | Mod: GP

## 2023-05-15 ASSESSMENT — ACTIVITIES OF DAILY LIVING (ADL)
SQUAT: ACTIVITY IS MINIMALLY DIFFICULT
SIT WITH YOUR KNEE BENT: ACTIVITY IS NOT DIFFICULT
STIFFNESS: I DO NOT HAVE THE SYMPTOM
PAIN: I DO NOT HAVE THE SYMPTOM
HOW_WOULD_YOU_RATE_THE_CURRENT_FUNCTION_OF_YOUR_KNEE_DURING_YOUR_USUAL_DAILY_ACTIVITIES_ON_A_SCALE_FROM_0_TO_100_WITH_100_BEING_YOUR_LEVEL_OF_KNEE_FUNCTION_PRIOR_TO_YOUR_INJURY_AND_0_BEING_THE_INABILITY_TO_PERFORM_ANY_OF_YOUR_USUAL_DAILY_ACTIVITIES?: 100
SWELLING: I DO NOT HAVE THE SYMPTOM
GO DOWN STAIRS: ACTIVITY IS NOT DIFFICULT
RAW_SCORE: 68
STAND: ACTIVITY IS NOT DIFFICULT
HOW_WOULD_YOU_RATE_THE_OVERALL_FUNCTION_OF_YOUR_KNEE_DURING_YOUR_USUAL_DAILY_ACTIVITIES?: NORMAL
KNEE_ACTIVITY_OF_DAILY_LIVING_SCORE: 97.14
WEAKNESS: I DO NOT HAVE THE SYMPTOM
GIVING WAY, BUCKLING OR SHIFTING OF KNEE: I DO NOT HAVE THE SYMPTOM
KNEEL ON THE FRONT OF YOUR KNEE: ACTIVITY IS NOT DIFFICULT
KNEE_ACTIVITY_OF_DAILY_LIVING_SUM: 68
AS_A_RESULT_OF_YOUR_KNEE_INJURY,_HOW_WOULD_YOU_RATE_YOUR_CURRENT_LEVEL_OF_DAILY_ACTIVITY?: NORMAL
GO UP STAIRS: ACTIVITY IS NOT DIFFICULT
LIMPING: I DO NOT HAVE THE SYMPTOM
RISE FROM A CHAIR: ACTIVITY IS MINIMALLY DIFFICULT
WALK: ACTIVITY IS NOT DIFFICULT

## 2023-05-15 NOTE — PROGRESS NOTES
"Discharge Note    Progress reporting period is from initial evaluation date (please see noted date below) to May 15, 2023.  Linked Episodes   Type: Episode: Status: Noted: Resolved: Last update: Updated by:   PHYSICAL THERAPY Right Knee 4/2023 Active 4/3/2023 5/15/23 5/15/2023  1:52 PM Elsa Greer PT      Comments:       Please see information below for last relevant information on current status.  Patient seen for 6 visits.    SUBJECTIVE  Subjective changes noted by patient:  She reports feeling good. Has been doing a lot more walking including outside, and no longer having difficulty with stairs. Has not had any knee pain in the last few weeks. Does HEP most days, not all.  .  Current pain level is  .     Previous pain level was  5/10.   Changes in function:  Yes (See Goal flowsheet attached for changes in current functional level)  Adverse reaction to treatment or activity: None    OBJECTIVE  Changes noted in objective findings: TUG 25.10 sec without AD - much improved from prior. Sit<>stand from 20\" without hands. Able to transfer independently to/from chair with appropriate use of hands.     KOS improved by 68.     ASSESSMENT/PLAN  Diagnosis: Right knee pain & impaired balance   Updated problem list and treatment plan:   Decreased function - HEP  Decreased strength - HEP  Impaired gait - HEP  STG/LTGs have been met or progress has been made towards goals:  Yes, please see goal flowsheet for most current information  Last current status: Pt is progressing well   Self Management Plans:  HEP  I have re-evaluated this patient and find that the nature, scope, duration and intensity of the therapy is appropriate for the medical condition of the patient.  Shahana continues to require the following intervention to meet STG and LTG's:  HEP.    Recommendations:  Discharge with current home program.  Patient to follow up with MD as needed.    Please refer to the daily flowsheet for treatment today, total treatment " time and time spent performing 1:1 timed codes.

## 2023-06-13 ENCOUNTER — OFFICE VISIT (OUTPATIENT)
Dept: OPHTHALMOLOGY | Facility: CLINIC | Age: 83
End: 2023-06-13
Attending: OPHTHALMOLOGY
Payer: MEDICARE

## 2023-06-13 DIAGNOSIS — H26.492 PCO (POSTERIOR CAPSULAR OPACIFICATION), LEFT: ICD-10-CM

## 2023-06-13 DIAGNOSIS — Z96.1 PSEUDOPHAKIA, LEFT EYE: Primary | ICD-10-CM

## 2023-06-13 DIAGNOSIS — H35.031 BLIND HYPERTENSIVE EYE, RIGHT: ICD-10-CM

## 2023-06-13 PROCEDURE — 92014 COMPRE OPH EXAM EST PT 1/>: CPT | Performed by: OPHTHALMOLOGY

## 2023-06-13 PROCEDURE — G0463 HOSPITAL OUTPT CLINIC VISIT: HCPCS | Performed by: OPHTHALMOLOGY

## 2023-06-13 ASSESSMENT — EXTERNAL EXAM - LEFT EYE: OS_EXAM: NORMAL

## 2023-06-13 ASSESSMENT — VISUAL ACUITY
METHOD: SNELLEN - LINEAR
OS_CC: 20/25
METHOD_MR: DEFERS
OD_CC: HM
OS_CC+: -2
CORRECTION_TYPE: GLASSES

## 2023-06-13 ASSESSMENT — REFRACTION_WEARINGRX
OS_AXIS: 154
OS_CYLINDER: +0.25
OS_ADD: +2.75
OS_SPHERE: -0.50
OD_SPHERE: BALANCE

## 2023-06-13 ASSESSMENT — CONF VISUAL FIELD
OD_INFERIOR_TEMPORAL_RESTRICTION: 1
OD_SUPERIOR_NASAL_RESTRICTION: 1
OD_INFERIOR_NASAL_RESTRICTION: 1
OD_SUPERIOR_TEMPORAL_RESTRICTION: 1

## 2023-06-13 ASSESSMENT — EXTERNAL EXAM - RIGHT EYE: OD_EXAM: NORMAL

## 2023-06-13 ASSESSMENT — TONOMETRY
OD_IOP_MMHG: 07
OS_IOP_MMHG: 09
IOP_METHOD: TONOPEN

## 2023-06-13 ASSESSMENT — SLIT LAMP EXAM - LIDS
COMMENTS: MGD, BLEPHARITIS
COMMENTS: MGD, BLEPHARITIS

## 2023-06-13 ASSESSMENT — CUP TO DISC RATIO: OS_RATIO: 0.4

## 2023-06-13 NOTE — NURSING NOTE
Chief Complaints and History of Present Illnesses   Patient presents with     Pseudophakia Follow Up     Chief Complaint(s) and History of Present Illness(es)     Pseudophakia Follow Up            Laterality: left eye    Duration: 1 year          Comments    Pt. States that she is doing well. VA seems to be stable BE. No pain BE. Some dryness BE. No flashes or floaters BE.   Mary Salas COT 1:58 PM June 13, 2023

## 2023-06-13 NOTE — PROGRESS NOTES
Chief Complaint(s) and History of Present Illness(es)     Pseudophakia Follow Up            Laterality: left eye    Duration: 1 year          Comments    Pt. States that she is doing well. VA seems to be stable BE. No pain BE.   Some dryness BE. No flashes or floaters BE.   Mary Salas COT 1:58 PM June 13, 2023                Review of systems for the eyes was negative other than the pertinent positives/negatives listed in the HPI.      Assessment & Plan      Shahana Donaldson is a 83 year old female with the following diagnoses:   1. Pseudophakia, left eye    2. PCO (posterior capsular opacification), left    3. Blind hypertensive eye, right         Stable vision.  No pain right eye   Not bothered by posterior capsular opacity (PCO).  Monitor  Monocular precautions to continue  Happy with current glasses    Patient disposition:   Return in about 1 year (around 6/13/2024) for DFE.           Attending Physician Attestation:  Complete documentation of historical and exam elements from today's encounter can be found in the full encounter summary report (not reduplicated in this progress note).  I personally obtained the chief complaint(s) and history of present illness.  I confirmed and edited as necessary the review of systems, past medical/surgical history, family history, social history, and examination findings as documented by others; and I examined the patient myself.  I personally reviewed the relevant tests, images, and reports as documented above.  I formulated and edited as necessary the assessment and plan and discussed the findings and management plan with the patient and family. . - Josue Trujillo MD

## 2023-07-11 NOTE — PROGRESS NOTES
HPI:    Her  is present today. She has decreased hearing and would like an ENT/Audiology. She is only on Diltiazem currently for BP and could not tolerate Amlodipine. No other HEENT, cardiopulmonary, abdominal, , GYN, neurological, systemic, psychiatric, lymphatic, endocrine, vascular complaints.     Past Medical History:   Diagnosis Date     Diverticulosis of colon (without mention of hemorrhage)      Essential hypertension, benign      Gastro-oesophageal reflux disease     nissen     Hemorrhoid      Nonsenile cataract      Osteoporosis      Other and unspecified hyperlipidemia      Rectocele      Symptomatic menopausal or female climacteric states      Past Surgical History:   Procedure Laterality Date     ARTHROPLASTY HIP Left 10/23/2018    Procedure: LEFT HIP COLEMAN- ARTHROPLASTY ;  Surgeon: Araceli Gutierrez MD;  Location: [facility] OR     CATARACT IOL, RT/LT Left 07/15/2019     COLONOSCOPY  5/24/2011    Procedure:COLONOSCOPY; Surgeon:HALINA SCOTT; Location:[facility]     COLONOSCOPY Left 11/10/2015    Procedure: COLONOSCOPY;  Surgeon: Raheem Colunga MD;  Location: [facility] GI     COLONOSCOPY  5/24/11, 11/10/2015     DILATION AND CURETTAGE      secondary to menorrhagia     GYN SURGERY      hysterectomy/oopherectomy; done for prolapse     HEMIARTHROPLASTY HIP Left 10/23/2018     HYSTERECTOMY      hysterectomy/oopherectomy; done for prolapse     LAPAROSCOPIC NISSEN FUNDOPLICATION  1/7/2013    Procedure: LAPAROSCOPIC NISSEN FUNDOPLICATION;  Laparoscopic Repair of Hiatel Hernia, NISSEN, Esophagoscopy, Gastroscopy And Duodenoscopy ;  Surgeon: Leonidas Valle MD;  Location: [facility] OR     LAPAROSCOPIC NISSEN FUNDOPLICATION  01/07/2013     PHACOEMULSIFICATION WITH STANDARD INTRAOCULAR LENS IMPLANT Left 7/15/2019    Procedure: Left Eye Phacoemulsification with Intraocular Lens;  Surgeon: Josue Trujillo MD;  Location: [facility] OR     TUBAL LIGATION Bilateral      ZZC NONSPECIFIC PROCEDURE      Bilateral Tubal  "Ligation     [facility] NONSPECIFIC PROCEDURE      D&C secondary to menorrhagia     Z NONSPECIFIC PROCEDURE      child birth x 2     PE:    Vitals noted, gen, nad, cooperative, alert, neck supple nl rom, lungs with good air movement, RRR, S1, S2, no MRG, abdomen, no acute findings. Grossly normal neurological exam.     A/P:    1. Immunizations. COVID Pfizer vaccine x 6 (Bi-valent 5/16/2023 and 10/18/2022). Tdap 11/11/2016. Prevnar 13 on 5/18/2015. Pneumococcal 23 on 4/3/2018. She has completed the Shingrix vaccine series.   2. HTN; on only  Diltazem. She had to discontinue Amlodipine. Ordered 24 hour BP monitor   3. Increased lipids on Atorvastatin; labs 7/13/2022 ; TG's 89, HDL 95 and LDL 55  4. Seen in Dermatology Dr. Estrada 2/3/2023 and follow up 8/4/2023  5. Breast imaging/mammogram done 4/18/2023  6. Preventive Cardiology appt. With Dr. Wanda Francisco 7/9/2021  7. Colonoscopy 11/10/2015  8. DEXA scan done 4/11/2022; most negative T score -3.5 and she was seen Endocrinology, Dr. Ochoa 4/13/2022. Ca/Vit D and reclast. Repeat DEXA scan in 2 years. She has a follow up appt. 1/10/2024  9. Seen Dr. Trujillo, Ophthalmology 6/13/2023 for eye exam and follow up 6/20/2024  10. Placed ENT referral for decreased hearing.   11. Offered memory, neuropsychiatric testing, but she wants to defer this.   12. Seen 4/25/2023, Orthopedics, Dr. Harding for R knee OA. PT appt. 5/15/2023.           30 minutes spent on the date of the encounter doing chart review, history and exam, documentation and further activities as noted above \"exclusive of procedures and other billable interpretations  "

## 2023-07-12 ENCOUNTER — OFFICE VISIT (OUTPATIENT)
Dept: INTERNAL MEDICINE | Facility: CLINIC | Age: 83
End: 2023-07-12
Payer: MEDICARE

## 2023-07-12 VITALS
HEIGHT: 69 IN | WEIGHT: 143.8 LBS | OXYGEN SATURATION: 96 % | SYSTOLIC BLOOD PRESSURE: 173 MMHG | BODY MASS INDEX: 21.3 KG/M2 | DIASTOLIC BLOOD PRESSURE: 77 MMHG | HEART RATE: 71 BPM

## 2023-07-12 DIAGNOSIS — H91.90 DECREASED HEARING, UNSPECIFIED LATERALITY: ICD-10-CM

## 2023-07-12 DIAGNOSIS — I10 BENIGN ESSENTIAL HYPERTENSION: Primary | ICD-10-CM

## 2023-07-12 DIAGNOSIS — R03.0 ELEVATED BLOOD-PRESSURE READING, WITHOUT DIAGNOSIS OF HYPERTENSION: ICD-10-CM

## 2023-07-12 PROCEDURE — 99214 OFFICE O/P EST MOD 30 MIN: CPT | Performed by: INTERNAL MEDICINE

## 2023-07-12 NOTE — NURSING NOTE
"Shahana Donaldson is a 83 year old female patient that presents today in clinic for the following:    Chief Complaint   Patient presents with     Follow Up     Hearing Problem     Pt interested in getting a hearing aid, would like to see audiology     The patient's allergies and medications were reviewed as noted. A set of vitals were recorded as noted without incident: BP (!) 173/77 (BP Location: Right arm, Patient Position: Sitting, Cuff Size: Adult Regular)   Pulse 71   Ht 1.74 m (5' 8.5\")   Wt 65.2 kg (143 lb 12.8 oz)   SpO2 96%   BMI 21.54 kg/m  . The patient does not have any other questions for the provider.    Yosi Palomo, EMT 8:22 AM on 7/12/2023   "

## 2023-07-14 ENCOUNTER — HOSPITAL ENCOUNTER (OUTPATIENT)
Dept: CARDIOLOGY | Facility: CLINIC | Age: 83
Discharge: HOME | End: 2023-07-14
Attending: INTERNAL MEDICINE | Admitting: INTERNAL MEDICINE
Payer: MEDICARE

## 2023-07-14 DIAGNOSIS — R03.0 ELEVATED BLOOD-PRESSURE READING, WITHOUT DIAGNOSIS OF HYPERTENSION: ICD-10-CM

## 2023-07-14 DIAGNOSIS — H91.90 DECREASED HEARING, UNSPECIFIED LATERALITY: ICD-10-CM

## 2023-07-14 DIAGNOSIS — I10 BENIGN ESSENTIAL HYPERTENSION: ICD-10-CM

## 2023-07-14 PROCEDURE — 93786 AMBL BP MNTR W/SW REC ONLY: CPT

## 2023-07-14 PROCEDURE — 93790 AMBL BP MNTR W/SW I&R: CPT | Performed by: INTERNAL MEDICINE

## 2023-07-26 NOTE — TELEPHONE ENCOUNTER
FUTURE VISIT INFORMATION:      FUTURE VISIT INFORMATION:  Date: 10/31/23  Time: 9:30 AM  Location: [facility]  REFERRAL INFORMATION:  Referring provider: Jeramie Curran MD   Referring providers clinic: [facility]   Reason for visit/diagnosis:  Benign essential hypertension [I10]Decreased hearing, unspecified laterality [H91.90], Jeramie Curran MD in [facility]     RECORDS REQUESTED FROM:       Clinic name Comments Records Status Imaging Status   [facility]  7/12/23 note- Jeramie Curran MD  Epic

## 2023-08-04 ENCOUNTER — OFFICE VISIT (OUTPATIENT)
Dept: DERMATOLOGY | Facility: CLINIC | Age: 83
End: 2023-08-04
Payer: MEDICARE

## 2023-08-04 DIAGNOSIS — L57.0 ACTINIC KERATOSIS: Primary | ICD-10-CM

## 2023-08-04 DIAGNOSIS — L82.1 SEBORRHEIC KERATOSIS: ICD-10-CM

## 2023-08-04 DIAGNOSIS — L90.0 LICHEN SCLEROSUS: ICD-10-CM

## 2023-08-04 DIAGNOSIS — D22.9 MULTIPLE BENIGN NEVI: ICD-10-CM

## 2023-08-04 DIAGNOSIS — Z86.018 HISTORY OF DYSPLASTIC NEVUS: ICD-10-CM

## 2023-08-04 DIAGNOSIS — D18.01 CHERRY ANGIOMA: ICD-10-CM

## 2023-08-04 DIAGNOSIS — L57.8 ACTINIC SKIN DAMAGE: ICD-10-CM

## 2023-08-04 PROCEDURE — 99214 OFFICE O/P EST MOD 30 MIN: CPT | Performed by: DERMATOLOGY

## 2023-08-04 RX ORDER — CALCIPOTRIENE 50 UG/G
CREAM TOPICAL
Qty: 60 G | Refills: 1 | Status: SHIPPED | OUTPATIENT
Start: 2023-08-04 | End: 2023-08-04

## 2023-08-04 RX ORDER — FLUOROURACIL 50 MG/G
CREAM TOPICAL
Qty: 40 G | Refills: 0 | Status: SHIPPED | OUTPATIENT
Start: 2023-08-04 | End: 2023-08-04

## 2023-08-04 RX ORDER — CALCIPOTRIENE 50 UG/G
CREAM TOPICAL
Qty: 60 G | Refills: 1 | Status: SHIPPED | OUTPATIENT
Start: 2023-08-04

## 2023-08-04 RX ORDER — FLUOROURACIL 50 MG/G
CREAM TOPICAL
Qty: 40 G | Refills: 0 | Status: SHIPPED | OUTPATIENT
Start: 2023-08-04

## 2023-08-04 ASSESSMENT — PAIN SCALES - GENERAL: PAINLEVEL: NO PAIN (0)

## 2023-08-04 NOTE — PROGRESS NOTES
[facility] Dermatology Note  Encounter Date: Aug 4, 2023  Office Visit     Dermatology Problem List:  1. 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  2. NMSC:  - BCC, R dorsal wrist, s/p MMS 5/24/21  3. Lichen planus, not active.  - Prior rx: clobetasol ointment  4. Biopsy-proven lichen sclerosis of the vaginal area  - clobetasol 0.05% ointment BID prn  5. Contact dermatitis due to  on hands in 2014 (presumed, no prior patch testing).  6. Benign bx  - Lentigo, Right lateral foot, s/p shave bx 12/3/21  - SK, Right lower back, s/p shave bx 12/3/21  7. AKs, s/p cryotherapy  - Efudex + calcipotriene initiated 4/7/2023 to the nose  8. Mild DN, left proximal thigh, s/p shave bx 4/7/2023  ____________________________________________    Assessment & Plan:    # AKs  # Actinic skin damage  - Definitively improved, unclear if did field therapy, we will repeat to nose and cheeks  - Repeat Efudex + calcipotriene BID for 4-10 days to nose and cheeks     # Lichen sclerosus, genital. Chronic, stable.  -  Pain better on clobetasol daily, exam still good, try reducing to every other day, then 2-3x weekly for maintenance     # Cherry angiomas  # Seborrheic keratoses  - Reassured of benign nature     # Multiple benign nevi  # Solar lentigines  - No concerning lesions today  - Monitor for ABCDEs of melanoma   - Continue sun protection - recommend SPF 30 or higher with frequent application   - Return sooner if noticing changing or symptomatic lesions      # History of atypical junctional melanocytic proliferation. No evidence of recurrent disease.  - Continue photoprotection  - Continue yearly skin exams  - Advised to monitor for changing, non-healing, bleeding, painful, changing, or otherwise symptomatic lesions    Procedures Performed:   None    Follow-up: 3 months) in-person, recheck nose, or earlier for new or changing lesions    Staff:    Rosamaria Estrada,  MD    Department of Dermatology  [facility]: Phone: [phone], Fax: [phone]  8/4/2023      ____________________________________________    CC: Skin Check (The patient reports a lesion of concern on their left side lower back. The patient reports that the site is itchy. The patient would like a full body check. )    HPI:  Ms. Shahana Donaldson is a(n) 83 year old female who presents today as a return patient for FBSE.     Nose improved  Vaginal area no pain, using clobetasol about once daily      Patient is otherwise feeling well, without additional skin concerns.    Labs Reviewed:  N/A    Physical Exam:  Vitals: There were no vitals taken for this visit.  SKIN: Full skin, which includes the head/face, both arms, chest, back, abdomen,both legs, genitalia and/or groin buttocks, digits and/or nails, was examined.  - Agglutination of the labia. No erythema or erosions.  - scaling on nose, improved from prior.  - There are dome shaped bright red papules on the trunk and extremities.   - Multiple regular brown pigmented macules and papules are identified on the trunk and extremities.   - Scattered brown macules on sun exposed areas.  - There are waxy stuck on tan to brown papules on the trunk and extremities.   - No other lesions of concern on areas examined.     Medications:  Current Outpatient Medications   Medication    Acetaminophen (TYLENOL PO)    amLODIPine (NORVASC) 2.5 MG tablet    ammonium lactate (LAC-HYDRIN) 12 % external lotion    atorvastatin (LIPITOR) 10 MG tablet    augmented betamethasone dipropionate (DIPROLENE-AF) 0.05 % external ointment    calcipotriene (DOVONOX) 0.005 % external cream    calcium carbonate 500 mg, elemental, (OSCAL;OYSTER SHELL CALCIUM) 500 MG tablet    calcium citrate (CITRACAL) 950 (200 Ca) MG tablet    cholecalciferol (VITAMIN D3) 1000 UNIT tablet    clobetasol (TEMOVATE) 0.05 %  external ointment    clobetasol (TEMOVATE) 0.05 % external ointment    clobetasol (TEMOVATE) 0.05 % ointment    diclofenac (VOLTAREN) 1 % topical gel    diltiazem ER COATED BEADS (CARDIZEM CD/CARTIA XT) 300 MG 24 hr capsule    docusate sodium (COLACE) 100 MG capsule    fluorouracil (EFUDEX) 5 % external cream    loperamide (IMODIUM) 2 MG capsule    triamcinolone (KENALOG) 0.1 % external ointment     No current facility-administered medications for this visit.      Past Medical History:   Patient Active Problem List   Diagnosis    Essential hypertension, benign    Mixed hyperlipidemia    Symptomatic menopausal or female climacteric states    Diverticulosis of large intestine    Hyperlipidemia    Coronary atherosclerosis    Esophageal reflux    vulvar cyst    Hiatal hernia    S/P Nissen fundoplication (without gastrostomy tube) procedure    History of tubal ligation    History of dilatation and curettage    History of hysterectomy    Lichen sclerosus    Left displaced femoral neck fracture (H)    Senile osteoporosis    Lichen planus    Seborrheic dermatitis    Heart failure with preserved ejection fraction, NYHA class II (H)     Past Medical History:   Diagnosis Date    Diverticulosis of colon (without mention of hemorrhage)     Essential hypertension, benign     Gastro-oesophageal reflux disease     nissen    Hemorrhoid     Nonsenile cataract     Osteoporosis     Other and unspecified hyperlipidemia     Rectocele     Symptomatic menopausal or female climacteric states         CC Referred Self, MD  No address on file on close of this encounter.

## 2023-08-04 NOTE — LETTER
8/4/2023       RE: Shahana Donaldson  [address]  [facility] [address]     Dear Colleague,    Thank you for referring your patient, Shahana Donaldson, to the [facility] [place] at [facility]. Please see a copy of my visit note below.    [facility] Dermatology Note  Encounter Date: Aug 4, 2023  Office Visit     Dermatology Problem List:  1. 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  2. NMSC:  - BCC, R dorsal wrist, s/p MMS 5/24/21  3. Lichen planus, not active.  - Prior rx: clobetasol ointment  4. Biopsy-proven lichen sclerosis of the vaginal area  - clobetasol 0.05% ointment BID prn  5. Contact dermatitis due to  on hands in 2014 (presumed, no prior patch testing).  6. Benign bx  - Lentigo, Right lateral foot, s/p shave bx 12/3/21  - SK, Right lower back, s/p shave bx 12/3/21  7. AKs, s/p cryotherapy  - Efudex + calcipotriene initiated 4/7/2023 to the nose  8. Mild DN, left proximal thigh, s/p shave bx 4/7/2023  ____________________________________________    Assessment & Plan:    # AKs  # Actinic skin damage  - Definitively improved, unclear if did field therapy, we will repeat to nose and cheeks  - Repeat Efudex + calcipotriene BID for 4-10 days to nose and cheeks     # Lichen sclerosus, genital. Chronic, stable.  -  Pain better on clobetasol daily, exam still good, try reducing to every other day, then 2-3x weekly for maintenance     # Cherry angiomas  # Seborrheic keratoses  - Reassured of benign nature     # Multiple benign nevi  # Solar lentigines  - No concerning lesions today  - Monitor for ABCDEs of melanoma   - Continue sun protection - recommend SPF 30 or higher with frequent application   - Return sooner if noticing changing or symptomatic lesions      # History of atypical junctional melanocytic proliferation. No evidence of  recurrent disease.  - Continue photoprotection  - Continue yearly skin exams  - Advised to monitor for changing, non-healing, bleeding, painful, changing, or otherwise symptomatic lesions    Procedures Performed:   None    Follow-up: 3 months) in-person, recheck nose, or earlier for new or changing lesions    Staff:    Rosamaria Estrada MD    Department of Dermatology  [facility]: Phone: [phone], Fax: [phone]  8/4/2023      ____________________________________________    CC: Skin Check (The patient reports a lesion of concern on their left side lower back. The patient reports that the site is itchy. The patient would like a full body check. )    HPI:  Ms. Shahana Donaldson is a(n) 83 year old female who presents today as a return patient for FBSE.     Nose improved  Vaginal area no pain, using clobetasol about once daily      Patient is otherwise feeling well, without additional skin concerns.    Labs Reviewed:  N/A    Physical Exam:  Vitals: There were no vitals taken for this visit.  SKIN: Full skin, which includes the head/face, both arms, chest, back, abdomen,both legs, genitalia and/or groin buttocks, digits and/or nails, was examined.  - Agglutination of the labia. No erythema or erosions.  - scaling on nose, improved from prior.  - There are dome shaped bright red papules on the trunk and extremities.   - Multiple regular brown pigmented macules and papules are identified on the trunk and extremities.   - Scattered brown macules on sun exposed areas.  - There are waxy stuck on tan to brown papules on the trunk and extremities.   - No other lesions of concern on areas examined.     Medications:  Current Outpatient Medications   Medication    Acetaminophen (TYLENOL PO)    amLODIPine (NORVASC) 2.5 MG tablet    ammonium lactate (LAC-HYDRIN) 12 % external lotion    atorvastatin (LIPITOR) 10 MG tablet     augmented betamethasone dipropionate (DIPROLENE-AF) 0.05 % external ointment    calcipotriene (DOVONOX) 0.005 % external cream    calcium carbonate 500 mg, elemental, (OSCAL;OYSTER SHELL CALCIUM) 500 MG tablet    calcium citrate (CITRACAL) 950 (200 Ca) MG tablet    cholecalciferol (VITAMIN D3) 1000 UNIT tablet    clobetasol (TEMOVATE) 0.05 % external ointment    clobetasol (TEMOVATE) 0.05 % external ointment    clobetasol (TEMOVATE) 0.05 % ointment    diclofenac (VOLTAREN) 1 % topical gel    diltiazem ER COATED BEADS (CARDIZEM CD/CARTIA XT) 300 MG 24 hr capsule    docusate sodium (COLACE) 100 MG capsule    fluorouracil (EFUDEX) 5 % external cream    loperamide (IMODIUM) 2 MG capsule    triamcinolone (KENALOG) 0.1 % external ointment     No current facility-administered medications for this visit.      Past Medical History:   Patient Active Problem List   Diagnosis    Essential hypertension, benign    Mixed hyperlipidemia    Symptomatic menopausal or female climacteric states    Diverticulosis of large intestine    Hyperlipidemia    Coronary atherosclerosis    Esophageal reflux    vulvar cyst    Hiatal hernia    S/P Nissen fundoplication (without gastrostomy tube) procedure    History of tubal ligation    History of dilatation and curettage    History of hysterectomy    Lichen sclerosus    Left displaced femoral neck fracture (H)    Senile osteoporosis    Lichen planus    Seborrheic dermatitis    Heart failure with preserved ejection fraction, NYHA class II (H)     Past Medical History:   Diagnosis Date    Diverticulosis of colon (without mention of hemorrhage)     Essential hypertension, benign     Gastro-oesophageal reflux disease     nissen    Hemorrhoid     Nonsenile cataract     Osteoporosis     Other and unspecified hyperlipidemia     Rectocele     Symptomatic menopausal or female climacteric states         CC Referred Self, MD  No address on file on close of this encounter.

## 2023-08-04 NOTE — PATIENT INSTRUCTIONS
For nose and cheeks, these are low-risk precancers called actinic keratoses. We will treat them with an anti-cancer cream.  Please start Efudex and calcipotriene mixed equally together. Apply twice daily to above areas for 4-10 days or until skin gets red. Stop applying when skin gets red.  Skin will get red and crusty (like hamburger).  Please keep Efudex away from pets and children. Wash hands thoroughly after application.   See handout below for more information about Efudex.    If medications are more than ~$75, please contact me for a compounded version through [email or web address].  Alternatively can also use Efudex alone. If you want to use Efudex alone, apply twice daily for 3-4 weeks. Stop when significant irritation occurs.        Efudex Treatment  Today, you are being prescribed Fluorouracil (Efudex) a topical cream used for the treatment of Actinic Keratosis (AKs).  The medication is working to eliminate the unhealthy cells.  This treatment may be unattractive and somewhat uncomfortable.  You may experience some mild discomfort while being treated.  You will want to stop any other creams such as glycolic acid products, retin A, Tazorac, etc. to the area. You may use bland makeup/cover-up as long as it doesn't sting or cause you discomfort.  Apply the cream at night as your physician recommends. Use a cotton-tipped applicator, or use gloves if applying it with your fingertips. If applied with unprotected fingertips, it is important to wash your hands well after you apply this medicine.   Keep this medication away from pets.  We recommend avoiding excessive sun exposure to the treated area  You may use moisturizing creams over bothersome areas such as Vanicream or Cetaphil cream if the reaction becomes too bothersome. Please, call the clinic if this occurs.   Potential Side Effects  Your treated areas may be unsightly during therapy.  This will improve slowly following the discontinuation of therapy.   During  the first week of application, mild inflammation may occur.   During the following weeks, redness, and swelling may occur with some crusting and burning.   Lesions resolve as the skin exfoliates.   Over 1 to 2 weeks, new skin grows into the treatment area.  Keep this medication away from pets  Specific side effects that usually do not require medical attention (report to your doctor or health care professional if they continue or are bothersome) include:  Red or dark-colored skin   Mild erosion (loss of upper layer of skin)   Mild eye irritation including burning, itching, sensitivity, stinging, or watering   Increased sensitivity of the skin to sun and ultraviolet light   Pain and burning of the affected area   Dryness, scaling or swelling of the affected area   Skin rash, itching of the affected area   Tenderness   If you have severe pain, please, call the clinic immediately and indicate that you have pain.  Ask for a call from the RN.     Who should I call with questions?  [facility]: [phone]  [facility]: [phone]  For urgent needs outside of business hours call the [facility] at [phone] and ask for the dermatology resident on call

## 2023-08-04 NOTE — NURSING NOTE
Dermatology Rooming Note    Shahana Donaldson's goals for this visit include:   Chief Complaint   Patient presents with    Skin Check     The patient reports a lesion of concern on their left side lower back. The patient reports that the site is itchy. The patient would like a full body check.      Elsa Apodaca LPN

## 2023-08-26 DIAGNOSIS — E78.2 MIXED HYPERLIPIDEMIA: ICD-10-CM

## 2023-08-29 DIAGNOSIS — E78.2 MIXED HYPERLIPIDEMIA: ICD-10-CM

## 2023-08-29 RX ORDER — ATORVASTATIN CALCIUM 10 MG/1
10 TABLET, FILM COATED ORAL DAILY
Qty: 90 TABLET | Refills: 0 | Status: SHIPPED | OUTPATIENT
Start: 2023-08-29 | End: 2023-11-17

## 2023-08-29 NOTE — TELEPHONE ENCOUNTER
atorvastatin (LIPITOR) 10 MG    Last Written Prescription Date:  9/9/22  Last Fill Quantity: 90,   # refills: 3  Last Office Visit : 7/12/23  Future Office visit:  1/10/23  Routing NOTE: 90 DAY RF SENT   OVER DUE  LDL

## 2023-08-31 RX ORDER — ATORVASTATIN CALCIUM 10 MG/1
10 TABLET, FILM COATED ORAL DAILY
Qty: 90 TABLET | Refills: 0 | OUTPATIENT
Start: 2023-08-31

## 2023-08-31 NOTE — TELEPHONE ENCOUNTER
atorvastatin (LIPITOR) 10 MG tablet 90 tablet 0 8/29/2023   Duplicate, sent to [id number]

## 2023-10-30 DIAGNOSIS — Z01.10 ENCOUNTER FOR HEARING TEST: Primary | ICD-10-CM

## 2023-10-31 ENCOUNTER — OFFICE VISIT (OUTPATIENT)
Dept: OTOLARYNGOLOGY | Facility: CLINIC | Age: 83
End: 2023-10-31
Attending: INTERNAL MEDICINE
Payer: MEDICARE

## 2023-10-31 ENCOUNTER — OFFICE VISIT (OUTPATIENT)
Dept: AUDIOLOGY | Facility: CLINIC | Age: 83
End: 2023-10-31
Payer: MEDICARE

## 2023-10-31 ENCOUNTER — PRE VISIT (OUTPATIENT)
Dept: OTOLARYNGOLOGY | Facility: CLINIC | Age: 83
End: 2023-10-31

## 2023-10-31 ENCOUNTER — LAB (OUTPATIENT)
Dept: LAB | Facility: CLINIC | Age: 83
End: 2023-10-31
Payer: MEDICARE

## 2023-10-31 VITALS
HEART RATE: 75 BPM | OXYGEN SATURATION: 95 % | DIASTOLIC BLOOD PRESSURE: 93 MMHG | HEIGHT: 68 IN | BODY MASS INDEX: 21.37 KG/M2 | WEIGHT: 141 LBS | TEMPERATURE: 98.2 F | SYSTOLIC BLOOD PRESSURE: 173 MMHG

## 2023-10-31 DIAGNOSIS — H91.90 DECREASED HEARING, UNSPECIFIED LATERALITY: ICD-10-CM

## 2023-10-31 DIAGNOSIS — H90.3 SENSORINEURAL HEARING LOSS (SNHL) OF BOTH EARS: Primary | ICD-10-CM

## 2023-10-31 DIAGNOSIS — H90.3 BILATERAL SENSORINEURAL HEARING LOSS: Primary | ICD-10-CM

## 2023-10-31 DIAGNOSIS — I10 BENIGN ESSENTIAL HYPERTENSION: ICD-10-CM

## 2023-10-31 DIAGNOSIS — H93.13 TINNITUS, BILATERAL: ICD-10-CM

## 2023-10-31 DIAGNOSIS — H61.23 EXCESSIVE CERUMEN IN BOTH EAR CANALS: ICD-10-CM

## 2023-10-31 DIAGNOSIS — E78.2 MIXED HYPERLIPIDEMIA: ICD-10-CM

## 2023-10-31 LAB
ALBUMIN SERPL BCG-MCNC: 4.2 G/DL (ref 3.5–5.2)
ALP SERPL-CCNC: 98 U/L (ref 35–104)
ALT SERPL W P-5'-P-CCNC: 10 U/L (ref 0–50)
ANION GAP SERPL CALCULATED.3IONS-SCNC: 8 MMOL/L (ref 7–15)
AST SERPL W P-5'-P-CCNC: 25 U/L (ref 0–45)
BASOPHILS # BLD AUTO: 0 10E3/UL (ref 0–0.2)
BASOPHILS NFR BLD AUTO: 0 %
BILIRUB SERPL-MCNC: 0.6 MG/DL
BUN SERPL-MCNC: 23.9 MG/DL (ref 8–23)
CALCIUM SERPL-MCNC: 9 MG/DL (ref 8.8–10.2)
CHLORIDE SERPL-SCNC: 108 MMOL/L (ref 98–107)
CHOLEST SERPL-MCNC: 167 MG/DL
CREAT SERPL-MCNC: 0.71 MG/DL (ref 0.51–0.95)
DEPRECATED HCO3 PLAS-SCNC: 25 MMOL/L (ref 22–29)
EGFRCR SERPLBLD CKD-EPI 2021: 84 ML/MIN/1.73M2
EOSINOPHIL # BLD AUTO: 0.1 10E3/UL (ref 0–0.7)
EOSINOPHIL NFR BLD AUTO: 1 %
ERYTHROCYTE [DISTWIDTH] IN BLOOD BY AUTOMATED COUNT: 12.8 % (ref 10–15)
GLUCOSE SERPL-MCNC: 94 MG/DL (ref 70–99)
HCT VFR BLD AUTO: 41.2 % (ref 35–47)
HDLC SERPL-MCNC: 99 MG/DL
HGB BLD-MCNC: 13.5 G/DL (ref 11.7–15.7)
IMM GRANULOCYTES # BLD: 0 10E3/UL
IMM GRANULOCYTES NFR BLD: 0 %
LDLC SERPL CALC-MCNC: 53 MG/DL
LYMPHOCYTES # BLD AUTO: 2.1 10E3/UL (ref 0.8–5.3)
LYMPHOCYTES NFR BLD AUTO: 33 %
MCH RBC QN AUTO: 31.1 PG (ref 26.5–33)
MCHC RBC AUTO-ENTMCNC: 32.8 G/DL (ref 31.5–36.5)
MCV RBC AUTO: 95 FL (ref 78–100)
MONOCYTES # BLD AUTO: 0.6 10E3/UL (ref 0–1.3)
MONOCYTES NFR BLD AUTO: 9 %
NEUTROPHILS # BLD AUTO: 3.7 10E3/UL (ref 1.6–8.3)
NEUTROPHILS NFR BLD AUTO: 57 %
NONHDLC SERPL-MCNC: 68 MG/DL
NRBC # BLD AUTO: 0 10E3/UL
NRBC BLD AUTO-RTO: 0 /100
PLATELET # BLD AUTO: 238 10E3/UL (ref 150–450)
POTASSIUM SERPL-SCNC: 3.8 MMOL/L (ref 3.4–5.3)
PROT SERPL-MCNC: 7.4 G/DL (ref 6.4–8.3)
RBC # BLD AUTO: 4.34 10E6/UL (ref 3.8–5.2)
SODIUM SERPL-SCNC: 141 MMOL/L (ref 135–145)
TRIGL SERPL-MCNC: 77 MG/DL
WBC # BLD AUTO: 6.5 10E3/UL (ref 4–11)

## 2023-10-31 PROCEDURE — 92557 COMPREHENSIVE HEARING TEST: CPT | Performed by: AUDIOLOGIST-HEARING AID FITTER

## 2023-10-31 PROCEDURE — 85025 COMPLETE CBC W/AUTO DIFF WBC: CPT | Performed by: PATHOLOGY

## 2023-10-31 PROCEDURE — 80053 COMPREHEN METABOLIC PANEL: CPT | Performed by: PATHOLOGY

## 2023-10-31 PROCEDURE — 80061 LIPID PANEL: CPT | Performed by: PATHOLOGY

## 2023-10-31 PROCEDURE — 99204 OFFICE O/P NEW MOD 45 MIN: CPT | Mod: 25 | Performed by: OTOLARYNGOLOGY

## 2023-10-31 PROCEDURE — 92550 TYMPANOMETRY & REFLEX THRESH: CPT | Performed by: AUDIOLOGIST-HEARING AID FITTER

## 2023-10-31 PROCEDURE — 36415 COLL VENOUS BLD VENIPUNCTURE: CPT | Performed by: PATHOLOGY

## 2023-10-31 PROCEDURE — 92504 EAR MICROSCOPY EXAMINATION: CPT | Performed by: OTOLARYNGOLOGY

## 2023-10-31 ASSESSMENT — PAIN SCALES - GENERAL: PAINLEVEL: NO PAIN (0)

## 2023-10-31 NOTE — PROGRESS NOTES
AUDIOLOGY REPORT    SUMMARY: Audiology visit completed. See audiogram for results.      RECOMMENDATIONS: Follow-up with ENT.  Hearing aid consult if medical clearance from ENT.     My Lopez, CCC-A, [facility]  Licensed Audiologist  [place] #[id number]

## 2023-10-31 NOTE — NURSING NOTE
"Chief Complaint   Patient presents with    Consult     Hearing loss      Blood pressure (!) 173/93, pulse 75, temperature 98.2  F (36.8  C), height 1.727 m (5' 8\"), weight 64 kg (141 lb), SpO2 95%, not currently breastfeeding.  John Jay LPN    "

## 2023-10-31 NOTE — PATIENT INSTRUCTIONS
1. You were seen in the ENT Clinic today by Dr. Nissen.  If you have any questions or concerns after your appointment, please call   - Option 1: ENT Clinic: [phone]   - Option 2: Priscila (Dr.Nissen's Nurse): [phone]       Shana (Dr. Nissen's Nurse): [phone]    2.   Plan to return to clinic as needed    How to Contact Us:  Send a [employer] message to your provider. Our team will respond to you via [employer]. Occasionally, we will need to call you to get further information.  For urgent matters (Monday-Friday), call the ENT Clinic: [phone] and speak with a call center team member - they will route your call appropriately.   If you'd like to speak directly with a nurse, please find our contact information below. We do our best to check voicemail frequently throughout the day, and will work to call you back within 1-2 days. For urgent matters, please use the general clinic phone numbers listed above.      Priscila KANG LPN  [facility] - Otolaryngology

## 2023-10-31 NOTE — PROGRESS NOTES
Dear Jeramie Martinez:    I had the pleasure of meeting Shahana Donaldson in consultation today at the [facility] at your request.    CHIEF COMPLAINT: Hearing loss    HISTORY OF PRESENT ILLNESS: Patient is an 83-year-old in today for consultation on her ears and hearing referred from her family physician.  She is accompanied today with her .  They report she has noticed hearing issues for several years.  She feels are equal and symmetrical as far as hearing.  She does have bilateral tinnitus occasionally, not problematic.  She denies any dizziness or balance concerns.  Further denies any dysphagia, hoarseness, facial paresthesias.  She has 2 siblings, her brother had hearing issues and hearing aids.  She has not been around any significant noise.    ALLERGIES:    Allergies   Allergen Reactions    Dilaudid [Hydromorphone]      dizziness    Senna Hives       HABITS: Social History    Substance and Sexual Activity      Alcohol use: No     History   Smoking Status    Never   Smokeless Tobacco    Never         PAST MEDICAL HISTORY: Please see today's intake form (for the remainder of the PMH) which I reviewed and signed.  Past Medical History:   Diagnosis Date    Diverticulosis of colon (without mention of hemorrhage)     Essential hypertension, benign     Gastro-oesophageal reflux disease     nissen    Hemorrhoid     Nonsenile cataract     Osteoporosis     Other and unspecified hyperlipidemia     Rectocele     Symptomatic menopausal or female climacteric states        FAMILY HISTORY/SOCIAL HISTORY:   Family History   Problem Relation Age of Onset    Hypertension Father     C.A.D. Father     Hypertension Mother     Breast Cancer Sister     Osteoporosis Sister     EYE* Brother         Keratoconus    Glaucoma No family hx of     Macular Degeneration No family hx of     Coronary Artery Disease Father     Breast Cancer Sister     Keratoconus Brother       Social History      Socioeconomic History    Marital status:      Spouse name: Not on file    Number of children: Not on file    Years of education: Not on file    Highest education level: Not on file   Occupational History     Employer: [employer]   Tobacco Use    Smoking status: Never    Smokeless tobacco: Never   Substance and Sexual Activity    Alcohol use: No    Drug use: No    Sexual activity: Yes     Partners: Male     Birth control/protection: Post-menopausal   Other Topics Concern    Parent/sibling w/ CABG, MI or angioplasty before 65F 55M? Not Asked   Social History Narrative    The patient has a 0 pk yr tobacco hx.  She has no active use.  Alcohol use is 0 alcoholic drinks per week.  She denies use of recreational drugs.          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     Social Determinants of Health     Financial Resource Strain: Not on file   Food Insecurity: Not on file   Transportation Needs: Not on file   Physical Activity: Not on file   Stress: Not on file   Social Connections: Not on file   Interpersonal Safety: Not on file   Housing Stability: Not on file       REVIEW OF SYSTEMS: Patient Supplied Answers to Review of Systems      10/31/2023     7:38 AM    ENT ROS   Ears, Nose, Throat Hearing loss    Ringing/noise in ears            The remainder of the 10 point ROS is negative    PHYSICIAL EXAMINATION:  Constitutional: The patient was well-groomed and in no acute distress.   Skin: Warm and pink.  Psychiatric: The patient's affect was calm, cooperative, and appropriate.   Respiratory: Breathing comfortably without stridor or exertion of accessory muscles.  Eyes: Pupils were equal and reactive. Extraocular movement intact.   Head: Normocephalic and atraumatic. No lesions or scars.  Ears: Patient placed under the microscope for microscopic evaluation and cleaning of cerumen which was obscuring full visualization and complete assessment of both TMs. Under high power  magnification, the right ear was examined and cleaned of cerumen using instruments.  After cleaning, TM is fully visualized and has normal position with normal middle ear aeration. The left ear was then cleaned and inspected using microscope, instruments and similar techniques. After cleaning of cerumen, the TM has normal position with normal aeration to middle ear.  Nose: Sinuses were nontender. Anterior rhinoscopy revealed midline septum and absence of purulence or polyps.  Oral Cavity: Normal tongue, floor of mouth, buccal mucosa, and palate. No lesions or masses on inspection or palpation. No abnormal lymph tissue in the oropharynx.   Neck: The parotid is soft without masses. Supple with normal laryngeal and tracheal landmarks.   Lymphatic: There is no palpable lymphadenopathy or other masses in the neck.   Neurologic: Alert and oriented x 3. Cranial nerves III-XI within normal limits. Voice quality normal.  Cerebellar Function Tests:  Grossly normal    Audiogram: Audiogram performed shows a moderate severe to severe downsloping sensorineural hearing loss.  64% discrimination in each ear.      IMPRESSION AND PLAN:   Bilateral sensorineural hearing loss: Discussed with him option for hearing aids.  She is not a surgical candidate.  Discussed trial period and strongly recommend she at least try hearing aids.  They will pursue that at their discretion place a location.  Bilateral tinnitus: Secondary sensory hearing loss, not problematic, no treatment needed, monitor.  Excessive cerumen: Cleaned today with instruments microscope as above.  No further treatment needed, monitor.    Thank you very much for the opportunity to participate in the care of your patient.    Rick L Nissen MD      Hearing loss

## 2023-10-31 NOTE — LETTER
10/31/2023       RE: Shahana Donaldson  [address]  [facility] [address]     Dear Colleague,    Thank you for referring your patient, Shahana Donaldson, to the [facility] EAR NOSE AND THROAT CLINIC [place] at [facility]. Please see a copy of my visit note below.    Dear Jeramie Martinez:    I had the pleasure of meeting Shahana Donaldson in consultation today at the [facility] Ot[facility] at your request.    CHIEF COMPLAINT: Hearing loss    HISTORY OF PRESENT ILLNESS: Patient is an 83-year-old in today for consultation on her ears and hearing referred from her family physician.  She is accompanied today with her .  They report she has noticed hearing issues for several years.  She feels are equal and symmetrical as far as hearing.  She does have bilateral tinnitus occasionally, not problematic.  She denies any dizziness or balance concerns.  Further denies any dysphagia, hoarseness, facial paresthesias.  She has 2 siblings, her brother had hearing issues and hearing aids.  She has not been around any significant noise.    ALLERGIES:    Allergies   Allergen Reactions    Dilaudid [Hydromorphone]      dizziness    Senna Hives       HABITS: Social History    Substance and Sexual Activity      Alcohol use: No     History   Smoking Status    Never   Smokeless Tobacco    Never         PAST MEDICAL HISTORY: Please see today's intake form (for the remainder of the PMH) which I reviewed and signed.  Past Medical History:   Diagnosis Date    Diverticulosis of colon (without mention of hemorrhage)     Essential hypertension, benign     Gastro-oesophageal reflux disease     nissen    Hemorrhoid     Nonsenile cataract     Osteoporosis     Other and unspecified hyperlipidemia     Rectocele     Symptomatic menopausal or female climacteric states        FAMILY HISTORY/SOCIAL HISTORY:   Family History   Problem Relation  Age of Onset    Hypertension Father     C.A.D. Father     Hypertension Mother     Breast Cancer Sister     Osteoporosis Sister     EYE* Brother         Keratoconus    Glaucoma No family hx of     Macular Degeneration No family hx of     Coronary Artery Disease Father     Breast Cancer Sister     Keratoconus Brother       Social History     Socioeconomic History    Marital status:      Spouse name: Not on file    Number of children: Not on file    Years of education: Not on file    Highest education level: Not on file   Occupational History     Employer: [employer]   Tobacco Use    Smoking status: Never    Smokeless tobacco: Never   Substance and Sexual Activity    Alcohol use: No    Drug use: No    Sexual activity: Yes     Partners: Male     Birth control/protection: Post-menopausal   Other Topics Concern    Parent/sibling w/ CABG, MI or angioplasty before 65F 55M? Not Asked   Social History Narrative    The patient has a 0 pk yr tobacco hx.  She has no active use.  Alcohol use is 0 alcoholic drinks per week.  She denies use of recreational drugs.          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     Social Determinants of Health     Financial Resource Strain: Not on file   Food Insecurity: Not on file   Transportation Needs: Not on file   Physical Activity: Not on file   Stress: Not on file   Social Connections: Not on file   Interpersonal Safety: Not on file   Housing Stability: Not on file       REVIEW OF SYSTEMS: Patient Supplied Answers to Review of Systems      10/31/2023     7:38 AM   [facility] ROS   Ears, Nose, Throat Hearing loss    Ringing/noise in ears            The remainder of the 10 point ROS is negative    PHYSICIAL EXAMINATION:  Constitutional: The patient was well-groomed and in no acute distress.   Skin: Warm and pink.  Psychiatric: The patient's affect was calm, cooperative, and appropriate.   Respiratory: Breathing comfortably without stridor or exertion  of accessory muscles.  Eyes: Pupils were equal and reactive. Extraocular movement intact.   Head: Normocephalic and atraumatic. No lesions or scars.  Ears: Patient placed under the microscope for microscopic evaluation and cleaning of cerumen which was obscuring full visualization and complete assessment of both TMs. Under high power magnification, the right ear was examined and cleaned of cerumen using instruments.  After cleaning, TM is fully visualized and has normal position with normal middle ear aeration. The left ear was then cleaned and inspected using microscope, instruments and similar techniques. After cleaning of cerumen, the TM has normal position with normal aeration to middle ear.  Nose: Sinuses were nontender. Anterior rhinoscopy revealed midline septum and absence of purulence or polyps.  Oral Cavity: Normal tongue, floor of mouth, buccal mucosa, and palate. No lesions or masses on inspection or palpation. No abnormal lymph tissue in the oropharynx.   Neck: The parotid is soft without masses. Supple with normal laryngeal and tracheal landmarks.   Lymphatic: There is no palpable lymphadenopathy or other masses in the neck.   Neurologic: Alert and oriented x 3. Cranial nerves III-XI within normal limits. Voice quality normal.  Cerebellar Function Tests:  Grossly normal    Audiogram: Audiogram performed shows a moderate severe to severe downsloping sensorineural hearing loss.  64% discrimination in each ear.      IMPRESSION AND PLAN:   Bilateral sensorineural hearing loss: Discussed with him option for hearing aids.  She is not a surgical candidate.  Discussed trial period and strongly recommend she at least try hearing aids.  They will pursue that at their discretion place a location.  Bilateral tinnitus: Secondary sensory hearing loss, not problematic, no treatment needed, monitor.  Excessive cerumen: Cleaned today with instruments microscope as above.  No further treatment needed, monitor.    Thank  you very much for the opportunity to participate in the care of your patient.    Rick L Nissen MD      Hearing loss    Again, thank you for allowing me to participate in the care of your patient.      Sincerely,    Rick L. Nissen, MD

## 2023-11-06 ENCOUNTER — MYC MEDICAL ADVICE (OUTPATIENT)
Dept: INTERNAL MEDICINE | Facility: CLINIC | Age: 83
End: 2023-11-06
Payer: MEDICARE

## 2023-11-06 DIAGNOSIS — R41.3 MEMORY CHANGES: Primary | ICD-10-CM

## 2023-11-07 ENCOUNTER — OFFICE VISIT (OUTPATIENT)
Dept: NEUROLOGY | Facility: CLINIC | Age: 83
End: 2023-11-07
Attending: INTERNAL MEDICINE
Payer: MEDICARE

## 2023-11-07 VITALS
DIASTOLIC BLOOD PRESSURE: 75 MMHG | SYSTOLIC BLOOD PRESSURE: 143 MMHG | OXYGEN SATURATION: 95 % | BODY MASS INDEX: 21.37 KG/M2 | WEIGHT: 141 LBS | HEART RATE: 71 BPM | HEIGHT: 68 IN

## 2023-11-07 DIAGNOSIS — R41.89 COGNITIVE CHANGES: ICD-10-CM

## 2023-11-07 DIAGNOSIS — F03.90 MAJOR NEUROCOGNITIVE DISORDER (H): Primary | ICD-10-CM

## 2023-11-07 PROCEDURE — 99204 OFFICE O/P NEW MOD 45 MIN: CPT | Mod: GC

## 2023-11-07 RX ORDER — DONEPEZIL HYDROCHLORIDE 5 MG/1
5 TABLET, FILM COATED ORAL AT BEDTIME
Qty: 90 TABLET | Refills: 3 | Status: SHIPPED | OUTPATIENT
Start: 2023-11-07 | End: 2024-05-22

## 2023-11-07 NOTE — NURSING NOTE
"Shahana Donaldson is a 83 year old female who presents for:  Chief Complaint   Patient presents with    Memory Loss     Memory change        Initial Vitals:  BP (!) 143/75   Pulse 71   Ht 1.727 m (5' 8\")   Wt 64 kg (141 lb)   SpO2 95%   BMI 21.44 kg/m   Estimated body mass index is 21.44 kg/m  as calculated from the following:    Height as of this encounter: 1.727 m (5' 8\").    Weight as of this encounter: 64 kg (141 lb).. Body surface area is 1.75 meters squared. BP completed using cuff size: small regular    Elaina Rathai   "

## 2023-11-07 NOTE — LETTER
11/7/2023         RE: Shahana Donaldson  [address]  [facility] [address]        Dear Colleague,    Thank you for referring your patient, Shahana Donaldson, to the [facility] NEUROLOGY CLINICS [facility]. Please see a copy of my visit note below.    [facility] Neurology New Patient Visit    Shahana Donaldson MRN# [id number]   Age: 83 year old YOB: 1940     Requesting physician: Jeramie Childers     Reason for Consultation:     Please note the following note has been dictated, in whole or in part, and may contain minor word substitution or transposition errors.  Please interpret with this in mind.     Assessment and Plan:   Assessment:  #Major Neurocognitive Disorder (dementia), suspected Alzheimer's disease (work-up pending)  Kamini is a delightful 83-year-old female presenting with insidious decline in memory and cognition over a period of at least 5 years, now with marked functional impairment and a MoCA in office 8/30.  Her presentation is consistent with major neurocognitive disorder (dementia).  Regarding etiology, I have a high degree of suspicion for neurodegenerative processes, most likely sporadic Alzheimer disease given  Jake's description of exclusively amnestic for many years, absence of clinical features for pointing towards other neurodegenerative conditions.      However, despite high degree of clinical concern we cannot rule out secondary causes of dementia, and we will pursue completion of usual laboratory work-up as below (deferring repeat CBC, CMP as they were recently normal).  In the meantime, we will plan to start Aricept as below and follow-up in a few months to discuss results/tolerance of medication/next steps.    Finally, given advanced dementia, she is not a candidate for aducanumab (which Feng and Kamini would not pursue anyway given risk/benefit, infusion requirements and cost).      Plan:  -Donepezil 5 mg once per day  *After  "1 month, increase to 5 mg 2x/day, in the morning and at night  *Discussed common side effects including diarrhea, nausea, incontinence, mood changes; patient instructed to reach out should they experience these or any other concerning side-effects   -MRI brain w/o contrast  -B12, TSH, B1  -Patient encouraged to engage in regular exercise (~30 min, 5x / week), eat a healthy diet (mediterranean, DASH, or MIND diet could be considered) and to pursue optimization of general health w/ PCP (optimial BP control, glycemic control, HLD management, etc)  -Further encouraged to keep the mind maximally engaged: Maintain and strengthen social ties as able, cross word puzzles, board games, cards, safe exposure to new and/or stimulating environments as able (e.g. a walk in the woods, attending a concert or a play).     >Kamini loves puzzles, which we encouraged!    Follow up in Neurology clinic in 3 months after testing is obtained, or earlier should new concerns arise.    Patient seen and discussed with my supervising physician, Dr. Ofelia Masters MD, MS  PGY-2, Neurology     History of Presenting Symptoms:   Shahana Donaldson is a 83 year old female who presents today for evaluation of memory concerns on referral from her longstanding PCP Dr. Curran.    On review of her referring providers notes, Ms Donaldson has been endorsing unspecified memory concerns since at least 2022.  Her  Jake send a chart message 11/7/23 that \"Kamini's memory problems (dementia) has become a lot worse in the last two months.  What should we do about it?\" Neurology referral was placed, and patient was able to be scheduled as an add on today.  Patient had previously been offered memory and neuropsychiatric testing, but wanted to defer.      Otherwise, review of the record is notable for h/o GERD and hiatial hernia s/p Nissen fundoplication, CAD, Seborrheic dermatitis, HLD, HTN.  Review of available labs show borderline-low B12 in 2018 " "at 244 w/o elevated MMA.  TSH 2.5 in 2018.  She has recently been pursuing hearing aids, with a fitting scheduled for December.  No recent intracranial imaging.    On discussion with Kamini and her  Jake at bedside, they report ~5 years of insidiously progressive difficulty with memory and cognition.  Initially, her  Feng noted that Kamini would sometimes lose the thread of the conversation, require repetition.  Over the years, this progressed to include much more prominent memory deficits to the point where Kamini is now leaving herself notes around the house about whether she has done the dishes, brushed her teeth, etc.  She remians independent in basic self-cares (with the exception of medications, which her  helps with), but is otherwise dependent on her  for support.  No sleep issues in association with memory change, although over the past few weeks she has been getting up in the middle of the night, turning on all the lights, and doing tasks (which would usually be done in the day time) around the house.  She no longer drives, after getting lost around a year ago while driving home from [place] (which she has done hundreds of times).  She is now frequently confused and disoriented to time and situation -- today, she is convinced she is here for an audiology appointment.  No dream enactment behavior, no hallucinations, no behavioral issues, no new obsessions, strange hobbies, no motor or sensory complaints.     Physical Exam:   Vitals: BP (!) 143/75   Pulse 71   Ht 1.727 m (5' 8\")   Wt 64 kg (141 lb)   SpO2 95%   BMI 21.44 kg/m     General: Seated comfortably in no acute distress.  Well groomed, pleasant, engaged.  HEENT: Neck with normal range of motion, no visible abnormality.  Oropharynx pink and moist with no visible lesion.    CV: Extremities warm and well perfused.  Pulmonary: Breathing comfortably on room air, speaking in full sentences, no cough, no accessory muscle " "use.  Abdominal: Non-distended.   Integumentary: Skin intact, warm, dry.  No rashes appreciated on exposed skin.  Neurologic:  Mental Status: Spontaneously alert, attentive. Disoriented to time (initially said \"well it's not 1920\"), situation (thinks she is at the audiologist's office, despite repeated correction..  MOCA 8/30.  Intermittent paraphasic errors and rare word-finding difficulty apparent to conversation.  Paucity of spontaneous speech.  Frequently looks at  for answers to questions she would be expected to know.  Cranial Nerves: Pupils directly and consensually reactive bilaterally, negative swinging flashlight test. No anisocoria. Extraocular movements intact with mild saccadic intrusion, no nystagmus.  Facial movements symmetric. Chronic hearing loss noted.  Tongue movements and soft palate elevation intact, without fasciculations or deviation.  No dysarthria. Shoulder shrug strong.  Motor:    LE: Strength 5/5 and equal bilaterally in hip flexion, extension, and ab/adduction.   UE: Strength 5/5 and equal bilaterally in arm flexion/extension, and wrist palmar/dorsiflexion.    Reflexes: 2+ at achillies, patella, biceps, brachioradialis.   Sensory: Negative Romberg. Intact to light touch, pinprick, vibration in b/l upper and lower extremities.   Movements: No tremors or other abnormal movements observed.  Finger-nose-finger and heel-to-shin somewhat uncoordinated but without gamal dysmetria.  Mild decrement on finger tapping, and some mild disdiadochokinesia R >L.  Gait: Somewhat broad based, mildly stooped, but intact arm swing, turn, no festination or freezing.  Negative pull test.        Data: Pertinent prior to visit   Imaging:  NA    Procedures:  NA    Laboratory:  -borderline-low B12 in 2018 at 244 w/o elevated MMA.  TSH 2.5 in 2018.     Past Medical History:     Patient Active Problem List   Diagnosis     Essential hypertension, benign     Mixed hyperlipidemia     Symptomatic menopausal or " female climacteric states     Diverticulosis of large intestine     Hyperlipidemia     Coronary atherosclerosis     Esophageal reflux     vulvar cyst     Hiatal hernia     S/P Nissen fundoplication (without gastrostomy tube) procedure     History of tubal ligation     History of dilatation and curettage     History of hysterectomy     Lichen sclerosus     Left displaced femoral neck fracture (H)     Senile osteoporosis     Lichen planus     Seborrheic dermatitis     Heart failure with preserved ejection fraction, NYHA class II (H)     Past Medical History:   Diagnosis Date     Diverticulosis of colon (without mention of hemorrhage)      Essential hypertension, benign      Gastro-oesophageal reflux disease     nissen     Hemorrhoid      Nonsenile cataract      Osteoporosis      Other and unspecified hyperlipidemia      Rectocele      Symptomatic menopausal or female climacteric states         Past Surgical History:     Past Surgical History:   Procedure Laterality Date     ARTHROPLASTY HIP Left 10/23/2018    Procedure: LEFT HIP COLEMAN- ARTHROPLASTY ;  Surgeon: Araceli Gutierrez MD;  Location: [facility] OR     CATARACT IOL, RT/LT Left 07/15/2019     COLONOSCOPY  5/24/2011    Procedure:COLONOSCOPY; Surgeon:HALINA SCOTT; Location:[facility] GI     COLONOSCOPY Left 11/10/2015    Procedure: COLONOSCOPY;  Surgeon: Raheem Colunga MD;  Location: [facility] GI     COLONOSCOPY  5/24/11, 11/10/2015     DILATION AND CURETTAGE      secondary to menorrhagia     GYN SURGERY      hysterectomy/oopherectomy; done for prolapse     HEMIARTHROPLASTY HIP Left 10/23/2018     HYSTERECTOMY      hysterectomy/oopherectomy; done for prolapse     LAPAROSCOPIC NISSEN FUNDOPLICATION  1/7/2013    Procedure: LAPAROSCOPIC NISSEN FUNDOPLICATION;  Laparoscopic Repair of Hiatel Hernia, NISSEN, Esophagoscopy, Gastroscopy And Duodenoscopy ;  Surgeon: Leonidas Valle MD;  Location: [facility] OR     LAPAROSCOPIC NISSEN FUNDOPLICATION  01/07/2013      PHACOEMULSIFICATION WITH STANDARD INTRAOCULAR LENS IMPLANT Left 7/15/2019    Procedure: Left Eye Phacoemulsification with Intraocular Lens;  Surgeon: Josue Trujillo MD;  Location: [facility] OR     TUBAL LIGATION Bilateral      ZZC NONSPECIFIC PROCEDURE      Bilateral Tubal Ligation     ZZC NONSPECIFIC PROCEDURE      D&C secondary to menorrhagia     ZZC NONSPECIFIC PROCEDURE      child birth x 2        Social History:     Social History     Tobacco Use     Smoking status: Never     Smokeless tobacco: Never   Substance Use Topics     Alcohol use: No     Drug use: No        Family History:     Family History   Problem Relation Age of Onset     Hypertension Father      C.A.D. Father      Hypertension Mother      Breast Cancer Sister      Osteoporosis Sister      EYE* Brother         Keratoconus     Glaucoma No family hx of      Macular Degeneration No family hx of      Coronary Artery Disease Father      Breast Cancer Sister      Keratoconus Brother         Medications:     Current Outpatient Medications   Medication Sig     Acetaminophen (TYLENOL PO) Take 1,000 mg by mouth every 6 hours as needed for mild pain or fever     amLODIPine (NORVASC) 2.5 MG tablet Take 1 tablet (2.5 mg) by mouth daily     ammonium lactate (LAC-HYDRIN) 12 % external lotion Apply topically 2 times daily To the feet     atorvastatin (LIPITOR) 10 MG tablet Take 1 tablet (10 mg) by mouth daily     augmented betamethasone dipropionate (DIPROLENE-AF) 0.05 % external ointment Apply topically 2 times daily To vaginal area as needed     calcipotriene (DOVONOX) 0.005 % external cream Mix efudex + calcipotriene equally. Apply BID x 4-10 days. Stop when skin gets red.     calcipotriene (DOVONOX) 0.005 % external cream Mix efudex + calcipotriene equally. Apply BID x 4-10 days. Stop when skin gets red.     calcium carbonate 500 mg, elemental, (OSCAL;OYSTER SHELL CALCIUM) 500 MG tablet Take 3 tablets (1,500 mg) by mouth 2 times daily (with meals)     calcium  citrate (CITRACAL) 950 (200 Ca) MG tablet Take 1 tablet by mouth 2 times daily     cholecalciferol (VITAMIN D3) 1000 UNIT tablet Take 2 tablets (2,000 Units) by mouth daily     clobetasol (TEMOVATE) 0.05 % external ointment Apply topically 2x weekly maintenance use of clobetasol *can increase to twice daily for flares when needed and then taper slowly back to 2x weekly.     clobetasol (TEMOVATE) 0.05 % external ointment Apply twice weekly to lichen sclerosus, increase to twice daily as needed for flares     clobetasol (TEMOVATE) 0.05 % ointment Apply sparingly to affected area 2 x daily for14 days. Then, apply small amount to affected area twice weekly for maintenance.     diclofenac (VOLTAREN) 1 % topical gel Apply 2 g topically 4 times daily For R knee     diltiazem ER COATED BEADS (CARDIZEM CD/CARTIA XT) 300 MG 24 hr capsule Take 1 capsule (300 mg) by mouth daily     docusate sodium (COLACE) 100 MG capsule Take 1 capsule (100 mg) by mouth 2 times daily     donepezil (ARICEPT) 5 MG tablet Take 1 tablet (5 mg) by mouth at bedtime If well tolerated after 4 weeks take 1 in the morning 1 at bedtime (total dose 10 mg / day)     fluorouracil (EFUDEX) 5 % external cream Mix equally with calcipotriene cream. Apply BID x 4-10 days. Stop when skin gets red.     fluorouracil (EFUDEX) 5 % external cream Mix equally with calcipotriene cream. Apply BID x 4-10 days. Stop when skin gets red.     loperamide (IMODIUM) 2 MG capsule Take 2 mg by mouth 4 times daily as needed for diarrhea     triamcinolone (KENALOG) 0.1 % external ointment Apply topically 2 times daily To the areas of bumps on the arms and legs     No current facility-administered medications for this visit.        Allergies:     Allergies   Allergen Reactions     Dilaudid [Hydromorphone]      dizziness     Senna Hives        Review of Systems:   A focused review of systems was performed and found to be negative except as described in this note.           Attestation  signed by Cam Gilbert MD at 11/8/2023  7:43 AM:  Attending Attestation    I saw and evaluated the patient on 11/7/23 and agree with the findings and the plan of care as documented in the resident's note.       I personally reviewed vital signs, medications, labs and pertinent imaging.    Discussed work up options to further confirm suspicion for a dementia syndrome and if so that of Alzheimers subtype most likely.  We mutually think long neuropsychological testing would be hard for her to complete.  Discussed rationale for blood work, MRI.  Discussed that conventional medicaitons are meant to slow decline but not necessarily improve memory based on data and why she would not be a good candidate for newer infusions.      All questions answered.  We will start empiric donepezil after risk/benefit discussion and follow up to review data when obtained.        Kwasi Gilbert MD   of Neurology  [facility]      Again, thank you for allowing me to participate in the care of your patient.        Sincerely,        Jakob Masters MD

## 2023-11-07 NOTE — PROGRESS NOTES
[facility] Neurology New Patient Visit    Shahana Donaldson MRN# [id number]   Age: 83 year old YOB: 1940     Requesting physician: Jeramie Childers     Reason for Consultation:     Please note the following note has been dictated, in whole or in part, and may contain minor word substitution or transposition errors.  Please interpret with this in mind.     Assessment and Plan:   Assessment:  #Major Neurocognitive Disorder (dementia), suspected Alzheimer's disease (work-up pending)  Kamini is a delightful 83-year-old female presenting with insidious decline in memory and cognition over a period of at least 5 years, now with marked functional impairment and a MoCA in office 8/30.  Her presentation is consistent with major neurocognitive disorder (dementia).  Regarding etiology, I have a high degree of suspicion for neurodegenerative processes, most likely sporadic Alzheimer disease given  Jake's description of exclusively amnestic for many years, absence of clinical features for pointing towards other neurodegenerative conditions.      However, despite high degree of clinical concern we cannot rule out secondary causes of dementia, and we will pursue completion of usual laboratory work-up as below (deferring repeat CBC, CMP as they were recently normal).  In the meantime, we will plan to start Aricept as below and follow-up in a few months to discuss results/tolerance of medication/next steps.    Finally, given advanced dementia, she is not a candidate for aducanumab (which Feng and Kamini would not pursue anyway given risk/benefit, infusion requirements and cost).      Plan:  -Donepezil 5 mg once per day  *After 1 month, increase to 5 mg 2x/day, in the morning and at night  *Discussed common side effects including diarrhea, nausea, incontinence, mood changes; patient instructed to reach out should they experience these or any other concerning side-effects   -MRI brain w/o  "contrast  -B12, TSH, B1  -Patient encouraged to engage in regular exercise (~30 min, 5x / week), eat a healthy diet (mediterranean, DASH, or MIND diet could be considered) and to pursue optimization of general health w/ PCP (optimial BP control, glycemic control, HLD management, etc)  -Further encouraged to keep the mind maximally engaged: Maintain and strengthen social ties as able, cross word puzzles, board games, cards, safe exposure to new and/or stimulating environments as able (e.g. a walk in the woods, attending a concert or a play).     >Kamini loves puzzles, which we encouraged!    Follow up in Neurology clinic in 3 months after testing is obtained, or earlier should new concerns arise.    Patient seen and discussed with my supervising physician, Dr. Ofelia Masters MD, MS  PGY-2, Neurology     History of Presenting Symptoms:   Shahana Donaldson is a 83 year old female who presents today for evaluation of memory concerns on referral from her longstanding PCP Dr. Curran.    On review of her referring providers notes, Ms Donaldson has been endorsing unspecified memory concerns since at least 2022.  Her  Jake send a chart message 11/7/23 that \"Kamini's memory problems (dementia) has become a lot worse in the last two months.  What should we do about it?\" Neurology referral was placed, and patient was able to be scheduled as an add on today.  Patient had previously been offered memory and neuropsychiatric testing, but wanted to defer.      Otherwise, review of the record is notable for h/o GERD and hiatial hernia s/p Nissen fundoplication, CAD, Seborrheic dermatitis, HLD, HTN.  Review of available labs show borderline-low B12 in 2018 at 244 w/o elevated MMA.  TSH 2.5 in 2018.  She has recently been pursuing hearing aids, with a fitting scheduled for December.  No recent intracranial imaging.    On discussion with Kamini and her  Jkae at bedside, they report ~5 years of insidiously " "progressive difficulty with memory and cognition.  Initially, her  Feng noted that Kamini would sometimes lose the thread of the conversation, require repetition.  Over the years, this progressed to include much more prominent memory deficits to the point where Kamini is now leaving herself notes around the house about whether she has done the dishes, brushed her teeth, etc.  She remians independent in basic self-cares (with the exception of medications, which her  helps with), but is otherwise dependent on her  for support.  No sleep issues in association with memory change, although over the past few weeks she has been getting up in the middle of the night, turning on all the lights, and doing tasks (which would usually be done in the day time) around the house.  She no longer drives, after getting lost around a year ago while driving home from [employer] (which she has done hundreds of times).  She is now frequently confused and disoriented to time and situation -- today, she is convinced she is here for an audiology appointment.  No dream enactment behavior, no hallucinations, no behavioral issues, no new obsessions, strange hobbies, no motor or sensory complaints.     Physical Exam:   Vitals: BP (!) 143/75   Pulse 71   Ht 1.727 m (5' 8\")   Wt 64 kg (141 lb)   SpO2 95%   BMI 21.44 kg/m     General: Seated comfortably in no acute distress.  Well groomed, pleasant, engaged.  HEENT: Neck with normal range of motion, no visible abnormality.  Oropharynx pink and moist with no visible lesion.    CV: Extremities warm and well perfused.  Pulmonary: Breathing comfortably on room air, speaking in full sentences, no cough, no accessory muscle use.  Abdominal: Non-distended.   Integumentary: Skin intact, warm, dry.  No rashes appreciated on exposed skin.  Neurologic:  Mental Status: Spontaneously alert, attentive. Disoriented to time (initially said \"well it's not 1920\"), situation (thinks she is at the " audiologist's office, despite repeated correction..  MOCA 8/30.  Intermittent paraphasic errors and rare word-finding difficulty apparent to conversation.  Paucity of spontaneous speech.  Frequently looks at  for answers to questions she would be expected to know.  Cranial Nerves: Pupils directly and consensually reactive bilaterally, negative swinging flashlight test. No anisocoria. Extraocular movements intact with mild saccadic intrusion, no nystagmus.  Facial movements symmetric. Chronic hearing loss noted.  Tongue movements and soft palate elevation intact, without fasciculations or deviation.  No dysarthria. Shoulder shrug strong.  Motor:    LE: Strength 5/5 and equal bilaterally in hip flexion, extension, and ab/adduction.   UE: Strength 5/5 and equal bilaterally in arm flexion/extension, and wrist palmar/dorsiflexion.    Reflexes: 2+ at achillies, patella, biceps, brachioradialis.   Sensory: Negative Romberg. Intact to light touch, pinprick, vibration in b/l upper and lower extremities.   Movements: No tremors or other abnormal movements observed.  Finger-nose-finger and heel-to-shin somewhat uncoordinated but without gamal dysmetria.  Mild decrement on finger tapping, and some mild disdiadochokinesia R >L.  Gait: Somewhat broad based, mildly stooped, but intact arm swing, turn, no festination or freezing.  Negative pull test.        Data: Pertinent prior to visit   Imaging:  NA    Procedures:  NA    Laboratory:  -borderline-low B12 in 2018 at 244 w/o elevated MMA.  TSH 2.5 in 2018.     Past Medical History:     Patient Active Problem List   Diagnosis    Essential hypertension, benign    Mixed hyperlipidemia    Symptomatic menopausal or female climacteric states    Diverticulosis of large intestine    Hyperlipidemia    Coronary atherosclerosis    Esophageal reflux    vulvar cyst    Hiatal hernia    S/P Nissen fundoplication (without gastrostomy tube) procedure    History of tubal ligation    History  of dilatation and curettage    History of hysterectomy    Lichen sclerosus    Left displaced femoral neck fracture (H)    Senile osteoporosis    Lichen planus    Seborrheic dermatitis    Heart failure with preserved ejection fraction, NYHA class II (H)     Past Medical History:   Diagnosis Date    Diverticulosis of colon (without mention of hemorrhage)     Essential hypertension, benign     Gastro-oesophageal reflux disease     nissen    Hemorrhoid     Nonsenile cataract     Osteoporosis     Other and unspecified hyperlipidemia     Rectocele     Symptomatic menopausal or female climacteric states         Past Surgical History:     Past Surgical History:   Procedure Laterality Date    ARTHROPLASTY HIP Left 10/23/2018    Procedure: LEFT HIP COLEMAN- ARTHROPLASTY ;  Surgeon: Araceli Gutierrez MD;  [facility]: [facility]    CATARACT IOL, RT/LT Left 07/15/2019    COLONOSCOPY  5/24/2011    Procedure:COLONOSCOPY; Surgeon:HALINA SCOTT; Location:[facility]    COLONOSCOPY Left 11/10/2015    Procedure: COLONOSCOPY;  Surgeon: Raheem Colunga MD;  Location: [facility] GI    COLONOSCOPY  5/24/11, 11/10/2015    DILATION AND CURETTAGE      secondary to menorrhagia    GYN SURGERY      hysterectomy/oopherectomy; done for prolapse    HEMIARTHROPLASTY HIP Left 10/23/2018    HYSTERECTOMY      hysterectomy/oopherectomy; done for prolapse    LAPAROSCOPIC NISSEN FUNDOPLICATION  1/7/2013    Procedure: LAPAROSCOPIC NISSEN FUNDOPLICATION;  Laparoscopic Repair of Hiatel Hernia, NISSEN, Esophagoscopy, Gastroscopy And Duodenoscopy ;  Surgeon: Leonidas Valle MD;  Location: [facility] OR    LAPAROSCOPIC NISSEN FUNDOPLICATION  01/07/2013    PHACOEMULSIFICATION WITH STANDARD INTRAOCULAR LENS IMPLANT Left 7/15/2019    Procedure: Left Eye Phacoemulsification with Intraocular Lens;  Surgeon: Josue Trujillo MD;  [facility]: [facility]    TUBAL LIGATION Bilateral     ZZC NONSPECIFIC PROCEDURE      Bilateral Tubal Ligation    ZZC NONSPECIFIC PROCEDURE      D&C  secondary to menorrhagia    ZZC NONSPECIFIC PROCEDURE      child birth x 2        Social History:     Social History     Tobacco Use    Smoking status: Never    Smokeless tobacco: Never   Substance Use Topics    Alcohol use: No    Drug use: No        Family History:     Family History   Problem Relation Age of Onset    Hypertension Father     C.A.D. Father     Hypertension Mother     Breast Cancer Sister     Osteoporosis Sister     EYE* Brother         Keratoconus    Glaucoma No family hx of     Macular Degeneration No family hx of     Coronary Artery Disease Father     Breast Cancer Sister     Keratoconus Brother         Medications:     Current Outpatient Medications   Medication Sig    Acetaminophen (TYLENOL PO) Take 1,000 mg by mouth every 6 hours as needed for mild pain or fever    amLODIPine (NORVASC) 2.5 MG tablet Take 1 tablet (2.5 mg) by mouth daily    ammonium lactate (LAC-HYDRIN) 12 % external lotion Apply topically 2 times daily To the feet    atorvastatin (LIPITOR) 10 MG tablet Take 1 tablet (10 mg) by mouth daily    augmented betamethasone dipropionate (DIPROLENE-AF) 0.05 % external ointment Apply topically 2 times daily To vaginal area as needed    calcipotriene (DOVONOX) 0.005 % external cream Mix efudex + calcipotriene equally. Apply BID x 4-10 days. Stop when skin gets red.    calcipotriene (DOVONOX) 0.005 % external cream Mix efudex + calcipotriene equally. Apply BID x 4-10 days. Stop when skin gets red.    calcium carbonate 500 mg, elemental, (OSCAL;OYSTER SHELL CALCIUM) 500 MG tablet Take 3 tablets (1,500 mg) by mouth 2 times daily (with meals)    calcium citrate (CITRACAL) 950 (200 Ca) MG tablet Take 1 tablet by mouth 2 times daily    cholecalciferol (VITAMIN D3) 1000 UNIT tablet Take 2 tablets (2,000 Units) by mouth daily    clobetasol (TEMOVATE) 0.05 % external ointment Apply topically 2x weekly maintenance use of clobetasol *can increase to twice daily for flares when needed and then taper  slowly back to 2x weekly.    clobetasol (TEMOVATE) 0.05 % external ointment Apply twice weekly to lichen sclerosus, increase to twice daily as needed for flares    clobetasol (TEMOVATE) 0.05 % ointment Apply sparingly to affected area 2 x daily for14 days. Then, apply small amount to affected area twice weekly for maintenance.    diclofenac (VOLTAREN) 1 % topical gel Apply 2 g topically 4 times daily For R knee    diltiazem ER COATED BEADS (CARDIZEM CD/CARTIA XT) 300 MG 24 hr capsule Take 1 capsule (300 mg) by mouth daily    docusate sodium (COLACE) 100 MG capsule Take 1 capsule (100 mg) by mouth 2 times daily    donepezil (ARICEPT) 5 MG tablet Take 1 tablet (5 mg) by mouth at bedtime If well tolerated after 4 weeks take 1 in the morning 1 at bedtime (total dose 10 mg / day)    fluorouracil (EFUDEX) 5 % external cream Mix equally with calcipotriene cream. Apply BID x 4-10 days. Stop when skin gets red.    fluorouracil (EFUDEX) 5 % external cream Mix equally with calcipotriene cream. Apply BID x 4-10 days. Stop when skin gets red.    loperamide (IMODIUM) 2 MG capsule Take 2 mg by mouth 4 times daily as needed for diarrhea    triamcinolone (KENALOG) 0.1 % external ointment Apply topically 2 times daily To the areas of bumps on the arms and legs     No current facility-administered medications for this visit.        Allergies:     Allergies   Allergen Reactions    Dilaudid [Hydromorphone]      dizziness    Senna Hives        Review of Systems:   A focused review of systems was performed and found to be negative except as described in this note.

## 2023-11-10 ENCOUNTER — LAB (OUTPATIENT)
Dept: LAB | Facility: CLINIC | Age: 83
End: 2023-11-10
Payer: MEDICARE

## 2023-11-10 DIAGNOSIS — F03.90 MAJOR NEUROCOGNITIVE DISORDER (H): ICD-10-CM

## 2023-11-10 LAB
TSH SERPL DL<=0.005 MIU/L-ACNC: 2.89 UIU/ML (ref 0.3–4.2)
VIT B12 SERPL-MCNC: 238 PG/ML (ref 232–1245)

## 2023-11-10 PROCEDURE — 36415 COLL VENOUS BLD VENIPUNCTURE: CPT

## 2023-11-10 PROCEDURE — 82607 VITAMIN B-12: CPT

## 2023-11-10 PROCEDURE — 83921 ORGANIC ACID SINGLE QUANT: CPT

## 2023-11-10 PROCEDURE — 84425 ASSAY OF VITAMIN B-1: CPT | Mod: 90

## 2023-11-10 PROCEDURE — 84443 ASSAY THYROID STIM HORMONE: CPT

## 2023-11-10 PROCEDURE — 99000 SPECIMEN HANDLING OFFICE-LAB: CPT

## 2023-11-14 LAB
METHYLMALONATE SERPL-SCNC: 0.25 UMOL/L (ref 0–0.4)
VIT B1 PYROPHOSHATE BLD-SCNC: 97 NMOL/L

## 2023-11-16 DIAGNOSIS — E78.2 MIXED HYPERLIPIDEMIA: ICD-10-CM

## 2023-11-17 RX ORDER — ATORVASTATIN CALCIUM 10 MG/1
10 TABLET, FILM COATED ORAL DAILY
Qty: 90 TABLET | Refills: 1 | Status: SHIPPED | OUTPATIENT
Start: 2023-11-17 | End: 2024-02-16

## 2023-11-18 NOTE — TELEPHONE ENCOUNTER
atorvastatin (LIPITOR) 10 MG tablet 90 tablet 0 8/29/2023  Last Office Visit : 7/12/2023   Future Office visit:  1/10/2024         LDL Cholesterol Calculated   Date Value Ref Range Status   10/31/2023 53 <=100 mg/dL Final   11/21/2018 64 <100 mg/dL Final     Comment:     Desirable:       <100 mg/dl

## 2023-12-05 ENCOUNTER — ANCILLARY PROCEDURE (OUTPATIENT)
Dept: MRI IMAGING | Facility: CLINIC | Age: 83
End: 2023-12-05
Payer: MEDICARE

## 2023-12-05 DIAGNOSIS — F03.90 MAJOR NEUROCOGNITIVE DISORDER (H): ICD-10-CM

## 2023-12-05 PROCEDURE — G1010 CDSM STANSON: HCPCS | Performed by: RADIOLOGY

## 2023-12-05 PROCEDURE — 70551 MRI BRAIN STEM W/O DYE: CPT | Mod: TC | Performed by: RADIOLOGY

## 2024-01-04 DIAGNOSIS — I10 BENIGN ESSENTIAL HYPERTENSION: ICD-10-CM

## 2024-01-08 NOTE — TELEPHONE ENCOUNTER
Requested:diltiazem ER (TIAZAC) 300 MG 24 hr ER beaded capsule       Current:diltiazem ER COATED BEADS (CARDIZEM CD/CARTIA XT) 300 MG 24 hr capsule  Last Written Prescription Date:  4-3-23  Last Fill Quantity: 90,   # refills: 2  Last Office Visit : 7-12-23  Future Office visit:  1-10-24      BP Readings from Last 3 Encounters:   11/07/23 (!) 143/75   10/31/23 (!) 173/93   07/12/23 (!) 173/77      Routing refill request to provider for review/approval because:  Blood pressure out of range   Med requested Tiazac, current Cartia

## 2024-01-09 RX ORDER — DILTIAZEM HYDROCHLORIDE 300 MG/1
300 CAPSULE, COATED, EXTENDED RELEASE ORAL DAILY
Qty: 90 CAPSULE | Refills: 2 | Status: SHIPPED | OUTPATIENT
Start: 2024-01-09 | End: 2024-10-03

## 2024-01-09 RX ORDER — DILTIAZEM HYDROCHLORIDE 300 MG/1
300 CAPSULE, EXTENDED RELEASE ORAL DAILY
Qty: 90 CAPSULE | Refills: 0 | OUTPATIENT
Start: 2024-01-09

## 2024-01-09 NOTE — CONFIDENTIAL NOTE
HPI:    Last visit with us 7/12/2023 and additional information in that note. Her  Jake is present today. She has lost weight. She has some trouble with swallowing pills. She has decreased appetite. Her  is worried about this. She and Jake still get exercise with walking and treadmill at home. She sates she has a good appetite. No change in bowel habits. No coughing or choking. She does not check her BP at home. No abdominal pain. No other HEENT, cardiopulmonary, abdominal, , GYN, neurological, systemic psychiatric, lymphatic, endocrine, vascular complaints.     Past Medical History:   Diagnosis Date    Diverticulosis of colon (without mention of hemorrhage)     Essential hypertension, benign     Gastro-oesophageal reflux disease     nissen    Hemorrhoid     Nonsenile cataract     Osteoporosis     Other and unspecified hyperlipidemia     Rectocele     Symptomatic menopausal or female climacteric states      Past Surgical History:   Procedure Laterality Date    ARTHROPLASTY HIP Left 10/23/2018    Procedure: LEFT HIP COLEMAN- ARTHROPLASTY ;  Surgeon: Araceli Gutierrez MD;  Location: [facility]    CATARACT IOL, RT/LT Left 07/15/2019    COLONOSCOPY  5/24/2011    Procedure:COLONOSCOPY; Surgeon:HALINA SCOTT; Location:[facility]    COLONOSCOPY Left 11/10/2015    Procedure: COLONOSCOPY;  Surgeon: Raheem Colunga MD;  [facility]    COLONOSCOPY  5/24/11, 11/10/2015    DILATION AND CURETTAGE      secondary to menorrhagia    GYN SURGERY      hysterectomy/oopherectomy; done for prolapse    HEMIARTHROPLASTY HIP Left 10/23/2018    HYSTERECTOMY      hysterectomy/oopherectomy; done for prolapse    LAPAROSCOPIC NISSEN FUNDOPLICATION  1/7/2013    Procedure: LAPAROSCOPIC NISSEN FUNDOPLICATION;  Laparoscopic Repair of Hiatel Hernia, NISSEN, Esophagoscopy, Gastroscopy And Duodenoscopy ;  Surgeon: Leonidas Valle MD;  [facility]    LAPAROSCOPIC NISSEN FUNDOPLICATION  01/07/2013    PHACOEMULSIFICATION  WITH STANDARD INTRAOCULAR LENS IMPLANT Left 7/15/2019    Procedure: Left Eye Phacoemulsification with Intraocular Lens;  Surgeon: Josue Trujillo MD;  [facility]    TUBAL LIGATION Bilateral     ZZC NONSPECIFIC PROCEDURE      Bilateral Tubal Ligation    ZZC NONSPECIFIC PROCEDURE      D&C secondary to menorrhagia    ZZC NONSPECIFIC PROCEDURE      child birth x 2     PE:    Vitals noted, gen, nad, cooperative, alert, neck supple nl rom, lungs with good air movement, RRR, S1, S2, no MRG, abdomen, no acute findings. Grossly normal neurological exam.     Results for orders placed or performed in visit on 01/10/24   TSH with free T4 reflex     Status: Normal   Result Value Ref Range    TSH 3.16 0.30 - 4.20 uIU/mL   Comprehensive metabolic panel     Status: Abnormal   Result Value Ref Range    Sodium 141 135 - 145 mmol/L    Potassium 4.0 3.4 - 5.3 mmol/L    Carbon Dioxide (CO2) 27 22 - 29 mmol/L    Anion Gap 10 7 - 15 mmol/L    Urea Nitrogen 18.3 8.0 - 23.0 mg/dL    Creatinine 0.82 0.51 - 0.95 mg/dL    GFR Estimate 71 >60 mL/min/1.73m2    Calcium 9.8 8.8 - 10.2 mg/dL    Chloride 104 98 - 107 mmol/L    Glucose 109 (H) 70 - 99 mg/dL    Alkaline Phosphatase 98 40 - 150 U/L    AST 32 0 - 45 U/L    ALT 21 0 - 50 U/L    Protein Total 7.9 6.4 - 8.3 g/dL    Albumin 4.5 3.5 - 5.2 g/dL    Bilirubin Total 0.7 <=1.2 mg/dL   ESR: Erythrocyte sedimentation rate     Status: Normal   Result Value Ref Range    Erythrocyte Sedimentation Rate 21 0 - 30 mm/hr   CRP, inflammation     Status: Normal   Result Value Ref Range    CRP Inflammation <3.00 <5.00 mg/L   CBC with platelets and differential     Status: None   Result Value Ref Range    WBC Count 6.6 4.0 - 11.0 10e3/uL    RBC Count 4.51 3.80 - 5.20 10e6/uL    Hemoglobin 14.1 11.7 - 15.7 g/dL    Hematocrit 42.7 35.0 - 47.0 %    MCV 95 78 - 100 fL    MCH 31.3 26.5 - 33.0 pg    MCHC 33.0 31.5 - 36.5 g/dL    RDW 13.4 10.0 - 15.0 %    Platelet Count 259 150 - 450 10e3/uL    %  Neutrophils 56 %    % Lymphocytes 33 %    % Monocytes 10 %    % Eosinophils 0 %    % Basophils 1 %    % Immature Granulocytes 0 %    NRBCs per 100 WBC 0 <1 /100    Absolute Neutrophils 3.7 1.6 - 8.3 10e3/uL    Absolute Lymphocytes 2.2 0.8 - 5.3 10e3/uL    Absolute Monocytes 0.6 0.0 - 1.3 10e3/uL    Absolute Eosinophils 0.0 0.0 - 0.7 10e3/uL    Absolute Basophils 0.0 0.0 - 0.2 10e3/uL    Absolute Immature Granulocytes 0.0 <=0.4 10e3/uL    Absolute NRBCs 0.0 10e3/uL   CBC with platelets and differential     Status: None    Narrative    The following orders were created for panel order CBC with platelets and differential.  Procedure                               Abnormality         Status                     ---------                               -----------         ------                     CBC with platelets and d...[[id number]]                      Final result                 Please view results for these tests on the individual orders.       A/P:    1. Immunizations. COVID Pfizer vaccine x 7 (Bi-valent 5/16/2023 and 10/18/2022). Tdap 11/11/2016. Prevnar 13 on 5/18/2015. Pneumococcal 23 on 4/3/2018. She has completed the Shingrix vaccine series. RSV vaccine done 10/18/2023. Influenza vaccine done 10/18/2023.    2. HTN; on only  Diltazem. She had to discontinue Amlodipine. 24 hour BP monitor was done on 7/14/2023 and Overall was 104/60   3. Increased lipids on Atorvastatin; labs; 10/31/2023 TG's 77, HDL 99 and LDL 53.   4. Seen in Dermatology Dr. Estrada 8/4/2023 and next 2/16/2024.   5. Breast imaging/mammogram done 4/18/2023  6. Preventive Cardiology appt. With Dr. Wanda Francisco 7/9/2021  7. Colonoscopy 11/10/2015  8. DEXA scan done 4/11/2022; most negative T score -3.5 and she was seen Endocrinology, Dr. Ochoa 4/13/2022. Ca/Vit D and reclast. Repeat DEXA scan in 2 years. She has a follow up appt. 1/10/2024  9. Seen Dr. Trujillo, Ophthalmology 6/13/2023 for eye exam and follow up 6/20/2024  10. Hearing; she was seen  "10/31/2023 by Dr. Nissen, ENT.    11. She was seen in Neurology Dr. Gilbert 11/7/2023 for memory issues and next visit 2/27/2024. Jake states he would prefer to see Neurology here at the [facility] because [place] to [place] and placed Neurology referral today. 1/10/2024.   12. Seen 4/25/2023, Orthopedics, Dr. Harding for R knee OA. PT appt. 5/15/2023.   13. Mild dysphagia with pills may well be related to her dementia. If worse discussed either/and EGG or video swallowing study  14. Weight loss. Ordered labs today and chest/abdomen/pelvis CT scan. Will see her back 2/16/2024.            30 minutes spent on the date of the encounter doing chart review, history and exam, documentation and further activities as noted above \"exclusive of procedures and other billable interpretations      "

## 2024-01-10 ENCOUNTER — LAB (OUTPATIENT)
Dept: LAB | Facility: CLINIC | Age: 84
End: 2024-01-10
Payer: MEDICARE

## 2024-01-10 ENCOUNTER — TELEPHONE (OUTPATIENT)
Dept: INTERNAL MEDICINE | Facility: CLINIC | Age: 84
End: 2024-01-10

## 2024-01-10 ENCOUNTER — OFFICE VISIT (OUTPATIENT)
Dept: INTERNAL MEDICINE | Facility: CLINIC | Age: 84
End: 2024-01-10
Payer: MEDICARE

## 2024-01-10 ENCOUNTER — OFFICE VISIT (OUTPATIENT)
Dept: ENDOCRINOLOGY | Facility: CLINIC | Age: 84
End: 2024-01-10
Payer: MEDICARE

## 2024-01-10 VITALS
OXYGEN SATURATION: 95 % | BODY MASS INDEX: 20.55 KG/M2 | HEART RATE: 66 BPM | DIASTOLIC BLOOD PRESSURE: 93 MMHG | HEIGHT: 68 IN | SYSTOLIC BLOOD PRESSURE: 189 MMHG | WEIGHT: 135.6 LBS

## 2024-01-10 VITALS
SYSTOLIC BLOOD PRESSURE: 165 MMHG | HEART RATE: 69 BPM | BODY MASS INDEX: 20.31 KG/M2 | HEIGHT: 68 IN | OXYGEN SATURATION: 94 % | WEIGHT: 134 LBS | DIASTOLIC BLOOD PRESSURE: 83 MMHG

## 2024-01-10 DIAGNOSIS — R13.10 DYSPHAGIA, UNSPECIFIED TYPE: ICD-10-CM

## 2024-01-10 DIAGNOSIS — R63.4 WEIGHT LOSS: Primary | ICD-10-CM

## 2024-01-10 DIAGNOSIS — M81.0 SENILE OSTEOPOROSIS: Primary | ICD-10-CM

## 2024-01-10 DIAGNOSIS — R63.4 WEIGHT LOSS: ICD-10-CM

## 2024-01-10 DIAGNOSIS — M81.0 SENILE OSTEOPOROSIS: ICD-10-CM

## 2024-01-10 DIAGNOSIS — Z29.11 NEED FOR VACCINATION AGAINST RESPIRATORY SYNCYTIAL VIRUS: ICD-10-CM

## 2024-01-10 DIAGNOSIS — R41.3 MEMORY LOSS: ICD-10-CM

## 2024-01-10 LAB
ALBUMIN SERPL BCG-MCNC: 4.5 G/DL (ref 3.5–5.2)
ALP SERPL-CCNC: 98 U/L (ref 40–150)
ALT SERPL W P-5'-P-CCNC: 21 U/L (ref 0–50)
ANION GAP SERPL CALCULATED.3IONS-SCNC: 10 MMOL/L (ref 7–15)
AST SERPL W P-5'-P-CCNC: 32 U/L (ref 0–45)
BASOPHILS # BLD AUTO: 0 10E3/UL (ref 0–0.2)
BASOPHILS NFR BLD AUTO: 1 %
BILIRUB SERPL-MCNC: 0.7 MG/DL
BUN SERPL-MCNC: 18.3 MG/DL (ref 8–23)
CALCIUM SERPL-MCNC: 9.8 MG/DL (ref 8.8–10.2)
CHLORIDE SERPL-SCNC: 104 MMOL/L (ref 98–107)
CREAT SERPL-MCNC: 0.82 MG/DL (ref 0.51–0.95)
CRP SERPL-MCNC: <3 MG/L
DEPRECATED HCO3 PLAS-SCNC: 27 MMOL/L (ref 22–29)
EGFRCR SERPLBLD CKD-EPI 2021: 71 ML/MIN/1.73M2
EOSINOPHIL # BLD AUTO: 0 10E3/UL (ref 0–0.7)
EOSINOPHIL NFR BLD AUTO: 0 %
ERYTHROCYTE [DISTWIDTH] IN BLOOD BY AUTOMATED COUNT: 13.4 % (ref 10–15)
ERYTHROCYTE [SEDIMENTATION RATE] IN BLOOD BY WESTERGREN METHOD: 21 MM/HR (ref 0–30)
GLUCOSE SERPL-MCNC: 109 MG/DL (ref 70–99)
HCT VFR BLD AUTO: 42.7 % (ref 35–47)
HGB BLD-MCNC: 14.1 G/DL (ref 11.7–15.7)
IMM GRANULOCYTES # BLD: 0 10E3/UL
IMM GRANULOCYTES NFR BLD: 0 %
LYMPHOCYTES # BLD AUTO: 2.2 10E3/UL (ref 0.8–5.3)
LYMPHOCYTES NFR BLD AUTO: 33 %
MCH RBC QN AUTO: 31.3 PG (ref 26.5–33)
MCHC RBC AUTO-ENTMCNC: 33 G/DL (ref 31.5–36.5)
MCV RBC AUTO: 95 FL (ref 78–100)
MONOCYTES # BLD AUTO: 0.6 10E3/UL (ref 0–1.3)
MONOCYTES NFR BLD AUTO: 10 %
NEUTROPHILS # BLD AUTO: 3.7 10E3/UL (ref 1.6–8.3)
NEUTROPHILS NFR BLD AUTO: 56 %
NRBC # BLD AUTO: 0 10E3/UL
NRBC BLD AUTO-RTO: 0 /100
PLATELET # BLD AUTO: 259 10E3/UL (ref 150–450)
POTASSIUM SERPL-SCNC: 4 MMOL/L (ref 3.4–5.3)
PROT SERPL-MCNC: 7.9 G/DL (ref 6.4–8.3)
RBC # BLD AUTO: 4.51 10E6/UL (ref 3.8–5.2)
SODIUM SERPL-SCNC: 141 MMOL/L (ref 135–145)
TSH SERPL DL<=0.005 MIU/L-ACNC: 3.16 UIU/ML (ref 0.3–4.2)
VIT D+METAB SERPL-MCNC: 35 NG/ML (ref 20–50)
WBC # BLD AUTO: 6.6 10E3/UL (ref 4–11)

## 2024-01-10 PROCEDURE — 99214 OFFICE O/P EST MOD 30 MIN: CPT | Mod: 25 | Performed by: INTERNAL MEDICINE

## 2024-01-10 PROCEDURE — 36415 COLL VENOUS BLD VENIPUNCTURE: CPT | Performed by: PATHOLOGY

## 2024-01-10 PROCEDURE — 85652 RBC SED RATE AUTOMATED: CPT | Performed by: PATHOLOGY

## 2024-01-10 PROCEDURE — 84443 ASSAY THYROID STIM HORMONE: CPT | Performed by: PATHOLOGY

## 2024-01-10 PROCEDURE — 90677 PCV20 VACCINE IM: CPT | Performed by: INTERNAL MEDICINE

## 2024-01-10 PROCEDURE — G0009 ADMIN PNEUMOCOCCAL VACCINE: HCPCS | Performed by: INTERNAL MEDICINE

## 2024-01-10 PROCEDURE — 80053 COMPREHEN METABOLIC PANEL: CPT | Performed by: PATHOLOGY

## 2024-01-10 PROCEDURE — 86140 C-REACTIVE PROTEIN: CPT | Performed by: PATHOLOGY

## 2024-01-10 PROCEDURE — 99000 SPECIMEN HANDLING OFFICE-LAB: CPT | Performed by: PATHOLOGY

## 2024-01-10 PROCEDURE — 85025 COMPLETE CBC W/AUTO DIFF WBC: CPT | Performed by: PATHOLOGY

## 2024-01-10 PROCEDURE — 82306 VITAMIN D 25 HYDROXY: CPT | Performed by: INTERNAL MEDICINE

## 2024-01-10 PROCEDURE — 99214 OFFICE O/P EST MOD 30 MIN: CPT | Performed by: INTERNAL MEDICINE

## 2024-01-10 RX ORDER — EPINEPHRINE 1 MG/ML
0.3 INJECTION, SOLUTION, CONCENTRATE INTRAVENOUS EVERY 5 MIN PRN
Status: CANCELLED | OUTPATIENT
Start: 2024-01-24

## 2024-01-10 RX ORDER — ACETAMINOPHEN 325 MG/1
325 TABLET ORAL
Status: CANCELLED | OUTPATIENT
Start: 2024-01-24

## 2024-01-10 RX ORDER — ALBUTEROL SULFATE 0.83 MG/ML
2.5 SOLUTION RESPIRATORY (INHALATION)
Status: CANCELLED | OUTPATIENT
Start: 2024-01-24

## 2024-01-10 RX ORDER — MEPERIDINE HYDROCHLORIDE 25 MG/ML
25 INJECTION INTRAMUSCULAR; INTRAVENOUS; SUBCUTANEOUS EVERY 30 MIN PRN
Status: CANCELLED | OUTPATIENT
Start: 2024-01-24

## 2024-01-10 RX ORDER — ZOLEDRONIC ACID 5 MG/100ML
5 INJECTION, SOLUTION INTRAVENOUS ONCE
Status: CANCELLED
Start: 2024-01-24

## 2024-01-10 RX ORDER — METHYLPREDNISOLONE SODIUM SUCCINATE 125 MG/2ML
125 INJECTION, POWDER, LYOPHILIZED, FOR SOLUTION INTRAMUSCULAR; INTRAVENOUS
Status: CANCELLED
Start: 2024-01-24

## 2024-01-10 RX ORDER — DIPHENHYDRAMINE HYDROCHLORIDE 50 MG/ML
50 INJECTION INTRAMUSCULAR; INTRAVENOUS
Status: CANCELLED
Start: 2024-01-24

## 2024-01-10 RX ORDER — ALBUTEROL SULFATE 90 UG/1
1-2 AEROSOL, METERED RESPIRATORY (INHALATION)
Status: CANCELLED
Start: 2024-01-24

## 2024-01-10 RX ORDER — HEPARIN SODIUM (PORCINE) LOCK FLUSH IV SOLN 100 UNIT/ML 100 UNIT/ML
5 SOLUTION INTRAVENOUS
Status: CANCELLED | OUTPATIENT
Start: 2024-01-24

## 2024-01-10 RX ORDER — HEPARIN SODIUM,PORCINE 10 UNIT/ML
5-20 VIAL (ML) INTRAVENOUS DAILY PRN
Status: CANCELLED | OUTPATIENT
Start: 2024-01-24

## 2024-01-10 ASSESSMENT — PAIN SCALES - GENERAL: PAINLEVEL: NO PAIN (0)

## 2024-01-10 NOTE — PATIENT INSTRUCTIONS
Please reach out to the following centers to schedule your appointment:       Imaging (DEXA, CT, MRI, XRAY)    [facility] ([facility] [place], [facility] [place] [place]) [phone]   [facility] ([place], [place]) [phone]   [facility] ([facility]) [phone]   [facility] ([facility]) [phone]       [facility] [phone]   [facility] [phone]   [place] [phone]   [facility]  [phone]   [facility] [phone]   [place] [phone]   [facility] [place]) [phone]   [facility] [place] Walk-In Only   [place] [phone]   [place] [phone]   [place] [phone]   [place] [phone]       Infusion    [facility] [phone]   [place] [phone]   [place] [phone]   [place] [phone]   [place] [phone]   [place] [phone]   [facility] [phone]     For any questions, please reach out to the [facility] Number for assistance: [phone].

## 2024-01-10 NOTE — PROGRESS NOTES
Pat is a 83 year old that presents in clinic today for the following:     Chief Complaint   Patient presents with    Follow Up     Sometimes trouble swallowing pills           1/10/2024     8:31 AM   Additional Questions   Roomed by [facility] EMT   Accompanied by        Screenings from encounters over the past 10 days    No data recorded       Agnes Parsons MA at 8:44 AM on 1/10/2024

## 2024-01-10 NOTE — NURSING NOTE
"Chief Complaint   Patient presents with    Osteoporosis     Vital signs:      BP: (!) 165/83 Pulse: 69     SpO2: 94 %     Height: 172.7 cm (5' 8\") Weight: 60.8 kg (134 lb)  Estimated body mass index is 20.37 kg/m  as calculated from the following:    Height as of this encounter: 1.727 m (5' 8\").    Weight as of this encounter: 60.8 kg (134 lb).        "

## 2024-01-10 NOTE — LETTER
1/10/2024       RE: Shahana Donaldson  [address]  [place] [address]     Dear Colleague,    Thank you for referring your patient, Shahana Donaldson, to the [facility] [place] at [facility]. Please see a copy of my visit note below.                                                                                 - Endocrinology follow up  -    Reason for visit/consult:  osteoporosis    Primary care provider: Jeramie Curran        Assessment and Plan  83 year old female with osteoporosis, kyphosis    Osteoporosis/kyphosis    - repeat Reclast infusion this spring (2024- third one 2019, 2022)    - continue Calcium citrate petite 2 tabs daily    - encourage exercise    - repeat DXA this spring    Dysphagia    Primary care visit today and ordered imaging study (neck CT)    By physical exam, I did not see any obvious abnormal structure by palpations.       RTC with me in 1 years      30 minutes spent on the date of the encounter doing chart review, history and exam, documentation and further activities as noted above.        Tatiana Ochoa MD  Staff Physician  Endocrinology and Metabolism  License: [id number]    Interval History as of 1/10/2024 : Patient has been doing ok but recently decrease appetite due to dysphagia. She is bit losing weight. She walks with her  but not doing any specific exercise.   HPI: A 82 yo female here for the evaluation of osteoporosis.  Referral from Dr. Curran.   In around 2005 per the record she was taking her Fosamax, in 2018 she had hip injury and fell and she had a left hip fracture at that time bone density was done and osteoporosis.  She received Reclast infusion therapy in 2019 since then she has not received it yet.  She repeated to bone density on April 11, 2022 which shows more advanced osteoporosis with declining T score over 4 years.   Other medical history including remote  history of hysterectomy, GERD for which she underwent Nissen procedure in 2013.  Essential hypertension mild dyslipidemia.    Prior fragility fracture: no  Parental history of hip fracture: left hip 2018  Rheumatoid arthritis:no  Secondary cause of osteoporosis:no  Body habitus (BMI less than or equal to 20):no  Family history of osteoporosis: unknown   Sedentary lifestyle: fair  Current tabacco smoking:no  History of thyroid disorder:no  Calcuim and Vitmin D supplements: Nature Ca D  Other supplement or bone treatment: Reclast in 2019  Medication use (PPI, epileptic medications etc): GERD  Early menopause (female): hysterectomy    Past Medical/Surgical History:  Past Medical History:   Diagnosis Date    Diverticulosis of colon (without mention of hemorrhage)     Essential hypertension, benign     Gastro-oesophageal reflux disease     nissen    Hemorrhoid     Nonsenile cataract     Osteoporosis     Other and unspecified hyperlipidemia     Rectocele     Symptomatic menopausal or female climacteric states      Past Surgical History:   Procedure Laterality Date    ARTHROPLASTY HIP Left 10/23/2018    Procedure: LEFT HIP COLEMAN- ARTHROPLASTY ;  Surgeon: Araceli Gutierrez MD;  Location: [facility]    CATARACT IOL, RT/LT Left 07/15/2019    COLONOSCOPY  5/24/2011    Procedure:COLONOSCOPY; Surgeon:HALINA SCOTT; Location:[facility]    COLONOSCOPY Left 11/10/2015    Procedure: COLONOSCOPY;  Surgeon: Raheem Colunga MD;  Location: [facility]    COLONOSCOPY  5/24/11, 11/10/2015    DILATION AND CURETTAGE      secondary to menorrhagia    GYN SURGERY      hysterectomy/oopherectomy; done for prolapse    HEMIARTHROPLASTY HIP Left 10/23/2018    HYSTERECTOMY      hysterectomy/oopherectomy; done for prolapse    LAPAROSCOPIC NISSEN FUNDOPLICATION  1/7/2013    Procedure: LAPAROSCOPIC NISSEN FUNDOPLICATION;  Laparoscopic Repair of Hiatel Hernia, NISSEN, Esophagoscopy, Gastroscopy And Duodenoscopy ;  Surgeon: Leonidas Valle MD;   Location: [facility] OR    LAPAROSCOPIC NISSEN FUNDOPLICATION  01/07/2013    PHACOEMULSIFICATION WITH STANDARD INTRAOCULAR LENS IMPLANT Left 7/15/2019    Procedure: Left Eye Phacoemulsification with Intraocular Lens;  Surgeon: Josue Trujillo MD;  [facility]    TUBAL LIGATION Bilateral     ZZC NONSPECIFIC PROCEDURE      Bilateral Tubal Ligation    ZZC NONSPECIFIC PROCEDURE      D&C secondary to menorrhagia    ZZC NONSPECIFIC PROCEDURE      child birth x 2       Allergies:  Allergies   Allergen Reactions    Dilaudid [Hydromorphone]      dizziness    Senna Hives       Current Medications   Current Outpatient Medications   Medication    Acetaminophen (TYLENOL PO)    atorvastatin (LIPITOR) 10 MG tablet    augmented betamethasone dipropionate (DIPROLENE-AF) 0.05 % external ointment    calcium carbonate 500 mg, elemental, (OSCAL;OYSTER SHELL CALCIUM) 500 MG tablet    calcium citrate (CITRACAL) 950 (200 Ca) MG tablet    cholecalciferol (VITAMIN D3) 1000 UNIT tablet    clobetasol (TEMOVATE) 0.05 % external ointment    clobetasol (TEMOVATE) 0.05 % external ointment    clobetasol (TEMOVATE) 0.05 % ointment    diclofenac (VOLTAREN) 1 % topical gel    diltiazem ER COATED BEADS (CARDIZEM CD/CARTIA XT) 300 MG 24 hr capsule    donepezil (ARICEPT) 5 MG tablet    loperamide (IMODIUM) 2 MG capsule    triamcinolone (KENALOG) 0.1 % external ointment    ammonium lactate (LAC-HYDRIN) 12 % external lotion    calcipotriene (DOVONOX) 0.005 % external cream    calcipotriene (DOVONOX) 0.005 % external cream    docusate sodium (COLACE) 100 MG capsule    fluorouracil (EFUDEX) 5 % external cream    fluorouracil (EFUDEX) 5 % external cream     No current facility-administered medications for this visit.       Family History:  Family History   Problem Relation Age of Onset    Hypertension Father     C.A.D. Father     Hypertension Mother     Breast Cancer Sister     Osteoporosis Sister     EYE* Brother         Keratoconus    Glaucoma No family  "hx of     Macular Degeneration No family hx of     Coronary Artery Disease Father     Breast Cancer Sister     Keratoconus Brother        Social History:  Social History     Tobacco Use    Smoking status: Never    Smokeless tobacco: Never   Substance Use Topics    Alcohol use: No       ROS:  Full review of systems taken with the help of the intake sheet. Otherwise a complete 14 point review of systems was taken and is negative unless stated in the history above.    Physical Exam:   Vitals: BP (!) 165/83   Pulse 69   Ht 1.727 m (5' 8\")   Wt 60.8 kg (134 lb)   SpO2 94%   BMI 20.37 kg/m    BMI= Body mass index is 20.37 kg/m .   General: well appearing, no acute distress, pleasant and conversant,   Mental Status/neuro: alert and oriented  Face: symmetrical, normal facial color  Eyes: anicteric, no proptosis or lid lag  Bask: kyphosis,   Neck: no goiter  Resp: normally breathing      Labs : I reviewed data from epic and extract and summarize the pertinent data here.   Lab Results   Component Value Date     07/22/2021     11/25/2019      Lab Results   Component Value Date    POTASSIUM 3.9 07/22/2021    POTASSIUM 4.0 11/25/2019     Lab Results   Component Value Date    CHLORIDE 110 07/22/2021    CHLORIDE 109 11/25/2019     Lab Results   Component Value Date    MARCK 9.1 07/22/2021    MARCK 9.1 11/25/2019     Lab Results   Component Value Date    CO2 29 07/22/2021    CO2 26 11/25/2019     Lab Results   Component Value Date    BUN 20 07/22/2021    BUN 24 11/25/2019     Lab Results   Component Value Date    CR 0.85 07/22/2021    CR 0.76 11/25/2019     Lab Results   Component Value Date    GLC 95 07/22/2021    GLC 92 11/25/2019     Lab Results   Component Value Date    TSH 2.51 04/17/2018     No results found for: T4  No results found for: A1C    No results found for: IGF1  No results found for: LH  No results found for: FSH  No results found for: ESTROGEN  No results found for: PROLACTIN        Bone density " 4/11/2022: I personally reviewed the original images and agree with the below reports.   Results   Lumbar spine   T-score -3.5 ., BMD is 0.756 g/cm2.         Right femoral neck  T-score -2.7      Right total femur  T-score -3.1, BMD is 0.622 g/cm2.     Interval change  There was no change at the areas of interest compared to the prior study.     Fracture risk   Fracture risk calculation is not indicated. Ref.4

## 2024-01-10 NOTE — PROGRESS NOTES
- Endocrinology follow up  -    Reason for visit/consult:  osteoporosis    Primary care provider: Jeramie Curran        Assessment and Plan  83 year old female with osteoporosis, kyphosis    Osteoporosis/kyphosis    - repeat Reclast infusion this spring (2024- third one 2019, 2022)    - continue Calcium citrate petite 2 tabs daily    - encourage exercise    - vitamin D level to check    - repeat DXA this spring    Dysphagia    Primary care visit today and ordered imaging study (neck CT)    By physical exam, I did not see any obvious abnormal structure by palpations.       RTC with me in 1 years      30 minutes spent on the date of the encounter doing chart review, history and exam, documentation and further activities as noted above.        Tatiana Ochoa MD  Staff Physician  Endocrinology and Metabolism  License: [id number]    Interval History as of 1/10/2024 : Patient has been doing ok but recently decrease appetite due to dysphagia. She is bit losing weight. She walks with her  but not doing any specific exercise.   HPI: A 80 yo female here for the evaluation of osteoporosis.  Referral from Dr. Curran.   In around 2005 per the record she was taking her Fosamax, in 2018 she had hip injury and fell and she had a left hip fracture at that time bone density was done and osteoporosis.  She received Reclast infusion therapy in 2019 since then she has not received it yet.  She repeated to bone density on April 11, 2022 which shows more advanced osteoporosis with declining T score over 4 years.   Other medical history including remote history of hysterectomy, GERD for which she underwent Nissen procedure in 2013.  Essential hypertension mild dyslipidemia.    Prior fragility fracture: no  Parental history of hip fracture: left hip 2018  Rheumatoid arthritis:no  Secondary cause of osteoporosis:no  Body habitus (BMI less than or equal to  20):no  Family history of osteoporosis: unknown   Sedentary lifestyle: fair  Current tabacco smoking:no  History of thyroid disorder:no  Calcuim and Vitmin D supplements: Nature Ca D  Other supplement or bone treatment: Reclast in 2019  Medication use (PPI, epileptic medications etc): GERD  Early menopause (female): hysterectomy    Past Medical/Surgical History:  Past Medical History:   Diagnosis Date    Diverticulosis of colon (without mention of hemorrhage)     Essential hypertension, benign     Gastro-oesophageal reflux disease     nissen    Hemorrhoid     Nonsenile cataract     Osteoporosis     Other and unspecified hyperlipidemia     Rectocele     Symptomatic menopausal or female climacteric states      Past Surgical History:   Procedure Laterality Date    ARTHROPLASTY HIP Left 10/23/2018    Procedure: LEFT HIP COLEMAN- ARTHROPLASTY ;  Surgeon: Araceli Gutierrez MD;  Location: [facility]    CATARACT IOL, RT/LT Left 07/15/2019    COLONOSCOPY  5/24/2011    Procedure:COLONOSCOPY; Surgeon:HALINA SCOTT; Location:[facility]    COLONOSCOPY Left 11/10/2015    Procedure: COLONOSCOPY;  Surgeon: Raheem Colunga MD;  Location: [facility]    COLONOSCOPY  5/24/11, 11/10/2015    DILATION AND CURETTAGE      secondary to menorrhagia    GYN SURGERY      hysterectomy/oopherectomy; done for prolapse    HEMIARTHROPLASTY HIP Left 10/23/2018    HYSTERECTOMY      hysterectomy/oopherectomy; done for prolapse    LAPAROSCOPIC NISSEN FUNDOPLICATION  1/7/2013    Procedure: LAPAROSCOPIC NISSEN FUNDOPLICATION;  Laparoscopic Repair of Hiatel Hernia, NISSEN, Esophagoscopy, Gastroscopy And Duodenoscopy ;  Surgeon: Leonidas Valle MD;  Location: [facility]    LAPAROSCOPIC NISSEN FUNDOPLICATION  01/07/2013    PHACOEMULSIFICATION WITH STANDARD INTRAOCULAR LENS IMPLANT Left 7/15/2019    Procedure: Left Eye Phacoemulsification with Intraocular Lens;  Surgeon: Josue Trujillo MD;  Location: [facility]    TUBAL LIGATION Bilateral     ZZC NONSPECIFIC  PROCEDURE      Bilateral Tubal Ligation    Z NONSPECIFIC PROCEDURE      D&C secondary to menorrhagia    Z NONSPECIFIC PROCEDURE      child birth x 2       Allergies:  Allergies   Allergen Reactions    Dilaudid [Hydromorphone]      dizziness    Senna Hives       Current Medications   Current Outpatient Medications   Medication    Acetaminophen (TYLENOL PO)    atorvastatin (LIPITOR) 10 MG tablet    augmented betamethasone dipropionate (DIPROLENE-AF) 0.05 % external ointment    calcium carbonate 500 mg, elemental, (OSCAL;OYSTER SHELL CALCIUM) 500 MG tablet    calcium citrate (CITRACAL) 950 (200 Ca) MG tablet    cholecalciferol (VITAMIN D3) 1000 UNIT tablet    clobetasol (TEMOVATE) 0.05 % external ointment    clobetasol (TEMOVATE) 0.05 % external ointment    clobetasol (TEMOVATE) 0.05 % ointment    diclofenac (VOLTAREN) 1 % topical gel    diltiazem ER COATED BEADS (CARDIZEM CD/CARTIA XT) 300 MG 24 hr capsule    donepezil (ARICEPT) 5 MG tablet    loperamide (IMODIUM) 2 MG capsule    triamcinolone (KENALOG) 0.1 % external ointment    ammonium lactate (LAC-HYDRIN) 12 % external lotion    calcipotriene (DOVONOX) 0.005 % external cream    calcipotriene (DOVONOX) 0.005 % external cream    docusate sodium (COLACE) 100 MG capsule    fluorouracil (EFUDEX) 5 % external cream    fluorouracil (EFUDEX) 5 % external cream     No current facility-administered medications for this visit.       Family History:  Family History   Problem Relation Age of Onset    Hypertension Father     C.A.D. Father     Hypertension Mother     Breast Cancer Sister     Osteoporosis Sister     EYE* Brother         Keratoconus    Glaucoma No family hx of     Macular Degeneration No family hx of     Coronary Artery Disease Father     Breast Cancer Sister     Keratoconus Brother        Social History:  Social History     Tobacco Use    Smoking status: Never    Smokeless tobacco: Never   Substance Use Topics    Alcohol use: No       ROS:  Full review of  "systems taken with the help of the intake sheet. Otherwise a complete 14 point review of systems was taken and is negative unless stated in the history above.    Physical Exam:   Vitals: BP (!) 165/83   Pulse 69   Ht 1.727 m (5' 8\")   Wt 60.8 kg (134 lb)   SpO2 94%   BMI 20.37 kg/m    BMI= Body mass index is 20.37 kg/m .   General: well appearing, no acute distress, pleasant and conversant,   Mental Status/neuro: alert and oriented  Face: symmetrical, normal facial color  Eyes: anicteric, no proptosis or lid lag  Bask: kyphosis,   Neck: no goiter  Resp: normally breathing      Labs : I reviewed data from epic and extract and summarize the pertinent data here.   Lab Results   Component Value Date     07/22/2021     11/25/2019      Lab Results   Component Value Date    POTASSIUM 3.9 07/22/2021    POTASSIUM 4.0 11/25/2019     Lab Results   Component Value Date    CHLORIDE 110 07/22/2021    CHLORIDE 109 11/25/2019     Lab Results   Component Value Date    MARCK 9.1 07/22/2021    MARCK 9.1 11/25/2019     Lab Results   Component Value Date    CO2 29 07/22/2021    CO2 26 11/25/2019     Lab Results   Component Value Date    BUN 20 07/22/2021    BUN 24 11/25/2019     Lab Results   Component Value Date    CR 0.85 07/22/2021    CR 0.76 11/25/2019     Lab Results   Component Value Date    GLC 95 07/22/2021    GLC 92 11/25/2019     Lab Results   Component Value Date    TSH 2.51 04/17/2018     No results found for: T4  No results found for: A1C    No results found for: IGF1  No results found for: LH  No results found for: FSH  No results found for: ESTROGEN  No results found for: PROLACTIN        Bone density 4/11/2022: I personally reviewed the original images and agree with the below reports.   Results   Lumbar spine   T-score -3.5 ., BMD is 0.756 g/cm2.         Right femoral neck  T-score -2.7      Right total femur  T-score -3.1, BMD is 0.622 g/cm2.     Interval change  There was no change at the areas of " interest compared to the prior study.     Fracture risk   Fracture risk calculation is not indicated. Ref.4

## 2024-01-12 ENCOUNTER — MYC REFILL (OUTPATIENT)
Dept: INTERNAL MEDICINE | Facility: CLINIC | Age: 84
End: 2024-01-12
Payer: MEDICARE

## 2024-01-12 DIAGNOSIS — I10 BENIGN ESSENTIAL HYPERTENSION: ICD-10-CM

## 2024-01-12 RX ORDER — DILTIAZEM HYDROCHLORIDE 300 MG/1
300 CAPSULE, COATED, EXTENDED RELEASE ORAL DAILY
Qty: 90 CAPSULE | Refills: 2 | Status: CANCELLED | OUTPATIENT
Start: 2024-01-12

## 2024-01-12 NOTE — TELEPHONE ENCOUNTER
RX AVAILABLE  diltiazem ER COATED BEADS (CARDIZEM CD/CARTIA XT) 300 MG 24 hr capsule   90 capsule 2 1/9/2024 -- No  Sig - Route: Take 1 capsule (300 mg) by mouth daily - Oral

## 2024-02-08 ENCOUNTER — OFFICE VISIT (OUTPATIENT)
Dept: AUDIOLOGY | Facility: CLINIC | Age: 84
End: 2024-02-08
Payer: MEDICARE

## 2024-02-08 ENCOUNTER — ANCILLARY PROCEDURE (OUTPATIENT)
Dept: CT IMAGING | Facility: CLINIC | Age: 84
End: 2024-02-08
Attending: INTERNAL MEDICINE
Payer: MEDICARE

## 2024-02-08 DIAGNOSIS — H90.3 SENSORINEURAL HEARING LOSS (SNHL) OF BOTH EARS: Primary | ICD-10-CM

## 2024-02-08 DIAGNOSIS — R63.4 WEIGHT LOSS: ICD-10-CM

## 2024-02-08 DIAGNOSIS — R41.3 MEMORY LOSS: ICD-10-CM

## 2024-02-08 DIAGNOSIS — Z29.11 NEED FOR VACCINATION AGAINST RESPIRATORY SYNCYTIAL VIRUS: ICD-10-CM

## 2024-02-08 PROCEDURE — 92591 PR HEARING AID EXAM BINAURAL: CPT | Performed by: AUDIOLOGIST

## 2024-02-08 PROCEDURE — 71260 CT THORAX DX C+: CPT | Mod: MG | Performed by: RADIOLOGY

## 2024-02-08 PROCEDURE — G1010 CDSM STANSON: HCPCS | Performed by: RADIOLOGY

## 2024-02-08 PROCEDURE — 74177 CT ABD & PELVIS W/CONTRAST: CPT | Mod: MG | Performed by: RADIOLOGY

## 2024-02-08 RX ORDER — IOPAMIDOL 755 MG/ML
76 INJECTION, SOLUTION INTRAVASCULAR ONCE
Status: COMPLETED | OUTPATIENT
Start: 2024-02-08 | End: 2024-02-08

## 2024-02-08 RX ADMIN — IOPAMIDOL 76 ML: 755 INJECTION, SOLUTION INTRAVASCULAR at 11:41

## 2024-02-08 NOTE — DISCHARGE INSTRUCTIONS

## 2024-02-09 NOTE — PROGRESS NOTES
AUDIOLOGY REPORT    SUBJECTIVE:   Shahana Donaldson is a 83 year old female was seen in the Audiology Clinic at  [facility] on 2/08/24 to discuss concerns with hearing and functional communication difficulties. The patient was accompanied by their . Shahana has been seen previously on 10/31/23, and results revealed a mild to severe sensorineural hearing loss bilaterally.  The patient was medically evaluated and determined to be cleared for binaural hearing aids by Rick Nissen. Shahana notes difficulty with communication in a variety of listening situations.    OBJECTIVE:  Patient is a hearing aid candidate. Patient would like to move forward with a hearing aid evaluation today. Therefore, the patient was presented with different options for amplification to help aid in communication. Discussed styles, levels of technology and monaural vs. binaural fitting.     The hearing aid(s) mutually chosen were:  Binaural: Phonak Audeo L50-RT  COLOR: [place]  BATTERY SIZE: rechargeable  EARMOLD/TIPS: Medium power  CANAL/ LENGTH: 1 medium    Patient reported she recently had an experience at [facility] where they attempted to take earmold impressions, however it was very painful and caused her a lot of distress. We discussed that we can certainly try and meet prescriptive targets with a dome before transitioning to a custom earpiece if needed    ASSESSMENT:     Reviewed purchase information and warranty information with patient. The 45 day trial period was explained to patient. The patient was given a copy of the [place] Department of [employer] consumer brochure on purchasing hearing instruments. Patient risk factors have been provided to the patient in writing prior to the sale of the hearing aid per FDA regulation. The risk factors are also available in the User Instructional Booklet to be presented on the day of the hearing aid fitting. Hearing aid(s) ordered.  Hearing aid evaluation completed.    PLAN: Shahana is scheduled to return in 2-3 weeks for a hearing aid fitting and programming. Purchase agreement will be completed on that date. Please contact this clinic with any questions or concerns.      Luis Kline, [facility]  Licensed Audiologist  [place] License #[id number]

## 2024-02-15 NOTE — PROGRESS NOTES
HPI:    Last visit with us 1/10/2024 and additional information in that note. Her  Jake is present today. Overall stable. She has a decreased appetite and has lost some weight. No other HEENT, cardiopulmonary, abdominal, , GYN, neurological, systemic, psychiatric, lymphatic, endocrine, vascular complaints.     Past Medical History:   Diagnosis Date    Diverticulosis of colon (without mention of hemorrhage)     Essential hypertension, benign     Gastro-oesophageal reflux disease     nissen    Hemorrhoid     Nonsenile cataract     Osteoporosis     Other and unspecified hyperlipidemia     Rectocele     Symptomatic menopausal or female climacteric states      Past Surgical History:   Procedure Laterality Date    ARTHROPLASTY HIP Left 10/23/2018    Procedure: LEFT HIP COLEMAN- ARTHROPLASTY ;  Surgeon: Araceli Gutierrez MD;  Location: [facility] OR    CATARACT IOL, RT/LT Left 07/15/2019    COLONOSCOPY  5/24/2011    Procedure:COLONOSCOPY; Surgeon:HALINA SCOTT; Location:[facility]    COLONOSCOPY Left 11/10/2015    Procedure: COLONOSCOPY;  Surgeon: Raheem Colunga MD;  Location: [facility]    COLONOSCOPY  5/24/11, 11/10/2015    DILATION AND CURETTAGE      secondary to menorrhagia    GYN SURGERY      hysterectomy/oopherectomy; done for prolapse    HEMIARTHROPLASTY HIP Left 10/23/2018    HYSTERECTOMY      hysterectomy/oopherectomy; done for prolapse    LAPAROSCOPIC NISSEN FUNDOPLICATION  1/7/2013    Procedure: LAPAROSCOPIC NISSEN FUNDOPLICATION;  Laparoscopic Repair of Hiatel Hernia, NISSEN, Esophagoscopy, Gastroscopy And Duodenoscopy ;  Surgeon: Leonidas Valle MD;  Location: [facility] OR    LAPAROSCOPIC NISSEN FUNDOPLICATION  01/07/2013    PHACOEMULSIFICATION WITH STANDARD INTRAOCULAR LENS IMPLANT Left 7/15/2019    Procedure: Left Eye Phacoemulsification with Intraocular Lens;  Surgeon: Josue Trujillo MD;  Location: [facility]    TUBAL LIGATION Bilateral     ZZC NONSPECIFIC PROCEDURE      Bilateral Tubal Ligation     ZZC NONSPECIFIC PROCEDURE      D&C secondary to menorrhagia    ZZC NONSPECIFIC PROCEDURE      child birth x 2     PE:    Vitals noted, gen, nad, cooperative, alert, neck supple nl rom, lungs with good air movement, RRR, S1, S2, no MRG, abdomen, no acute findings. Grossly normal neurological exam.     CT Chest/Abdomen/Pelvis w Contrast  Narrative: EXAM: CT CHEST/ABDOMEN/PELVIS W CONTRAST  LOCATION: [facility]  DATE: 2/8/2024    INDICATION: Unintentional weight loss.  COMPARISON: 08/09/2019 CT.  TECHNIQUE: CT scan of the chest, abdomen, and pelvis was performed following injection of IV contrast. Multiplanar reformats were obtained. Dose reduction techniques were used.   CONTRAST: ISOVUE 370 76cc    FINDINGS:   LUNGS AND PLEURA: No acute infiltrates or pleural effusions. Mild chronic subpleural lung base scarring. Unchanged scattered punctate granulomas and mild postinflammatory reticular nodularity lung bases with areas of minor peripheral mucous plugging   middle lobe and lingula. Central airways are patent. No new/enlarging suspicious nodules.    MEDIASTINUM/AXILLAE: Normal heart size and caliber aorta with normal variant aberrant origin right subclavian artery. No lymphadenopathy.    CORONARY ARTERY CALCIFICATION: Minimal    HEPATOBILIARY: Normal.    PANCREAS: Normal.    SPLEEN: Normal.    ADRENAL GLANDS: Normal.    KIDNEYS/BLADDER: Normal.    BOWEL: No obstruction or inflammatory change. Severe sigmoid predominant diverticulosis without active diverticulitis or colitis. No ascites or free air. Surgical changes GE junction consistent with Nissen fundoplication.    LYMPH NODES: Normal.    VASCULATURE: Unremarkable.    PELVIC ORGANS: [Hysterectomy. No masses or fluid collections. Left JOHAN streak artifact.    MUSCULOSKELETAL: No acute findings or suspicious bone lesions visualized. Osteoporosis. Left hip hemiarthroplasty. Body wall unremarkable.  Impression:  IMPRESSION:  1.  No inflammatory changes or other acute findings.  2.  No CT evidence for primary/metastatic malignancy.  3.  Severe colonic diverticulosis without active diverticulitis.  4.  Mild scarring and postinflammatory reticular nodularity lower lungs without acute infiltrates or new/enlarging suspicious nodules.          A/P:    1. Immunizations. COVID Pfizer vaccine x 7 (Bi-valent 5/16/2023 and 10/18/2022). Tdap 11/11/2016. Prevnar 13 on 5/18/2015. Pneumococcal 23 on 4/3/2018. She has completed the Shingrix vaccine series. RSV vaccine done 10/18/2023. Influenza vaccine done 10/18/2023.    2. HTN; on only  Diltazem. She had to discontinue Amlodipine. 24 hour BP monitor was done on 7/14/2023 and Overall was 104/60   3. Increased lipids on Atorvastatin; labs; 10/31/2023 TG's 77, HDL 99 and LDL 53.   4. Seen in Dermatology Dr. Estrada 8/4/2023 and next 2/16/2024.   5. Breast imaging/mammogram done 4/18/2023  6. Preventive Cardiology appt. With Dr. Wanda Francisco 7/9/2021  7. Colonoscopy 11/10/2015  8. DEXA scan done 4/11/2022; most negative T score -3.5 and she was seen Endocrinology, Dr. Ochoa 1/10/2024. Ca/Vit D and reclast. Repeat DEXA scan in 2 years. She has a follow up appt. 1/22/2025  9. Seen Dr. Trujillo, Ophthalmology 6/13/2023 for eye exam and follow up 6/20/2024. Next DEXA scan 4/12/2024. She has a reclast infusion scheduled for 5/22/2024. Last Vitamin D level 35 on 1/10/2024.   10. Hearing; she was seen 10/31/2023 by Dr. Nissen, ENT.  She had an Audiogram 2/8/2024.   11. She was seen in Neurology Dr. Gilbert 11/7/2023 for memory issues and next visit 2/27/2024. Jake states he would prefer to see Neurology here at the [facility] because it is closer to home and placed Neurology referral  1/10/2024. She has a 5/22/2024 Neurology visit with Dr. Velasquez   12. Seen 4/25/2023, Orthopedics, Dr. Harding for R knee OA. PT appt. 5/15/2023.   13. Mild dysphagia with pills may well be related to her dementia. If worse  "discussed either/and EGG or video swallowing study  14. Weight loss. She had an unremarkable CT chest/abdomen/pelvis on 2/8/2024. She had laboratory testing 1/10/2024.            30 minutes spent on the date of the encounter doing chart review, history and exam, documentation and further activities as noted above \"exclusive of procedures and other billable interpretations  "

## 2024-02-16 ENCOUNTER — OFFICE VISIT (OUTPATIENT)
Dept: DERMATOLOGY | Facility: CLINIC | Age: 84
End: 2024-02-16
Payer: MEDICARE

## 2024-02-16 ENCOUNTER — OFFICE VISIT (OUTPATIENT)
Dept: INTERNAL MEDICINE | Facility: CLINIC | Age: 84
End: 2024-02-16
Payer: MEDICARE

## 2024-02-16 VITALS
HEART RATE: 62 BPM | HEIGHT: 68 IN | OXYGEN SATURATION: 96 % | SYSTOLIC BLOOD PRESSURE: 173 MMHG | WEIGHT: 132 LBS | DIASTOLIC BLOOD PRESSURE: 73 MMHG | BODY MASS INDEX: 20 KG/M2

## 2024-02-16 DIAGNOSIS — L82.0 INFLAMED SEBORRHEIC KERATOSIS: ICD-10-CM

## 2024-02-16 DIAGNOSIS — E78.2 MIXED HYPERLIPIDEMIA: ICD-10-CM

## 2024-02-16 DIAGNOSIS — Z85.828 HISTORY OF NONMELANOMA SKIN CANCER: Primary | ICD-10-CM

## 2024-02-16 DIAGNOSIS — D18.01 CHERRY ANGIOMA: ICD-10-CM

## 2024-02-16 DIAGNOSIS — D22.9 MULTIPLE BENIGN NEVI: ICD-10-CM

## 2024-02-16 DIAGNOSIS — L81.4 LENTIGINES: ICD-10-CM

## 2024-02-16 DIAGNOSIS — L90.0 LICHEN SCLEROSUS: ICD-10-CM

## 2024-02-16 DIAGNOSIS — L82.1 SEBORRHEIC KERATOSIS: ICD-10-CM

## 2024-02-16 PROCEDURE — 17110 DESTRUCTION B9 LES UP TO 14: CPT | Mod: GC | Performed by: DERMATOLOGY

## 2024-02-16 PROCEDURE — 99214 OFFICE O/P EST MOD 30 MIN: CPT | Performed by: INTERNAL MEDICINE

## 2024-02-16 PROCEDURE — 99214 OFFICE O/P EST MOD 30 MIN: CPT | Mod: 25 | Performed by: DERMATOLOGY

## 2024-02-16 RX ORDER — CLOBETASOL PROPIONATE 0.5 MG/G
OINTMENT TOPICAL
Qty: 60 G | Refills: 3 | Status: SHIPPED | OUTPATIENT
Start: 2024-02-16

## 2024-02-16 RX ORDER — ATORVASTATIN CALCIUM 10 MG/1
10 TABLET, FILM COATED ORAL DAILY
Qty: 90 TABLET | Refills: 1 | Status: SHIPPED | OUTPATIENT
Start: 2024-02-16

## 2024-02-16 ASSESSMENT — PAIN SCALES - GENERAL: PAINLEVEL: NO PAIN (0)

## 2024-02-16 NOTE — PROGRESS NOTES
[facility] Dermatology Note  Encounter Date: Feb 16, 2024  Office Visit     Dermatology Problem List:  Last skin check 02/16/24  1. 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  2. NMSC:  - BCC, R dorsal wrist, s/p MMS 5/24/21  3. Lichen planus, not active.  - Prior rx: clobetasol ointment  4. Biopsy-proven lichen sclerosis of the vaginal area  - clobetasol 0.05% ointment BID prn  5. Contact dermatitis due to  on hands in 2014 (presumed, no prior patch testing).  6. Benign bx  - Lentigo, Right lateral foot, s/p shave bx 12/3/21  - SK, Right lower back, s/p shave bx 12/3/21  7. AKs, s/p cryotherapy  - Efudex + calcipotriene initiated 4/7/2023 to the nose  8. Mild DN, left proximal thigh, s/p shave bx 4/7/2023  9. Excoriations  - duoderm    ____________________________________________    Assessment & Plan:    # Lichen sclerosus, genital. Chronic, w focal area of activity and some symptoms.  Previously applying 2-3 x weekly but then ran out. Describes some soreness of labia minora. Physical exam shows erythema on R inferior labia minora w shiny white scar at posterior commissure.  - refilled clobetasol  - recommended clobetasol bid x 2 weeks, then daily x 2 weeks, then every other day utnil follow up.    # Lesion to monitor - left lateral elbow, suspect flat SK  1 cm total, bilobed brown macule w hypopigmentation centrally. Dermoscopy shows no true pigment network w some peppering. L lateral elbow. Considered melanocytic lesion, however dermoscopy is most consistent with SK.  - photo today and will monitor    # iSK  L nasal bridge near medial canthus. Reviewed benign etiology and treatment options.  - see proc note below    # Excoriated papules x 2  Forehead with benign appearing linear excoriations. Reviewed w patient. Offered duoderm and recheck at follow-up.   - written instructions provided    # Actinic damage.  - improved s/p field  therapy    # Multiple benign nevi.   - Monitor for ABCDEs of melanoma   - Continue sun protection - recommend SPF 30 or higher with frequent application   - Return sooner if noticing changing or symptomatic lesions    # Benign lesions - SKs, cherry angiomas, lentigenes.  - No treatment required    # History of atypical junctional melanocytic proliferation. No evidence of recurrent disease.  - Continue photoprotection  - Continue yearly skin exams  - Advised to monitor for changing, non-healing, bleeding, painful, changing, or otherwise symptomatic lesions    # History of NMSC. No evidence of recurrent disease.  - Continue photoprotection - recommend SPF 30 or higher with frequent reapplication  - Continue yearly skin exams  - Advised to monitor for changing, non-healing, bleeding, painful, changing, or otherwise symptomatic lesions    # Healing ecchymosis.  R superior lateral thigh. Pt cannot recall etiology. Does not appear symptomatic. Will make note and follow-up in 6 months. Would explore etiologies if additional or persistent bruising.    Procedures Performed:   - Cryotherapy procedure note, location(s): L nasal bridge near medial canthus. After verbal consent and discussion of risks and benefits including, but not limited to, dyspigmentation/scar, blister, and pain, 1 lesion(s) was(were) treated with 1-2 mm freeze border for 1-2 cycles with liquid nitrogen. Post cryotherapy instructions were provided.      Follow-up: 6 month(s) in-person, or earlier for new or changing lesions    Staff and Resident and Scribe:     IDemi, saw and staffed this patient with the attending. All recommendations, therapies and procedures were pre-staffed with the attending or administered with direct supervision.     Staff Physician Comments:   I saw and evaluated the patient with the resident and I agree with the assessment and plan.  I was present for the entire minor procedure and examination.    Rosamaria Estrada,  MD    Department of Dermatology  [facility]: Phone: [phone], Fax: [phone]  2/21/2024     ____________________________________________    CC: Skin Check (Patient presents for FBSE.The patient denies any new lesions of concern. )    HPI:  Ms. Shahana Donaldson is a(n) 83 year old female who presents today as a return patient for FBSE.    - feeling well today  - no areas of concern  - has difficulty reporting history of lesions  - cannot recall if using clobetasol every other day or triamcinolone    - spouse reports that patient describes occasional vaginal soreness    - noted bruise on R hip during physical exam, patient cannot recall etiology    Patient is otherwise feeling well, without additional skin concerns.    Labs Reviewed:  N/A    Physical Exam:  Vitals: There were no vitals taken for this visit.  SKIN: Full skin, which includes the head/face, both arms, chest, back, abdomen,both legs, genitalia and/or groin buttocks, digits and/or nails, was examined.  - excoriated papules on the forehead x 2. No concerning features on dermoscopy  - Scattered brown macules on sun exposed areas.  - 10 cm round ecchmyosis R thigh  - There are waxy stuck on tan to brown papules on the trunk and extremities.   - L lateral elbow: 1 cm total, bilobed brown macule w hypopigmentation centrally. Dermoscopy shows no true pigment network w some peppering.   Genital exam: R medial labia minora with erythematous plaque inferiorly. White shiny scar on posterior commissure.   - No other lesions of concern on areas examined.     Medications:  Current Outpatient Medications   Medication    Acetaminophen (TYLENOL PO)    ammonium lactate (LAC-HYDRIN) 12 % external lotion    atorvastatin (LIPITOR) 10 MG tablet    augmented betamethasone dipropionate (DIPROLENE-AF) 0.05 % external ointment    calcipotriene (DOVONOX) 0.005 % external cream     calcipotriene (DOVONOX) 0.005 % external cream    calcium carbonate 500 mg, elemental, (OSCAL;OYSTER SHELL CALCIUM) 500 MG tablet    calcium citrate (CITRACAL) 950 (200 Ca) MG tablet    cholecalciferol (VITAMIN D3) 1000 UNIT tablet    clobetasol (TEMOVATE) 0.05 % external ointment    clobetasol (TEMOVATE) 0.05 % external ointment    clobetasol (TEMOVATE) 0.05 % ointment    diclofenac (VOLTAREN) 1 % topical gel    diltiazem ER COATED BEADS (CARDIZEM CD/CARTIA XT) 300 MG 24 hr capsule    docusate sodium (COLACE) 100 MG capsule    donepezil (ARICEPT) 5 MG tablet    fluorouracil (EFUDEX) 5 % external cream    fluorouracil (EFUDEX) 5 % external cream    loperamide (IMODIUM) 2 MG capsule    triamcinolone (KENALOG) 0.1 % external ointment     No current facility-administered medications for this visit.      Past Medical History:   Patient Active Problem List   Diagnosis    Essential hypertension, benign    Mixed hyperlipidemia    Symptomatic menopausal or female climacteric states    Diverticulosis of large intestine    Hyperlipidemia    Coronary atherosclerosis    Esophageal reflux    vulvar cyst    Hiatal hernia    S/P Nissen fundoplication (without gastrostomy tube) procedure    History of tubal ligation    History of dilatation and curettage    History of hysterectomy    Lichen sclerosus    Left displaced femoral neck fracture (H)    Senile osteoporosis    Lichen planus    Seborrheic dermatitis    Heart failure with preserved ejection fraction, NYHA class II (H)     Past Medical History:   Diagnosis Date    Diverticulosis of colon (without mention of hemorrhage)     Essential hypertension, benign     Gastro-oesophageal reflux disease     nissen    Hemorrhoid     Nonsenile cataract     Osteoporosis     Other and unspecified hyperlipidemia     Rectocele     Symptomatic menopausal or female climacteric states         CC Referred Self, MD  No address on file on close of this encounter.

## 2024-02-16 NOTE — PROGRESS NOTES
Pat is a 83 year old that presents in clinic today for the following:     Chief Complaint   Patient presents with    Follow Up           2/16/2024     8:54 AM   Additional Questions   Roomed by Jeanine Patel   Accompanied by      Screenings from encounters over the past 10 days    No data recorded       Jeanine Patel at 8:58 AM on 2/16/2024

## 2024-02-16 NOTE — NURSING NOTE
Chief Complaint   Patient presents with    Skin Check     Patient presents for FBSE.The patient denies any new lesions of concern.      Elsa Apodaca LPN

## 2024-02-16 NOTE — PATIENT INSTRUCTIONS
Please begin applying the clobetasol twice a day for two weeks to the sore spots in the vagina. Then please apply the clobetasol once per day for two weeks. Then apply the clobetasol every other day for maintenance.    Please put the new duoderm patches on the sore on your forehead.    Cryotherapy    What is it?  Use of a very cold liquid, such as liquid nitrogen, to freeze and destroy abnormal skin cells that need to be removed    What should I expect?  Tenderness and redness  A small blister that might grow and fill with dark purple blood. There may be crusting.  More than one treatment may be needed if the lesions do not go away.    How do I care for the treated area?  Gently wash the area with your hands when bathing.  Use a thin layer of Vaseline to help with healing. You may use a Band-Aid.   The area should heal within 7-10 days and may leave behind a pink or lighter color.   Do not use an antibiotic or Neosporin ointment.   You may take acetaminophen (Tylenol) for pain.     Call your doctor if you have:  Severe pain  Signs of infection (warmth, redness, cloudy yellow drainage, and or a bad smell)  Questions or concerns    Who should I call with questions?      [facility]: [phone]      [facility]: [phone]      For urgent needs outside of business hours call the [facility] at [phone] and ask for the dermatology resident on call

## 2024-02-16 NOTE — LETTER
2/16/2024       RE: Shahana Donaldson  [address]  [place] [address]     Dear Colleague,    Thank you for referring your patient, Shahana Donaldson, to the [facility] [place] at [facility]. Please see a copy of my visit note below.    [facility] Dermatology Note  Encounter Date: Feb 16, 2024  Office Visit     Dermatology Problem List:  Last skin check 02/16/24  1. 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  2. NMSC:  - BCC, R dorsal wrist, s/p MMS 5/24/21  3. Lichen planus, not active.  - Prior rx: clobetasol ointment  4. Biopsy-proven lichen sclerosis of the vaginal area  - clobetasol 0.05% ointment BID prn  5. Contact dermatitis due to  on hands in 2014 (presumed, no prior patch testing).  6. Benign bx  - Lentigo, Right lateral foot, s/p shave bx 12/3/21  - SK, Right lower back, s/p shave bx 12/3/21  7. AKs, s/p cryotherapy  - Efudex + calcipotriene initiated 4/7/2023 to the nose  8. Mild DN, left proximal thigh, s/p shave bx 4/7/2023  9. Excoriations  - duoderm    ____________________________________________    Assessment & Plan:    # Lichen sclerosus, genital. Chronic, w focal area of activity and some symptoms.  Previously applying 2-3 x weekly but then ran out. Describes some soreness of labia minora. Physical exam shows erythema on R inferior labia minora w shiny white scar at posterior commissure.  - refilled clobetasol  - recommended clobetasol bid x 2 weeks, then daily x 2 weeks, then every other day utnil follow up.    # Lesion to monitor - left lateral elbow, suspect flat SK  1 cm total, bilobed brown macule w hypopigmentation centrally. Dermoscopy shows no true pigment network w some peppering. L lateral elbow. Considered melanocytic lesion, however dermoscopy is most consistent with SK.  - photo today and will monitor    #  iSK  L nasal bridge near medial canthus. Reviewed benign etiology and treatment options.  - see proc note below    # Excoriated papules x 2  Forehead with benign appearing linear excoriations. Reviewed w patient. Offered duoderm and recheck at follow-up.   - written instructions provided    # Actinic damage.  - improved s/p field therapy    # Multiple benign nevi.   - Monitor for ABCDEs of melanoma   - Continue sun protection - recommend SPF 30 or higher with frequent application   - Return sooner if noticing changing or symptomatic lesions    # Benign lesions - SKs, cherry angiomas, lentigenes.  - No treatment required    # History of atypical junctional melanocytic proliferation. No evidence of recurrent disease.  - Continue photoprotection  - Continue yearly skin exams  - Advised to monitor for changing, non-healing, bleeding, painful, changing, or otherwise symptomatic lesions    # History of NMSC. No evidence of recurrent disease.  - Continue photoprotection - recommend SPF 30 or higher with frequent reapplication  - Continue yearly skin exams  - Advised to monitor for changing, non-healing, bleeding, painful, changing, or otherwise symptomatic lesions    # Healing ecchymosis.  R superior lateral thigh. Pt cannot recall etiology. Does not appear symptomatic. Will make note and follow-up in 6 months. Would explore etiologies if additional or persistent bruising.    Procedures Performed:   - Cryotherapy procedure note, location(s): L nasal bridge near medial canthus. After verbal consent and discussion of risks and benefits including, but not limited to, dyspigmentation/scar, blister, and pain, 1 lesion(s) was(were) treated with 1-2 mm freeze border for 1-2 cycles with liquid nitrogen. Post cryotherapy instructions were provided.      Follow-up: 6 month(s) in-person, or earlier for new or changing lesions    Staff and Resident and Scribe:     Demi FORD, saw and staffed this patient with the attending. All  recommendations, therapies and procedures were pre-staffed with the attending or administered with direct supervision.     Staff Physician Comments:   I saw and evaluated the patient with the resident and I agree with the assessment and plan.  I was present for the entire minor procedure and examination.    Rosamaria Estrada MD    Department of Dermatology  [facility]: Phone: [phone], Fax: [phone]  2/21/2024     ____________________________________________    CC: Skin Check (Patient presents for FBSE.The patient denies any new lesions of concern. )    HPI:  Ms. Shahana Donaldson is a(n) 83 year old female who presents today as a return patient for FBSE.    - feeling well today  - no areas of concern  - has difficulty reporting history of lesions  - cannot recall if using clobetasol every other day or triamcinolone    - spouse reports that patient describes occasional vaginal soreness    - noted bruise on R hip during physical exam, patient cannot recall etiology    Patient is otherwise feeling well, without additional skin concerns.    Labs Reviewed:  N/A    Physical Exam:  Vitals: There were no vitals taken for this visit.  SKIN: Full skin, which includes the head/face, both arms, chest, back, abdomen,both legs, genitalia and/or groin buttocks, digits and/or nails, was examined.  - excoriated papules on the forehead x 2. No concerning features on dermoscopy  - Scattered brown macules on sun exposed areas.  - 10 cm round ecchmyosis R thigh  - There are waxy stuck on tan to brown papules on the trunk and extremities.   - L lateral elbow: 1 cm total, bilobed brown macule w hypopigmentation centrally. Dermoscopy shows no true pigment network w some peppering.   Genital exam: R medial labia minora with erythematous plaque inferiorly. White shiny scar on posterior commissure.   - No other lesions of concern on areas  examined.     Medications:  Current Outpatient Medications   Medication    Acetaminophen (TYLENOL PO)    ammonium lactate (LAC-HYDRIN) 12 % external lotion    atorvastatin (LIPITOR) 10 MG tablet    augmented betamethasone dipropionate (DIPROLENE-AF) 0.05 % external ointment    calcipotriene (DOVONOX) 0.005 % external cream    calcipotriene (DOVONOX) 0.005 % external cream    calcium carbonate 500 mg, elemental, (OSCAL;OYSTER SHELL CALCIUM) 500 MG tablet    calcium citrate (CITRACAL) 950 (200 Ca) MG tablet    cholecalciferol (VITAMIN D3) 1000 UNIT tablet    clobetasol (TEMOVATE) 0.05 % external ointment    clobetasol (TEMOVATE) 0.05 % external ointment    clobetasol (TEMOVATE) 0.05 % ointment    diclofenac (VOLTAREN) 1 % topical gel    diltiazem ER COATED BEADS (CARDIZEM CD/CARTIA XT) 300 MG 24 hr capsule    docusate sodium (COLACE) 100 MG capsule    donepezil (ARICEPT) 5 MG tablet    fluorouracil (EFUDEX) 5 % external cream    fluorouracil (EFUDEX) 5 % external cream    loperamide (IMODIUM) 2 MG capsule    triamcinolone (KENALOG) 0.1 % external ointment     No current facility-administered medications for this visit.      Past Medical History:   Patient Active Problem List   Diagnosis    Essential hypertension, benign    Mixed hyperlipidemia    Symptomatic menopausal or female climacteric states    Diverticulosis of large intestine    Hyperlipidemia    Coronary atherosclerosis    Esophageal reflux    vulvar cyst    Hiatal hernia    S/P Nissen fundoplication (without gastrostomy tube) procedure    History of tubal ligation    History of dilatation and curettage    History of hysterectomy    Lichen sclerosus    Left displaced femoral neck fracture (H)    Senile osteoporosis    Lichen planus    Seborrheic dermatitis    Heart failure with preserved ejection fraction, NYHA class II (H)     Past Medical History:   Diagnosis Date    Diverticulosis of colon (without mention of hemorrhage)     Essential hypertension, benign      Gastro-oesophageal reflux disease     nissen    Hemorrhoid     Nonsenile cataract     Osteoporosis     Other and unspecified hyperlipidemia     Rectocele     Symptomatic menopausal or female climacteric states         CC Referred Self, MD  No address on file on close of this encounter.

## 2024-02-20 ENCOUNTER — TELEPHONE (OUTPATIENT)
Dept: DERMATOLOGY | Facility: CLINIC | Age: 84
End: 2024-02-20
Payer: MEDICARE

## 2024-02-29 ENCOUNTER — OFFICE VISIT (OUTPATIENT)
Dept: AUDIOLOGY | Facility: CLINIC | Age: 84
End: 2024-02-29
Payer: MEDICARE

## 2024-02-29 DIAGNOSIS — H90.3 SENSORINEURAL HEARING LOSS (SNHL) OF BOTH EARS: Primary | ICD-10-CM

## 2024-02-29 PROCEDURE — V5160 DISPENSING FEE BINAURAL: HCPCS | Performed by: AUDIOLOGIST

## 2024-02-29 PROCEDURE — V5261 HEARING AID, DIGIT, BIN, BTE: HCPCS | Performed by: AUDIOLOGIST

## 2024-02-29 PROCEDURE — V5020 CONFORMITY EVALUATION: HCPCS | Performed by: AUDIOLOGIST

## 2024-02-29 PROCEDURE — V5011 HEARING AID FITTING/CHECKING: HCPCS | Performed by: AUDIOLOGIST

## 2024-02-29 NOTE — PROGRESS NOTES
"AUDIOLOGY REPORT  SUBJECTIVE:   Shahana \"Pat\" EMMA Donaldson is an 83 year old female who was seen in the Audiology Clinic at the [facility] for a fitting of bilateral Phonak Audeo L50-RT -in-the-canal (MISA) hearing aids. The patient was accompanied by her . Shahana has been seen previously on 10/31/23, and results revealed a mild to severe sensorineural hearing loss bilaterally.  The patient was medically evaluated and determined to be cleared for binaural hearing aids by Rick Nissen. Shahana notes difficulty with communication in a variety of listening situations.   OBJECTIVE:   The hearing aid conformity evaluation was completed.The hearing aids were placed and they provided a good fit. Simulated real-ear-probe-microphone measurements were completed on the [employer] system and were a good match to NAL-NL1 target with soft sounds audible, moderate sounds comfortable, and loud sounds below discomfort. UCLs are verified through maximum power output measures and demonstrate appropriate limiting of loud inputs. Shahana was oriented to proper hearing aid use, care, cleaning (no water, dry brush),  insertion/removal, toxicity, low-battery signal, aid insertion/removal, user booklet, warranty information, storage cases, and other hearing aid details. The patient confirmed understanding of hearing aid use and care. After 15-20 minutes of practice while in the office today, Shahana demonstrated overall proper insertion and removal of hearing aids. Shahana initially reported that the hearing aids were too loud when they were turned on. We set overall gain to 90% and Pat reported they were still too loud. We then decreased overall gain by 3 dB. Shahana then reported good volume and sound quality today. Shahana needed frequent reminders over the course of today's appointment that things will sound loud to her during the adjustment period but that they " will improve with usage time.   Hearing aids were programmed as follows:  Program 1: Autosense/Everyday  EAR(S) FIT: Bilateral  HEARING AID MODEL NAME: Shady Bentono L50-RT  HEARING AID STYLE: -in-the-ear behind-the-ear  EARMOLDS/TIP: Small power (consider a vented dome at review appointment if Kamini continues to struggle to fully insert the hearing aid into her ear canal)  SERIAL NUMBERS: Right: [id number] Left: [id number]  WARRANTY END DATE: 03/11/2027  ASSESSMENT:   Bilateral hearing aid(s) were fit today. Verification measures were performed. Shahana signed the Hearing Aid Purchase Agreement and was given a copy, as well as details on her hearing aids. Patient was counseled that exact out of pocket amounts cannot be determined for hearing aid claims being sent to insurance. Any insurance coverage information presented to the patient is an estimate only, and is not a guarantee of payment. Patient has been advised to check with their own insurance.  PLAN:   Shahana will return for follow-up in 2-3 weeks for a hearing aid review appointment. Please call this clinic with questions regarding today s appointment.  Vickie Velez M.A.  Audiology Doctoral Extern  [place] #[id number]     I was present with the patient for the entire audiology appointment including all procedures/testing performed by the AuD student, and agree with the student's assessment and plan as documented.     Luis Kline, [facility] [place]  Licensed Audiologist  [place] License #[id number]

## 2024-03-19 ENCOUNTER — OFFICE VISIT (OUTPATIENT)
Dept: AUDIOLOGY | Facility: CLINIC | Age: 84
End: 2024-03-19
Payer: MEDICARE

## 2024-03-19 DIAGNOSIS — H90.3 SENSORINEURAL HEARING LOSS (SNHL) OF BOTH EARS: Primary | ICD-10-CM

## 2024-03-19 PROCEDURE — 99207 PR ASSESSMENT FOR HEARING AID: CPT | Performed by: AUDIOLOGIST

## 2024-03-19 NOTE — PROGRESS NOTES
AUDIOLOGY REPORT    SUBJECTIVE:  Shahana Donaldson is a 83 year old female who was seen in the Audiology Clinic at the [facility] on 3/19/2024  for a follow-up check regarding the fitting of new hearing aids. Previous results have revealed a mild to severe sensorineural hearing loss bilaterally.  The patient has been seen previously in this clinic and was fit with Phonak L50-R hearing aids on 2/29/24.  Shahana reports that she is pleased with the hearing aids. She reported that she is able to hear better at choir. They reported that the left one is a lot harder to get in. They also inquired about if it was okay for her to take a break wearing them if her ears itch.     OBJECTIVE:   Based on patient report, the following changes were made; left dome size was changed to a small vented. No feedback occurred.    Reviewed 45 day trial period, care, cleaning (no water, dry brush), batteries (size n/a) insertion/removal, toxicity, low-battery signal), aid insertion/removal, volume adjustment (if applicable), user booklet, warranty information, storage cases, and other hearing aid details.     No charge visit today (in warranty hearing aid check).    ASSESSMENT:   A follow-up appointment for hearing aid fitting was completed today. Changes to hearing aid was completed as outlined above.     PLAN:  Shahana will return for follow-up as needed, or at least every 9-12 months for cleaning and assessment of hearing aid.  . Please call this clinic with any questions regarding today s appointment.    Luis Kline, [facility]  Licensed Audiologist  [place] License #[id number]

## 2024-03-29 NOTE — TELEPHONE ENCOUNTER
RECORDS RECEIVED FROM: Internal    REASON FOR VISIT: Z29.11 (ICD-10-CM) - Need for vaccination against respiratory syncytial virus  R63.4 (ICD-10-CM) - Weight loss  R41.3 (ICD-10-CM) - Memory loss    PROVIDER: Liu Malik DO   DATE OF APPT: 5/22/24 @ 7:30 am    NOTES (FOR ALL VISITS) STATUS DETAILS   OFFICE NOTE from referring provider Internal 1/10/24 Jeramie Curran MD @[facility]-     OFFICE NOTE from other specialist Internal 11/7/23 Jakob Masters MD @[facility] Neuro     MEDICATION LIST Internal    IMAGING  (FOR ALL VISITS)     MRI (HEAD, NECK, SPINE) Internal [facility]  12/5/23 MR Brain

## 2024-04-12 ENCOUNTER — ANCILLARY PROCEDURE (OUTPATIENT)
Dept: BONE DENSITY | Facility: CLINIC | Age: 84
End: 2024-04-12
Attending: INTERNAL MEDICINE
Payer: MEDICARE

## 2024-04-12 DIAGNOSIS — M81.0 SENILE OSTEOPOROSIS: ICD-10-CM

## 2024-04-12 PROCEDURE — 77080 DXA BONE DENSITY AXIAL: CPT

## 2024-05-14 ENCOUNTER — TELEPHONE (OUTPATIENT)
Dept: DERMATOLOGY | Facility: CLINIC | Age: 84
End: 2024-05-14
Payer: MEDICARE

## 2024-05-14 DIAGNOSIS — F03.90 MAJOR NEUROCOGNITIVE DISORDER (H): ICD-10-CM

## 2024-05-14 NOTE — TELEPHONE ENCOUNTER
Pending Prescriptions:                       Disp   Refills    donepezil (ARICEPT) 5 MG tablet           90 tab*3            Sig: Take 1 tablet (5 mg) by mouth at bedtime If well           tolerated after 4 weeks take 1 in the morning 1           at bedtime (total dose 10 mg / day)      Office visit - 11/7/2023

## 2024-05-14 NOTE — TELEPHONE ENCOUNTER
Patient Contacted  and schedule the following:    Appointment type: Return  Provider: Dr. Estrada  Return date: 1/03/25  Specialty phone number: [phone]

## 2024-05-15 ENCOUNTER — MYC MEDICAL ADVICE (OUTPATIENT)
Dept: NEUROLOGY | Facility: CLINIC | Age: 84
End: 2024-05-15
Payer: MEDICARE

## 2024-05-15 RX ORDER — DONEPEZIL HYDROCHLORIDE 5 MG/1
5 TABLET, FILM COATED ORAL AT BEDTIME
Qty: 90 TABLET | Refills: 3 | OUTPATIENT
Start: 2024-05-15

## 2024-05-15 NOTE — TELEPHONE ENCOUNTER
Pt replied back that she does not need refills prior to visit with Dr. Malik.     Will deny.     Kylee SCHWARTZ RN, BSN  [facility]

## 2024-05-21 RX ORDER — EPINEPHRINE 1 MG/ML
0.3 INJECTION, SOLUTION INTRAMUSCULAR; SUBCUTANEOUS EVERY 5 MIN PRN
OUTPATIENT
Start: 2025-05-21

## 2024-05-21 RX ORDER — DIPHENHYDRAMINE HYDROCHLORIDE 50 MG/ML
50 INJECTION INTRAMUSCULAR; INTRAVENOUS
Start: 2025-05-21

## 2024-05-21 RX ORDER — ALBUTEROL SULFATE 0.83 MG/ML
2.5 SOLUTION RESPIRATORY (INHALATION)
OUTPATIENT
Start: 2025-05-21

## 2024-05-21 RX ORDER — ACETAMINOPHEN 325 MG/1
325 TABLET ORAL
OUTPATIENT
Start: 2025-05-21

## 2024-05-21 RX ORDER — MEPERIDINE HYDROCHLORIDE 25 MG/ML
25 INJECTION INTRAMUSCULAR; INTRAVENOUS; SUBCUTANEOUS EVERY 30 MIN PRN
OUTPATIENT
Start: 2025-05-21

## 2024-05-21 RX ORDER — HEPARIN SODIUM (PORCINE) LOCK FLUSH IV SOLN 100 UNIT/ML 100 UNIT/ML
5 SOLUTION INTRAVENOUS
OUTPATIENT
Start: 2025-05-21

## 2024-05-21 RX ORDER — HEPARIN SODIUM,PORCINE 10 UNIT/ML
5-20 VIAL (ML) INTRAVENOUS DAILY PRN
OUTPATIENT
Start: 2025-05-21

## 2024-05-21 RX ORDER — ZOLEDRONIC ACID 5 MG/100ML
5 INJECTION, SOLUTION INTRAVENOUS ONCE
Start: 2025-05-21

## 2024-05-21 RX ORDER — ALBUTEROL SULFATE 90 UG/1
1-2 AEROSOL, METERED RESPIRATORY (INHALATION)
Start: 2025-05-21

## 2024-05-21 RX ORDER — METHYLPREDNISOLONE SODIUM SUCCINATE 125 MG/2ML
125 INJECTION, POWDER, LYOPHILIZED, FOR SOLUTION INTRAMUSCULAR; INTRAVENOUS
Start: 2025-05-21

## 2024-05-22 ENCOUNTER — OFFICE VISIT (OUTPATIENT)
Dept: NEUROLOGY | Facility: CLINIC | Age: 84
End: 2024-05-22
Attending: INTERNAL MEDICINE
Payer: MEDICARE

## 2024-05-22 ENCOUNTER — ANCILLARY PROCEDURE (OUTPATIENT)
Dept: MAMMOGRAPHY | Facility: CLINIC | Age: 84
End: 2024-05-22
Payer: MEDICARE

## 2024-05-22 ENCOUNTER — APPOINTMENT (OUTPATIENT)
Dept: LAB | Facility: CLINIC | Age: 84
End: 2024-05-22
Attending: INTERNAL MEDICINE
Payer: MEDICARE

## 2024-05-22 ENCOUNTER — PRE VISIT (OUTPATIENT)
Dept: NEUROLOGY | Facility: CLINIC | Age: 84
End: 2024-05-22

## 2024-05-22 ENCOUNTER — INFUSION THERAPY VISIT (OUTPATIENT)
Dept: INFUSION THERAPY | Facility: CLINIC | Age: 84
End: 2024-05-22
Attending: INTERNAL MEDICINE
Payer: MEDICARE

## 2024-05-22 VITALS
HEART RATE: 80 BPM | BODY MASS INDEX: 21.13 KG/M2 | OXYGEN SATURATION: 95 % | DIASTOLIC BLOOD PRESSURE: 78 MMHG | SYSTOLIC BLOOD PRESSURE: 155 MMHG | HEIGHT: 67 IN

## 2024-05-22 VITALS
OXYGEN SATURATION: 95 % | BODY MASS INDEX: 20.47 KG/M2 | SYSTOLIC BLOOD PRESSURE: 165 MMHG | WEIGHT: 134.6 LBS | HEART RATE: 63 BPM | DIASTOLIC BLOOD PRESSURE: 87 MMHG

## 2024-05-22 DIAGNOSIS — F03.90 MAJOR NEUROCOGNITIVE DISORDER (H): ICD-10-CM

## 2024-05-22 DIAGNOSIS — M81.0 SENILE OSTEOPOROSIS: Primary | ICD-10-CM

## 2024-05-22 DIAGNOSIS — Z12.31 VISIT FOR SCREENING MAMMOGRAM: ICD-10-CM

## 2024-05-22 LAB
CALCIUM SERPL-MCNC: 9 MG/DL (ref 8.8–10.2)
CREAT SERPL-MCNC: 0.73 MG/DL (ref 0.51–0.95)
EGFRCR SERPLBLD CKD-EPI 2021: 81 ML/MIN/1.73M2

## 2024-05-22 PROCEDURE — 77063 BREAST TOMOSYNTHESIS BI: CPT | Mod: GC | Performed by: RADIOLOGY

## 2024-05-22 PROCEDURE — 82310 ASSAY OF CALCIUM: CPT | Performed by: INTERNAL MEDICINE

## 2024-05-22 PROCEDURE — 77067 SCR MAMMO BI INCL CAD: CPT | Mod: GC | Performed by: RADIOLOGY

## 2024-05-22 PROCEDURE — 36415 COLL VENOUS BLD VENIPUNCTURE: CPT | Performed by: INTERNAL MEDICINE

## 2024-05-22 PROCEDURE — 99205 OFFICE O/P NEW HI 60 MIN: CPT | Performed by: PSYCHIATRY & NEUROLOGY

## 2024-05-22 PROCEDURE — 96365 THER/PROPH/DIAG IV INF INIT: CPT

## 2024-05-22 PROCEDURE — 82565 ASSAY OF CREATININE: CPT | Performed by: INTERNAL MEDICINE

## 2024-05-22 PROCEDURE — 250N000011 HC RX IP 250 OP 636: Mod: JZ | Performed by: INTERNAL MEDICINE

## 2024-05-22 RX ORDER — ZOLEDRONIC ACID 5 MG/100ML
5 INJECTION, SOLUTION INTRAVENOUS ONCE
Status: COMPLETED | OUTPATIENT
Start: 2024-05-22 | End: 2024-05-22

## 2024-05-22 RX ORDER — DONEPEZIL HYDROCHLORIDE 10 MG/1
10 TABLET, FILM COATED ORAL AT BEDTIME
Qty: 60 TABLET | Refills: 2 | Status: SHIPPED | OUTPATIENT
Start: 2024-05-22 | End: 2024-08-28

## 2024-05-22 RX ADMIN — ZOLEDRONIC ACID 5 MG: 0.05 INJECTION, SOLUTION INTRAVENOUS at 14:32

## 2024-05-22 ASSESSMENT — PAIN SCALES - GENERAL: PAINLEVEL: NO PAIN (0)
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
## 2024-05-22 NOTE — PATIENT INSTRUCTIONS
I would agree that you likely have Alzheimer's dementia.  You would benefit from cognitive testing as well as continuation of donepezil.    I am happy to continue to help you with monitoring signs of increasing needs or atypical behaviors that may develop. This will require shorter follow ups initially.

## 2024-05-22 NOTE — NURSING NOTE
Chief Complaint   Patient presents with    Blood Draw     Labs drawn via PIV by RN in lab.        Labs drawn from PIV placed by RN. Line flushed with saline.    Kristy Trujillo RN

## 2024-05-22 NOTE — PATIENT INSTRUCTIONS
Dear Shahana Donaldson    Thank you for choosing [facility] ([facility]) for your infusion.  The following information is a summary of our appointment as well as important reminders.      If you are a transplant patient and require transplant education, please click on this link: [email or web address].    We look forward in seeing you on your next appointment here at [facility] ([facility]).  Please don t hesitate to call us at [phone] to reschedule any of your appointments or to speak with one of the [facility] registered nurses.  It was a pleasure taking care of you today.    Sincerely,    [facility]  [address] [place] [address]  [id number]  Phone:  [phone]
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
## 2024-05-22 NOTE — PROGRESS NOTES
Nursing Note  Shahana Donaldson presents [facility] for:   Chief Complaint   Patient presents with    Blood Draw     Labs drawn via PIV by RN in lab.      During today's [facility] appointment, orders from  were completed.  Frequency: yearly    Progress note:  Patient identification verified by name and date of birth.  Assessment completed.  Vitals recorded in Doc Flowsheets.  Patient was provided with education regarding medication/procedure and possible side effects.  Patient verbalized understanding.     present during visit today: Not Applicable.    Treatment Conditions: Patient's Creatinine Clearance is within paramaters to administer medication per orders.   Latest Reference Range & Units 05/22/24 13:29   Creatinine 0.51 - 0.95 mg/dL 0.73   GFR Estimate >60 mL/min/1.73m2 81   Calcium 8.8 - 10.2 mg/dL 9.0         Premedications: were not ordered.    Drug Waste Record: No    Infusion length and rate:  infusion given over approximately  30 minutes    Labs: drawn today, prior to appointment in second floor lab.    Vascular access: peripheral IV was placed prior to appointment at [facility] on 5-.    Is the next appt scheduled? no    Post Infusion Assessment:  Patient tolerated infusion without incident.  No evidence of extravasations.  Access discontinued per protocol.     Discharge Plan:   Follow up plan of care with: ordering provider as scheduled.  Discharge instructions were reviewed with patient.  Patient/representative verbalized understanding of discharge instructions and all questions answered.  Patient discharged from [facility] in stable condition.    Rosetta Ruiz RN      Administrations This Visit       zoledronic acid (RECLAST) infusion 5 mg       Admin Date  05/22/2024 Action  $New Bag Dose  5 mg Rate  200 mL/hr Route  Intravenous Documented  By  Rosetta Ruiz, RN

## 2024-05-24 NOTE — PROGRESS NOTES
Brief Orthopaedic Surgery Note    Given current OR availability, will change L hip hemiarthroplasty to tomorrow with Dr. Lujan. Kip for a diet tonight. NPO after midnight. Pre-op labs completed. Hold chemical AC pre-operatively. Consent completed and in chart. Hall catheter to be placed for patient comfort. Updated patient at bedside.     Nafisa Gaines MD  Orthopaedic Surgery Resident, PGY-4  Pager: [phone]    For questions about this patient during weekday business hours, please attempt to contact me at my pager prior to contacting the Orthopaedic Surgery resident on call. On the weekends and overnight, please page the Orthopaedic Surgery resident on call. Thank you!      [General Appearance - Alert] : alert [General Appearance - In No Acute Distress] : in no acute distress [General Appearance - Well Nourished] : well nourished [General Appearance - Well-Appearing] : healthy appearing [Sclera] : the sclera and conjunctiva were normal [PERRL With Normal Accommodation] : pupils were equal in size, round, reactive to light [Extraocular Movements] : extraocular movements were intact [Outer Ear] : the ears and nose were normal in appearance [Hearing Threshold Finger Rub Not Jessamine] : hearing was normal [Examination Of The Oral Cavity] : the lips and gums were normal [Oropharynx] : the oropharynx was normal with no thrush [Neck Appearance] : the appearance of the neck was normal [Neck Cervical Mass (___cm)] : no neck mass was observed [Jugular Venous Distention Increased] : there was no jugular-venous distention [Thyroid Diffuse Enlargement] : the thyroid was not enlarged [Respiration, Rhythm And Depth] : normal respiratory rhythm and effort [Exaggerated Use Of Accessory Muscles For Inspiration] : no accessory muscle use [Auscultation Breath Sounds / Voice Sounds] : lungs were clear to auscultation bilaterally [Heart Rate And Rhythm] : heart rate was normal and rhythm regular [Heart Sounds] : normal S1 and S2 [Heart Sounds Gallop] : no gallops [Murmurs] : no murmurs [Heart Sounds Pericardial Friction Rub] : no pericardial rub [Full Pulse] : the pedal pulses are present [Edema] : there was no peripheral edema [Bowel Sounds] : normal bowel sounds [Abdomen Soft] : soft [Abdomen Tenderness] : non-tender [Abdomen Mass (___ Cm)] : no abdominal mass palpated [Costovertebral Angle Tenderness] : no CVA tenderness [No Palpable Adenopathy] : no palpable adenopathy [Cervical Lymph Nodes Enlarged Posterior Bilaterally] : posterior cervical [Cervical Lymph Nodes Enlarged Anterior Bilaterally] : anterior cervical [Musculoskeletal - Swelling] : no joint swelling [Range of Motion to Joints] : range of motion to joints [Nail Clubbing] : no clubbing  or cyanosis of the fingernails [Motor Tone] : muscle strength and tone were normal [Skin Color & Pigmentation] : normal skin color and pigmentation [] : no rash [Skin Lesions] : no skin lesions [Cranial Nerves] : cranial nerves 2-12 were intact [Sensation] : the sensory exam was normal to light touch and pinprick [Motor Exam] : the motor exam was normal [No Focal Deficits] : no focal deficits [Oriented To Time, Place, And Person] : oriented to person, place, and time [Affect] : the affect was normal

## 2024-05-28 ENCOUNTER — THERAPY VISIT (OUTPATIENT)
Dept: OCCUPATIONAL THERAPY | Facility: CLINIC | Age: 84
End: 2024-05-28
Attending: PSYCHIATRY & NEUROLOGY
Payer: MEDICARE

## 2024-05-28 DIAGNOSIS — Z78.9 ALTERATION IN INSTRUMENTAL ACTIVITIES OF DAILY LIVING (IADL): Primary | ICD-10-CM

## 2024-05-28 DIAGNOSIS — F03.90 MAJOR NEUROCOGNITIVE DISORDER (H): ICD-10-CM

## 2024-05-28 PROCEDURE — 96125 COGNITIVE TEST BY HC PRO: CPT | Mod: GO | Performed by: OCCUPATIONAL THERAPIST

## 2024-05-28 NOTE — PROGRESS NOTES
Cognitive Performance Test    SUMMARY OF TEST:    The Cognitive Performance Test (CPT) is a standardized performance-based assessment to measure working memory/executive function processing capacities that underlie functional performance. Subtasks include common basic and instrumental activities of daily living (ADL/IADL) which are rated based on the manner in which patients respond to task demands of varying complexity. The total CPT score describes a level of functioning that indicates how information is processed, implications for functional activities, potential safety risks and a recommended level of supervision or assist based on cognitive function. The highest total score on this test is in the range of 5.6 to 5.8.    Ordering Provider: Dr. Malki    Order date: 5/22/24    Order Diagnosis: Major neurocognitive disorder (H) [F03.90]      Occupational Profile (including diagnosis and medical history):Kamini Donaldson is a 84 y.o. female with referral for OP occupational therapy evaluation from Dr. Malik.  Pt arrives on time with spouse, Jake, who provides additional history, as inquired. Pt resides with spouse, who provides basically 24 hour supervision. Pt spouse completes all IADLs, with pt able to assist to some degree.     Previous cognitive screens completed in OT or by Physician and score: MoCA 8/30 on 11/7/23    Functional History Interview:    Informants: with spouse, Jake     Client s perception of memory/ thinking or functional difficulties: Pt spouse reports no significant changes, hard to tell. Pt reports use of calendar to keep appts. Pt with use of calendar to keep track of when she took pills.      Living situation: single story house with basement.      Home maintenance activities: Jake completes most     Meal preparation and grocery shopping: pt will help with simple meal prep tasks     Finances and paying bills: spouse completes      Medication management: Jake sets up meds,  then will provide for pt.      Driving and transportation: Jake completes     Leisure and routine activities: gardening, singing in the choir, going for walks.      ADL/Mobility/Physical Functioning:  Spouse assist with transfer and set up for bathing, grab bars and shower chair. Pt with no use of mobility device.     Fall Risk Screen:   Has the patient fallen 2 or more times in the last year? No      Has the patient fallen and had an injury in the past year? No       Timed Up and Go Score: NT    Is the patient a fall risk? No     DATE OF TESTIN2024    RESULTS OF TESTING:                                                                                         CPT Subtest Results    MEDBOX: 4.5/6 SHOP/GLOVES: 3.5/6 PHONE: 3.5/6   WASH:  3.5/5 TRAVEL: 3.5/6 TOAST: 3.5/5   DRESS: 3.5/5   TOTAL CPT SCORE:  25.5/39     Average CPT Score  3.6/5.6    INTERPRETATION OF TEST RESULTS:    Based on the Cognitive Performance Test, this patient scored at CPT Level 3.5.  See CPT Levels reference below.    Summary of functional cognitive status:   Moderate cognitive-functional disability; increased cues needed for task completion. Aware of concrete task steps but needs prompting or cues to initiate and complete simple tasks. Attention span is limited, simple directions may need to be repeated, and re-focus to a topic or task may be required.  Safety:  24-hour supervision required for safety and for assistance with daily tasks. Assistance required with medications, and access to medication should be limited. Meals, nutrition and dietary restrictions need to be monitored.  All hazardous activities should be restricted or supervised. Should not drive. Prone to wandering and can become lost.  ADL:  Moderate functional decline. Familiar tasks usually requires set-up of supplies and directions to complete steps. May need objects handed to them for task initiation. Function best with a set schedule in familiar surroundings with  familiar people. All complex tasks must be done by others. Vocabulary is diminished and speech often unfocused.     Factors affecting performance:  No additional problems noted    Recommendations:    Continued assist with all IADLs.   Continued supervision for bathing and bath transfer.                                                        TIME ADMINISTERING TEST: 30    TIME FOR INTERPRETATION AND PREPARATION OF REPORT: 15    TOTAL TIME: 45      CPT Levels Reference:    Patient's Average CPT Score:   3.6/5.6                                                                                                                                                  Individual scores range along a continuum as outlined below.  In addition to cognitive status, other factors may affect safety in a home environment.  Please refer to specific recommendations for this patient.    ___5.6-5.8  Normal functioning (absence of cognitive-functional disability).  Independent in managing personal affairs, monitors and directs own behavior.  Uses complex information to carry out daily activities with safety and accuracy.    Proficient with instrumental activities of daily living (IADL) and learning new activity.  Problems are anticipated, errors are avoided, and consequences of actions are considered.      ___5.0   Mild cognitive-functional disability; deficits in working memory and executive thought processes. Difficulty using complex information. Problems may be observed with recent memory, judgment, reasoning and planning ahead. May be impulsive or have difficulty anticipating consequences.  Safety:  May require assistance to plan ahead; or to manage complex medication schedules, appointments or finances.  Hazardous activities may need to be monitored or limited.  ADL:  Mild functional decline.  Able to complete basic self-care and routine household tasks.  May have difficulty with complex daily tasks such as reading, writing, meal  "preparation, shopping or driving.   Learns through hands on teaching. Self-centered behavior or difficulty considering the needs of others may be seen related to trouble seeing the  whole picture\". Can appear disorganized or uninhibited.    ___4.5  Mild to moderate cognitive-functional disability. Significant deficits in working memory and executive thought processes. Judgment, reasoning and planning show obvious impairment.  Distractible with inability to shift attention/actions given competing stimuli.  Difficulty with problem solving and managing details. Complex daily tasks performed with inconsistency, difficulty, or error.     Safety:  Medications should be monitored, stove use may require supervision, and driving ability may be affected.  Impaired safety awareness with inability to anticipate potential problems.  May not recognize or respond to emergent situations. Requires frequent check-in support.   ADL:  Mild difficulty with simple everyday self-care tasks. Benefits from structured, routine activity.  Will likely need reminders to complete tasks outside of the routine. Requires assistance with planning and IADL tasks like shopping and finances. Learns concrete tasks through repetition, but performance may not generalize. Tends to be impulsive with poor insight. Self centered behavior or inability to consider the needs of others is common.    ___4.0  Moderate cognitive-functional disability; abstract to concrete thought processes. Working memory and executive function impairments are obvious. Difficulty with planning and problem solving.  Behavior is goal-directed, but unable to follow multi-step directions, is easily distracted, and may not recognize mistakes.  Inability to anticipate hazards or understand precautions.  Safety:  Recommend 24-hour supervision for safety. Supervision needed for medication management and for hazardous activities. May not be able to follow a restricted diet. Can get lost in " unfamiliar surroundings. Generally, persons functioning at level 4 should not be driving.   ADL:  Some decline in quality or frequency of ADL.  [place] enhanced by use of a routine, simple concrete directions, and caregiver set-up of needed items. Complex tasks such as money or home management typically requires assistance.  Relies heavily on vision to guide behavior; will ignore objects/hazards not in plain sight and can be distracted by irrelevant objects. Often has poor insight.  Able to carry out social conversation and may verbally  cover  for deficits leading caregivers to believe they are capable of functioning independently.       _X_3.5  Moderate cognitive-functional disability; increased cues needed for task completion. Aware of concrete task steps but needs prompting or cues to initiate and complete simple tasks. Attention span is limited, simple directions may need to be repeated, and re-focus to a topic or task may be required.  Safety:  24-hour supervision required for safety and for assistance with daily tasks. Assistance required with medications, and access to medication should be limited. Meals, nutrition and dietary restrictions need to be monitored.  All hazardous activities should be restricted or supervised. Should not drive. Prone to wandering and can become lost.  ADL:  Moderate functional decline. Familiar tasks usually requires set-up of supplies and directions to complete steps. May need objects handed to them for task initiation. Function best with a set schedule in familiar surroundings with familiar people. All complex tasks must be done by others. Vocabulary is diminished and speech often unfocused.

## 2024-06-01 ENCOUNTER — HEALTH MAINTENANCE LETTER (OUTPATIENT)
Age: 84
End: 2024-06-01

## 2024-06-04 NOTE — TELEPHONE ENCOUNTER
Last Clinic Visit: 1/3/2020  [facility]    Per note, provider managing HTN and happy with BP readings.  
32

## 2024-06-20 ENCOUNTER — OFFICE VISIT (OUTPATIENT)
Dept: OPHTHALMOLOGY | Facility: CLINIC | Age: 84
End: 2024-06-20
Attending: OPHTHALMOLOGY
Payer: MEDICARE

## 2024-06-20 DIAGNOSIS — Z96.1 PSEUDOPHAKIA, LEFT EYE: Primary | ICD-10-CM

## 2024-06-20 DIAGNOSIS — H35.031 BLIND HYPERTENSIVE EYE, RIGHT: ICD-10-CM

## 2024-06-20 DIAGNOSIS — H26.492 PCO (POSTERIOR CAPSULAR OPACIFICATION), LEFT: ICD-10-CM

## 2024-06-20 PROCEDURE — G0463 HOSPITAL OUTPT CLINIC VISIT: HCPCS | Performed by: OPHTHALMOLOGY

## 2024-06-20 PROCEDURE — 92014 COMPRE OPH EXAM EST PT 1/>: CPT | Performed by: OPHTHALMOLOGY

## 2024-06-20 ASSESSMENT — REFRACTION_MANIFEST
OS_SPHERE: -0.25
OS_AXIS: 155
OS_CYLINDER: +0.25
OD_SPHERE: BALANCE
OS_ADD: +2.75

## 2024-06-20 ASSESSMENT — TONOMETRY
OS_IOP_MMHG: 13
IOP_METHOD: TONOPEN
OD_IOP_MMHG: 17

## 2024-06-20 ASSESSMENT — SLIT LAMP EXAM - LIDS
COMMENTS: MGD, BLEPHARITIS
COMMENTS: MGD, BLEPHARITIS

## 2024-06-20 ASSESSMENT — CONF VISUAL FIELD
OS_SUPERIOR_TEMPORAL_RESTRICTION: 0
OD_SUPERIOR_TEMPORAL_RESTRICTION: 0
METHOD: COUNTING FINGERS
OS_SUPERIOR_NASAL_RESTRICTION: 0
OS_INFERIOR_TEMPORAL_RESTRICTION: 0
OD_INFERIOR_NASAL_RESTRICTION: 0
OD_NORMAL: 1
OS_INFERIOR_NASAL_RESTRICTION: 0
OD_SUPERIOR_NASAL_RESTRICTION: 0
OD_INFERIOR_TEMPORAL_RESTRICTION: 0
OS_NORMAL: 1

## 2024-06-20 ASSESSMENT — EXTERNAL EXAM - LEFT EYE: OS_EXAM: NORMAL

## 2024-06-20 ASSESSMENT — EXTERNAL EXAM - RIGHT EYE: OD_EXAM: NORMAL

## 2024-06-20 ASSESSMENT — REFRACTION_WEARINGRX
OS_SPHERE: -0.50
OS_CYLINDER: +0.25
OS_ADD: +2.75
OS_AXIS: 154
OD_SPHERE: BALANCE

## 2024-06-20 ASSESSMENT — CUP TO DISC RATIO: OS_RATIO: 0.4

## 2024-06-20 ASSESSMENT — VISUAL ACUITY
OS_CC: 20/25
METHOD: SNELLEN - LINEAR
OD_CC: 2/200
CORRECTION_TYPE: GLASSES

## 2024-06-20 NOTE — PROGRESS NOTES
HPI       Pseudophakia Follow Up    In left eye.  Associated symptoms include Negative for blurred vision and glare (States va is the same since last visit  ).  Duration of years.  Treatments tried include no treatments.  Pain was noted as 0/10.             Comments    Here for Pseudophakia, left eye   No red watery or dry   Fay Benoit COT 7:51 AM June 20, 2024              Last edited by Fay Benoit on 6/20/2024  7:51 AM.          Review of systems for the eyes was negative other than the pertinent positives/negatives listed in the HPI.      Assessment & Plan      Shahana Donaldson is a 84 year old female with the following diagnoses:   1. Pseudophakia, left eye    2. PCO (posterior capsular opacification), left    3. Blind hypertensive eye, right         Here for annual dilated fundus exam   Stable vision.  No pain right eye     Early visually significant posterior capsular opacity (PCO)   Not bothered.  Monitor    Monocular precautions to continue  Refractive options reviewed  Refraction given         Patient disposition:   Return in about 1 year (around 6/20/2025) for DFE.           Attending Physician Attestation:  Complete documentation of historical and exam elements from today's encounter can be found in the full encounter summary report (not reduplicated in this progress note).  I personally obtained the chief complaint(s) and history of present illness.  I confirmed and edited as necessary the review of systems, past medical/surgical history, family history, social history, and examination findings as documented by others; and I examined the patient myself.  I personally reviewed the relevant tests, images, and reports as documented above.  I formulated and edited as necessary the assessment and plan and discussed the findings and management plan with the patient and family. . - Josue Trujillo MD

## 2024-06-20 NOTE — NURSING NOTE
Chief Complaints and History of Present Illnesses   Patient presents with    Pseudophakia Follow Up     Chief Complaint(s) and History of Present Illness(es)       Pseudophakia Follow Up              Laterality: left eye    Associated symptoms: Negative for blurred vision and glare (States va is the same since last visit  )    Duration: years    Treatments tried: no treatments    Pain scale: 0/10              Comments    Here for Pseudophakia, left eye   No red watery or dry   aFy Benoit COT 7:51 AM June 20, 2024

## 2024-08-16 ENCOUNTER — LAB (OUTPATIENT)
Dept: LAB | Facility: CLINIC | Age: 84
End: 2024-08-16
Payer: MEDICARE

## 2024-08-16 ENCOUNTER — OFFICE VISIT (OUTPATIENT)
Dept: INTERNAL MEDICINE | Facility: CLINIC | Age: 84
End: 2024-08-16
Payer: MEDICARE

## 2024-08-16 VITALS
HEART RATE: 63 BPM | WEIGHT: 137.4 LBS | OXYGEN SATURATION: 96 % | DIASTOLIC BLOOD PRESSURE: 78 MMHG | SYSTOLIC BLOOD PRESSURE: 163 MMHG | BODY MASS INDEX: 21.57 KG/M2

## 2024-08-16 DIAGNOSIS — R01.1 HEART MURMUR: Primary | ICD-10-CM

## 2024-08-16 DIAGNOSIS — R01.1 HEART MURMUR: ICD-10-CM

## 2024-08-16 DIAGNOSIS — R94.6 THYROID FUNCTION TEST ABNORMAL: ICD-10-CM

## 2024-08-16 DIAGNOSIS — R19.5 LOOSE STOOLS: ICD-10-CM

## 2024-08-16 LAB
ALBUMIN SERPL BCG-MCNC: 4 G/DL (ref 3.5–5.2)
ALP SERPL-CCNC: 105 U/L (ref 40–150)
ALT SERPL W P-5'-P-CCNC: 14 U/L (ref 0–50)
ANION GAP SERPL CALCULATED.3IONS-SCNC: 10 MMOL/L (ref 7–15)
AST SERPL W P-5'-P-CCNC: 28 U/L (ref 0–45)
BASOPHILS # BLD AUTO: 0 10E3/UL (ref 0–0.2)
BASOPHILS NFR BLD AUTO: 0 %
BILIRUB SERPL-MCNC: 0.5 MG/DL
BUN SERPL-MCNC: 18.9 MG/DL (ref 8–23)
CALCIUM SERPL-MCNC: 9.2 MG/DL (ref 8.8–10.4)
CHLORIDE SERPL-SCNC: 105 MMOL/L (ref 98–107)
CREAT SERPL-MCNC: 0.75 MG/DL (ref 0.51–0.95)
EGFRCR SERPLBLD CKD-EPI 2021: 78 ML/MIN/1.73M2
EOSINOPHIL # BLD AUTO: 0 10E3/UL (ref 0–0.7)
EOSINOPHIL NFR BLD AUTO: 0 %
ERYTHROCYTE [DISTWIDTH] IN BLOOD BY AUTOMATED COUNT: 13.4 % (ref 10–15)
GLUCOSE SERPL-MCNC: 101 MG/DL (ref 70–99)
HCO3 SERPL-SCNC: 26 MMOL/L (ref 22–29)
HCT VFR BLD AUTO: 38.6 % (ref 35–47)
HGB BLD-MCNC: 12.4 G/DL (ref 11.7–15.7)
IMM GRANULOCYTES # BLD: 0 10E3/UL
IMM GRANULOCYTES NFR BLD: 0 %
LYMPHOCYTES # BLD AUTO: 2.2 10E3/UL (ref 0.8–5.3)
LYMPHOCYTES NFR BLD AUTO: 28 %
MAGNESIUM SERPL-MCNC: 2.2 MG/DL (ref 1.7–2.3)
MCH RBC QN AUTO: 30.2 PG (ref 26.5–33)
MCHC RBC AUTO-ENTMCNC: 32.1 G/DL (ref 31.5–36.5)
MCV RBC AUTO: 94 FL (ref 78–100)
MONOCYTES # BLD AUTO: 0.8 10E3/UL (ref 0–1.3)
MONOCYTES NFR BLD AUTO: 10 %
NEUTROPHILS # BLD AUTO: 4.8 10E3/UL (ref 1.6–8.3)
NEUTROPHILS NFR BLD AUTO: 62 %
NRBC # BLD AUTO: 0 10E3/UL
NRBC BLD AUTO-RTO: 0 /100
PLATELET # BLD AUTO: 246 10E3/UL (ref 150–450)
POTASSIUM SERPL-SCNC: 4.2 MMOL/L (ref 3.4–5.3)
PROT SERPL-MCNC: 7.3 G/DL (ref 6.4–8.3)
RBC # BLD AUTO: 4.11 10E6/UL (ref 3.8–5.2)
SODIUM SERPL-SCNC: 141 MMOL/L (ref 135–145)
TSH SERPL DL<=0.005 MIU/L-ACNC: 2.74 UIU/ML (ref 0.3–4.2)
WBC # BLD AUTO: 7.9 10E3/UL (ref 4–11)

## 2024-08-16 PROCEDURE — 99215 OFFICE O/P EST HI 40 MIN: CPT | Performed by: INTERNAL MEDICINE

## 2024-08-16 PROCEDURE — 84443 ASSAY THYROID STIM HORMONE: CPT | Performed by: PATHOLOGY

## 2024-08-16 PROCEDURE — 85025 COMPLETE CBC W/AUTO DIFF WBC: CPT | Performed by: PATHOLOGY

## 2024-08-16 PROCEDURE — 83735 ASSAY OF MAGNESIUM: CPT | Performed by: PATHOLOGY

## 2024-08-16 PROCEDURE — 36415 COLL VENOUS BLD VENIPUNCTURE: CPT | Performed by: PATHOLOGY

## 2024-08-16 PROCEDURE — 80053 COMPREHEN METABOLIC PANEL: CPT | Performed by: PATHOLOGY

## 2024-08-16 NOTE — PROGRESS NOTES
Pat is a 84 year old that presents in clinic today for the following:     Chief Complaint   Patient presents with    Follow Up     6 months follow up; diarrhea concerns per pt             8/16/2024     9:18 AM   Additional Questions   Roomed by MR   Accompanied by      Screenings as of today     Fallen 2 or more times in the past year?  No        Nena Cornejo, EMT at 9:21 AM on 8/16/2024    Answers submitted by the patient for this visit:  General Questionnaire (Submitted on 8/16/2024)  Chief Complaint: Chronic problems general questions HPI Form  What is the reason for your visit today? : follow up  How many servings of fruits and vegetables do you eat daily?: 0-1  On average, how many sweetened beverages do you drink each day (Examples: soda, juice, sweet tea, etc.  Do NOT count diet or artificially sweetened beverages)?: 2  How many minutes a day do you exercise enough to make your heart beat faster?: 20 to 29  How many days a week do you exercise enough to make your heart beat faster?: 7  How many days per week do you miss taking your medication?: 0

## 2024-08-16 NOTE — PROGRESS NOTES
HPI:    Her  Jake is present today. She has several months of loose stools and has taken imodium with some benefit but it seems like her sxs. Are getting a little worse.Her weight is low but stable. She has a decreased appetite that is chronic. No systemic sxs. No fever or chills. No recent travel. No other sick contacts. No change in her medications. No other HEENT, cardiopulmonary, abdominal, , GYN, neurological, systemic, psychiatric, lymphatic, endocrine, vascular complaints.     Past Medical History:   Diagnosis Date    Diverticulosis of colon (without mention of hemorrhage)     Essential hypertension, benign     Gastro-oesophageal reflux disease     nissen    Hemorrhoid     Nonsenile cataract     Osteoporosis     Other and unspecified hyperlipidemia     Rectocele     Symptomatic menopausal or female climacteric states      Past Surgical History:   Procedure Laterality Date    ARTHROPLASTY HIP Left 10/23/2018    Procedure: LEFT HIP COLEMAN- ARTHROPLASTY ;  Surgeon: Araceli Gutierrez MD;  Location: [facility] OR    CATARACT IOL, RT/LT Left 07/15/2019    COLONOSCOPY  5/24/2011    Procedure:COLONOSCOPY; Surgeon:HALINA SCOTT; Location:[facility] GI    COLONOSCOPY Left 11/10/2015    Procedure: COLONOSCOPY;  Surgeon: Raheem Colunga MD;  Location: [facility] GI    COLONOSCOPY  5/24/11, 11/10/2015    DILATION AND CURETTAGE      secondary to menorrhagia    GYN SURGERY      hysterectomy/oopherectomy; done for prolapse    HEMIARTHROPLASTY HIP Left 10/23/2018    HYSTERECTOMY      hysterectomy/oopherectomy; done for prolapse    LAPAROSCOPIC NISSEN FUNDOPLICATION  1/7/2013    Procedure: LAPAROSCOPIC NISSEN FUNDOPLICATION;  Laparoscopic Repair of Hiatel Hernia, NISSEN, Esophagoscopy, Gastroscopy And Duodenoscopy ;  Surgeon: Leonidas Valle MD;  Location: [facility] OR    LAPAROSCOPIC NISSEN FUNDOPLICATION  01/07/2013    PHACOEMULSIFICATION WITH STANDARD INTRAOCULAR LENS IMPLANT Left 7/15/2019    Procedure: Left Eye  Phacoemulsification with Intraocular Lens;  Surgeon: Josue Trujillo MD;  Location: [facility] OR    TUBAL LIGATION Bilateral     ZZC NONSPECIFIC PROCEDURE      Bilateral Tubal Ligation    ZZC NONSPECIFIC PROCEDURE      D&C secondary to menorrhagia    ZZC NONSPECIFIC PROCEDURE      child birth x 2     PE:    Vitals noted, gen, nad, cooperative, alert, neck supple nl rom, lungs with good air movement, RRR, S1, S2, murmur present, abdomen with positive bowel sounds, grossly normal neurological exam.     Results for orders placed or performed in visit on 08/16/24   CBC with platelets and differential     Status: None   Result Value Ref Range    WBC Count 7.9 4.0 - 11.0 10e3/uL    RBC Count 4.11 3.80 - 5.20 10e6/uL    Hemoglobin 12.4 11.7 - 15.7 g/dL    Hematocrit 38.6 35.0 - 47.0 %    MCV 94 78 - 100 fL    MCH 30.2 26.5 - 33.0 pg    MCHC 32.1 31.5 - 36.5 g/dL    RDW 13.4 10.0 - 15.0 %    Platelet Count 246 150 - 450 10e3/uL    % Neutrophils 62 %    % Lymphocytes 28 %    % Monocytes 10 %    % Eosinophils 0 %    % Basophils 0 %    % Immature Granulocytes 0 %    NRBCs per 100 WBC 0 <1 /100    Absolute Neutrophils 4.8 1.6 - 8.3 10e3/uL    Absolute Lymphocytes 2.2 0.8 - 5.3 10e3/uL    Absolute Monocytes 0.8 0.0 - 1.3 10e3/uL    Absolute Eosinophils 0.0 0.0 - 0.7 10e3/uL    Absolute Basophils 0.0 0.0 - 0.2 10e3/uL    Absolute Immature Granulocytes 0.0 <=0.4 10e3/uL    Absolute NRBCs 0.0 10e3/uL   CBC with platelets and differential     Status: None    Narrative    The following orders were created for panel order CBC with platelets and differential.  Procedure                               Abnormality         Status                     ---------                               -----------         ------                     CBC with platelets and d...[[id number]]                      Final result                 Please view results for these tests on the individual orders.         A/P:    1. Immunizations. COVID Pfizer vaccine x  7. Moderna x 1.  Tdap 11/11/2016. Prevnar 13 on 5/18/2015. Pneumococcal 23 on 4/3/2018. She has completed the Shingrix vaccine series. RSV vaccine done 10/18/2023.  2. HTN; on only  Diltazem. She had to discontinue Amlodipine. 24 hour BP monitor was done on 7/14/2023 and Overall was 104/60. Elevated in Clinic today and will follow  3. Increased lipids on Atorvastatin; labs; 10/31/2023 TG's 77, HDL 99 and LDL 53.   4. Seen in Dermatology Dr. Estrada  2/16/2024; next 1/3/2025  5. Breast imaging/mammogram done 5/22/2024  6. Preventive Cardiology appt. With Dr. Wanda Francisco 7/9/2021  7. Colonoscopy 11/10/2015  8. DEXA scan done 4/11/2022; most negative T score -3.5 and she was seen Endocrinology, Dr. Ochoa 1/10/2024. Ca/Vit D and reclast. Repeat DEXA scan in 2 years. She has a follow up appt. 1/22/2025  9. Seen Dr. Trujillo, Ophthalmology 6/20/2024 for eye exam and follow up 6/24/2025.   10. Hearing; she was seen 10/31/2023 by Dr. Nissen, ENT.  She had an Audiogram 2/8/2024 and Audiology follo wu 3/19/2024.   11. She was seen in Neurology Dr. Gilbert 11/7/2023 for memory issues and next visit 2/27/2024. Jake states he [place] [facility] [place] and placed Neurology referral  1/10/2024. She h a 5/22/2024 Neurology visit with Dr. Velasquez and next 8/28/2024.   12. Seen 4/25/2023, Orthopedics, Dr. Harding for R knee OA. PT appt. 5/15/2023.   13. Mild dysphagia with pills may well be related to her dementia. If worse discussed either/and EGG or video swallowing study  14. Weight loss. She had an unremarkable CT chest/abdomen/pelvis on 2/8/2024. She had laboratory testing 1/10/2024.   15. Low bone density; DEXA scan 4/12/2024; most negative T score was -3.9. Follow up with Dr. Ochoa, Endocrinology 1/22/2025. She had a Reclast infusion 5/22/2024.   16. Loose stools for several months. Ordered labs, stool study and colonoscopy to assess for lymphocytic colitis.   17. Murmur  "ordered echo today 8/16/2024 (last 7/22/2021). She currently has no sxs.      I will see her back after colonoscopy, labs, and resting cardiac echo    40 minutes spent on the date of the encounter doing chart review, history and exam, documentation and further activities as noted above \"exclusive of procedures and other billable interpretations  "

## 2024-08-19 ENCOUNTER — TELEPHONE (OUTPATIENT)
Dept: INTERNAL MEDICINE | Facility: CLINIC | Age: 84
End: 2024-08-19
Payer: MEDICARE

## 2024-08-19 ENCOUNTER — TELEPHONE (OUTPATIENT)
Dept: GASTROENTEROLOGY | Facility: CLINIC | Age: 84
End: 2024-08-19
Payer: MEDICARE

## 2024-08-19 DIAGNOSIS — R19.5 LOOSE STOOLS: Primary | ICD-10-CM

## 2024-08-19 RX ORDER — BISACODYL 5 MG/1
TABLET, DELAYED RELEASE ORAL
Qty: 4 TABLET | Refills: 0 | Status: SHIPPED | OUTPATIENT
Start: 2024-08-19

## 2024-08-19 NOTE — TELEPHONE ENCOUNTER
Left Voicemail (1st Attempt) and Sent Mychart (1st Attempt) for the patient to call back and schedule the following:    Appointment type: echo  Provider: per PCP  Return date: any  Specialty phone number: [phone]      
26 yo F  presenting @36 weeks of gestation for repeat    **Denies any fever, chills, or sick contacts  **Covid 19 PCR on 3/6/22

## 2024-08-19 NOTE — TELEPHONE ENCOUNTER
"Endoscopy Scheduling Screen    Have you had a positive Covid test in the last 14 days?  No    What is your communication preference for Instructions and/or Bowel Prep?   MyChart    What insurance is in the chart?  Other:  Medicare, BCBS    Ordering/Referring Provider: Jeramie Curran MD   (If ordering provider performs procedure, schedule with ordering provider unless otherwise instructed. )    BMI: Estimated body mass index is 21.57 kg/m  as calculated from the following:    Height as of 5/22/24: 1.7 m (5' 6.93\").    Weight as of 8/16/24: 62.3 kg (137 lb 6.4 oz).     Sedation Ordered  moderate sedation.   If patient BMI > 50 do not schedule in [facility].    If patient BMI > 45 do not schedule at [facility].    Are you taking methadone or Suboxone?  No    Have you had difficulties, pain, or discomfort during past endoscopy procedures?  No    Are you taking any prescription medications for pain 3 or more times per week?   NO, No RN review required.    Do you have a history of malignant hyperthermia?  No    (Females) Are you currently pregnant?   No     Have you been diagnosed or told you have pulmonary hypertension?   No    Do you have an LVAD?  No    Have you been told you have moderate to severe sleep apnea?  No    Have you been told you have COPD, asthma, or any other lung disease?  No    Do you have any heart conditions?  No     Have you ever had or are you waiting for an organ transplant?  No. Continue scheduling, no site restrictions.    Have you had a stroke or transient ischemic attack (TIA aka \"mini stroke\" in the last 6 months?   No    Have you been diagnosed with or been told you have cirrhosis of the liver?   No    Are you currently on dialysis?   No    Do you need assistance transferring?   No    BMI: Estimated body mass index is 21.57 kg/m  as calculated from the following:    Height as of 5/22/24: 1.7 m (5' 6.93\").    Weight as of 8/16/24: 62.3 kg (137 lb 6.4 oz).     Is patients BMI > 40 and scheduling " location [facility]?  No    Do you take an injectable medication for weight loss or diabetes (excluding insulin)?  No    Do you take the medication Naltrexone?  No    Do you take blood thinners?  No       Prep   Are you currently on dialysis or do you have chronic kidney disease?  No    Do you have a diagnosis of diabetes?  No    Do you have a diagnosis of cystic fibrosis (CF)?  No    On a regular basis do you go 3 -5 days between bowel movements?  No    BMI > 40?  No    Preferred Pharmacy:        [facility] [address] [facility] [address]  Phone: [phone] Fax: [phone]      Final Scheduling Details     Procedure scheduled  Colonoscopy    Surgeon:  Cristine     Date of procedure:  08/28/2024     Pre-OP / PAC:   No - Not required for this site.    Location  [facility] - Patient preference.    Sedation   Moderate Sedation - Per order.      Patient Reminders:   You will receive a call from a Nurse to review instructions and health history.  This assessment must be completed prior to your procedure.  Failure to complete the Nurse assessment may result in the procedure being cancelled.      On the day of your procedure, please designate an adult(s) who can drive you home stay with you for the next 24 hours. The medicines used in the exam will make you sleepy. You will not be able to drive.      You cannot take public transportation, ride share services, or non-medical taxi service without a responsible caregiver.  Medical transport services are allowed with the requirement that a responsible caregiver will receive you at your destination.  We require that drivers and caregivers are confirmed prior to your procedure.

## 2024-08-19 NOTE — TELEPHONE ENCOUNTER
Pre visit planning completed.      Procedure details:    Patient scheduled for Colonoscopy on 8/28/2024.     Arrival time: 1415. Procedure time 1515    Facility location: [facility] [place]; [address] [place] [address], [address]rd Floor, [place] [address]. Check in location: Main entrance at registration desk.    Sedation type: Conscious sedation     Pre op exam needed? No.    Indication for procedure: Loose stools [R19.5]       Chart review:     Electronic implanted devices? No    Recent diagnosis of diverticulitis within the last 6 weeks? No      Medication review:    Diabetic? No    Anticoagulants? No    Weight loss medication/injectable? No.    NSAIDS? No NSAID medications per patient's medication list.  RN will verify with pre-assessment call.    Other medication HOLDING recommendations:  N/A      Prep for procedure:     Bowel prep recommendation: Standard Golytely. Bowel prep prescription sent to    [facility] [address] [place] [address]  Due to: standard bowel prep.    Prep instructions sent via [employer] Kat RN  Endoscopy Procedure Pre Assessment RN  [phone] option 4

## 2024-08-19 NOTE — TELEPHONE ENCOUNTER
Pre assessment completed for upcoming procedure.   (Please see previous telephone encounter notes for complete details)     -writer spoke with pt's  Jake. Consent to communicate on file    Procedure details:    Arrival time and facility location reviewed.    Pre op exam needed? No.    Designated  policy reviewed. Instructed to have someone stay 6  hours post procedure.       Medication review:    Medications reviewed. Please see supporting documentation below. Holding recommendations discussed (if applicable).   NSAID medication(s): Ibuprofen (Advil, Motrin): HOLD 1 day before procedure.      Prep for procedure:     Procedure prep instructions reviewed.        Any additional information needed:  N/A      Family member  verbalized understanding and had no questions or concerns at this time.      Keena Kat RN  Endoscopy Procedure Pre Assessment   [phone] option 4

## 2024-08-20 ENCOUNTER — TELEPHONE (OUTPATIENT)
Dept: GASTROENTEROLOGY | Facility: CLINIC | Age: 84
End: 2024-08-20
Payer: MEDICARE

## 2024-08-20 NOTE — TELEPHONE ENCOUNTER
Caller: Jake, patients     Reason for Reschedule/Cancellation   (please be detailed, any staff messages or encounters to note?): Schedule conflict      Prior to reschedule please review:  Ordering Provider: Jeramie Curran  Sedation Determined: Moderate  Does patient have any ASC Exclusions, please identify?: N      Notes on Cancelled Procedure:  Procedure: Lower Endoscopy [Colonoscopy]   Date: 8/28/24  Location: [facility]; [address] [place] [address]   Surgeon: Cristine      Rescheduled: Yes,   Procedure: Lower Endoscopy [Colonoscopy]    Date: 8/29/24   Location: [facility]; [address] [place] [address]    Surgeon: Leonel   Sedation Level Scheduled  Moderate ,  Reason for Sedation Level Order   Instructions updated and sent: Andrey     Does patient need PAC or Pre -Op Rescheduled? : No       Did you cancel or rescheduled an [facility] procedure? No.

## 2024-08-21 PROCEDURE — 87507 IADNA-DNA/RNA PROBE TQ 12-25: CPT | Performed by: INTERNAL MEDICINE

## 2024-08-21 PROCEDURE — 99000 SPECIMEN HANDLING OFFICE-LAB: CPT | Performed by: PATHOLOGY

## 2024-08-22 LAB

## 2024-08-23 ENCOUNTER — ANCILLARY PROCEDURE (OUTPATIENT)
Dept: CARDIOLOGY | Facility: CLINIC | Age: 84
End: 2024-08-23
Attending: INTERNAL MEDICINE
Payer: MEDICARE

## 2024-08-23 ENCOUNTER — TELEPHONE (OUTPATIENT)
Dept: INTERNAL MEDICINE | Facility: CLINIC | Age: 84
End: 2024-08-23

## 2024-08-23 DIAGNOSIS — R01.1 HEART MURMUR: ICD-10-CM

## 2024-08-23 DIAGNOSIS — R19.5 LOOSE STOOLS: Primary | ICD-10-CM

## 2024-08-23 LAB — LVEF ECHO: NORMAL

## 2024-08-23 PROCEDURE — 93306 TTE W/DOPPLER COMPLETE: CPT | Performed by: INTERNAL MEDICINE

## 2024-08-23 RX ORDER — AZITHROMYCIN 500 MG/1
500 TABLET, FILM COATED ORAL DAILY
Qty: 3 TABLET | Refills: 0 | Status: SHIPPED | OUTPATIENT
Start: 2024-08-23

## 2024-08-23 NOTE — TELEPHONE ENCOUNTER
Dear Vicente;    Please see attached results note. I sent in Rx. For 500 mg of Azithromycin to Kamini's pharmacy for 3 days for positive stool culture. Please give her  Jake a call regarding this. Kamini has some memory issues.     Thanks, EDMOND Curran      Dear Kamini;    Your stool study shows positive for E. Coli. I recommend we treat you with azithromycin for 3 days. I placed an order for this today and our nursing staff, Amy Zhang will help coordinate this for you    EDMOND Curran

## 2024-08-28 ENCOUNTER — OFFICE VISIT (OUTPATIENT)
Dept: NEUROLOGY | Facility: CLINIC | Age: 84
End: 2024-08-28
Payer: MEDICARE

## 2024-08-28 VITALS
SYSTOLIC BLOOD PRESSURE: 155 MMHG | HEART RATE: 65 BPM | OXYGEN SATURATION: 96 % | WEIGHT: 128.7 LBS | DIASTOLIC BLOOD PRESSURE: 88 MMHG | BODY MASS INDEX: 20.2 KG/M2

## 2024-08-28 DIAGNOSIS — G30.9 ALZHEIMER'S DISEASE (H): ICD-10-CM

## 2024-08-28 DIAGNOSIS — F03.90 MAJOR NEUROCOGNITIVE DISORDER (H): Primary | ICD-10-CM

## 2024-08-28 DIAGNOSIS — F02.80 ALZHEIMER'S DISEASE (H): ICD-10-CM

## 2024-08-28 PROCEDURE — 99215 OFFICE O/P EST HI 40 MIN: CPT | Performed by: PSYCHIATRY & NEUROLOGY

## 2024-08-28 PROCEDURE — G2211 COMPLEX E/M VISIT ADD ON: HCPCS | Performed by: PSYCHIATRY & NEUROLOGY

## 2024-08-28 RX ORDER — DONEPEZIL HYDROCHLORIDE 10 MG/1
10 TABLET, FILM COATED ORAL AT BEDTIME
Qty: 60 TABLET | Refills: 2 | Status: SHIPPED | OUTPATIENT
Start: 2024-08-28

## 2024-08-28 ASSESSMENT — PAIN SCALES - GENERAL: PAINLEVEL: NO PAIN (0)

## 2024-08-28 NOTE — LETTER
"8/28/2024       RE: Shahana Donaldson  [address]  [facility] [address]     Dear Colleague,    Thank you for referring your patient, Shahana Donaldson, to the [facility] NEUROLOG[facility] [place] [facility]. Please see a copy of my visit note below.    [facility] Neurology Follow Up Visit    Shahana Donaldson MRN# [id number]   Age: 84 year old YOB: 1940     Brief history of symptoms: The patient was initially seen in neurologic consultation on 11/7/2023 and with me 5/22/2024 for evaluation of cognitive concerns. Please see the comprehensive neurologic consultation notes from those dates in the Epic records for details.     Overall impression was for that of Alzheimer disease related dementia.  Prior studies included:  - MoCA on 11/7/2023 of 8/30  - MRI brain 12/5/2023 showing microvascular ischemic diease and mild b/l hippocampal atrophy    The patient was to continue with Donepezil 10 mg at bedtime, and obtain CPT for further evaluation of daily function.    Interval history:   - OT evaluation 5/28/2024 showed a CPT score of 3.6/5.6.   - \"Safety:  24-hour supervision required for safety and for assistance with daily tasks. Assistance required with medications, and access to medication should be limited. Meals, nutrition and dietary restrictions need to be monitored.  All hazardous activities should be restricted or supervised. Should not drive. Prone to wandering and can become lost.  ADL:  Moderate functional decline. Familiar tasks usually requires set-up of supplies and directions to complete steps. May need objects handed to them for task initiation. Function best with a set schedule in familiar surroundings with familiar people. All complex tasks must be done by others. Vocabulary is diminished and speech often unfocused. \"    Today, the patient is present with her .  She tells me she keeps a calendar to remind her " of her appointments.  She keeps a pretty structured schedule with her  and sticks to it. He manages some of her medications.  Her  is managing finances.  Their son stops in twice weekly to help them out with physical needs in the house.  They also have two other children in the area who are available to help if needed or called upon.  The patient's  does most of the cooking.  She still manages her physical needs in the bathroom.  They both live in a house in [place], and have for 52 years.      Physical Exam:   Vitals: BP (!) 155/88   Pulse 65   Wt 58.4 kg (128 lb 11.2 oz)   SpO2 96%   BMI 20.20 kg/m     General: Seated comfortably in no acute distress.  HEENT: Neck supple with normal range of motion.   Skin: No rashes  Neurologic:     Mental Status: Fully alert, attentive and oriented. Speech clear and fluent, no paraphasic errors.     Cranial Nerves: EOMI with normal smooth pursuit. Facial movements symmetric. Hearing not formally tested but intact to conversation.  No dysarthria.     Motor: No tremors or other abnormal movements observed.      Gait: stands up to walk without issue, stooped posture but not shuffling. Tandem poor.         Assessment and Plan:   Assessment:  Alzheimer disease related dementia, moderate but w/out psychosis or behavioral changes    The patient, her , and I went over her CPT results today and I feel her needs are being met.  However, I did caution them that if either of them was injured or became sick, that this may change the level of care at home quickly.  We spoke about developing a plan of care in case of emergencies with their families, and I also asked them to speak with a  about housing options, home safety, and community resources like respite care available or home nursing.  They also decided to not trial Namenda given side-effects that may develop with the medication.      Plan:  - Donepezil 10 mg at bedtime  -   referral    Follow up in Neurology clinic in 6 months, or earlier as needed should new concerns arise.    NARENDRA Malik D.O.   of Neurology    Total time today (30 min) in this patient encounter was spent on pre-charting, counseling and/or coordination of care. The longitudinal plan of care for the diagnosis(es)/condition(s) as documented were addressed during this visit. Due to the added complexity in care, I will continue to support Pat in the subsequent management and with ongoing continuity of care.        Again, thank you for allowing me to participate in the care of your patient.      Sincerely,    Liu Malik, DO

## 2024-08-28 NOTE — PROGRESS NOTES
"[facility] Neurology Follow Up Visit    Shahana Donaldson MRN# [id number]   Age: 84 year old YOB: 1940     Brief history of symptoms: The patient was initially seen in neurologic consultation on 11/7/2023 and with me 5/22/2024 for evaluation of cognitive concerns. Please see the comprehensive neurologic consultation notes from those dates in the Epic records for details.     Overall impression was for that of Alzheimer disease related dementia.  Prior studies included:  - MoCA on 11/7/2023 of 8/30  - MRI brain 12/5/2023 showing microvascular ischemic diease and mild b/l hippocampal atrophy    The patient was to continue with Donepezil 10 mg at bedtime, and obtain CPT for further evaluation of daily function.    Interval history:   - OT evaluation 5/28/2024 showed a CPT score of 3.6/5.6.   - \"Safety:  24-hour supervision required for safety and for assistance with daily tasks. Assistance required with medications, and access to medication should be limited. Meals, nutrition and dietary restrictions need to be monitored.  All hazardous activities should be restricted or supervised. Should not drive. Prone to wandering and can become lost.  ADL:  Moderate functional decline. Familiar tasks usually requires set-up of supplies and directions to complete steps. May need objects handed to them for task initiation. Function best with a set schedule in familiar surroundings with familiar people. All complex tasks must be done by others. Vocabulary is diminished and speech often unfocused. \"    Today, the patient is present with her .  She tells me she keeps a calendar to remind her of her appointments.  She keeps a pretty structured schedule with her  and sticks to it. He manages some of her medications.  Her  is managing finances.  Their son stops in twice weekly to help them out with physical needs in the house.  They also have two other children in the area who are available to help if " needed or called upon.  The patient's  does most of the cooking.  She still manages her physical needs in the bathroom.  They both live in a house in [place], and have for 52 years.      Physical Exam:   Vitals: BP (!) 155/88   Pulse 65   Wt 58.4 kg (128 lb 11.2 oz)   SpO2 96%   BMI 20.20 kg/m     General: Seated comfortably in no acute distress.  HEENT: Neck supple with normal range of motion.   Skin: No rashes  Neurologic:     Mental Status: Fully alert, attentive and oriented. Speech clear and fluent, no paraphasic errors.     Cranial Nerves: EOMI with normal smooth pursuit. Facial movements symmetric. Hearing not formally tested but intact to conversation.  No dysarthria.     Motor: No tremors or other abnormal movements observed.      Gait: stands up to walk without issue, stooped posture but not shuffling. Tandem poor.         Assessment and Plan:   Assessment:  Alzheimer disease related dementia, moderate but w/out psychosis or behavioral changes    The patient, her , and I went over her CPT results today and I feel her needs are being met.  However, I did caution them that if either of them was injured or became sick, that this may change the level of care at home quickly.  We spoke about developing a plan of care in case of emergencies with their families, and I also asked them to speak with a  about housing options, home safety, and community resources like respite care available or home nursing.  They also decided to not trial Namenda given side-effects that may develop with the medication.      Plan:  - Donepezil 10 mg at bedtime  -  referral    Follow up in Neurology clinic in 6 months, or earlier as needed should new concerns arise.    NARENDRA Malik D.O.   of Neurology    Total time today (30 min) in this patient encounter was spent on pre-charting, counseling and/or coordination of care. The longitudinal plan of care for the  diagnosis(es)/condition(s) as documented were addressed during this visit. Due to the added complexity in care, I will continue to support Pat in the subsequent management and with ongoing continuity of care.

## 2024-08-28 NOTE — PATIENT INSTRUCTIONS
I think your Alzheimer disease is progressing over time and leading to more needs having to be met at home. Careful planning for emergencies and eventual increased needs should occur to allow you to have agency over what type of care you receive.  I can place a  referral to help with this type of decision making.

## 2024-08-29 ENCOUNTER — HOSPITAL ENCOUNTER (OUTPATIENT)
Facility: CLINIC | Age: 84
Discharge: HOME | End: 2024-08-29
Attending: INTERNAL MEDICINE | Admitting: INTERNAL MEDICINE
Payer: MEDICARE

## 2024-08-29 VITALS
SYSTOLIC BLOOD PRESSURE: 124 MMHG | RESPIRATION RATE: 17 BRPM | DIASTOLIC BLOOD PRESSURE: 74 MMHG | HEART RATE: 74 BPM | OXYGEN SATURATION: 98 %

## 2024-08-29 LAB — COLONOSCOPY: NORMAL

## 2024-08-29 PROCEDURE — 99153 MOD SED SAME PHYS/QHP EA: CPT | Performed by: INTERNAL MEDICINE

## 2024-08-29 PROCEDURE — 250N000011 HC RX IP 250 OP 636: Performed by: INTERNAL MEDICINE

## 2024-08-29 PROCEDURE — 88305 TISSUE EXAM BY PATHOLOGIST: CPT | Mod: TC | Performed by: INTERNAL MEDICINE

## 2024-08-29 PROCEDURE — G0500 MOD SEDAT ENDO SERVICE >5YRS: HCPCS | Performed by: INTERNAL MEDICINE

## 2024-08-29 PROCEDURE — 45380 COLONOSCOPY AND BIOPSY: CPT | Performed by: INTERNAL MEDICINE

## 2024-08-29 PROCEDURE — 88305 TISSUE EXAM BY PATHOLOGIST: CPT | Mod: 26 | Performed by: PATHOLOGY

## 2024-08-29 RX ORDER — NALOXONE HYDROCHLORIDE 0.4 MG/ML
0.2 INJECTION, SOLUTION INTRAMUSCULAR; INTRAVENOUS; SUBCUTANEOUS
Status: CANCELLED | OUTPATIENT
Start: 2024-08-29

## 2024-08-29 RX ORDER — ONDANSETRON 2 MG/ML
4 INJECTION INTRAMUSCULAR; INTRAVENOUS EVERY 6 HOURS PRN
Status: CANCELLED | OUTPATIENT
Start: 2024-08-29

## 2024-08-29 RX ORDER — ONDANSETRON 2 MG/ML
4 INJECTION INTRAMUSCULAR; INTRAVENOUS
Status: DISCONTINUED | OUTPATIENT
Start: 2024-08-29 | End: 2024-08-29 | Stop reason: HOSPADM

## 2024-08-29 RX ORDER — ONDANSETRON 4 MG/1
4 TABLET, ORALLY DISINTEGRATING ORAL EVERY 6 HOURS PRN
Status: CANCELLED | OUTPATIENT
Start: 2024-08-29

## 2024-08-29 RX ORDER — PROCHLORPERAZINE MALEATE 5 MG
5 TABLET ORAL EVERY 6 HOURS PRN
Status: CANCELLED | OUTPATIENT
Start: 2024-08-29

## 2024-08-29 RX ORDER — NALOXONE HYDROCHLORIDE 0.4 MG/ML
0.4 INJECTION, SOLUTION INTRAMUSCULAR; INTRAVENOUS; SUBCUTANEOUS
Status: CANCELLED | OUTPATIENT
Start: 2024-08-29

## 2024-08-29 RX ORDER — FENTANYL CITRATE 50 UG/ML
INJECTION, SOLUTION INTRAMUSCULAR; INTRAVENOUS PRN
Status: DISCONTINUED | OUTPATIENT
Start: 2024-08-29 | End: 2024-08-29 | Stop reason: HOSPADM

## 2024-08-29 RX ORDER — FLUMAZENIL 0.1 MG/ML
0.2 INJECTION, SOLUTION INTRAVENOUS
Status: CANCELLED | OUTPATIENT
Start: 2024-08-29 | End: 2024-08-30

## 2024-08-29 RX ORDER — LIDOCAINE 40 MG/G
CREAM TOPICAL
Status: DISCONTINUED | OUTPATIENT
Start: 2024-08-29 | End: 2024-08-29 | Stop reason: HOSPADM

## 2024-08-29 ASSESSMENT — ACTIVITIES OF DAILY LIVING (ADL)
ADLS_ACUITY_SCORE: 38
ADLS_ACUITY_SCORE: 38

## 2024-08-29 NOTE — H&P
Shahana CARTER [facility]  [id number]  female  84 year old      Reason for procedure/surgery: diarrhea    Patient Active Problem List   Diagnosis    Essential hypertension, benign    Mixed hyperlipidemia    Symptomatic menopausal or female climacteric states    Diverticulosis of large intestine    Hyperlipidemia    Coronary atherosclerosis    Esophageal reflux    vulvar cyst    Hiatal hernia    S/P Nissen fundoplication (without gastrostomy tube) procedure    History of tubal ligation    History of dilatation and curettage    History of hysterectomy    Lichen sclerosus    Left displaced femoral neck fracture (H)    Senile osteoporosis    Lichen planus    Seborrheic dermatitis    Heart failure with preserved ejection fraction, NYHA class II (H)       Past Surgical History:    Past Surgical History:   Procedure Laterality Date    ARTHROPLASTY HIP Left 10/23/2018    Procedure: LEFT HIP COLEMAN- ARTHROPLASTY ;  Surgeon: Araceli Gutierrez MD;  Location: [facility]    CATARACT IOL, RT/LT Left 07/15/2019    COLONOSCOPY  5/24/2011    Procedure:COLONOSCOPY; Surgeon:HALINA SCOTT; Location:[facility] GI    COLONOSCOPY Left 11/10/2015    Procedure: COLONOSCOPY;  Surgeon: Raheem Colunga MD;  Location: [facility]    COLONOSCOPY  5/24/11, 11/10/2015    DILATION AND CURETTAGE      secondary to menorrhagia    GYN SURGERY      hysterectomy/oopherectomy; done for prolapse    HEMIARTHROPLASTY HIP Left 10/23/2018    HYSTERECTOMY      hysterectomy/oopherectomy; done for prolapse    LAPAROSCOPIC NISSEN FUNDOPLICATION  1/7/2013    Procedure: LAPAROSCOPIC NISSEN FUNDOPLICATION;  Laparoscopic Repair of Hiatel Hernia, NISSEN, Esophagoscopy, Gastroscopy And Duodenoscopy ;  Surgeon: Leonidas Valle MD;  [facility]    LAPAROSCOPIC NISSEN FUNDOPLICATION  01/07/2013    PHACOEMULSIFICATION WITH STANDARD INTRAOCULAR LENS IMPLANT Left 7/15/2019    Procedure: Left Eye Phacoemulsification with Intraocular Lens;  Surgeon: Josue Trujillo MD;   [facility]    TUBAL LIGATION Bilateral     ZZC NONSPECIFIC PROCEDURE      Bilateral Tubal Ligation    ZZC NONSPECIFIC PROCEDURE      D&C secondary to menorrhagia    ZZC NONSPECIFIC PROCEDURE      child birth x 2       Past Medical History:   Past Medical History:   Diagnosis Date    Diverticulosis of colon (without mention of hemorrhage)     Essential hypertension, benign     Gastro-oesophageal reflux disease     nissen    Hemorrhoid     Nonsenile cataract     Osteoporosis     Other and unspecified hyperlipidemia     Rectocele     Symptomatic menopausal or female climacteric states        Social History:   Social History     Tobacco Use    Smoking status: Never    Smokeless tobacco: Never   Substance Use Topics    Alcohol use: No       Family History:   Family History   Problem Relation Age of Onset    Hypertension Father     C.A.D. Father     Hypertension Mother     Breast Cancer Sister     Osteoporosis Sister     EYE* Brother         Keratoconus    Glaucoma No family hx of     Macular Degeneration No family hx of     Coronary Artery Disease Father     Breast Cancer Sister     Keratoconus Brother        Allergies:   Allergies   Allergen Reactions    Dilaudid [Hydromorphone]      dizziness    Senna Hives       Active Medications:   No current outpatient medications on file.       Systemic Review:   CONSTITUTIONAL: NEGATIVE for fever, chills, change in weight  ENT/MOUTH: NEGATIVE for ear, mouth and throat problems  RESP: NEGATIVE for significant cough or SOB  CV: NEGATIVE for chest pain, palpitations or peripheral edema    Physical Examination:   Vital Signs: /66   Resp 18   SpO2 97%   GENERAL: healthy, alert and no distress  NECK: no adenopathy, no asymmetry, masses, or scars  RESP: lungs clear to auscultation - no rales, rhonchi or wheezes  CV: regular rate and rhythm, normal S1 S2, no S3 or S4, no murmur, click or rub, no peripheral edema and peripheral pulses strong  ABDOMEN: soft, nontender, no  hepatosplenomegaly, no masses and bowel sounds normal  MS: no gross musculoskeletal defects noted, no edema    Plan: Appropriate to proceed as scheduled.      Gareth Castaneda MD  8/29/2024    PCP:  Jeramie Curran

## 2024-08-29 NOTE — LETTER
August 30, 2024      Kamini Donaldson  [address] [facility] [address]        Dear ,    The pathology results returned from the biopsies taken at your recent colonoscopy.    The colon biopsies were all normal- there was no evidence of infection, inflammation or malignancy.    The polyp removed from your colon was precancerous.  As we discussed, no follow-up is needed for this finding.    I have sent a copy of this letter to Dr Curran who will decide the next steps in your care.    Sincerely,               Gareth Castaneda MD   [facility], [place], ENDOSCOPY  [address] [facility] [address]  Phone: [phone]

## 2024-08-30 LAB
PATH REPORT.COMMENTS IMP SPEC: NORMAL
PATH REPORT.COMMENTS IMP SPEC: NORMAL
PATH REPORT.FINAL DX SPEC: NORMAL
PATH REPORT.GROSS SPEC: NORMAL
PATH REPORT.MICROSCOPIC SPEC OTHER STN: NORMAL
PATH REPORT.RELEVANT HX SPEC: NORMAL
PHOTO IMAGE: NORMAL

## 2024-09-03 ENCOUNTER — PATIENT OUTREACH (OUTPATIENT)
Dept: CARE COORDINATION | Facility: CLINIC | Age: 84
End: 2024-09-03
Payer: MEDICARE

## 2024-09-03 NOTE — PROGRESS NOTES
"Social Work Intervention  [facility]    Data/Intervention:    Patient Name:  Shahana Donaldson  /Age:  1940 (84 year old)    Visit Type: telephone  Referral Source: Dr TRUMAN Malik  Reason for Referral:  assess and provide resources related to dementia    Collaborated With:    -Jake Pt's spouse    Psychosocial Information/Concerns:  Pt with advancing Alz disease lives with her spouse Jake in their home of 52 years in [place]. She was napping when I called and Jake preferred to talk with me without her on the phone.  He is her primary caregiver. Their son who lives nearby visits 2x/week and they have a niece who is going to start visiting on . Their dtr took them recently to her cabin. Jake feels the daily care giving burden. His biggest worry is keeping himself in shape so he can continue to care for Pat. She needs frequently supervision and \"things to do\". She can toilet herself but he helps with other ADLS including managing finances, medication, cooking, cleaning, laundry, transportation, managing appts. He has his own health issues, primarily back pain. He had surgery 2 years ago. She is independent with mobility but she tires quickly. He tried to get her walking as often as he can but she doesn't get much exercise according to Jake.   He doesn't have financial worries. They have a living Trust set up and POA and HCD (copy is in her medical record). 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      Intervention/Education/Resources Provided:  Discussed some options for respite for him that would also benefit Pat such as going to an adult day program or hiring a  to do things with her at home to give him a break. Those were of interest to him. Discussed the importance of having some respite.   He's not interested in homemaking assistance. He isn't considering a move to a senior living facility for them and would rather pay for care at home. " Suggested he talk with the  who set up their Trust about whether she would be eligible for any funding for services from the [facility]. Provided info re using [facility] if they ever do need help finding a facility.     I sent him an email with the following information:  We discussed the option of hiring a  at home to be with Pat when you need to step out or when you are home to help give her someone to do things with. There are many agencies that offer companions.   Here are a few:  [employer]- [phone] /In-Home Dementia & Memory Care - [employer]  [employer] [facility] [employer]- [phone]/  Our Services - [employer] ([email or web address].[email or web address])  [facility] wkrs-[phone]/ In-Home Care Services  [facility] Care    Here is a website to find more options if needed:  [employer] [facility]: Home page  (it's also where I found Adult Day programs below)    Here are some Adult Day programs:  [facility]............................[phone]  [address], [place] [address]  www.[email or web address]    [facility] [place]............................[phone]  (This one is affiliated with [facility])  [address], [place] [address]  [facility]  www.daybreakstpaul.org    [facility]............................[phone] [address] [place] [address]  www.[email or web address].[email or web address]    [facility]............................[phone]  [address], [place] [address]  [email or web address]    [facility] [place] [facility] [place]............................[phone] [address] [place] [address], [place] [address]  Providing a Home Away From Home  [email or web address].[email or web address]      And lastly, if you do ever need to consider moving to a senior living facility, [facility] is an organization that can help you find the place  that's right for you. www.twincitiescare.com  There is no cost to you for their services.    Assessment/Plan:  Spouse needs some respite for self care and encouraged trying the resources above. They have adequate financial resources to fund the assistance. Jake has looked at the Alz assoc website. Discussed info and support groups there. He is also aware of a local group he can go to where Pat would have caregivers while he attends the group.   Encouraged him to reach out to me anytime in the future as needed.     Provided patient/family with contact information and availability.    NIMESH Pardo, [facility]    [facility]  [address] [place] [address], [address] [place] [address] [id number] [phone]

## 2024-09-28 DIAGNOSIS — I10 BENIGN ESSENTIAL HYPERTENSION: ICD-10-CM

## 2024-10-03 ENCOUNTER — OFFICE VISIT (OUTPATIENT)
Dept: INTERNAL MEDICINE | Facility: CLINIC | Age: 84
End: 2024-10-03
Payer: MEDICARE

## 2024-10-03 ENCOUNTER — LAB (OUTPATIENT)
Dept: LAB | Facility: CLINIC | Age: 84
End: 2024-10-03
Payer: MEDICARE

## 2024-10-03 VITALS
WEIGHT: 133.5 LBS | DIASTOLIC BLOOD PRESSURE: 79 MMHG | TEMPERATURE: 97.9 F | OXYGEN SATURATION: 96 % | HEART RATE: 65 BPM | SYSTOLIC BLOOD PRESSURE: 137 MMHG | BODY MASS INDEX: 20.95 KG/M2

## 2024-10-03 DIAGNOSIS — R19.5 LOOSE STOOLS: ICD-10-CM

## 2024-10-03 DIAGNOSIS — R19.5 LOOSE STOOLS: Primary | ICD-10-CM

## 2024-10-03 PROCEDURE — 99214 OFFICE O/P EST MOD 30 MIN: CPT | Performed by: INTERNAL MEDICINE

## 2024-10-03 RX ORDER — DILTIAZEM HYDROCHLORIDE 300 MG/1
300 CAPSULE, COATED, EXTENDED RELEASE ORAL DAILY
Qty: 90 CAPSULE | Refills: 3 | Status: SHIPPED | OUTPATIENT
Start: 2024-10-03

## 2024-10-03 NOTE — PROGRESS NOTES
HPI;    Her  is present today. She still has periodic loose stools but not as bad as before. She does use Loperamide maybe 6 to 8 pills a  week. Her does not eat as much as before. No abdominal pain. She has not used Ensure an her  does not feel she needs to start these nutritional supplements at this time. NO other HEENT. Carodiopulmonary, abdominal, , GYN, neurological, systemic, psychiatric, lymphatic, systemic, psychiatric, endocrine,    vascular complaints     Past Surgical History:   Procedure Laterality Date    ARTHROPLASTY HIP Left 10/23/2018    Procedure: LEFT HIP COLEMAN- ARTHROPLASTY ;  Surgeon: Araceli Gutierrez MD;  [facility]    CATARACT IOL, RT/LT Left 07/15/2019    COLONOSCOPY  5/24/2011    Procedure:COLONOSCOPY; Surgeon:HALINA SCOTT; Location:[facility] GI    COLONOSCOPY Left 11/10/2015    Procedure: COLONOSCOPY;  Surgeon: Raheem Colunga MD;  Location: [facility] GI    COLONOSCOPY  5/24/11, 11/10/2015    COLONOSCOPY N/A 8/29/2024    Procedure: Colonoscopy;  Surgeon: Gareth Clay MD;  Location: [facility] GI    DILATION AND CURETTAGE      secondary to menorrhagia    GYN SURGERY      hysterectomy/oopherectomy; done for prolapse    HEMIARTHROPLASTY HIP Left 10/23/2018    HYSTERECTOMY      hysterectomy/oopherectomy; done for prolapse    LAPAROSCOPIC NISSEN FUNDOPLICATION  1/7/2013    Procedure: LAPAROSCOPIC NISSEN FUNDOPLICATION;  Laparoscopic Repair of Hiatel Hernia, NISSEN, Esophagoscopy, Gastroscopy And Duodenoscopy ;  Surgeon: Leonidas Valle MD;  Location: [facility] OR    LAPAROSCOPIC NISSEN FUNDOPLICATION  01/07/2013    PHACOEMULSIFICATION WITH STANDARD INTRAOCULAR LENS IMPLANT Left 7/15/2019    Procedure: Left Eye Phacoemulsification with Intraocular Lens;  Surgeon: Josue Trujillo MD;  [facility] OR    TUBAL LIGATION Bilateral     ZZC NONSPECIFIC PROCEDURE      Bilateral Tubal Ligation    ZZC NONSPECIFIC PROCEDURE      D&C secondary to menorrhagia    ZZC NONSPECIFIC  PROCEDURE      child birth x 2     PE:    Vitals noted, gen, nad, cooperative, alert, neck supple nl rom, lungs with good air moment, RRR, S1, S2 , no MRG, abdomen, positive bowel sounds,  Grosslyy farrukh neurological exam    Echocardiogram Complete  [id number]  [id number]  [id number]  [id number]^AAKASH^JORDI^LUIS     [facility]  Diagnostic and Treatment-3rd Floor  [address] [place] [address]     Name: IWONA PHIPPS  MRN: [id number]  : 1940  Study Date: 2024 01:30 PM  Age: 84 yrs  Gender: Female  Patient [facility]  Reason For Study: Heart murmur  Ordering Physician: JORDI FINN  Referring Physician: JORDI FINN  Performed By: Laura Abbott     BSA: 1.7 m2  Height: 66 in  Weight: 137 lb  HR: 66  BP: 138/82 mmHg  ______________________________________________________________________________  Procedure  Echocardiogram with two-dimensional, color and spectral Doppler performed.  ______________________________________________________________________________  Interpretation Summary  Global and regional left ventricular function is normal with an EF of 60-65%.  Right ventricular function, chamber size, wall motion, and thickness are  normal.  Trileaflet aortic sclerosis without stenosis. Vmax is 1.8 m/s.  No significant valvular abnormalities present.     This study was compared with the study from 2021 .No significant changes  noted.  ______________________________________________________________________________  Left Ventricle  Global and regional left ventricular function is normal with an EF of 60-65%.  Left ventricular size is normal. Left ventricular wall thickness is normal.  Left ventricular diastolic function is normal.     Right Ventricle  Right ventricular function, chamber size, wall motion, and thickness are  normal.     Atria  The right atria appears normal. Mild left atrial enlargement is present.     Mitral  Valve  The mitral valve is normal. Trace mitral insufficiency is present.     Aortic Valve  Trileaflet aortic sclerosis without stenosis. On Doppler interrogation, there  is no significant stenosis or regurgitation.     Tricuspid Valve  The tricuspid valve is normal. Mild tricuspid insufficiency is present. The  right ventricular systolic pressure is approximated at 33.6 mmHg plus the  right atrial pressure.     Pulmonic Valve  On Doppler interrogation, there is no significant stenosis or regurgitation.     Vessels  The aorta root is normal. IVC diameter and respiratory changes fall into an  intermediate range suggesting an RA pressure of 8 mmHg.     Pericardium  No pericardial effusion is present.     Miscellaneous  No significant valvular abnormalities present.     Compared to Previous Study  This study was compared with the study from 2021 . No significant changes  noted.  ______________________________________________________________________________  MMode/2D Measurements & Calculations  IVSd: 0.72 cm  LVIDd: 4.7 cm  LVIDs: 3.0 cm  LVPWd: 0.82 cm  FS: 36.3 %  LV mass(C)d: 114.8 grams  LV mass(C)dI: 67.4 grams/m2  Ao root diam: 3.0 cm  asc Aorta Diam: 3.0 cm  LVOT diam: 2.0 cm  LVOT area: 3.2 cm2  Ao root diam index Ht(cm/m): 1.8  Ao root diam index BSA (cm/m2): 1.7  Asc Ao diam index BSA (cm/m2): 1.8  Asc Ao diam index Ht(cm/m): 1.8  LA Volume (BP): 64.8 ml     LA Volume Index (BP): 38.1 ml/m2  RV Base: 3.8 cm  RWT: 0.35  TAPSE: 2.2 cm     Doppler Measurements & Calculations  MV E max gloria: 69.8 cm/sec  MV A max gloria: 82.7 cm/sec  MV E/A: 0.84  MV dec slope: 284.4 cm/sec2  MV dec time: 0.25 sec  Ao V2 max: 182.6 cm/sec  Ao max P.3 mmHg  Ao V2 mean: 138.6 cm/sec  Ao mean P.1 mmHg  Ao V2 VTI: 39.2 cm  AUGIE(I,D): 2.3 cm2  AUGIE(V,D): 2.3 cm2  LV V1 max P.1 mmHg  LV V1 max: 132.9 cm/sec  LV V1 VTI: 28.9 cm  SV(LVOT): 92.2 ml  SI(LVOT): 54.1 ml/m2  TR max gloria: 289.9 cm/sec  TR max P.6 mmHg     AV  Bahman Ratio (DI): 0.73  AUGIE Index (cm2/m2): 1.4  E/E' av.2  Lateral E/e': 7.3  Medial E/e': 9.2  RV S Bahman: 12.6 cm/sec     ______________________________________________________________________________  Report approved by: Vitaliy ALMEIDA 2024 02:34 PM              A/P:    1. Immunizations. COVID Pfizer vaccine x 7. Moderna x 1.  Tdap 2016. Prevnar 13 on 2015. Pneumococcal 23 on 4/3/2018. She has completed the Shingrix vaccine series. RSV vaccine done 10/18/2023.  2. HTN; on only  Diltazem. She had to discontinue Amlodipine. 24 hour BP monitor was done on 2023 and Overall was 104/60. Elevated in Clinic today and will follow  3. Increased lipids on Atorvastatin; labs; 10/31/2023 TG's 77, HDL 99 and LDL 53.   4. Seen in Dermatology Dr. Estrada  2024; next 1/3/2025  5. Breast imaging/mammogram done 2024  6. Preventive Cardiology appt. With Dr. Wanda Francisco 2021  7. Colonoscopy 11/10/2015. Repeat 2024 and no microscopic colitis   8. DEXA scan done 2022; most negative T score -3.5 and she was seen Endocrinology, Dr. Ochoa 1/10/2024. Ca/Vit D and reclast. Repeat DEXA scan in 2 years. She has a follow up appt. 2025  9. Seen Dr. Trujillo, Ophthalmology 2024 for eye exam and follow up 2025.   10. Hearing; she was seen 10/31/2023 by Dr. Nissen, ENT.  She had an Audiogram 2024 and Audiology follo wup 3/19/2024.   11. She was seen in Neurology Dr. Gilbert 2023 for memory issues and next visit 2024. Jake states he would prefer to see Neurology here at the [facility] because it is closer to home and placed Neurology referral  1/10/2024. She had a 2024  Neurology visit with Dr. Velasquez   12. Seen 2023, Orthopedics, Dr. Harding for R knee OA. PT appt. 5/15/2023.   13. Mild dysphagia with pills may well be related to her dementia. If worse discussed either/and EGG or video swallowing study  14. Weight loss. She had an unremarkable CT  "chest/abdomen/pelvis on 2/8/2024. She had laboratory testing 1/10/2024.   15. Low bone density; DEXA scan 4/12/2024; most negative T score was -3.9. Follow up with Dr. Ochoa, Endocrinology 1/22/2025. She had a Reclast infusion 5/22/2024.   16. Loose stools for several months. Ordered labs, stool study and colonoscopy to assess for lymphocytic colitis.   17. Murmur ordered echo today 8/16/2024 (last 7/22/2021). She currently has no sxs.           40 minutes spent on the date of the encounter doing chart review, history and exam, documentation and further activities as noted above \"exclusive of procedures and other billable interpretations  "

## 2024-11-09 DIAGNOSIS — E78.2 MIXED HYPERLIPIDEMIA: ICD-10-CM

## 2024-11-12 RX ORDER — ATORVASTATIN CALCIUM 10 MG/1
10 TABLET, FILM COATED ORAL DAILY
Qty: 90 TABLET | Refills: 0 | Status: SHIPPED | OUTPATIENT
Start: 2024-11-12

## 2024-11-12 NOTE — TELEPHONE ENCOUNTER
Atorvastatin Calcium Oral Tablet 10 MG     Last Written Prescription Date:  2/16/24  Last Fill Quantity: 90,   # refills: 1  Last Office Visit : 10/3/24  Future Office visit:  1/7/25    90 day angela refill sent to the pharmacy     LDL Cholesterol Calculated   Date Value Ref Range Status   10/31/2023 53 <=100 mg/dL Final   11/21/2018 64 <100 mg/dL Final     Comment:     Desirable:       <100 mg/dl

## 2025-01-06 NOTE — PROGRESS NOTES
HPI     Shahana Donaldson is a 79 year old female with the following diagnoses:      1. Age-related nuclear cataract of both eyes   2. Blind right eye   3. Staphyloma of right eye      Feels distance vision has decreased since the last visit. Denies issues with glare or haloes. Rarely drives at night.   Updated glasses at last visit.     Florence Awan CO 7:54 AM May 30, 2019     Last edited by Brandon Delcid MD on 5/30/2019  8:21 AM. (History)          Assessment & Plan      Shahana Donaldson is a 77 year old female with the following diagnoses:     1. Age-related nuclear cataract of both eyes    2. Staphyloma of right eye    3. Blind right eye       Long standing amblyopia right eye. No previous treatments. Stable vision per patient  Cataract, both eyes   Visually significant  Risks, benefits and alternatives to cataract extraction/IOL implantation discussed; consent obtained.  Will schedule surgery today    Special equipment/needs:    Anesthesia:Topical  Dilation:Good  Iris expansion:Not needed  Pseudoexfoliation: No pseudoexfoliation  Trypan Blue: No   Limited vision right eye   Goal emmetropia       Patient disposition:   Return in about 1 year (around 5/30/2020) for Annual Visit. Sooner as needed.     Brandon Delcid MD  Ophthalmology Resident, PGY-2      Attending Physician Attestation:  Complete documentation of historical and exam elements from today's encounter can be found in the full encounter summary report (not reduplicated in this progress note).  I personally obtained the chief complaint(s) and history of present illness.  I confirmed and edited as necessary the review of systems, past medical/surgical history, family history, social history, and examination findings as documented by others; and I examined the patient myself.  I personally reviewed the relevant tests, images, and reports as documented above.  I formulated and edited as necessary the assessment and plan and discussed  the findings and management plan with the patient and family. . - Josue Trujillo MD      right upper arm

## 2025-01-08 ENCOUNTER — MYC MEDICAL ADVICE (OUTPATIENT)
Dept: INTERNAL MEDICINE | Facility: CLINIC | Age: 85
End: 2025-01-08
Payer: MEDICARE

## 2025-01-08 DIAGNOSIS — M62.81 GENERALIZED MUSCLE WEAKNESS: Primary | ICD-10-CM

## 2025-01-13 ENCOUNTER — MEDICAL CORRESPONDENCE (OUTPATIENT)
Dept: HEALTH INFORMATION MANAGEMENT | Facility: CLINIC | Age: 85
End: 2025-01-13

## 2025-01-13 ENCOUNTER — TELEPHONE (OUTPATIENT)
Dept: INTERNAL MEDICINE | Facility: CLINIC | Age: 85
End: 2025-01-13
Payer: MEDICARE

## 2025-01-13 NOTE — TELEPHONE ENCOUNTER
[facility] Call Center    Phone Message    May a detailed message be left on voicemail: yes     Reason for Call: Order(s): Home Care Orders: Physical Therapy (PT):     Continue w/ Home PT : 1x a week's for 4 weeks, followed by 1x every other week for 5 weeks. Please call w/verbal orders thank you.    Action Taken: Message routed to:  [facility] ([facility] pcc    Travel Screening: Not Applicable     Date of Service:

## 2025-01-13 NOTE — TELEPHONE ENCOUNTER
Spoke with Andry with [facility] and gave the following verbal orders: PT - 1x a week's for 4 weeks, followed by 1x every other week for 5 weeks.    Andry also has the following requests/questions:  1. Referral was placed under the following dx: M62.81 (ICD-10-CM) - Generalized muscle weakness. Andry states that this is more of a symptom and not diagnosis. He needs a diagnosis code to explain why patient is weak - such as dementia.  2. Patient has dx of HF with preserved EF, but no orders for this, such as daily weights, etc. Andry wants to know if there is anything she should be doing for this?    Evelin EVANS LPN  [facility]

## 2025-01-15 NOTE — TELEPHONE ENCOUNTER
Left detailed VM on Andry with [facility]'s confidential voicemail box to inform him that they diagnosis for the referral can be changed to dementia and nothing needed for the past dx of heart failure (currently not an issue).  Evelin EVANS LPN  [facility]

## 2025-01-21 ENCOUNTER — TELEPHONE (OUTPATIENT)
Dept: ENDOCRINOLOGY | Facility: CLINIC | Age: 85
End: 2025-01-21

## 2025-01-21 ENCOUNTER — OFFICE VISIT (OUTPATIENT)
Dept: INTERNAL MEDICINE | Facility: CLINIC | Age: 85
End: 2025-01-21
Payer: MEDICARE

## 2025-01-21 VITALS
SYSTOLIC BLOOD PRESSURE: 127 MMHG | BODY MASS INDEX: 18.79 KG/M2 | TEMPERATURE: 98 F | HEART RATE: 64 BPM | HEIGHT: 67 IN | DIASTOLIC BLOOD PRESSURE: 72 MMHG | WEIGHT: 119.7 LBS | OXYGEN SATURATION: 95 % | RESPIRATION RATE: 18 BRPM

## 2025-01-21 DIAGNOSIS — M62.81 GENERALIZED MUSCLE WEAKNESS: ICD-10-CM

## 2025-01-21 DIAGNOSIS — G47.00 INSOMNIA, UNSPECIFIED TYPE: ICD-10-CM

## 2025-01-21 DIAGNOSIS — F03.90 MAJOR NEUROCOGNITIVE DISORDER (H): Primary | ICD-10-CM

## 2025-01-21 PROCEDURE — 99213 OFFICE O/P EST LOW 20 MIN: CPT

## 2025-01-21 NOTE — PROGRESS NOTES
Assessment & Plan     Major neurocognitive disorder (H)  Generalized muscle weakness  POLST updated today after discussion with Jake. Pat present for the discussion, unable to track the conversation, tangential thoughts today. Pat would benefit from continued home care for continued PT/OT evaluation, improvement in weakness, modifications as needed for ADL completion. I can place a new referral if needed.     Insomnia, unspecified type  Pat often wakes early per Jake. She does nap during the day. Recommend trying to stay engaged during the day, avoiding napping during the day as possible to help regulate sleep/wake cycle. Can continue to use melatonin as they have tried in the past. Recommend against additional medications at this time, plan to trial lifestyle modification first.       No follow-ups on file.    Subjective   Kamini is a 84 year old, presenting for the following health issues:  RECHECK (PT referral/Discuss sleep issue./POLST Form)      1/21/2025     3:13 PM   Additional Questions   Roomed by KTR         1/21/2025   Forms   Any forms needing to be completed Yes     History of Present Illness       Reason for visit:  Sleep issue   She is taking medications regularly.     Shahana CARTER Anika presents to the clinic today to discuss a referral, sleep concerns. She has a history of alzheimer's dementia, dysphagia, hypertension, hyperlipidemia, HFpEF, reflux, s/p nissen fundoplication, and osteoporosis.       She received a home care evaluation. She has had balance issues, history of falls, generalized weakness. There are 14 steps to get up to their living area, no elevator. Her  thinks she needs to have an evaluation for a face-to-face for ongoing home care.     She has been having issues sleeping.  Goes to bed around 9 pm, goal is to be awake by 6 am. Will get dressed and be ready for the day by 3 am. Wakes frequently at night, 4-6 times at night. Sometimes will nap during the day. Tries to keep  "her awake.     Jake would like to review a POLST today. He has a copy of Pat's advance directive.     Review of Systems  Constitutional, HEENT, cardiovascular, pulmonary, gi and gu systems are negative, except as otherwise noted.      Objective    /72 (BP Location: Right arm, Patient Position: Sitting, Cuff Size: Adult Regular)   Pulse 64   Temp 98  F (36.7  C) (Oral)   Resp 18   Ht 1.7 m (5' 6.93\")   Wt 54.3 kg (119 lb 11.2 oz)   SpO2 95%   BMI 18.79 kg/m    Body mass index is 18.79 kg/m .  Physical Exam   GENERAL: alert and no distress  PSYCH: judgement and insight impaired            Signed Electronically by: Tammy Maciel NP    "

## 2025-01-21 NOTE — TELEPHONE ENCOUNTER
Left Voicemail (1st Attempt) for the patient to call back and schedule the following:    Appointment type: return endocrine   Provider: Don   Return date: re-ary 1/22 to virtual hold slots on 2/17 or next avail in person IRMA slot   Specialty phone number: [phone]  Additional appointment(s) needed:   Additonal Notes: LVM, MyC x1   Due to changes in the providers ary appt on 1/22 needs to get re-ary to virtual hold slots on 2/17. If pt cannot do a virtual visit re-ary to next avail in person IRMA. Thank you.     Eamples:  2/17 Don virtual csc (in hold slots)  3/12 Don in person csc  3/26 Don in person csc  4/9 Don in person csc     Please note that the above appointment(s) will require manual scheduling as they are marked as IRMA and will not appear using auto search. Do not schedule the patient if another patient has already been scheduled in the requested appointment slot.     Mary Sage on 1/21/2025 at 3:01 PM

## 2025-01-22 ENCOUNTER — DOCUMENTATION ONLY (OUTPATIENT)
Dept: OTHER | Facility: CLINIC | Age: 85
End: 2025-01-22

## 2025-01-22 NOTE — TELEPHONE ENCOUNTER
Patient confirmed scheduled appointment:  Date: 3/12   Time: 12:30   Visit type: return endocrine   Provider: Don   [facility]   Testing/imaging:   Additional notes: Spoke to pt spouse and re-ary appt on 1/22 due to changes in the providers ary to next avail in person IRMA due to pt not having virtual capabilities. Pt spouse agreed w appt.     Mary Sage on 1/22/2025 at 7:12 AM

## 2025-01-28 ENCOUNTER — TELEPHONE (OUTPATIENT)
Dept: INTERNAL MEDICINE | Facility: CLINIC | Age: 85
End: 2025-01-28

## 2025-01-28 DIAGNOSIS — Z53.9 DIAGNOSIS NOT YET DEFINED: Primary | ICD-10-CM

## 2025-01-28 NOTE — TELEPHONE ENCOUNTER
I called Rosamaria but reached voicemail. Message left requesting callback to determine what changes are needed for the patient to continue homecare services.    Tammy Maciel NP

## 2025-01-28 NOTE — TELEPHONE ENCOUNTER
[facility] Call Center    Phone Message    May a detailed message be left on voicemail: yes     Reason for Call: Other: Rosamaria from [facility] requesting call back. She is wanting to discuss Tammy Maciel possibly making an addendum to the notes from pt's appt on 1/21 to clear the pt for homecare

## 2025-01-29 ENCOUNTER — DOCUMENTATION ONLY (OUTPATIENT)
Dept: INTERNAL MEDICINE | Facility: CLINIC | Age: 85
End: 2025-01-29
Payer: MEDICARE

## 2025-01-29 NOTE — PROGRESS NOTES
Type of Form Received: [facility] [id number]    Form Received (Date) 1/28/25   Form Filled out No   Placed in provider folder Yes

## 2025-02-02 DIAGNOSIS — Z53.9 DIAGNOSIS NOT YET DEFINED: Primary | ICD-10-CM

## 2025-02-03 ENCOUNTER — TELEPHONE (OUTPATIENT)
Dept: INTERNAL MEDICINE | Facility: CLINIC | Age: 85
End: 2025-02-03
Payer: MEDICARE

## 2025-02-03 NOTE — TELEPHONE ENCOUNTER
[facility] Call Center    Phone Message    May a detailed message be left on voicemail: yes     Reason for Call: Other: Per Kinza the signature date is not there , only the signature. Please fix that on order number [id number] and refax it. Thank you!     Action Taken: Other: [facility]    Travel Screening: Not Applicable     Date of Service:

## 2025-02-05 ENCOUNTER — TRANSFERRED RECORDS (OUTPATIENT)
Dept: HEALTH INFORMATION MANAGEMENT | Facility: CLINIC | Age: 85
End: 2025-02-05
Payer: MEDICARE

## 2025-02-07 NOTE — TELEPHONE ENCOUNTER
Rosamaria, is returning missed call from provider Tammy Maciel. Please call her at [phone] ext [phone]. What changes need to done to the addendum: plan and assess patient requires home care, PT for osteoporosis  diagnosis. Request if you are Willing to sign POC, and 485 form. Fax: [phone]. Please call her to discuss further details. Thank you

## 2025-02-08 DIAGNOSIS — E78.2 MIXED HYPERLIPIDEMIA: ICD-10-CM

## 2025-02-13 RX ORDER — ATORVASTATIN CALCIUM 10 MG/1
10 TABLET, FILM COATED ORAL DAILY
Qty: 90 TABLET | Refills: 0 | Status: SHIPPED | OUTPATIENT
Start: 2025-02-13

## 2025-02-13 NOTE — TELEPHONE ENCOUNTER
Faxed addendum 01/21/2025 visit notes to Janae Ca at fax [phone]       Ritesh Prieto CMA ([facility]) at 1:10 PM on 2/13/2025

## 2025-02-13 NOTE — TELEPHONE ENCOUNTER
Last Written Prescription:     atorvastatin (LIPITOR) 10 MG tablet 90 tablet 0 11/12/2024 -- No   Sig - Route: TAKE ONE TABLET BY MOUTH ONE TIME DAILY - Oral     ----------------------  Last Visit Date: 1/21/25 Raheem  Future Visit Date: 4/18/25  ----------------------  LDL Cholesterol Calculated   Date Value Ref Range Status   10/31/2023 53 <=100 mg/dL Final   11/21/2018 64 <100 mg/dL Final     Comment:     Desirable:       <100 mg/dl         []  Refill decision: Medication refilled per  Medication Refill in Ambulatory Care  policy. 90 day angela sent at last request     []  Supervision: no future appointment scheduled. Scheduling has been notified to contact the pt for appointment.      [x]  Refill decision: Medication unable to be refilled by RN due to: Overdue labs/test:   90 day angela sent at last request Last LDL 10/31/23        Request from pharmacy:  Requested Prescriptions   Pending Prescriptions Disp Refills    atorvastatin (LIPITOR) 10 MG tablet [Pharmacy Med Name: Atorvastatin Calcium Oral Tablet 10 MG] 90 tablet 0     Sig: TAKE ONE TABLET BY MOUTH ONE TIME DAILY       Antihyperlipidemic agents Failed - 2/13/2025  3:25 PM        Failed - LDL on file in the past 12 months        Passed - Medication is active on med list and the sig matches. RN to manually verify dose and sig if red X/fail.     If the protocol passes (green check), you do not need to verify med dose and sig.    A prescription matches if they are the same clinical intention.    For Example: once daily and every morning are the same.    For all fails (red x), verify dose and sig.    If the refill does match what is on file, the RN can still proceed to approve the refill request.     If they do not match, route to the appropriate provider.             Passed - Recent (12 mo) or future (90 days) visit within the authorizing provider's specialty     The patient must have completed an in-person or virtual visit within the past 12 months or has a  future visit scheduled within the next 90 days with the authorizing provider s specialty.  Urgent care and e-visits do not qualify as an office visit for this protocol.          Passed - Patient is age 18 years or older        Passed - No active pregnancy on record        Passed - No positive pregnancy test in past 12 mos

## 2025-02-19 ENCOUNTER — TELEPHONE (OUTPATIENT)
Dept: INTERNAL MEDICINE | Facility: CLINIC | Age: 85
End: 2025-02-19
Payer: MEDICARE

## 2025-02-19 NOTE — TELEPHONE ENCOUNTER
Sae was calling about a letter sent to us on 2/11/25 due to the Anxiety and insomnia and they wanted to know how we will move forward with the Remeron 7.5mg recommendation or if they should try it, please review and follow up thank you.

## 2025-02-20 ENCOUNTER — DOCUMENTATION ONLY (OUTPATIENT)
Dept: INTERNAL MEDICINE | Facility: CLINIC | Age: 85
End: 2025-02-20
Payer: MEDICARE

## 2025-02-20 NOTE — PROGRESS NOTES
Type of Form Received: [facility] [id number]    Form Received (Date) 2/19/25   Form Filled out No   Placed in provider folder Yes

## 2025-02-21 NOTE — TELEPHONE ENCOUNTER
Called Dr. Almazan- gave correct fax number. [facility] recommends patient try the remeron, but wanting to know if the PCP would like to prescribe or should [facility] prescribe. They usually just consult. They will refax over orders.    Vicente Reyes RN on 2/21/2025 at 12:28 PM

## 2025-02-21 NOTE — TELEPHONE ENCOUNTER
Dr. Almazan called back stating the she's been faxing the paperwork to correct fax number and just faxed over again the information. They faxed over information on 02/11 and 02/19. Please call if you don't received them again

## 2025-02-26 ENCOUNTER — TELEPHONE (OUTPATIENT)
Dept: INTERNAL MEDICINE | Facility: CLINIC | Age: 85
End: 2025-02-26
Payer: MEDICARE

## 2025-02-26 DIAGNOSIS — F03.90 MAJOR NEUROCOGNITIVE DISORDER (H): Primary | ICD-10-CM

## 2025-02-26 DIAGNOSIS — M62.81 GENERALIZED MUSCLE WEAKNESS: ICD-10-CM

## 2025-02-26 DIAGNOSIS — M81.0 SENILE OSTEOPOROSIS: ICD-10-CM

## 2025-02-26 NOTE — TELEPHONE ENCOUNTER
[facility] Call Center    Phone Message    May a detailed message be left on voicemail: yes     Reason for Call: Other: sean with [facility] is requesting to get a new referral faxed over to them at # [phone] for the Pt to be able to get PT. The Pt did have a previous referral but he states that it was signed off on too late by her PCP so they had to discharge her until she gets a new one. Please advise.      Action Taken: Other: PCC    Travel Screening: Not Applicable     Date of Service: 2/26/25

## 2025-02-27 ENCOUNTER — DOCUMENTATION ONLY (OUTPATIENT)
Dept: INTERNAL MEDICINE | Facility: CLINIC | Age: 85
End: 2025-02-27
Payer: MEDICARE

## 2025-02-27 NOTE — PROGRESS NOTES
Type of Form Received: [facility] [id number]    Form Received (Date) 2/26/25   Form Filled out No   Placed in provider folder Yes

## 2025-03-03 ENCOUNTER — MEDICAL CORRESPONDENCE (OUTPATIENT)
Dept: HEALTH INFORMATION MANAGEMENT | Facility: CLINIC | Age: 85
End: 2025-03-03
Payer: MEDICARE

## 2025-03-03 DIAGNOSIS — F03.90 MAJOR NEUROCOGNITIVE DISORDER (H): ICD-10-CM

## 2025-03-03 RX ORDER — DONEPEZIL HYDROCHLORIDE 10 MG/1
10 TABLET, FILM COATED ORAL AT BEDTIME
Qty: 60 TABLET | Refills: 2 | Status: SHIPPED | OUTPATIENT
Start: 2025-03-03

## 2025-03-03 NOTE — TELEPHONE ENCOUNTER
Rx Authorization:  Requested Medication/ Dose donepezil (ARICEPT) 10 MG tablet   Date last refill ordered: 8/28/24  Quantity ordered: 60 tablet  # refills: 2  Date of last clinic visit with ordering provider: 8/28/24  Date of next clinic visit with ordering provider:   All pertinent protocol data (lab date/result):   Include pertinent information from patients message:

## 2025-03-05 ENCOUNTER — TRANSFERRED RECORDS (OUTPATIENT)
Dept: HEALTH INFORMATION MANAGEMENT | Facility: CLINIC | Age: 85
End: 2025-03-05
Payer: MEDICARE

## 2025-03-11 ENCOUNTER — TRANSFERRED RECORDS (OUTPATIENT)
Dept: HEALTH INFORMATION MANAGEMENT | Facility: CLINIC | Age: 85
End: 2025-03-11
Payer: MEDICARE

## 2025-03-12 ENCOUNTER — OFFICE VISIT (OUTPATIENT)
Dept: ENDOCRINOLOGY | Facility: CLINIC | Age: 85
End: 2025-03-12
Payer: MEDICARE

## 2025-03-12 ENCOUNTER — OFFICE VISIT (OUTPATIENT)
Dept: NEUROLOGY | Facility: CLINIC | Age: 85
End: 2025-03-12
Payer: MEDICARE

## 2025-03-12 VITALS
SYSTOLIC BLOOD PRESSURE: 152 MMHG | HEIGHT: 66 IN | DIASTOLIC BLOOD PRESSURE: 74 MMHG | BODY MASS INDEX: 18.48 KG/M2 | WEIGHT: 115 LBS | HEART RATE: 62 BPM

## 2025-03-12 VITALS
BODY MASS INDEX: 17.89 KG/M2 | WEIGHT: 114 LBS | DIASTOLIC BLOOD PRESSURE: 82 MMHG | SYSTOLIC BLOOD PRESSURE: 153 MMHG | HEART RATE: 66 BPM | OXYGEN SATURATION: 95 %

## 2025-03-12 DIAGNOSIS — G30.9 ALZHEIMER'S DISEASE (H): ICD-10-CM

## 2025-03-12 DIAGNOSIS — M81.0 SENILE OSTEOPOROSIS: Primary | ICD-10-CM

## 2025-03-12 DIAGNOSIS — F03.90 MAJOR NEUROCOGNITIVE DISORDER (H): ICD-10-CM

## 2025-03-12 DIAGNOSIS — F02.80 ALZHEIMER'S DISEASE (H): ICD-10-CM

## 2025-03-12 RX ORDER — DIPHENHYDRAMINE HYDROCHLORIDE 50 MG/ML
50 INJECTION, SOLUTION INTRAMUSCULAR; INTRAVENOUS
Start: 2025-03-12

## 2025-03-12 RX ORDER — ALBUTEROL SULFATE 90 UG/1
1-2 INHALANT RESPIRATORY (INHALATION)
Start: 2025-03-12

## 2025-03-12 RX ORDER — ALBUTEROL SULFATE 0.83 MG/ML
2.5 SOLUTION RESPIRATORY (INHALATION)
OUTPATIENT
Start: 2025-03-12

## 2025-03-12 RX ORDER — HEPARIN SODIUM (PORCINE) LOCK FLUSH IV SOLN 100 UNIT/ML 100 UNIT/ML
5 SOLUTION INTRAVENOUS
OUTPATIENT
Start: 2025-03-12

## 2025-03-12 RX ORDER — METHYLPREDNISOLONE SODIUM SUCCINATE 40 MG/ML
40 INJECTION INTRAMUSCULAR; INTRAVENOUS
Start: 2025-03-12

## 2025-03-12 RX ORDER — MEPERIDINE HYDROCHLORIDE 25 MG/ML
25 INJECTION INTRAMUSCULAR; INTRAVENOUS; SUBCUTANEOUS
OUTPATIENT
Start: 2025-03-12

## 2025-03-12 RX ORDER — DIPHENHYDRAMINE HYDROCHLORIDE 50 MG/ML
25 INJECTION, SOLUTION INTRAMUSCULAR; INTRAVENOUS
Start: 2025-03-12

## 2025-03-12 RX ORDER — ZOLEDRONIC ACID 0.05 MG/ML
5 INJECTION, SOLUTION INTRAVENOUS ONCE
Start: 2025-03-12

## 2025-03-12 RX ORDER — EPINEPHRINE 1 MG/ML
0.3 INJECTION, SOLUTION, CONCENTRATE INTRAVENOUS EVERY 5 MIN PRN
OUTPATIENT
Start: 2025-03-12

## 2025-03-12 RX ORDER — HEPARIN SODIUM,PORCINE 10 UNIT/ML
5-20 VIAL (ML) INTRAVENOUS DAILY PRN
OUTPATIENT
Start: 2025-03-12

## 2025-03-12 RX ORDER — ACETAMINOPHEN 325 MG/1
650 TABLET ORAL
OUTPATIENT
Start: 2025-03-12

## 2025-03-12 ASSESSMENT — PAIN SCALES - GENERAL: PAINLEVEL_OUTOF10: NO PAIN (0)

## 2025-03-12 NOTE — PATIENT INSTRUCTIONS
Please call and schedule at one of these locations that provide the service you need.     DEXA, CT, MRI, US, XRAY    [facility]   ([facility], [facility], [place], [place]) [phone]   [facility] ([place], [place]) [phone]   [facility] ([facility]) [phone]   [facility] ([facility]) [phone]       Please call one of these infusion locations to schedule your Reclast    [facility] [phone] (option 3, then option 2)   [place]  [phone]   [facility]) [phone]   [facility]) [phone]   [facility]) [phone]   [place] [phone]   [place] [phone]   [place] [phone]

## 2025-03-12 NOTE — LETTER
3/12/2025       RE: Shahana Donaldson  [address]  [place] [address]     Dear Colleague,    Thank you for referring your patient, Shahana Donaldson, to the [facility] [place] [facility]. Please see a copy of my visit note below.                                                                                 - Endocrinology follow up  -    Reason for visit/consult:  osteoporosis    Primary care provider: Jeramie Curran    Assessment and Plan  84 year old female with osteoporosis, kyphosis    Osteoporosis/kyphosis  Her bone density has been gradually decreasing but this can be from aging and immobilization (Alzheimer). However T score is still not very critical range. I recommend to try Reclast one more time, and continue current Ca supplement.     - 4th  Reclast infusion this spring ( 2019, 2022, 2024)    - continue Calcium citrate petite 2 tabs daily    - encourage moving if can.     - repeat DXA 4/2026 spring    Alzheimer disease and Alzheimer related dementia  It is significantly affecting her physical activity levels    Dysphagia        RTC with me in 1 years      30 minutes spent on the date of the encounter doing chart review, history and exam, documentation and further activities as noted above.        Tatiana Ochoa MD  Staff Physician  Endocrinology and Metabolism  License: [id number]    Interval History as of 3/13/2025 : Patient has been doing ok, her  answered all questions today, he is her care alisha . Medication compliance : compliant to Ca supplement  . New event includes : no fall but quite sedentary .   Interval History as of 1/10/2024 : Patient has been doing ok but recently decrease appetite due to dysphagia. She is bit losing weight. She walks with her  but not doing any specific exercise.   HPI: A 82 yo female here for the evaluation of osteoporosis.  Referral from Dr. Curran.    In around 2005 per the record she was taking her Fosamax, in 2018 she had hip injury and fell and she had a left hip fracture at that time bone density was done and osteoporosis.  She received Reclast infusion therapy in 2019 since then she has not received it yet.  She repeated to bone density on April 11, 2022 which shows more advanced osteoporosis with declining T score over 4 years.   Other medical history including remote history of hysterectomy, GERD for which she underwent Nissen procedure in 2013.  Essential hypertension mild dyslipidemia.    Prior fragility fracture: no  Parental history of hip fracture: left hip 2018  Rheumatoid arthritis:no  Secondary cause of osteoporosis:no  Body habitus (BMI less than or equal to 20):no  Family history of osteoporosis: unknown   Sedentary lifestyle: fair  Current tabacco smoking:no  History of thyroid disorder:no  Calcuim and Vitmin D supplements: Nature Ca D  Other supplement or bone treatment: Reclast in 2019  Medication use (PPI, epileptic medications etc): GERD  Early menopause (female): hysterectomy    Past Medical/Surgical History:  Past Medical History:   Diagnosis Date     Diverticulosis of colon (without mention of hemorrhage)      Essential hypertension, benign      Gastro-oesophageal reflux disease     nissen     Hemorrhoid      Nonsenile cataract      Osteoporosis      Other and unspecified hyperlipidemia      Rectocele      Symptomatic menopausal or female climacteric states      Past Surgical History:   Procedure Laterality Date     ARTHROPLASTY HIP Left 10/23/2018    Procedure: LEFT HIP COLEMAN- ARTHROPLASTY ;  Surgeon: Araceli Gutierrez MD;  Location:  OR     CATARACT IOL, RT/LT Left 07/15/2019     COLONOSCOPY  5/24/2011    Procedure:COLONOSCOPY; Surgeon:HALINA SCOTT; Location:[facility]     COLONOSCOPY Left 11/10/2015    Procedure: COLONOSCOPY;  Surgeon: Raheem Colunga MD;  Location:  GI     COLONOSCOPY  5/24/11, 11/10/2015     COLONOSCOPY  N/A 8/29/2024    Procedure: Colonoscopy;  Surgeon: Gareth Clay MD;  Location: [facility]     DILATION AND CURETTAGE      secondary to menorrhagia     GYN SURGERY      hysterectomy/oopherectomy; done for prolapse     HEMIARTHROPLASTY HIP Left 10/23/2018     HYSTERECTOMY      hysterectomy/oopherectomy; done for prolapse     LAPAROSCOPIC NISSEN FUNDOPLICATION  1/7/2013    Procedure: LAPAROSCOPIC NISSEN FUNDOPLICATION;  Laparoscopic Repair of Hiatel Hernia, NISSEN, Esophagoscopy, Gastroscopy And Duodenoscopy ;  Surgeon: Leonidas Valle MD;  Location: [facility] OR     LAPAROSCOPIC NISSEN FUNDOPLICATION  01/07/2013     PHACOEMULSIFICATION WITH STANDARD INTRAOCULAR LENS IMPLANT Left 7/15/2019    Procedure: Left Eye Phacoemulsification with Intraocular Lens;  Surgeon: Josue Trujillo MD;  Location: [facility]     TUBAL LIGATION Bilateral      ZZC NONSPECIFIC PROCEDURE      Bilateral Tubal Ligation     ZZC NONSPECIFIC PROCEDURE      D&C secondary to menorrhagia     ZZC NONSPECIFIC PROCEDURE      child birth x 2       Allergies:  Allergies   Allergen Reactions     Dilaudid [Hydromorphone]      dizziness     Senna Hives       Current Medications   Current Outpatient Medications   Medication Sig Dispense Refill     atorvastatin (LIPITOR) 10 MG tablet TAKE ONE TABLET BY MOUTH ONE TIME DAILY 90 tablet 0     calcium citrate (CITRACAL) 950 (200 Ca) MG tablet Take 1 tablet by mouth 2 times daily       cholecalciferol (VITAMIN D3) 1000 UNIT tablet Take 2 tablets (2,000 Units) by mouth daily 30 tablet 0     clobetasol (TEMOVATE) 0.05 % external ointment Apply twice weekly to lichen sclerosus, increase to twice daily as needed for flares 60 g 3     clobetasol (TEMOVATE) 0.05 % external ointment Apply topically 2x weekly maintenance use of clobetasol *can increase to twice daily for flares when needed and then taper slowly back to 2x weekly. 60 g 11     clobetasol (TEMOVATE) 0.05 % ointment Apply sparingly to affected area 2  x daily for14 days. Then, apply small amount to affected area twice weekly for maintenance. 30 g 2     diltiazem ER COATED BEADS (CARDIZEM CD/CARTIA XT) 300 MG 24 hr capsule Take 1 capsule (300 mg) by mouth daily. 90 capsule 3     loperamide (IMODIUM) 2 MG capsule Take 2 mg by mouth 4 times daily as needed for diarrhea.       Acetaminophen (TYLENOL PO) Take 1,000 mg by mouth every 6 hours as needed for mild pain or fever (Patient not taking: Reported on 10/3/2024)       ammonium lactate (LAC-HYDRIN) 12 % external lotion Apply topically 2 times daily To the feet (Patient not taking: Reported on 5/22/2024) 225 g 11     augmented betamethasone dipropionate (DIPROLENE-AF) 0.05 % external ointment Apply topically 2 times daily To vaginal area as needed (Patient not taking: Reported on 5/22/2024) 50 g 3     azithromycin (ZITHROMAX) 500 MG tablet Take 1 tablet (500 mg) by mouth daily. (Patient not taking: Reported on 8/28/2024) 3 tablet 0     bisacodyl (DULCOLAX) 5 MG EC tablet Take 2 tablets at 3 pm the day before your procedure. If your procedure is before 11 am, take 2 additional tablets at 11 pm. If your procedure is after 11 am, take 2 additional tablets at 6 am. For additional instructions refer to your colonoscopy prep instructions. (Patient not taking: Reported on 10/3/2024) 4 tablet 0     calcipotriene (DOVONOX) 0.005 % external cream Mix efudex + calcipotriene equally. Apply BID x 4-10 days. Stop when skin gets red. (Patient not taking: Reported on 5/22/2024) 60 g 1     calcipotriene (DOVONOX) 0.005 % external cream Mix efudex + calcipotriene equally. Apply BID x 4-10 days. Stop when skin gets red. (Patient not taking: Reported on 5/22/2024) 60 g 1     calcium carbonate 500 mg, elemental, (OSCAL;OYSTER SHELL CALCIUM) 500 MG tablet Take 3 tablets (1,500 mg) by mouth 2 times daily (with meals) (Patient not taking: Reported on 5/22/2024) 60 tablet 0     diclofenac (VOLTAREN) 1 % topical gel Apply 2 g topically 4  times daily For R knee (Patient not taking: Reported on 8/28/2024) 100 g 3     docusate sodium (COLACE) 100 MG capsule Take 1 capsule (100 mg) by mouth 2 times daily (Patient not taking: Reported on 5/22/2024) 60 capsule 0     fluorouracil (EFUDEX) 5 % external cream Mix equally with calcipotriene cream. Apply BID x 4-10 days. Stop when skin gets red. (Patient not taking: Reported on 10/3/2024) 40 g 0     fluorouracil (EFUDEX) 5 % external cream Mix equally with calcipotriene cream. Apply BID x 4-10 days. Stop when skin gets red. (Patient not taking: Reported on 10/3/2024) 40 g 0     polyethylene glycol (GOLYTELY) 236 g suspension The night before the exam at 6 pm drink an 8-ounce glass every 15 minutes until the jug is half empty. If you arrive before 11 AM: Drink the other half of the Golytely jug at 11 PM night before procedure. If you arrive after 11 AM: Drink the other half of the Golytely jug at 6 AM day of procedure. For additional instructions refer to your colonoscopy prep instructions. (Patient not taking: Reported on 10/3/2024) 4000 mL 0     triamcinolone (KENALOG) 0.1 % external ointment Apply topically 2 times daily To the areas of bumps on the arms and legs (Patient not taking: Reported on 5/22/2024) 60 g 5     No current facility-administered medications for this visit.       Family History:  Family History   Problem Relation Age of Onset     Hypertension Father      C.A.D. Father      Hypertension Mother      Breast Cancer Sister      Osteoporosis Sister      EYE* Brother         Keratoconus     Glaucoma No family hx of      Macular Degeneration No family hx of      Coronary Artery Disease Father      Breast Cancer Sister      Keratoconus Brother        Social History:  Social History     Tobacco Use     Smoking status: Never     Smokeless tobacco: Never   Substance Use Topics     Alcohol use: No       ROS:  Full review of systems taken with the help of the intake sheet. Otherwise a complete 14 point  "review of systems was taken and is negative unless stated in the history above.    Physical Exam:   Vitals: BP (!) 152/74   Pulse 62   Ht 1.676 m (5' 6\")   Wt 52.2 kg (115 lb)   BMI 18.56 kg/m    BMI= Body mass index is 18.56 kg/m .   General: well appearing, no acute distress, pleasant and conversant,   Mental Status/neuro: alert and oriented  Face: symmetrical, normal facial color  Eyes: anicteric, no proptosis or lid lag  Bask: kyphosis,   Neck: no goiter  Resp: normally breathing      Labs : I reviewed data from epic and extract and summarize the pertinent data here.   Lab Results   Component Value Date     07/22/2021     11/25/2019      Lab Results   Component Value Date    POTASSIUM 3.9 07/22/2021    POTASSIUM 4.0 11/25/2019     Lab Results   Component Value Date    CHLORIDE 110 07/22/2021    CHLORIDE 109 11/25/2019     Lab Results   Component Value Date    MARCK 9.1 07/22/2021    MARCK 9.1 11/25/2019     Lab Results   Component Value Date    CO2 29 07/22/2021    CO2 26 11/25/2019     Lab Results   Component Value Date    BUN 20 07/22/2021    BUN 24 11/25/2019     Lab Results   Component Value Date    CR 0.85 07/22/2021    CR 0.76 11/25/2019     Lab Results   Component Value Date    GLC 95 07/22/2021    GLC 92 11/25/2019     Lab Results   Component Value Date    TSH 2.51 04/17/2018     No results found for: T4  No results found for: A1C    No results found for: IGF1  No results found for: LH  No results found for: FSH  No results found for: ESTROGEN  No results found for: PROLACTIN        Bone density 4/11/2022: I personally reviewed the original images and agree with the below reports.   Results   Lumbar spine   T-score -3.5 ., BMD is 0.756 g/cm2.         Right femoral neck  T-score -2.7      Right total femur  T-score -3.1, BMD is 0.622 g/cm2.     Interval change  There was no change at the areas of interest compared to the prior study.     Fracture risk   Fracture risk calculation is not " indicated. Ref.4           Again, thank you for allowing me to participate in the care of your patient.      Sincerely,    Tatiana Ochoa MD

## 2025-03-12 NOTE — NURSING NOTE
"Chief Complaint   Patient presents with    Osteoporosis     Vital signs:      BP: (!) 152/74 Pulse: 62           Height: 167.6 cm (5' 6\") Weight: 52.2 kg (115 lb)  Estimated body mass index is 18.56 kg/m  as calculated from the following:    Height as of this encounter: 1.676 m (5' 6\").    Weight as of this encounter: 52.2 kg (115 lb).        "

## 2025-03-12 NOTE — PROGRESS NOTES
"[facility] Neurology Follow Up Visit    Shahana Donaldson MRN# [id number]   Age: 84 year old YOB: 1940     Brief history of symptoms: The patient was initially seen in neurologic consultation on 11/7/2023 and most recently 8/28/2024 for evaluation of cognitive concerns. Please see the comprehensive neurologic consultation notes from those dates in the Epic records for details.     Overall impression was for that of Alzheimer disease related dementia.  Prior studies included:  - MoCA on 11/7/2023 of 8/30  - MRI brain 12/5/2023 showing microvascular ischemic diease and mild b/l hippocampal atrophy  - Donepezil 10 mg at bedtime continued   - OT evaluation 5/28/2024 showed a CPT score of 3.6/5.6.              - \"Safety:  24-hour supervision required for safety and for assistance with daily tasks. Assistance required with medications, and access to medication should be limited. Meals, nutrition and dietary restrictions need to be monitored.  All hazardous activities should be restricted or supervised. Should not drive. Prone to wandering and can become lost.  ADL:  Moderate functional decline. Familiar tasks usually requires set-up of supplies and directions to complete steps. May need objects handed to them for task initiation. Function best with a set schedule in familiar surroundings with familiar people. All complex tasks must be done by others. Vocabulary is diminished and speech often unfocused. \"    After our last visit, the patient was to continue with Donepezil for her Alzheimer's disease related dementia of moderate severity. I also asked her and her  to speak with social work about further emergency planning and increasing level of care.    Interval history:  - Social work note is dated 9/3/2024.    Today, the patient is present with her  Jake.  They tell me that their son comes in twice weekly, an in-home care person come twice weekly for 4 hours, and have a niece or nephew stop in " weekly.  Jake is enrolled in guide program, offering advice for primary care givers.  Kamini needs help with dressing and undressing in the bathroom, and wears diapers for urinary incontinence.  She has good and bad nights, with bad nights described as Pat getting up to get dressed at 3 am or go to the bathroom.  She often can be redirected easily.  She is taking melatonin at night to help with sleep.  They are considering starting Remeron.  She uses a can and walker for most ambulation, but otherwise holds onto her  for mobility.  She has had no falls reported, but is a high fall risk.     Physical Exam:   Vitals: BP (!) 153/82 (BP Location: Right arm, Patient Position: Sitting, Cuff Size: Adult Regular)   Pulse 66   Wt 51.7 kg (114 lb)   SpO2 95%   BMI 17.89 kg/m     General: Seated comfortably in no acute distress.  Neurologic:     Mental Status: Fully alert, attentive and but not oriented. Frontal release signs noted (palmomental, luria, disinhibition with motions (when asked to tap one foot she has her entire body tap). Speech clear and fluent, no paraphasic errors. Looks to her  to answer most questions.  Is pleasant and tries to answer questions, but responses are often not accurate.     Cranial Nerves: EOMI with normal smooth pursuit. Facial movements symmetric. Hearing not formally tested but intact to conversation.  No dysarthria.     Motor: No tremors or other abnormal movements observed at rest. Mild postural tremor in arms with extension.  No notable weakness with UE or LE, as all are antigravity.  Good  and can give resistance with biceps flexion, triceps extension, knee flexion or knee extension.     Sensory:Negative Romberg.     DTR: 2+ and symmetric UE and LE b/l.     Gait: stands up with need to use hands on chair. Normal base. Stride is short, near shuffling, and almost like a side shuffle/skip.  Turns quickly and overshoots during motion.  Sits in a quick manner and  uncontrolled if no hand rails are provided.           Assessment and Plan:   Assessment:  Moderate to severe Alzheimer disease related dementia    The patient has an increased level of care provided by at home nursing, family, and her  compared to our past visit.  She is redirectable at night and doesn't require sedating medications overall. I worry her donepezil is leading to diarrhea, and would like to have her stop it to see if this issue resolves quickly.  We spoke about her weight loss, and the importance of eating together and feeding Kamini if it seems she is lacking an ability to understand how to use utensils. There was an understanding that she may not necessarily have a strong drive to eat and should use nutritional supplements such as ensure with meals to help keep weight on.  We also went over Kamini's fall risk being high, and that her balance devices should be used often and in the home as well as out of the home.  Overall, we spoke about progression of dementia and that as this occurs, Kamini's needs will increase.  She may need further agents for calming, such as seroquel if she cannot be redirected at night and is having behavioral concerns, and we went over potential side effects if it was started.  We will obtain a new CPT to help guide whether at home care or other types of care are good options for the next year or two.       Plan:  - Donepezil to stop to see if diarrhea is impacted, if not we would restart for mood stabalization  - OT for CPT  - high fall risk, encouraged use of balance devices already present    Follow up in Neurology clinic in one year, or earlier as needed should new concerns arise.    NARENDRA Malik D.O.   of Neurology    Total time today (45 min) in this patient encounter was spent on pre-charting, counseling and/or coordination of care. The longitudinal plan of care for the diagnosis(es)/condition(s) as documented were addressed during this visit.  Due to the added complexity in care, I will continue to support Pat in the subsequent management and with ongoing continuity of care.

## 2025-03-12 NOTE — PROGRESS NOTES
- Endocrinology follow up  -    Reason for visit/consult:  osteoporosis    Primary care provider: Jermaie Curran    Assessment and Plan  84 year old female with osteoporosis, kyphosis    Osteoporosis/kyphosis  Her bone density has been gradually decreasing but this can be from aging and immobilization (Alzheimer). However T score is still not very critical range. I recommend to try Reclast one more time, and continue current Ca supplement.     - 4th  Reclast infusion this spring ( 2019, 2022, 2024)    - continue Calcium citrate petite 2 tabs daily    - encourage moving if can.     - repeat DXA 4/2026 spring    Alzheimer disease and Alzheimer related dementia  It is significantly affecting her physical activity levels    Dysphagia        RTC with me in 1 years      30 minutes spent on the date of the encounter doing chart review, history and exam, documentation and further activities as noted above.        Tatiana Ochoa MD  Staff Physician  Endocrinology and Metabolism  License: [id number]    Interval History as of 3/13/2025 : Patient has been doing ok, her  answered all questions today, [facility] . Medication compliance : compliant to Ca supplement  . New event includes : no fall but quite sedentary .   Interval History as of 1/10/2024 : Patient has been doing ok but recently decrease appetite due to dysphagia. She is bit losing weight. She walks with her  but not doing any specific exercise.   HPI: A 82 yo female here for the evaluation of osteoporosis.  Referral from Dr. Curran.   In around 2005 per the record she was taking her Fosamax, in 2018 she had hip injury and fell and she had a left hip fracture at that time bone density was done and osteoporosis.  She received Reclast infusion therapy in 2019 since then she has not received it yet.  She repeated to bone density on April 11, 2022 which shows more  advanced osteoporosis with declining T score over 4 years.   Other medical history including remote history of hysterectomy, GERD for which she underwent Nissen procedure in 2013.  Essential hypertension mild dyslipidemia.    Prior fragility fracture: no  Parental history of hip fracture: left hip 2018  Rheumatoid arthritis:no  Secondary cause of osteoporosis:no  Body habitus (BMI less than or equal to 20):no  Family history of osteoporosis: unknown   Sedentary lifestyle: fair  Current tabacco smoking:no  History of thyroid disorder:no  Calcuim and Vitmin D supplements: Nature Ca D  Other supplement or bone treatment: Reclast in 2019  Medication use (PPI, epileptic medications etc): GERD  Early menopause (female): hysterectomy    Past Medical/Surgical History:  Past Medical History:   Diagnosis Date    Diverticulosis of colon (without mention of hemorrhage)     Essential hypertension, benign     Gastro-oesophageal reflux disease     nissen    Hemorrhoid     Nonsenile cataract     Osteoporosis     Other and unspecified hyperlipidemia     Rectocele     Symptomatic menopausal or female climacteric states      Past Surgical History:   Procedure Laterality Date    ARTHROPLASTY HIP Left 10/23/2018    Procedure: LEFT HIP COLEMAN- ARTHROPLASTY ;  Surgeon: Araceli Gutierrez MD;  Location: [facility]    CATARACT IOL, RT/LT Left 07/15/2019    COLONOSCOPY  5/24/2011    Procedure:COLONOSCOPY; Surgeon:HALINA SCOTT; Location:[facility]    COLONOSCOPY Left 11/10/2015    Procedure: COLONOSCOPY;  Surgeon: Raheem Colunga MD;  Location: [facility]    COLONOSCOPY  5/24/11, 11/10/2015    COLONOSCOPY N/A 8/29/2024    Procedure: Colonoscopy;  Surgeon: Gareth Clay MD;  Location: [facility]    DILATION AND CURETTAGE      secondary to menorrhagia    GYN SURGERY      hysterectomy/oopherectomy; done for prolapse    HEMIARTHROPLASTY HIP Left 10/23/2018    HYSTERECTOMY      hysterectomy/oopherectomy; done for prolapse    LAPAROSCOPIC NISSEN  FUNDOPLICATION  1/7/2013    Procedure: LAPAROSCOPIC NISSEN FUNDOPLICATION;  Laparoscopic Repair of Hiatel Hernia, NISSEN, Esophagoscopy, Gastroscopy And Duodenoscopy ;  Surgeon: Leonidas Valle MD;  Location: [facility] OR    LAPAROSCOPIC NISSEN FUNDOPLICATION  01/07/2013    PHACOEMULSIFICATION WITH STANDARD INTRAOCULAR LENS IMPLANT Left 7/15/2019    Procedure: Left Eye Phacoemulsification with Intraocular Lens;  Surgeon: Josue Trujillo MD;  Location: [facility]    TUBAL LIGATION Bilateral     ZZC NONSPECIFIC PROCEDURE      Bilateral Tubal Ligation    ZZC NONSPECIFIC PROCEDURE      D&C secondary to menorrhagia    ZZC NONSPECIFIC PROCEDURE      child birth x 2       Allergies:  Allergies   Allergen Reactions    Dilaudid [Hydromorphone]      dizziness    Senna Hives       Current Medications   Current Outpatient Medications   Medication Sig Dispense Refill    atorvastatin (LIPITOR) 10 MG tablet TAKE ONE TABLET BY MOUTH ONE TIME DAILY 90 tablet 0    calcium citrate (CITRACAL) 950 (200 Ca) MG tablet Take 1 tablet by mouth 2 times daily      cholecalciferol (VITAMIN D3) 1000 UNIT tablet Take 2 tablets (2,000 Units) by mouth daily 30 tablet 0    clobetasol (TEMOVATE) 0.05 % external ointment Apply twice weekly to lichen sclerosus, increase to twice daily as needed for flares 60 g 3    clobetasol (TEMOVATE) 0.05 % external ointment Apply topically 2x weekly maintenance use of clobetasol *can increase to twice daily for flares when needed and then taper slowly back to 2x weekly. 60 g 11    clobetasol (TEMOVATE) 0.05 % ointment Apply sparingly to affected area 2 x daily for14 days. Then, apply small amount to affected area twice weekly for maintenance. 30 g 2    diltiazem ER COATED BEADS (CARDIZEM CD/CARTIA XT) 300 MG 24 hr capsule Take 1 capsule (300 mg) by mouth daily. 90 capsule 3    loperamide (IMODIUM) 2 MG capsule Take 2 mg by mouth 4 times daily as needed for diarrhea.      Acetaminophen (TYLENOL PO) Take 1,000 mg  by mouth every 6 hours as needed for mild pain or fever (Patient not taking: Reported on 10/3/2024)      ammonium lactate (LAC-HYDRIN) 12 % external lotion Apply topically 2 times daily To the feet (Patient not taking: Reported on 5/22/2024) 225 g 11    augmented betamethasone dipropionate (DIPROLENE-AF) 0.05 % external ointment Apply topically 2 times daily To vaginal area as needed (Patient not taking: Reported on 5/22/2024) 50 g 3    azithromycin (ZITHROMAX) 500 MG tablet Take 1 tablet (500 mg) by mouth daily. (Patient not taking: Reported on 8/28/2024) 3 tablet 0    bisacodyl (DULCOLAX) 5 MG EC tablet Take 2 tablets at 3 pm the day before your procedure. If your procedure is before 11 am, take 2 additional tablets at 11 pm. If your procedure is after 11 am, take 2 additional tablets at 6 am. For additional instructions refer to your colonoscopy prep instructions. (Patient not taking: Reported on 10/3/2024) 4 tablet 0    calcipotriene (DOVONOX) 0.005 % external cream Mix efudex + calcipotriene equally. Apply BID x 4-10 days. Stop when skin gets red. (Patient not taking: Reported on 5/22/2024) 60 g 1    calcipotriene (DOVONOX) 0.005 % external cream Mix efudex + calcipotriene equally. Apply BID x 4-10 days. Stop when skin gets red. (Patient not taking: Reported on 5/22/2024) 60 g 1    calcium carbonate 500 mg, elemental, (OSCAL;OYSTER SHELL CALCIUM) 500 MG tablet Take 3 tablets (1,500 mg) by mouth 2 times daily (with meals) (Patient not taking: Reported on 5/22/2024) 60 tablet 0    diclofenac (VOLTAREN) 1 % topical gel Apply 2 g topically 4 times daily For R knee (Patient not taking: Reported on 8/28/2024) 100 g 3    docusate sodium (COLACE) 100 MG capsule Take 1 capsule (100 mg) by mouth 2 times daily (Patient not taking: Reported on 5/22/2024) 60 capsule 0    fluorouracil (EFUDEX) 5 % external cream Mix equally with calcipotriene cream. Apply BID x 4-10 days. Stop when skin gets red. (Patient not taking:  "Reported on 10/3/2024) 40 g 0    fluorouracil (EFUDEX) 5 % external cream Mix equally with calcipotriene cream. Apply BID x 4-10 days. Stop when skin gets red. (Patient not taking: Reported on 10/3/2024) 40 g 0    polyethylene glycol (GOLYTELY) 236 g suspension The night before the exam at 6 pm drink an 8-ounce glass every 15 minutes until the jug is half empty. If you arrive before 11 AM: Drink the other half of the Golytely jug at 11 PM night before procedure. If you arrive after 11 AM: Drink the other half of the Golytely jug at 6 AM day of procedure. For additional instructions refer to your colonoscopy prep instructions. (Patient not taking: Reported on 10/3/2024) 4000 mL 0    triamcinolone (KENALOG) 0.1 % external ointment Apply topically 2 times daily To the areas of bumps on the arms and legs (Patient not taking: Reported on 5/22/2024) 60 g 5     No current facility-administered medications for this visit.       Family History:  Family History   Problem Relation Age of Onset    Hypertension Father     C.A.D. Father     Hypertension Mother     Breast Cancer Sister     Osteoporosis Sister     EYE* Brother         Keratoconus    Glaucoma No family hx of     Macular Degeneration No family hx of     Coronary Artery Disease Father     Breast Cancer Sister     Keratoconus Brother        Social History:  Social History     Tobacco Use    Smoking status: Never    Smokeless tobacco: Never   Substance Use Topics    Alcohol use: No       ROS:  Full review of systems taken with the help of the intake sheet. Otherwise a complete 14 point review of systems was taken and is negative unless stated in the history above.    Physical Exam:   Vitals: BP (!) 152/74   Pulse 62   Ht 1.676 m (5' 6\")   Wt 52.2 kg (115 lb)   BMI 18.56 kg/m    BMI= Body mass index is 18.56 kg/m .   General: well appearing, no acute distress, pleasant and conversant,   Mental Status/neuro: alert and oriented  Face: symmetrical, normal facial " color  Eyes: anicteric, no proptosis or lid lag  Bask: kyphosis,   Neck: no goiter  Resp: normally breathing      Labs : I reviewed data from epic and extract and summarize the pertinent data here.   Lab Results   Component Value Date     07/22/2021     11/25/2019      Lab Results   Component Value Date    POTASSIUM 3.9 07/22/2021    POTASSIUM 4.0 11/25/2019     Lab Results   Component Value Date    CHLORIDE 110 07/22/2021    CHLORIDE 109 11/25/2019     Lab Results   Component Value Date    MARCK 9.1 07/22/2021    MARCK 9.1 11/25/2019     Lab Results   Component Value Date    CO2 29 07/22/2021    CO2 26 11/25/2019     Lab Results   Component Value Date    BUN 20 07/22/2021    BUN 24 11/25/2019     Lab Results   Component Value Date    CR 0.85 07/22/2021    CR 0.76 11/25/2019     Lab Results   Component Value Date    GLC 95 07/22/2021    GLC 92 11/25/2019     Lab Results   Component Value Date    TSH 2.51 04/17/2018     No results found for: T4  No results found for: A1C    No results found for: IGF1  No results found for: LH  No results found for: FSH  No results found for: ESTROGEN  No results found for: PROLACTIN        Bone density 4/11/2022: I personally reviewed the original images and agree with the below reports.   Results   Lumbar spine   T-score -3.5 ., BMD is 0.756 g/cm2.         Right femoral neck  T-score -2.7      Right total femur  T-score -3.1, BMD is 0.622 g/cm2.     Interval change  There was no change at the areas of interest compared to the prior study.     Fracture risk   Fracture risk calculation is not indicated. Ref.4

## 2025-03-12 NOTE — PATIENT INSTRUCTIONS
I think the diarrhea could be due to donepezil, so we should have Pat stop the medication for now and see if the diarrhea resolves.  If it does, great. If it doesn't, then we can restart donepezil.  Please let me know through Mychart in 15-30 days.    I am happy to hear of the extra care Pat has, and that you are enrolled in the guide program.  We will repeat the cognitive performance test to see where Pat's needs are at this time.    If new behavior issues arise, consider Seroquel use.  Contact me if this is the case.

## 2025-03-12 NOTE — LETTER
"3/12/2025       RE: Shahana Donaldson  [address] [place] [address]     Dear Colleague,    Thank you for referring your patient, Shahana Donaldson, to the [facility] NEUROLOGY [facility] Branch at [facility]. Please see a copy of my visit note below.    [facility] Neurology Follow Up Visit    Shahana Donaldson MRN# [id number]   Age: 84 year old YOB: 1940     Brief history of symptoms: The patient was initially seen in neurologic consultation on 11/7/2023 and most recently 8/28/2024 for evaluation of cognitive concerns. Please see the comprehensive neurologic consultation notes from those dates in the Epic records for details.     Overall impression was for that of Alzheimer disease related dementia.  Prior studies included:  - MoCA on 11/7/2023 of 8/30  - MRI brain 12/5/2023 showing microvascular ischemic diease and mild b/l hippocampal atrophy  - Donepezil 10 mg at bedtime continued   - OT evaluation 5/28/2024 showed a CPT score of 3.6/5.6.              - \"Safety:  24-hour supervision required for safety and for assistance with daily tasks. Assistance required with medications, and access to medication should be limited. Meals, nutrition and dietary restrictions need to be monitored.  All hazardous activities should be restricted or supervised. Should not drive. Prone to wandering and can become lost.  ADL:  Moderate functional decline. Familiar tasks usually requires set-up of supplies and directions to complete steps. May need objects handed to them for task initiation. Function best with a set schedule in familiar surroundings with familiar people. All complex tasks must be done by others. Vocabulary is diminished and speech often unfocused. \"    After our last visit, the patient was to continue with Donepezil for her Alzheimer's disease related dementia of moderate severity. I also asked her and her  to speak with " social work about further emergency planning and increasing level of care.    Interval history:  - Social work note is dated 9/3/2024.    Today, the patient is present with her  Jake.  They tell me that their son comes in twice weekly, an in-home care person come twice weekly for 4 hours, and have a niece or nephew stop in weekly.  Jake is enrolled in guide program, offering advice for primary care givers.  Pat needs help with dressing and undressing in the bathroom, and wears diapers for urinary incontinence.  She has good and bad nights, with bad nights described as Pat getting up to get dressed at 3 am or go to the bathroom.  She often can be redirected easily.  She is taking melatonin at night to help with sleep.  They are considering starting Remeron.  She uses a can and walker for most ambulation, but otherwise holds onto her  for mobility.  She has had no falls reported, but is a high fall risk.     Physical Exam:   Vitals: BP (!) 153/82 (BP Location: Right arm, Patient Position: Sitting, Cuff Size: Adult Regular)   Pulse 66   Wt 51.7 kg (114 lb)   SpO2 95%   BMI 17.89 kg/m     General: Seated comfortably in no acute distress.  Neurologic:     Mental Status: Fully alert, attentive and but not oriented. Frontal release signs noted (palmomental, luria, disinhibition with motions (when asked to tap one foot she has her entire body tap). Speech clear and fluent, no paraphasic errors. Looks to her  to answer most questions.  Is pleasant and tries to answer questions, but responses are often not accurate.     Cranial Nerves: EOMI with normal smooth pursuit. Facial movements symmetric. Hearing not formally tested but intact to conversation.  No dysarthria.     Motor: No tremors or other abnormal movements observed at rest. Mild postural tremor in arms with extension.  No notable weakness with UE or LE, as all are antigravity.  Good  and can give resistance with biceps flexion, triceps  extension, knee flexion or knee extension.     Sensory:Negative Romberg.     DTR: 2+ and symmetric UE and LE b/l.     Gait: stands up with need to use hands on chair. Normal base. Stride is short, near shuffling, and almost like a side shuffle/skip.  Turns quickly and overshoots during motion.  Sits in a quick manner and uncontrolled if no hand rails are provided.           Assessment and Plan:   Assessment:  Moderate to severe Alzheimer disease related dementia    The patient has an increased level of care provided by at home nursing, family, and her  compared to our past visit.  She is redirectable at night and doesn't require sedating medications overall. I worry her donepezil is leading to diarrhea, and would like to have her stop it to see if this issue resolves quickly.  We spoke about her weight loss, and the importance of eating together and feeding Pat if it seems she is lacking an ability to understand how to use utensils. There was an understanding that she may not necessarily have a strong drive to eat and should use nutritional supplements such as ensure with meals to help keep weight on.  We also went over Kamini's fall risk being high, and that her balance devices should be used often and in the home as well as out of the home.  Overall, we spoke about progression of dementia and that as this occurs, Kamini's needs will increase.  She may need further agents for calming, such as seroquel if she cannot be redirected at night and is having behavioral concerns, and we went over potential side effects if it was started.  We will obtain a new CPT to help guide whether at home care or other types of care are good options for the next year or two.       Plan:  - Donepezil to stop to see if diarrhea is impacted, if not we would restart for mood stabalization  - OT for CPT  - high fall risk, encouraged use of balance devices already present    Follow up in Neurology clinic in one year, or earlier as needed  should new concerns arise.    NARENDRA Malik D.O.   of Neurology    Total time today (45 min) in this patient encounter was spent on pre-charting, counseling and/or coordination of care. The longitudinal plan of care for the diagnosis(es)/condition(s) as documented were addressed during this visit. Due to the added complexity in care, I will continue to support Pat in the subsequent management and with ongoing continuity of care.        Again, thank you for allowing me to participate in the care of your patient.      Sincerely,    Liu Malik, DO

## 2025-03-19 ENCOUNTER — TRANSFERRED RECORDS (OUTPATIENT)
Dept: HEALTH INFORMATION MANAGEMENT | Facility: CLINIC | Age: 85
End: 2025-03-19
Payer: MEDICARE

## 2025-03-19 DIAGNOSIS — Z53.9 DIAGNOSIS NOT YET DEFINED: Primary | ICD-10-CM

## 2025-03-20 ENCOUNTER — TELEPHONE (OUTPATIENT)
Dept: NEUROLOGY | Facility: CLINIC | Age: 85
End: 2025-03-20
Payer: MEDICARE

## 2025-03-20 NOTE — TELEPHONE ENCOUNTER
Patient's spouse, Jake (C2C on file) confirmed scheduled appointment:  Date: 3/11/26  Time: 9am  Visit type: Return Neurology  Provider: King  Location: [facility]  Testing/imaging: NA  Additional notes: 1 year follow up    Preeti Meyer on 3/20/2025 at 3:59 PM

## 2025-03-31 ENCOUNTER — TRANSFERRED RECORDS (OUTPATIENT)
Dept: HEALTH INFORMATION MANAGEMENT | Facility: CLINIC | Age: 85
End: 2025-03-31
Payer: MEDICARE

## 2025-04-14 ENCOUNTER — THERAPY VISIT (OUTPATIENT)
Dept: OCCUPATIONAL THERAPY | Facility: CLINIC | Age: 85
End: 2025-04-14
Attending: PSYCHIATRY & NEUROLOGY
Payer: MEDICARE

## 2025-04-14 DIAGNOSIS — F03.90 MAJOR NEUROCOGNITIVE DISORDER (H): ICD-10-CM

## 2025-04-14 PROCEDURE — 96125 COGNITIVE TEST BY HC PRO: CPT | Mod: GO | Performed by: OCCUPATIONAL THERAPIST

## 2025-04-14 PROCEDURE — 97535 SELF CARE MNGMENT TRAINING: CPT | Mod: GO | Performed by: OCCUPATIONAL THERAPIST

## 2025-04-14 NOTE — PROGRESS NOTES
OCCUPATIONAL THERAPY EVALUATION  Type of Visit: Evaluation        Fall Risk Screen:  Fall screen completed by: OT  Have you fallen 2 or more times in the past year?: Yes  Have you fallen and had an injury in the past year?: No  Is patient a fall risk?: Yes    Subjective        Presenting condition or subjective complaint:    Date of onset: 04/14/25    Relevant medical history:     Dates & types of surgery:      Prior diagnostic imaging/testing results: MRI; CT scan; X-ray; Bone scan     Prior therapy history for the same diagnosis, illness or injury: Yes pt   february 2025      Living Environment  Social support: With a significant other or spouse   Type of home: House; 2-story   Stairs to enter the home: Yes 14 Is there a railing: Yes     Ramp: Yes   Stairs inside the home: Yes 14 Is there a railing: Yes     Help at home: Self Cares (home health aide/personal care attendant, family, etc); Home management tasks (cooking, cleaning); Medication and/or finances; Home and Yard maintenance tasks; Assist for driving and community activities  Equipment owned: Straight Cane; Walker; Walker with wheels; Crutches; Grab bars; Dressing equipment     Employment: Not Applicable    Hobbies/Interests:      Patient goals for therapy: walk bextter    Pain assessment: Pain denied     Objective     Cognitive Performance Test    SUMMARY OF TEST:    The Cognitive Performance Test (CPT) is a standardized performance-based assessment to measure working memory/executive function processing capacities that underlie functional performance. Subtasks include common basic and instrumental activities of daily living (ADL/IADL) which are rated based on the manner in which patients respond to task demands of varying complexity. The total CPT score describes a level of functioning that indicates how information is processed, implications for functional activities, potential safety risks and a recommended level of supervision or assist based on  cognitive function. The highest total score on this test is in the range of 5.6 to 5.8.    DATE OF TESTIN25    RESULTS OF TESTING:                                                                                         CPT Subtest Results    MEDBOX: 3. SHOP/GLOVES: 2 PHONE: 3.6   WASH:  3/5 TRAVEL: NA TOAST:    DRESS: NA   TOTAL CPT SCORE:       Average CPT Score  2.8/5.6    INTERPRETATION OF TEST RESULTS:    Based on the Cognitive Performance Test, this patient scored at CPT Level 2.5.  See CPT Levels reference below.    Summary of functional cognitive status:   Pt needed simple one step instruction for each task. She was unable to follow instructions as given. She got tangential and did not complete the task at hand, often talking nonsensical or perseverating on something we talked about earlier. She needs constant cues and assist with all tasks, including ADLs. When handed the soap and told to wash her hands, she held it but didn't know what to do with it. With the toast making task, she struggled to get the bread out of the bag, and was unable to process steps to make toast, even if told one step at a time. This correlates with her function at home,  needs to help her with all of her cares and activities.     Factors affecting performance:  Impaired mobility   Balance impairment    Recommendations:    Assist for ADL/IADL:  ADL, Meal preparation, Cleaning, Laundry, Shopping, Finances, Driving, and Medication management  Supervision for ADL/IADL:  supervision and cues for each step for all ADL and leisure activities  Supervision in living settin hour                                                       TIME ADMINISTERING TEST: 60    TIME FOR INTERPRETATION AND PREPARATION OF REPORT: 30    TOTAL TIME: 90      CPT Levels Reference: 2.5    Patient's Average CPT Score:  2.5                                                                                                                "                                   Individual scores range along a continuum as outlined below.  In addition to cognitive status, other factors may affect safety in a home environment.  Please refer to specific recommendations for this patient.    ___5.6-5.8  Normal functioning (absence of cognitive-functional disability).  Independent in managing personal affairs, monitors and directs own behavior.  Uses complex information to carry out daily activities with safety and accuracy.    Proficient with instrumental activities of daily living (IADL) and learning new activity.  Problems are anticipated, errors are avoided, and consequences of actions are considered.      ___5.0   Mild cognitive-functional disability; deficits in working memory and executive thought processes. Difficulty using complex information. Problems may be observed with recent memory, judgment, reasoning and planning ahead. May be impulsive or have difficulty anticipating consequences.  Safety:  May require assistance to plan ahead; or to manage complex medication schedules, appointments or finances.  Hazardous activities may need to be monitored or limited.  ADL:  Mild functional decline.  Able to complete basic self-care and routine household tasks.  May have difficulty with complex daily tasks such as reading, writing, meal preparation, shopping or driving.   Learns through hands on teaching. Self-centered behavior or difficulty considering the needs of others may be seen related to trouble seeing the  whole picture\". Can appear disorganized or uninhibited.    ___4.5  Mild to moderate cognitive-functional disability. Significant deficits in working memory and executive thought processes. Judgment, reasoning and planning show obvious impairment.  Distractible with inability to shift attention/actions given competing stimuli.  Difficulty with problem solving and managing details. Complex daily tasks performed with inconsistency, difficulty, or " error.     Safety:  Medications should be monitored, stove use may require supervision, and driving ability may be affected.  Impaired safety awareness with inability to anticipate potential problems.  May not recognize or respond to emergent situations. Requires frequent check-in support.   ADL:  Mild difficulty with simple everyday self-care tasks. Benefits from structured, routine activity.  Will likely need reminders to complete tasks outside of the routine. Requires assistance with planning and IADL tasks like shopping and finances. Learns concrete tasks through repetition, but performance may not generalize. Tends to be impulsive with poor insight. Self centered behavior or inability to consider the needs of others is common.    ___4.0  Moderate cognitive-functional disability; abstract to concrete thought processes. Working memory and executive function impairments are obvious. Difficulty with planning and problem solving.  Behavior is goal-directed, but unable to follow multi-step directions, is easily distracted, and may not recognize mistakes.  Inability to anticipate hazards or understand precautions.  Safety:  Recommend 24-hour supervision for safety. Supervision needed for medication management and for hazardous activities. May not be able to follow a restricted diet. Can get lost in unfamiliar surroundings. Generally, persons functioning at level 4 should not be driving.   ADL:  Some decline in quality or frequency of ADL.  Novi enhanced by use of a routine, simple concrete directions, and caregiver set-up of needed items. Complex tasks such as money or home management typically requires assistance.  Relies heavily on vision to guide behavior; will ignore objects/hazards not in plain sight and can be distracted by irrelevant objects. Often has poor insight.  Able to carry out social conversation and may verbally  cover  for deficits leading caregivers to believe they are capable of functioning  independently.       ___3.5  Moderate cognitive-functional disability; increased cues needed for task completion. Aware of concrete task steps but needs prompting or cues to initiate and complete simple tasks. Attention span is limited, simple directions may need to be repeated, and re-focus to a topic or task may be required.  Safety:  24-hour supervision required for safety and for assistance with daily tasks. Assistance required with medications, and access to medication should be limited. Meals, nutrition and dietary restrictions need to be monitored.  All hazardous activities should be restricted or supervised. Should not drive. Prone to wandering and can become lost.  ADL:  Moderate functional decline. Familiar tasks usually requires set-up of supplies and directions to complete steps. May need objects handed to them for task initiation. Function best with a set schedule in familiar surroundings with familiar people. All complex tasks must be done by others. Vocabulary is diminished and speech often unfocused.     _X_ 3.0 and below  Moderate to severe functional decline; unable to complete self-care tasks without assist -  Can handle and use objects, but is unaware of task goals or outcomes. Caregivers are needed to give direction throughout task and set supplies in front of the person.  The person may need verbal cues, demonstration, and hand-over-hand assist. Use short, direct words, such as  Put on your shirt.  Routine and structure is important.  Resisting care may occur as the person is confused about the world around them. Conversation is typically tangential and may not make sense or seem relevant. Cannot learn new information. Not oriented to time (ex. month, day of week) or place. Sensory deprivation; May benefit from sensory activity programs.         Assessment & Plan   CLINICAL IMPRESSIONS  Medical Diagnosis: Major neurocognitive disorder    Treatment Diagnosis: cognitive decline     Impression/Assessment: Pt is a 84 year old female presenting to Occupational Therapy due to cognitive decline.  The following significant findings have been identified: Impaired activity tolerance, Impaired balance, Impaired cognition, Impaired mobility, Impaired safety/judgement, and Impaired strength.  These identified deficits interfere with their ability to perform self care tasks, recreational activities, household mobility, and community mobility as compared to previous level of function.     Recommendations: Pt has declined in the last year (was 3.5, now 2.5), and is now severely impaired. Her current living situation seems appropriate, as long as her  is able to care for her. Recommend increasing homecare, or adding a day program for increased stimulation for her and respite for him. She would also benefit from PT to help her ambulation and balance, as she is very unsteady. If her care becomes too much for her  to manage, she will need a memory care facility. No further OT or future CPTs are necessary.     Clinical Decision Making (Complexity):  Assessment of Occupational Performance: 3-5 Performance Deficits  Occupational Performance Limitations: bathing/showering, toileting, dressing, feeding, functional mobility, personal device care, hygiene and grooming, communication management, health management and maintenance, home establishment and management, meal preparation and cleanup, sleep, and leisure activities  Clinical Decision Making (Complexity): Moderate complexity    PLAN OF CARE  Treatment Interventions:  Interventions: Self-Care/Home Management    Long Term Goals   OT Goal 1  Goal Identifier: Caregiver support  Goal Description: Instruct caregiver on the level of care she needs at this level of cognitive funciton.  Rationale: In order to maximize safety and independence with performance of self-care activities  Goal Progress: goal met  Target Date: 04/14/25  Date Met:  04/14/25      Frequency of Treatment: one time visit  Duration of Treatment: one time visit     Recommended Referrals to Other Professionals: Physical Therapy for ambulation and LE strength  Education Assessment: Learner/Method: Patient;Significant Other;Listening  Education Comments:  was able to discuss and understand, but pt was unable to have a conversation or process what was said.     Risks and benefits of evaluation/treatment have been explained.   Patient/Family/caregiver agrees with Plan of Care.     Evaluation Time:         Signing Clinician: JUAN Olguin [facility]                                                                                   OUTPATIENT OCCUPATIONAL THERAPY      PLAN OF TREATMENT FOR OUTPATIENT REHABILITATION   Patient's Last Name, First Name, Shahana Aldana YOB: 1940   Provider's Name   [facility]   Medical Record No.  [id number]     Onset Date: 04/14/25 Start of Care Date: 04/14/25     Medical Diagnosis:  Major neurocognitive disorder      OT Treatment Diagnosis:  cognitive decline Plan of Treatment  Frequency/Duration:one time visit/one time visit    Certification date from 04/14/25   To 04/14/25        See note for plan of treatment details and functional goals     JUAN Olguin                         I CERTIFY THE NEED FOR THESE SERVICES FURNISHED UNDER        THIS PLAN OF TREATMENT AND WHILE UNDER MY CARE     (Physician attestation of this document indicates review and certification of the therapy plan).              Referring Provider:  Liu Malik    Initial Assessment  See Epic Evaluation- 04/14/25

## 2025-04-18 ENCOUNTER — APPOINTMENT (OUTPATIENT)
Dept: CT IMAGING | Facility: CLINIC | Age: 85
End: 2025-04-18
Attending: EMERGENCY MEDICINE
Payer: MEDICARE

## 2025-04-18 ENCOUNTER — HOSPITAL ENCOUNTER (EMERGENCY)
Facility: CLINIC | Age: 85
Discharge: HOME | End: 2025-04-18
Attending: EMERGENCY MEDICINE | Admitting: EMERGENCY MEDICINE
Payer: MEDICARE

## 2025-04-18 VITALS
HEART RATE: 78 BPM | DIASTOLIC BLOOD PRESSURE: 71 MMHG | OXYGEN SATURATION: 97 % | TEMPERATURE: 97.8 F | SYSTOLIC BLOOD PRESSURE: 121 MMHG | RESPIRATION RATE: 16 BRPM

## 2025-04-18 DIAGNOSIS — S09.90XA INJURY OF HEAD, INITIAL ENCOUNTER: ICD-10-CM

## 2025-04-18 LAB
ANION GAP SERPL CALCULATED.3IONS-SCNC: 8 MMOL/L (ref 7–15)
ATRIAL RATE - MUSE: 65 BPM
BASOPHILS # BLD AUTO: 0 10E3/UL (ref 0–0.2)
BASOPHILS NFR BLD AUTO: 0 %
BUN SERPL-MCNC: 23.1 MG/DL (ref 8–23)
CALCIUM SERPL-MCNC: 8.8 MG/DL (ref 8.8–10.4)
CHLORIDE SERPL-SCNC: 103 MMOL/L (ref 98–107)
CREAT SERPL-MCNC: 0.75 MG/DL (ref 0.51–0.95)
DIASTOLIC BLOOD PRESSURE - MUSE: NORMAL MMHG
EGFRCR SERPLBLD CKD-EPI 2021: 78 ML/MIN/1.73M2
EOSINOPHIL # BLD AUTO: 0 10E3/UL (ref 0–0.7)
EOSINOPHIL NFR BLD AUTO: 0 %
ERYTHROCYTE [DISTWIDTH] IN BLOOD BY AUTOMATED COUNT: 15.2 % (ref 10–15)
GLUCOSE SERPL-MCNC: 105 MG/DL (ref 70–99)
HCO3 SERPL-SCNC: 26 MMOL/L (ref 22–29)
HCT VFR BLD AUTO: 35.4 % (ref 35–47)
HGB BLD-MCNC: 11.1 G/DL (ref 11.7–15.7)
IMM GRANULOCYTES # BLD: 0 10E3/UL
IMM GRANULOCYTES NFR BLD: 0 %
INTERPRETATION ECG - MUSE: NORMAL
LYMPHOCYTES # BLD AUTO: 0.9 10E3/UL (ref 0.8–5.3)
LYMPHOCYTES NFR BLD AUTO: 13 %
MCH RBC QN AUTO: 28.3 PG (ref 26.5–33)
MCHC RBC AUTO-ENTMCNC: 31.4 G/DL (ref 31.5–36.5)
MCV RBC AUTO: 90 FL (ref 78–100)
MONOCYTES # BLD AUTO: 0.8 10E3/UL (ref 0–1.3)
MONOCYTES NFR BLD AUTO: 11 %
NEUTROPHILS # BLD AUTO: 5.3 10E3/UL (ref 1.6–8.3)
NEUTROPHILS NFR BLD AUTO: 76 %
NRBC # BLD AUTO: 0 10E3/UL
NRBC BLD AUTO-RTO: 0 /100
P AXIS - MUSE: 58 DEGREES
PLATELET # BLD AUTO: 365 10E3/UL (ref 150–450)
POTASSIUM SERPL-SCNC: 4.1 MMOL/L (ref 3.4–5.3)
PR INTERVAL - MUSE: 168 MS
QRS DURATION - MUSE: 84 MS
QT - MUSE: 416 MS
QTC - MUSE: 432 MS
R AXIS - MUSE: 43 DEGREES
RBC # BLD AUTO: 3.92 10E6/UL (ref 3.8–5.2)
SODIUM SERPL-SCNC: 137 MMOL/L (ref 135–145)
SYSTOLIC BLOOD PRESSURE - MUSE: NORMAL MMHG
T AXIS - MUSE: 75 DEGREES
TROPONIN T SERPL HS-MCNC: 24 NG/L
TROPONIN T SERPL HS-MCNC: 29 NG/L
VENTRICULAR RATE- MUSE: 65 BPM
WBC # BLD AUTO: 7 10E3/UL (ref 4–11)

## 2025-04-18 PROCEDURE — 85004 AUTOMATED DIFF WBC COUNT: CPT | Performed by: EMERGENCY MEDICINE

## 2025-04-18 PROCEDURE — 84484 ASSAY OF TROPONIN QUANT: CPT | Performed by: EMERGENCY MEDICINE

## 2025-04-18 PROCEDURE — 12001 RPR S/N/AX/GEN/TRNK 2.5CM/<: CPT | Performed by: EMERGENCY MEDICINE

## 2025-04-18 PROCEDURE — 99285 EMERGENCY DEPT VISIT HI MDM: CPT | Mod: 25 | Performed by: EMERGENCY MEDICINE

## 2025-04-18 PROCEDURE — 36415 COLL VENOUS BLD VENIPUNCTURE: CPT | Performed by: EMERGENCY MEDICINE

## 2025-04-18 PROCEDURE — 80048 BASIC METABOLIC PNL TOTAL CA: CPT | Performed by: EMERGENCY MEDICINE

## 2025-04-18 PROCEDURE — 70450 CT HEAD/BRAIN W/O DYE: CPT

## 2025-04-18 PROCEDURE — 93005 ELECTROCARDIOGRAM TRACING: CPT | Performed by: EMERGENCY MEDICINE

## 2025-04-18 PROCEDURE — 70450 CT HEAD/BRAIN W/O DYE: CPT | Mod: 26 | Performed by: RADIOLOGY

## 2025-04-18 PROCEDURE — 72125 CT NECK SPINE W/O DYE: CPT

## 2025-04-18 PROCEDURE — 250N000009 HC RX 250: Performed by: EMERGENCY MEDICINE

## 2025-04-18 PROCEDURE — 72125 CT NECK SPINE W/O DYE: CPT | Mod: 26 | Performed by: RADIOLOGY

## 2025-04-18 PROCEDURE — 99284 EMERGENCY DEPT VISIT MOD MDM: CPT | Performed by: EMERGENCY MEDICINE

## 2025-04-18 RX ADMIN — Medication 3 ML: at 08:50

## 2025-04-18 ASSESSMENT — ACTIVITIES OF DAILY LIVING (ADL)
ADLS_ACUITY_SCORE: 44

## 2025-04-18 ASSESSMENT — VISUAL ACUITY: OU: 1

## 2025-04-18 NOTE — ED NOTES
Bed: [facility]-  Expected date:   Expected time:   Means of arrival:   Comments:  ED Room

## 2025-04-18 NOTE — DISCHARGE INSTRUCTIONS
I was very happy to see that the CAT scan of your head demonstrates no sign of stroke, intracranial bleeding or skull fracture.  Your wound was closed primarily with 3 staples.  You may apply a topical triple antibiotic such as Neosporin or bacitracin once to twice daily while healing.  Staples should remain in place for approximately 7 to 10 days and may be removed here with the PCP.  Should you notice any change, progression or any worsening symptoms please call or return emergently for reevaluation.

## 2025-04-18 NOTE — ED PROVIDER NOTES
ED PROVIDER NOTE  April 18, 2025  History     Chief Complaint   Patient presents with    Fall    Head Injury    Laceration     HPI  Shahana Donaldson is a 84 year old female who has a history significant for scoliosis, senile osteoporosis, moderate to severe dementia.  She arrives today to the emergency department with  due to concern of ground-level fall with head injury.  This was unwitnessed and patient does not recall events in detail.  Per  she has had no medical concerns the past few days including no nasal congestion, rhinorrhea, headache, neck pain, chest pain, fever, chills, shortness of breath or cough.  She has been in her normal state of health.  He believes the patient attempted get up at night and fell backwards striking her head against the desk.  Patient at this time unable to state if she has any pain.  She denies pain in her C, T, L-spine pain.  She reports no weakness.  No knowledge of headache per patient.      Past Medical History  Past Medical History:   Diagnosis Date    Diverticulosis of colon (without mention of hemorrhage)     Essential hypertension, benign     Gastro-oesophageal reflux disease     nissen    Hemorrhoid     Nonsenile cataract     Osteoporosis     Other and unspecified hyperlipidemia     Rectocele     Symptomatic menopausal or female climacteric states      Past Surgical History:   Procedure Laterality Date    ARTHROPLASTY HIP Left 10/23/2018    Procedure: LEFT HIP COLEMAN- ARTHROPLASTY ;  Surgeon: Araceli Gutierrez MD;  Location: [facility] OR    CATARACT IOL, RT/LT Left 07/15/2019    COLONOSCOPY  5/24/2011    Procedure:COLONOSCOPY; Surgeon:HALINA SCOTT; Location:[facility]    COLONOSCOPY Left 11/10/2015    Procedure: COLONOSCOPY;  Surgeon: Raheem Colunga MD;  Location:  GI    COLONOSCOPY  5/24/11, 11/10/2015    COLONOSCOPY N/A 8/29/2024    Procedure: Colonoscopy;  Surgeon: Gareth Clay MD;  Location:  GI    DILATION AND CURETTAGE       secondary to menorrhagia    GYN SURGERY      hysterectomy/oopherectomy; done for prolapse    HEMIARTHROPLASTY HIP Left 10/23/2018    HYSTERECTOMY      hysterectomy/oopherectomy; done for prolapse    LAPAROSCOPIC NISSEN FUNDOPLICATION  1/7/2013    Procedure: LAPAROSCOPIC NISSEN FUNDOPLICATION;  Laparoscopic Repair of Hiatel Hernia, NISSEN, Esophagoscopy, Gastroscopy And Duodenoscopy ;  Surgeon: Leonidas Valle MD;  Location: [facility] OR    LAPAROSCOPIC NISSEN FUNDOPLICATION  01/07/2013    PHACOEMULSIFICATION WITH STANDARD INTRAOCULAR LENS IMPLANT Left 7/15/2019    Procedure: Left Eye Phacoemulsification with Intraocular Lens;  Surgeon: Josue Trujillo MD;  Location: [facility] OR    TUBAL LIGATION Bilateral     ZZC NONSPECIFIC PROCEDURE      Bilateral Tubal Ligation    ZZC NONSPECIFIC PROCEDURE      D&C secondary to menorrhagia    ZZC NONSPECIFIC PROCEDURE      child birth x 2     Acetaminophen (TYLENOL PO)  ammonium lactate (LAC-HYDRIN) 12 % external lotion  atorvastatin (LIPITOR) 10 MG tablet  augmented betamethasone dipropionate (DIPROLENE-AF) 0.05 % external ointment  azithromycin (ZITHROMAX) 500 MG tablet  bisacodyl (DULCOLAX) 5 MG EC tablet  calcipotriene (DOVONOX) 0.005 % external cream  calcipotriene (DOVONOX) 0.005 % external cream  calcium carbonate 500 mg, elemental, (OSCAL;OYSTER SHELL CALCIUM) 500 MG tablet  calcium citrate (CITRACAL) 950 (200 Ca) MG tablet  cholecalciferol (VITAMIN D3) 1000 UNIT tablet  clobetasol (TEMOVATE) 0.05 % external ointment  clobetasol (TEMOVATE) 0.05 % external ointment  clobetasol (TEMOVATE) 0.05 % ointment  diclofenac (VOLTAREN) 1 % topical gel  diltiazem ER COATED BEADS (CARDIZEM CD/CARTIA XT) 300 MG 24 hr capsule  docusate sodium (COLACE) 100 MG capsule  fluorouracil (EFUDEX) 5 % external cream  fluorouracil (EFUDEX) 5 % external cream  loperamide (IMODIUM) 2 MG capsule  polyethylene glycol (GOLYTELY) 236 g suspension  triamcinolone (KENALOG) 0.1 % external  ointment      Allergies   Allergen Reactions    Dilaudid [Hydromorphone]      dizziness    Senna Hives     Family History  Family History   Problem Relation Age of Onset    Hypertension Father     C.A.D. Father     Hypertension Mother     Breast Cancer Sister     Osteoporosis Sister     EYE* Brother         Keratoconus    Glaucoma No family hx of     Macular Degeneration No family hx of     Coronary Artery Disease Father     Breast Cancer Sister     Keratoconus Brother      Social History   Social History     Tobacco Use    Smoking status: Never    Smokeless tobacco: Never   Substance Use Topics    Alcohol use: No    Drug use: No         A medically appropriate review of systems was performed with pertinent positives and negatives noted in the HPI, and all other systems negative.      Physical Exam   BP: 111/66  Pulse: 71  Temp: 97.8  F (36.6  C)  Resp: 18  SpO2: 95 %      Physical Exam  Vitals and nursing note reviewed.   Constitutional:       Appearance: Normal appearance. She is not ill-appearing, toxic-appearing or diaphoretic.      Interventions: She is not intubated.  HENT:      Head: Normocephalic.        Comments: 1 cm scalp laceration extending through the dermis no active bleeding or gross contamination.  No underlying bony depression or crepitus.     Right Ear: External ear normal. No hemotympanum.      Left Ear: External ear normal. No hemotympanum.      Nose: Nose normal. No nasal tenderness.      Right Nostril: No epistaxis or septal hematoma.      Left Nostril: No septal hematoma.      Mouth/Throat:      Lips: No lesions.      Mouth: No injury or oral lesions.      Pharynx: Oropharynx is clear.   Eyes:      General: Lids are normal. Vision grossly intact.      Extraocular Movements: Extraocular movements intact.   Neck:      Trachea: Phonation normal.   Cardiovascular:      Rate and Rhythm: Normal rate and regular rhythm.      Pulses: Normal pulses. No decreased pulses.      Heart sounds: Normal heart  sounds.   Pulmonary:      Effort: Pulmonary effort is normal. No tachypnea, accessory muscle usage, prolonged expiration or respiratory distress. She is not intubated.      Breath sounds: Normal breath sounds. No wheezing, rhonchi or rales.   Abdominal:      General: Abdomen is flat. There is no distension.      Palpations: Abdomen is soft.      Tenderness: There is no abdominal tenderness.   Musculoskeletal:      Right shoulder: No deformity or tenderness. Normal range of motion.      Left shoulder: No deformity or tenderness. Normal range of motion.      Right upper arm: No tenderness.      Left upper arm: No tenderness.      Right elbow: No deformity. Normal range of motion. No tenderness.      Left elbow: No deformity. Normal range of motion. No tenderness.      Right wrist: No tenderness or bony tenderness. Normal range of motion.      Left wrist: No tenderness or bony tenderness. Normal range of motion.      Right hand: No tenderness or bony tenderness.      Left hand: No tenderness or bony tenderness.      Cervical back: Full passive range of motion without pain and normal range of motion. No signs of trauma or crepitus. No pain with movement.      Thoracic back: No tenderness. Normal range of motion.      Lumbar back: No tenderness. Normal range of motion.      Right hip: No tenderness. Normal range of motion.      Left hip: No tenderness. Normal range of motion.      Right knee: No bony tenderness. Normal range of motion. No tenderness.      Left knee: No bony tenderness. Normal range of motion. No tenderness.      Right ankle: No swelling, deformity or ecchymosis. No tenderness. Normal range of motion.      Left ankle: No swelling, deformity or ecchymosis. No tenderness. Normal range of motion.   Skin:     General: Skin is warm.      Capillary Refill: Capillary refill takes less than 2 seconds.      Findings: No abrasion, bruising or ecchymosis.   Neurological:      Mental Status: She is alert.      GCS:  GCS eye subscore is 4. GCS verbal subscore is 5. GCS motor subscore is 6.      Cranial Nerves: No cranial nerve deficit.      Sensory: Sensation is intact.      Motor: Motor function is intact. No weakness.      Coordination: Coordination is intact.   Psychiatric:         Attention and Perception: Attention normal.         Speech: Speech normal.         Cognition and Memory: Cognition and memory normal.         ED [facility]    -Laceration Repair    Date/Time: 4/18/2025 11:12 AM    Performed by: Dontae Mahoney MD  Authorized by: Dontae Mahoney MD    Risks, benefits and alternatives discussed.      ANESTHESIA (see MAR for exact dosages):     Anesthesia method:  Topical application    Topical anesthetic:  LET  LACERATION DETAILS     Location:  Scalp    Scalp location:  Mid-scalp    Length (cm):  1    REPAIR TYPE:     Repair type:  Simple    EXPLORATION:     Wound extent: no signs of injury and no underlying fracture      Contaminated: no      TREATMENT:     Area cleansed with:  Saline    Amount of cleaning:  Standard    Irrigation solution:  Sterile saline    Irrigation volume:  250    Irrigation method:  Syringe    Visualized foreign bodies/material removed: no      SKIN REPAIR     Repair method:  Staples    Number of staples:  3    APPROXIMATION     Approximation:  Close    POST-PROCEDURE DETAILS     Dressing:  Open (no dressing)      PROCEDURE    Patient Tolerance:  Patient tolerated the procedure well with no immediate complications           No results found for this or any previous visit (from the past 24 hours).  Medications - No data to display          Critical care was not performed.     Medical Decision Making  The patient's presentation was of moderate complexity (an acute complicated injury).    The patient's evaluation involved:  review of external note(s) from 2 sources (see separate area of note for details)  review of 3+ test  result(s) ordered prior to this encounter (see separate area of note for details)  ordering and/or review of 3+ test(s) in this encounter (see separate area of note for details)      Assessments & Plan (with Medical Decision Making)     Shahana Donaldson is a 84 year old female who has a history significant for scoliosis, senile osteoporosis, moderate to severe dementia.  She arrives today to the emergency department with  due to concern of ground-level fall with head injury.   Upon arrival patient noted to be protecting airway and phonating.  No sign of increased work of breath or evidence of chest trauma.  She is moving air well without signs of increased work of breathing.  GCS 15.  She does have evidence of a 1 cm scalp laceration midline of the posterior parietal scalp.  No active bleeding.  I would plan for irrigation, left and primary closure.  Secondary to age and head trauma I would plan for CT imaging.    CT imaging of the head demonstrate no sign of acute intracranial process, bleeding, stroke, or skull fracture.  Wound irrigated thoroughly and closed primarily with 3 interrupted staples.  Discussed close clinical monitoring of symptoms at home and outpatient follow-up for timing of staple removal as well as signs of infection.  Laboratory studies reviewed demonstrate no significant abnormality.  Minimal elevation in troponin with delta level completed.  Very low suspicion for ACS but counseled  on indications for continued surveillance and follow-up.    I have reviewed the nursing notes.    I have reviewed the findings, diagnosis, plan and need for follow up with the patient.    New Prescriptions    No medications on file       Final diagnoses:   None       NELSON MURDOCK MD    4/18/2025   [facility] EMERGENCY DEPARTMENT     Nelson Murdock MD  04/18/25 [id number]

## 2025-04-18 NOTE — ED TRIAGE NOTES
Arrives by w/c with a unwitnessed fall. Per  she hit her head on the table and was found on the ground. Denies being on thinners. Denies pain. Has a head laceration to back of her head.    Hx Dementia     Triage Assessment (Adult)       Row Name 04/18/25 0747          Triage Assessment    Airway WDL WDL        Respiratory WDL    Respiratory WDL WDL        Skin Circulation/Temperature WDL    Skin Circulation/Temperature WDL WDL        Cardiac WDL    Cardiac WDL WDL        Peripheral/Neurovascular WDL    Peripheral Neurovascular WDL WDL        Cognitive/Neuro/Behavioral WDL    Cognitive/Neuro/Behavioral WDL WDL

## 2025-04-22 ENCOUNTER — TRANSFERRED RECORDS (OUTPATIENT)
Dept: HEALTH INFORMATION MANAGEMENT | Facility: CLINIC | Age: 85
End: 2025-04-22
Payer: MEDICARE

## 2025-04-23 ENCOUNTER — MYC MEDICAL ADVICE (OUTPATIENT)
Dept: INTERNAL MEDICINE | Facility: CLINIC | Age: 85
End: 2025-04-23
Payer: MEDICARE

## 2025-04-28 ENCOUNTER — TRANSFERRED RECORDS (OUTPATIENT)
Dept: HEALTH INFORMATION MANAGEMENT | Facility: CLINIC | Age: 85
End: 2025-04-28
Payer: MEDICARE

## 2025-05-05 ENCOUNTER — TRANSFERRED RECORDS (OUTPATIENT)
Dept: HEALTH INFORMATION MANAGEMENT | Facility: CLINIC | Age: 85
End: 2025-05-05
Payer: MEDICARE

## 2025-05-07 ENCOUNTER — TRANSFERRED RECORDS (OUTPATIENT)
Dept: HEALTH INFORMATION MANAGEMENT | Facility: CLINIC | Age: 85
End: 2025-05-07
Payer: MEDICARE

## 2025-05-23 ENCOUNTER — APPOINTMENT (OUTPATIENT)
Dept: LAB | Facility: CLINIC | Age: 85
End: 2025-05-23
Attending: INTERNAL MEDICINE
Payer: MEDICARE

## 2025-05-23 ENCOUNTER — RESULTS FOLLOW-UP (OUTPATIENT)
Dept: INTERNAL MEDICINE | Facility: CLINIC | Age: 85
End: 2025-05-23

## 2025-06-09 ENCOUNTER — TRANSFERRED RECORDS (OUTPATIENT)
Dept: HEALTH INFORMATION MANAGEMENT | Facility: CLINIC | Age: 85
End: 2025-06-09
Payer: MEDICARE

## 2025-06-09 ENCOUNTER — DOCUMENTATION ONLY (OUTPATIENT)
Dept: INTERNAL MEDICINE | Facility: CLINIC | Age: 85
End: 2025-06-09
Payer: MEDICARE

## 2025-06-09 NOTE — PROGRESS NOTES
Type of Form Received: [facility] Orders for Admission    Form Received (Date) 6/6/25   Form Filled out Yes, faxed 6/12   Placed in provider folder Yes

## 2025-06-11 ENCOUNTER — TELEPHONE (OUTPATIENT)
Dept: INTERNAL MEDICINE | Facility: CLINIC | Age: 85
End: 2025-06-11
Payer: MEDICARE

## 2025-06-11 ENCOUNTER — MEDICAL CORRESPONDENCE (OUTPATIENT)
Dept: HEALTH INFORMATION MANAGEMENT | Facility: CLINIC | Age: 85
End: 2025-06-11
Payer: MEDICARE

## 2025-06-11 DIAGNOSIS — Z74.09 MOBILITY IMPAIRED: Primary | ICD-10-CM

## 2025-06-11 NOTE — TELEPHONE ENCOUNTER
[facility] Call Center    Phone Message    May a detailed message be left on voicemail: yes     Reason for Call: Other: Sariah states patient is moving there on Friday and they need orders signed by Dr. Curran and she has faxed over multiple times on 06/06, 06/09 and 06/10 with no response. Writer did confirm fax number with her. Please watch out for orders and fill them out ASAP before patient get's there. If they don't get them, it will delay her moving in. Please call with questions.

## 2025-06-11 NOTE — TELEPHONE ENCOUNTER
Spoke with Sariah that Dr Curran has the paperwork and is working on it. He needs to put in any orders for PT/OT and this will be faxed as well as visit notes, medication list and ICD-10 list.

## 2025-06-12 ENCOUNTER — DOCUMENTATION ONLY (OUTPATIENT)
Dept: INTERNAL MEDICINE | Facility: CLINIC | Age: 85
End: 2025-06-12
Payer: MEDICARE

## 2025-06-12 NOTE — PROGRESS NOTES
Type of Form Received: [facility] Request for Updates to Med List    Form Received (Date) 6/12/25   Form Filled out No   Placed in provider folder Yes

## 2025-06-13 ENCOUNTER — TELEPHONE (OUTPATIENT)
Dept: INTERNAL MEDICINE | Facility: CLINIC | Age: 85
End: 2025-06-13
Payer: MEDICARE

## 2025-06-13 NOTE — TELEPHONE ENCOUNTER
[facility] Call Center    Phone Message    May a detailed message be left on voicemail: yes     Reason for Call: Medication Question or concern regarding medication   Prescription Clarification  Name of Medication: clobetasol (TEMOVATE) 0.05 % external ointment [21125] (Order [id number]   Prescribing Provider: Bina   Pharmacy: [facility]   What on the order needs clarification? Advise on if patient should get cream,foam?       Action Taken: Message routed to:  [facility]    Travel Screening: Not Applicable     Date of Service:

## 2025-06-14 ENCOUNTER — HEALTH MAINTENANCE LETTER (OUTPATIENT)
Age: 85
End: 2025-06-14

## 2025-06-17 NOTE — TELEPHONE ENCOUNTER
Assuming this is for her LS?  Ointment is appropriate  Use 2x weekly to vulva, increase prn for flares

## 2025-07-26 ENCOUNTER — HEALTH MAINTENANCE LETTER (OUTPATIENT)
Age: 85
End: 2025-07-26

## (undated) DEVICE — SUCTION IRR SYSTEM W/O TIP INTERPULSE HANDPIECE 0210-100-000

## (undated) DEVICE — SU MONOCRYL 2-0 SH 27" UND Y417H

## (undated) DEVICE — LINEN TOWEL PACK X5 5464

## (undated) DEVICE — DRSG TEGADERM 4X4 3/4" 1626W

## (undated) DEVICE — GLOVE PROTEXIS MICRO 7.5  2D73PM75

## (undated) DEVICE — DRAPE CONVERTORS U-DRAPE 60X72" 8476

## (undated) DEVICE — IMM PILLOW ABDUCT HIP MED 31143061

## (undated) DEVICE — EYE PACK CUSTOM ANTERIOR 30DEG TIP CENTURION PPK6682-04

## (undated) DEVICE — EYE TIP IRRIGATION & ASPIRATION POLYMER CVD 0.3MM 8065751512

## (undated) DEVICE — GLOVE ESTEEM BLUE W/NEU-THERA 7.5  2D73PB75

## (undated) DEVICE — EYE KNIFE SLIT XSTAR VISITEC 2.6MM 45DEG 373726

## (undated) DEVICE — PACK GOWN 3/PK DISP XL SBA32GPFCB

## (undated) DEVICE — TUBING SUCTION 10'X3/16" N510

## (undated) DEVICE — SYR 50ML LL W/O NDL 309653

## (undated) DEVICE — GLOVE PROTEXIS POWDER FREE 7.5 ORTHOPEDIC 2D73ET75

## (undated) DEVICE — DRAPE IOBAN INCISE 23X17" 6650EZ

## (undated) DEVICE — SUCTION MANIFOLD DORNOCH ULTRA CART UL-CL500

## (undated) DEVICE — DEVICE RETRIEVER HEWSON 71111579

## (undated) DEVICE — SU VICRYL 0 CT-1 27" UND J260H

## (undated) DEVICE — PACK TOTAL HIP W/POUCH SOP15HPFSM

## (undated) DEVICE — SU MONOCRYL 3-0 PS-1 27" Y936H

## (undated) DEVICE — EYE CANN IRR 27GA ANTERIOR CHAMBER 581280

## (undated) DEVICE — EYE KNIFE STILETTO VISITEC 1.1MM ANG 45DEG SIDEPORT 376620

## (undated) DEVICE — SU ETHIBOND 0 CT-1 CR 8X18" CX21D

## (undated) DEVICE — ESU GROUND PAD ADULT W/CORD E7507

## (undated) DEVICE — DRAPE C-ARM 41X74"

## (undated) DEVICE — GOWN REINFORCED XXLG 9071

## (undated) DEVICE — BLADE SAW RECIP STRK 70X6X0.025MM 0277-096-250S5

## (undated) DEVICE — SOL WATER IRRIG 1000ML BOTTLE 2F7114

## (undated) DEVICE — SUCTION TIP YANKAUER STR K87

## (undated) DEVICE — EYE SHIELD PLASTIC

## (undated) DEVICE — DRSG AQUACEL AG 3.5X9.75" HYDROFIBER 412011

## (undated) DEVICE — DRSG STERI STRIP 1/2X4" R1547

## (undated) DEVICE — PACK CATARACT CUSTOM ASC SEY15CPUMC

## (undated) DEVICE — SU VICRYL 2-0 CT-1 27" UND J259H

## (undated) DEVICE — LINEN TOWEL PACK X30 5481

## (undated) DEVICE — TAPE MEASURE PAPER 36" LF NI14-1300

## (undated) DEVICE — DRAPE SHEET REV FOLD 3/4 9349

## (undated) DEVICE — LINEN TOWEL PACK X6 WHITE 5487

## (undated) DEVICE — EYE CANN IRR 25GA CYSTOTOME 581610

## (undated) DEVICE — SOL NACL 0.9% IRRIG 1000ML BOTTLE 2F7124

## (undated) RX ORDER — CEFAZOLIN SODIUM 2 G/100ML
INJECTION, SOLUTION INTRAVENOUS
Status: DISPENSED
Start: 2018-10-23

## (undated) RX ORDER — FENTANYL CITRATE 50 UG/ML
INJECTION, SOLUTION INTRAMUSCULAR; INTRAVENOUS
Status: DISPENSED
Start: 2018-10-23

## (undated) RX ORDER — GABAPENTIN 100 MG/1
CAPSULE ORAL
Status: DISPENSED
Start: 2018-10-23

## (undated) RX ORDER — FENTANYL CITRATE 50 UG/ML
INJECTION, SOLUTION INTRAMUSCULAR; INTRAVENOUS
Status: DISPENSED
Start: 2019-07-15

## (undated) RX ORDER — FENTANYL CITRATE 50 UG/ML
INJECTION, SOLUTION INTRAMUSCULAR; INTRAVENOUS
Status: DISPENSED
Start: 2024-08-29

## (undated) RX ORDER — ACETAMINOPHEN 325 MG/1
TABLET ORAL
Status: DISPENSED
Start: 2018-10-23

## (undated) RX ORDER — ONDANSETRON 2 MG/ML
INJECTION INTRAMUSCULAR; INTRAVENOUS
Status: DISPENSED
Start: 2018-10-23

## (undated) RX ORDER — ACETAMINOPHEN 325 MG/1
TABLET ORAL
Status: DISPENSED
Start: 2019-07-15

## (undated) RX ORDER — ALBUMIN, HUMAN INJ 5% 5 %
SOLUTION INTRAVENOUS
Status: DISPENSED
Start: 2018-10-23